# Patient Record
Sex: FEMALE | Race: WHITE | NOT HISPANIC OR LATINO | Employment: OTHER | URBAN - METROPOLITAN AREA
[De-identification: names, ages, dates, MRNs, and addresses within clinical notes are randomized per-mention and may not be internally consistent; named-entity substitution may affect disease eponyms.]

---

## 2017-01-03 DIAGNOSIS — C50.911 BREAST CARCINOMA, FEMALE, RIGHT: ICD-10-CM

## 2017-01-03 RX ORDER — ANASTROZOLE 1 MG/1
TABLET ORAL
Qty: 90 TABLET | Refills: 3 | Status: SHIPPED | OUTPATIENT
Start: 2017-01-03 | End: 2017-10-18 | Stop reason: SDUPTHER

## 2017-01-10 DIAGNOSIS — R06.09 DOE (DYSPNEA ON EXERTION): ICD-10-CM

## 2017-01-10 DIAGNOSIS — I10 ESSENTIAL HYPERTENSION: ICD-10-CM

## 2017-01-10 DIAGNOSIS — D33.2 BENIGN NEOPLASM OF BRAIN, UNSPECIFIED BRAIN REGION: ICD-10-CM

## 2017-01-10 DIAGNOSIS — I48.19 PERSISTENT ATRIAL FIBRILLATION: ICD-10-CM

## 2017-01-10 DIAGNOSIS — I38 VALVULAR REGURGITATION: ICD-10-CM

## 2017-01-10 DIAGNOSIS — C50.519 MALIGNANT NEOPLASM OF LOWER-OUTER QUADRANT OF FEMALE BREAST, UNSPECIFIED LATERALITY: ICD-10-CM

## 2017-01-11 ENCOUNTER — LAB VISIT (OUTPATIENT)
Dept: LAB | Facility: HOSPITAL | Age: 82
End: 2017-01-11
Attending: FAMILY MEDICINE
Payer: MEDICARE

## 2017-01-11 ENCOUNTER — ANTI-COAG VISIT (OUTPATIENT)
Dept: CARDIOLOGY | Facility: CLINIC | Age: 82
End: 2017-01-11

## 2017-01-11 DIAGNOSIS — I48.19 PERSISTENT ATRIAL FIBRILLATION: ICD-10-CM

## 2017-01-11 DIAGNOSIS — Z79.01 LONG TERM (CURRENT) USE OF ANTICOAGULANTS: ICD-10-CM

## 2017-01-11 DIAGNOSIS — I48.91 ATRIAL FIBRILLATION: ICD-10-CM

## 2017-01-11 LAB
INR PPP: 2.3
PROTHROMBIN TIME: 23.5 SEC

## 2017-01-11 PROCEDURE — 85610 PROTHROMBIN TIME: CPT

## 2017-01-11 PROCEDURE — 36415 COLL VENOUS BLD VENIPUNCTURE: CPT

## 2017-01-12 RX ORDER — ATENOLOL 25 MG/1
25 TABLET ORAL DAILY
Qty: 90 TABLET | Refills: 3 | Status: SHIPPED | OUTPATIENT
Start: 2017-01-12 | End: 2017-10-24 | Stop reason: SDUPTHER

## 2017-01-12 RX ORDER — LISINOPRIL 5 MG/1
TABLET ORAL
Qty: 90 TABLET | Refills: 3 | Status: SHIPPED | OUTPATIENT
Start: 2017-01-12 | End: 2017-10-18 | Stop reason: SDUPTHER

## 2017-01-12 RX ORDER — WARFARIN 2.5 MG/1
2.5 TABLET ORAL DAILY
Qty: 90 TABLET | Refills: 3 | Status: SHIPPED | OUTPATIENT
Start: 2017-01-12 | End: 2017-02-14

## 2017-01-18 ENCOUNTER — OFFICE VISIT (OUTPATIENT)
Dept: UROLOGY | Facility: CLINIC | Age: 82
End: 2017-01-18
Payer: MEDICARE

## 2017-01-18 VITALS
DIASTOLIC BLOOD PRESSURE: 75 MMHG | WEIGHT: 180.13 LBS | BODY MASS INDEX: 28.27 KG/M2 | SYSTOLIC BLOOD PRESSURE: 139 MMHG | HEIGHT: 67 IN | HEART RATE: 75 BPM

## 2017-01-18 DIAGNOSIS — R35.1 NOCTURIA: ICD-10-CM

## 2017-01-18 DIAGNOSIS — R35.0 INCREASED FREQUENCY OF URINATION: ICD-10-CM

## 2017-01-18 DIAGNOSIS — R39.15 URINARY URGENCY: Primary | ICD-10-CM

## 2017-01-18 PROCEDURE — 1157F ADVNC CARE PLAN IN RCRD: CPT | Mod: S$GLB,,, | Performed by: UROLOGY

## 2017-01-18 PROCEDURE — 87086 URINE CULTURE/COLONY COUNT: CPT

## 2017-01-18 PROCEDURE — 1126F AMNT PAIN NOTED NONE PRSNT: CPT | Mod: S$GLB,,, | Performed by: UROLOGY

## 2017-01-18 PROCEDURE — 1159F MED LIST DOCD IN RCRD: CPT | Mod: S$GLB,,, | Performed by: UROLOGY

## 2017-01-18 PROCEDURE — 99205 OFFICE O/P NEW HI 60 MIN: CPT | Mod: S$GLB,,, | Performed by: UROLOGY

## 2017-01-18 PROCEDURE — 1160F RVW MEDS BY RX/DR IN RCRD: CPT | Mod: S$GLB,,, | Performed by: UROLOGY

## 2017-01-18 PROCEDURE — 99999 PR PBB SHADOW E&M-EST. PATIENT-LVL III: CPT | Mod: PBBFAC,,, | Performed by: UROLOGY

## 2017-01-18 NOTE — MR AVS SNAPSHOT
[facility] - Urology 4th Floor  [address]  [place] [address]  Phone: [phone]                  Ngoc Castellanos   2017 10:20 AM   Office Visit    Description:  Female : 5/10/1925   Provider:  Ritu Zuniga MD   Department:  [facility] - Urology 4th Floor           Diagnoses this Visit        Comments    Urinary urgency    -  Primary     Nocturia         Increased frequency of urination                To Do List           Future Appointments        Provider Department Dept Phone    2017 11:20 AM Henry Maldonado MD [place] - Neurology [phone]    2017 8:00 AM APPOINTMENT LAB, [facility] [phone]    2017 2:40 PM Marcelino Stewart MD [place] - Cardiology [phone]      Goals (5 Years of Data)     None       These Medications        Disp Refills Start End    mirabegron (MYRBETRIQ) 25 mg Tb24 ER tablet 30 tablet 11 2017    Take 1 tablet (25 mg total) by mouth once daily. - Oral    Pharmacy: [facility] [address] [place] [address] [place] [address] [facility]  #: [phone]         [facility]     [facility] -  Assistance  Registered nurses in the [facility] provide clinical advisement, health education, appointment booking, and other advisory services.  Call for this free service at [phone].             Medications           Message regarding Medications     Verify the changes and/or additions to your medication regime listed below are the same as discussed with your clinician today.  If any of these changes or additions are incorrect, please notify your healthcare provider.        START taking these NEW medications        Refills    mirabegron (MYRBETRIQ) 25 mg Tb24 ER tablet 11    Sig: Take 1 tablet (25 mg total) by mouth once daily.    Class: Normal    Route: Oral           Verify that the below list of medications is an  "accurate representation of the medications you are currently taking.  If none reported, the list may be blank. If incorrect, please contact your healthcare provider. Carry this list with you in case of emergency.           Current Medications     anastrozole (ARIMIDEX) 1 mg Tab TAKE 1 TABLET ONE TIME DAILY    atenolol (TENORMIN) 25 MG tablet Take 1 tablet (25 mg total) by mouth once daily.    CALCIUM CARBONATE/VITAMIN D3 (CALCIUM 600 + D,3, ORAL) Take 1 tablet by mouth once daily.    lisinopril (PRINIVIL,ZESTRIL) 5 MG tablet Take 1 tablet (5 mg total)  by mouth once daily.    meclizine (ANTIVERT) 12.5 mg tablet Take 1 tablet (12.5 mg total) by mouth 3 (three) times daily as needed.    polyethylene glycol (GLYCOLAX) 17 gram/dose powder Take 17 g by mouth once daily.    VIT C/VIT E/LUTEIN/MIN/OMEGA-3 (OCUVITE ORAL) Take 1 tablet by mouth once daily.    warfarin (COUMADIN) 2.5 MG tablet Take 1 tablet (2.5 mg total) by mouth Daily. Mon, Wed, Thurs, Sat, Sun (AS DIRECTED BY COUMADIN CLINIC)    warfarin (COUMADIN) 5 MG tablet Take 1 tablet (5 mg total) by mouth Daily.    mirabegron (MYRBETRIQ) 25 mg Tb24 ER tablet Take 1 tablet (25 mg total) by mouth once daily.           Clinical Reference Information           Vital Signs - Last Recorded  Most recent update: 1/18/2017 10:24 AM by Richard White MA    BP Pulse Ht Wt BMI    139/75 (BP Location: Left arm, Patient Position: Sitting, BP Method: Automatic) 75 5' 6.5" (1.689 m) 81.7 kg (180 lb 1.9 oz) 28.64 kg/m2      Blood Pressure          Most Recent Value    BP  139/75      Allergies as of 1/18/2017     No Known Drug Allergies      Immunizations Administered on Date of Encounter - 1/18/2017     None      Orders Placed During Today's Visit      Normal Orders This Visit    Urine culture       "

## 2017-01-18 NOTE — LETTER
January 18, 2017      Jacobo Mcleod MD  [address] [facility] [address]           [facility] - Urology 4th Floor  [address]  [place] [address]  Phone: [phone]          Patient: Ngoc Castellanos   MR Number: [id number]   YOB: 1925   Date of Visit: 1/18/2017       Dear Dr. Jacobo Mcleod:    Thank you for referring Ngoc Castellanos to me for evaluation. Attached you will find relevant portions of my assessment and plan of care.    If you have questions, please do not hesitate to call me. I look forward to following Ngoc Castellanos along with you.    Sincerely,    Ritu Zuniga MD    Enclosure  CC:  No Recipients    If you would like to receive this communication electronically, please contact [email or web address] or [phone] to request more information on [employer] access.    For providers and/or their staff who would like to refer a patient to [facility], please contact us through our one-stop-shop provider referral line, [facility], at [phone].    If you feel you have received this communication in error or would no longer like to receive these types of communications, please e-mail [email or web address]

## 2017-01-18 NOTE — PROGRESS NOTES
CHIEF COMPLAINT:    Mrs. Castellanos is a 89 y.o. female presenting for a consultation at the request of Dr. Mcleod. Patient presents with urinary urgency, frequency and nocturia.    PRESENTING ILLNESS:    Ngoc Castellanos is a 89 y.o. female who states she has a history of urinary urgency, frequency and nocturia.  She denies incontinence on a regular basis.  She thinks she may have had urge incontinence 4-5 times in the past year.  She wears a panty liner just in case.  During the day, she urinates every 2-3 hours.  At night she has nocturia x 3.  She denies any gross hematuria nor does she have a history of recurrent UTI.  She is a very healthy 89 year old and is active in the assisted living home in which she lives.  She is on warfarin and has a history of afib and Mitral valve prolapse.  She also has a history of breast cancer, has been on anti estrogens.  She also has a history of meningioma and reports that she will be seeing Dr. Maldonado soon for a staggering gait, feeling a fogginess about her temples.      G0, hysterectomy for tumors of the uterus, not sexually active, has constipation, treats with Miralax.      REVIEW OF SYSTEMS:    Review of Systems   Constitutional: Negative.    HENT: Negative.    Eyes: Negative.    Respiratory: Negative.    Cardiovascular: Negative.    Gastrointestinal: Positive for constipation.   Genitourinary: Positive for frequency and urgency.        Nocturia   Musculoskeletal: Positive for joint pain. Negative for myalgias.   Skin: Negative.    Neurological: Positive for dizziness and focal weakness.   Endo/Heme/Allergies: Does not bruise/bleed easily.   Psychiatric/Behavioral: Negative.          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     left    Valvular regurgitation      moderate MR       Past Surgical History   Procedure Laterality Date    Brain surgery  2002     meningioma    Hysterectomy      Tonsillectomy      Hemorrhoid surgery      Appendectomy      Breast biopsy  2/2014     Right breast- [facility]    Breast surgery  03/17/14     right mastectomy    Breast cyst excision  1960     left breast - benign       Family History   Problem Relation Age of Onset    Breast cancer Neg Hx     Ovarian cancer Neg Hx      Social History    Marital status:      Social History Main Topics    Smoking status: Never Smoker    Smokeless tobacco: Never Used    Alcohol use No    Drug use: No    Sexual activity: Not on file       Allergies:  No known drug allergies    Medications:  Outpatient Encounter Prescriptions as of 1/18/2017   Medication Sig Dispense Refill    anastrozole (ARIMIDEX) 1 mg Tab TAKE 1 TABLET ONE TIME DAILY 90 tablet 3    atenolol (TENORMIN) 25 MG tablet Take 1 tablet (25 mg total) by mouth once daily. 90 tablet 3    CALCIUM CARBONATE/VITAMIN D3 (CALCIUM 600 + D,3, ORAL) Take 1 tablet by mouth once daily.      lisinopril (PRINIVIL,ZESTRIL) 5 MG tablet Take 1 tablet (5 mg total)  by mouth once daily. 90 tablet 3    meclizine (ANTIVERT) 12.5 mg tablet Take 1 tablet (12.5 mg total) by mouth 3 (three) times daily as needed. 60 tablet 2    polyethylene glycol (GLYCOLAX) 17 gram/dose powder Take 17 g by mouth once daily.      VIT C/VIT E/LUTEIN/MIN/OMEGA-3 (OCUVITE ORAL) Take 1 tablet by mouth once daily.      warfarin (COUMADIN) 2.5 MG tablet Take 1 tablet (2.5 mg total) by mouth Daily. Mon, Wed, Thurs, Sat, Sun (AS DIRECTED BY COUMADIN CLINIC) 90 tablet 3    warfarin (COUMADIN) 5 MG tablet Take 1 tablet (5 mg total) by mouth Daily. (Patient taking differently: Take 5 mg by mouth Daily. 5 mg tabs Tues, Fri.) 90 tablet 3    mirabegron (MYRBETRIQ) 25 mg Tb24 ER tablet Take 1 tablet (25 mg  total) by mouth once daily. 30 tablet 11     No facility-administered encounter medications on file as of 1/18/2017.          PHYSICAL EXAMINATION:    The patient generally appears in good health, is appropriately interactive, and is in no apparent distress.    Skin: No lesions.    Mental: Cooperative with normal affect.    Neuro: Grossly intact.    HEENT: Normal. No evidence of lymphadenopathy.    Chest: normal inspiratory effort.    Abdomen:  Soft, non-tender. No masses or organomegaly. Bladder is not palpable. No evidence of flank discomfort. No evidence of inguinal hernia.    Extremities: No clubbing, cyanosis, or edema    Normal external female genitalia  Grade II urogenital atrophy  Urethral meatus with a small urethral caruncle  Urethra and bladder are nontender to bimanual exam  Well supported anteriorly and posteriorly   Uterus and cervix are surgically absent  No adnexal masses  PVR by catheterization was 30 ml    LABS:    UA 1.005, pH 7, tr protein, tr blood otherwise, negative (voided specimen)    IMPRESSION:    Encounter Diagnoses   Name Primary?    Urinary urgency Yes    Nocturia     Increased frequency of urination        PLAN:    1.  Myrbetriq was prescribed.    2.  The catheterized specimen was sent for culture  3.  Follow up in 4-6 weeks.     Copy to: Dr. Mcleod

## 2017-01-19 LAB — BACTERIA UR CULT: NO GROWTH

## 2017-01-20 ENCOUNTER — OFFICE VISIT (OUTPATIENT)
Dept: NEUROLOGY | Facility: CLINIC | Age: 82
End: 2017-01-20
Payer: MEDICARE

## 2017-01-20 VITALS — HEIGHT: 67 IN | WEIGHT: 180.31 LBS | BODY MASS INDEX: 28.3 KG/M2

## 2017-01-20 DIAGNOSIS — G60.9 IDIOPATHIC PERIPHERAL NEUROPATHY: ICD-10-CM

## 2017-01-20 DIAGNOSIS — R26.9 GAIT DISORDER: ICD-10-CM

## 2017-01-20 DIAGNOSIS — R25.1 TREMOR: Primary | ICD-10-CM

## 2017-01-20 PROCEDURE — 1159F MED LIST DOCD IN RCRD: CPT | Mod: S$GLB,,, | Performed by: NEUROMUSCULOSKELETAL MEDICINE & OMM

## 2017-01-20 PROCEDURE — 1160F RVW MEDS BY RX/DR IN RCRD: CPT | Mod: S$GLB,,, | Performed by: NEUROMUSCULOSKELETAL MEDICINE & OMM

## 2017-01-20 PROCEDURE — 99499 UNLISTED E&M SERVICE: CPT | Mod: S$GLB,,, | Performed by: NEUROMUSCULOSKELETAL MEDICINE & OMM

## 2017-01-20 PROCEDURE — 1126F AMNT PAIN NOTED NONE PRSNT: CPT | Mod: S$GLB,,, | Performed by: NEUROMUSCULOSKELETAL MEDICINE & OMM

## 2017-01-20 PROCEDURE — 99999 PR PBB SHADOW E&M-EST. PATIENT-LVL III: CPT | Mod: PBBFAC,,, | Performed by: NEUROMUSCULOSKELETAL MEDICINE & OMM

## 2017-01-20 PROCEDURE — 99215 OFFICE O/P EST HI 40 MIN: CPT | Mod: S$GLB,,, | Performed by: NEUROMUSCULOSKELETAL MEDICINE & OMM

## 2017-01-20 PROCEDURE — 1157F ADVNC CARE PLAN IN RCRD: CPT | Mod: S$GLB,,, | Performed by: NEUROMUSCULOSKELETAL MEDICINE & OMM

## 2017-01-20 NOTE — PROGRESS NOTES
Ngoc E Pack  5/10/1925  Review of patient's allergies indicates:   Allergen Reactions    No known drug allergies      [unfilled]    Past Medical History   Diagnosis Date    *Atrial fibrillation     Atrial fibrillation     Breast cancer 2/2014     Right breast infiltrating ductal CA, ER/IN positive, Her2 equivocal    H/O total mastectomy of right breast 03/17/14    Hypertension     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     left    Valvular regurgitation      moderate MR     Social History     Social History    Marital status:      Spouse name: N/A    Number of children: N/A    Years of education: N/A     Occupational History    Not on file.     Social History Main Topics    Smoking status: Never Smoker    Smokeless tobacco: Never Used    Alcohol use No    Drug use: No    Sexual activity: Not on file     Other Topics Concern    Not on file     Social History Narrative     Family History   Problem Relation Age of Onset    Breast cancer Neg Hx     Ovarian cancer Neg Hx        Review of systems:  Constitutional-negative  Eyes-negative  ENT, mouth-negative  Cardiovascular-negative  Respiratory-negative  GI-negative  - negative  Musculoskeletal-negative  Skin-negative  Neurologic-negative  Psychiatric-negative  Endocrine-negative  Hematology/lymph nodes-negative  Allergies/immunology-negative  Ngoc E Pack  5/10/1925  Review of patient's allergies indicates:   Allergen Reactions    No known drug allergies      [unfilled]    Past Medical History   Diagnosis Date    *Atrial fibrillation     Atrial fibrillation     Breast cancer 2/2014     Right breast infiltrating ductal CA, ER/IN positive, Her2 equivocal    H/O total mastectomy of right breast 03/17/14    Hypertension     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     left    Valvular regurgitation      moderate MR     Social History     Social History    Marital status:      Spouse name: N/A    Number of  children: N/A    Years of education: N/A     Occupational History    Not on file.     Social History Main Topics    Smoking status: Never Smoker    Smokeless tobacco: Never Used    Alcohol use No    Drug use: No    Sexual activity: Not on file     Other Topics Concern    Not on file     Social History Narrative     Family History   Problem Relation Age of Onset    Breast cancer Neg Hx     Ovarian cancer Neg Hx        Review of systems:  Constitutional-negative  Eyes-negative  ENT, mouth-negative  Cardiovascular-negative  Respiratory-negative  GI-negative  - negative  Musculoskeletal-negative  Skin-negative  Neurologic-negative  Psychiatric-negative  Endocrine-negative  Hematology/lymph nodes-negative  Allergies/immunology-negative  Gen. Appearance: Well-developed with no obvious deformities  Carotid arteries symmetrical pulses  Peripheral vascular shows symmetrical pulses with no obvious edema or tenderness  Social History : Patient lives in a care home center.  Present history:   This is a 89-year-old white female who complains of foggy headedness and her thinking and off-balance.  She had some difficulty last week thinking straight.  She had seen me 2 years ago for right postural tremor.  She has a history of status post meningioma surgery.  According to her the tremor is very slightly progress.  Patient is hard of hearing which may contribute to some of her cognitive issues.  She has no specific complaints in terms of cognitive problems but just generally feels like her head feels foggy at times.  She remains quite sharp for 89 years old.  She also complains of balance problems and is noticed that she looks at the ground when she walks.    Previous note: 1-5-15:: Patient is doing well with resolution of previous parieto-occipital pains. She only very rarely has as these pains. Patient has been walking a lot Regularly,  Her tremor is only bothersome on occasion  Pevious note : 5-19-14 :She only  "occasionally has a minor left parietal spasm from her old surgical scar. Since her last visit she has had a right mastectomy for breast cancer. She denies any significant tremors although does complain of some difficulty writing. Overall she feels that she is doing quite well.       Patient presents for followup and test results. Her MRI of the brain is stable. EEG shows no evidence of seizure activity although she does have some mild changes consistent with her previous craniotomy. Her biggest concern is persistent handwriting problem since her brain surgery. She is aware that there is not much we can do to change that and she is not interested in medication for tremors at the present time.She continues to get the brief neuralgic type pains in the back of the head lasting less than a minute which she has accepted are related to scar tissue from her brain surgery.      Neurological Exam:   Mental status-alert and oriented to person, place, and time; attention span and concentration is good. Fund of knowledge-patient is aware of current events and able to give detailed history of the current problem.recent and remote memory seems intact.  Patient can subtract serial sevens and spell "world" backwards.  Language function is normal with no evidence of aphasia     Cranial nerves:Visual acuity to hand chart -normal; visual fields to confrontation normal;pupils were equal and reactive to light ; funduscopic examination was normal with sharp disc margins. external ocular movements were full with no nystagmus. Facial sensation to pinprick and corneal reflexes intact; Facial muscles were symmetrical. Hearing is unimpaired symmetrical finger rub; Tongue and palate movements were normal with swallowing unimpaired. Shoulder shrug was intact with good strength Speech was normal   Motor examination: Upper and Lower extremities - Normal and symmetrical;muscle tone was normal ; right-handed   Sensory examination:Upper -Normal; " lower extremities - Pinprick and soft touch Decreased 40% at the toes. Vibration sense -Absent at the toes   Deep tendon reflexes -Absent. Both plantar responses were flexor   Cerebellar examination upper: Normal finger to nose and rapid alternating movements   Gait: Steady with no ataxia; heel and toe walk normal   Romberg test: Positive Tandem gait: Normal   Involuntary movements: Mild right postural tremor   Cervical examination: Full range of motion with no pain Cervical tenderness:negative   Lumbar examination: Low back tenderness-negative Sciatic notch tenderness-negative Straight leg raising test-negative   Impression:Left parietal Neuralgia pain probably related to scar tissue postoperatively;History of meningioma status post craniotomy;Postural tremor on the right; history of peripheral neuropathy   Recommendations/plan: Followup 1 year                   Impression: Mild postural tremor; balance problems secondary to neuropathy of aging    Recommendations/Plan : Long discussion in reference to aging process of neuropathy and importance of watching her feet since she has no proprioception.  We discussed her going up and down the stairs and suggested at this point I would not do that.  She will continue to walk for exercise.  No further workup; follow-up 1 year

## 2017-02-01 ENCOUNTER — ANTI-COAG VISIT (OUTPATIENT)
Dept: CARDIOLOGY | Facility: CLINIC | Age: 82
End: 2017-02-01

## 2017-02-01 ENCOUNTER — LAB VISIT (OUTPATIENT)
Dept: LAB | Facility: HOSPITAL | Age: 82
End: 2017-02-01
Attending: INTERNAL MEDICINE
Payer: MEDICARE

## 2017-02-01 DIAGNOSIS — I10 ESSENTIAL HYPERTENSION: ICD-10-CM

## 2017-02-01 DIAGNOSIS — Z79.01 LONG TERM (CURRENT) USE OF ANTICOAGULANTS: ICD-10-CM

## 2017-02-01 DIAGNOSIS — I48.19 PERSISTENT ATRIAL FIBRILLATION: ICD-10-CM

## 2017-02-01 LAB
INR PPP: 3.1
PROTHROMBIN TIME: 31.4 SEC

## 2017-02-01 PROCEDURE — 85610 PROTHROMBIN TIME: CPT

## 2017-02-01 PROCEDURE — 36415 COLL VENOUS BLD VENIPUNCTURE: CPT

## 2017-02-14 ENCOUNTER — LAB VISIT (OUTPATIENT)
Dept: LAB | Facility: HOSPITAL | Age: 82
End: 2017-02-14
Attending: INTERNAL MEDICINE
Payer: MEDICARE

## 2017-02-14 ENCOUNTER — OFFICE VISIT (OUTPATIENT)
Dept: CARDIOLOGY | Facility: CLINIC | Age: 82
End: 2017-02-14
Payer: MEDICARE

## 2017-02-14 ENCOUNTER — ANTI-COAG VISIT (OUTPATIENT)
Dept: CARDIOLOGY | Facility: CLINIC | Age: 82
End: 2017-02-14

## 2017-02-14 VITALS
WEIGHT: 180 LBS | HEART RATE: 72 BPM | BODY MASS INDEX: 28.93 KG/M2 | DIASTOLIC BLOOD PRESSURE: 74 MMHG | SYSTOLIC BLOOD PRESSURE: 120 MMHG | HEIGHT: 66 IN

## 2017-02-14 DIAGNOSIS — R51.9 GENERALIZED HEADACHES: ICD-10-CM

## 2017-02-14 DIAGNOSIS — I48.19 PERSISTENT ATRIAL FIBRILLATION: ICD-10-CM

## 2017-02-14 DIAGNOSIS — Z79.01 LONG TERM (CURRENT) USE OF ANTICOAGULANTS: ICD-10-CM

## 2017-02-14 DIAGNOSIS — G60.9 IDIOPATHIC PERIPHERAL NEUROPATHY: ICD-10-CM

## 2017-02-14 DIAGNOSIS — I38 VALVULAR REGURGITATION: ICD-10-CM

## 2017-02-14 DIAGNOSIS — N18.30 CKD (CHRONIC KIDNEY DISEASE) STAGE 3, GFR 30-59 ML/MIN: ICD-10-CM

## 2017-02-14 DIAGNOSIS — I10 ESSENTIAL HYPERTENSION: ICD-10-CM

## 2017-02-14 DIAGNOSIS — R42 VERTIGO: ICD-10-CM

## 2017-02-14 DIAGNOSIS — I48.91 ATRIAL FIBRILLATION, UNSPECIFIED TYPE: Primary | ICD-10-CM

## 2017-02-14 DIAGNOSIS — R26.9 GAIT DISORDER: ICD-10-CM

## 2017-02-14 LAB
INR PPP: 2.4
PROTHROMBIN TIME: 24.4 SEC

## 2017-02-14 PROCEDURE — 99213 OFFICE O/P EST LOW 20 MIN: CPT | Mod: S$GLB,,, | Performed by: INTERNAL MEDICINE

## 2017-02-14 PROCEDURE — 1126F AMNT PAIN NOTED NONE PRSNT: CPT | Mod: S$GLB,,, | Performed by: INTERNAL MEDICINE

## 2017-02-14 PROCEDURE — 1157F ADVNC CARE PLAN IN RCRD: CPT | Mod: S$GLB,,, | Performed by: INTERNAL MEDICINE

## 2017-02-14 PROCEDURE — 36415 COLL VENOUS BLD VENIPUNCTURE: CPT

## 2017-02-14 PROCEDURE — 1160F RVW MEDS BY RX/DR IN RCRD: CPT | Mod: S$GLB,,, | Performed by: INTERNAL MEDICINE

## 2017-02-14 PROCEDURE — 99499 UNLISTED E&M SERVICE: CPT | Mod: S$GLB,,, | Performed by: INTERNAL MEDICINE

## 2017-02-14 PROCEDURE — 99999 PR PBB SHADOW E&M-EST. PATIENT-LVL III: CPT | Mod: PBBFAC,,, | Performed by: INTERNAL MEDICINE

## 2017-02-14 PROCEDURE — 85610 PROTHROMBIN TIME: CPT

## 2017-02-14 PROCEDURE — 1159F MED LIST DOCD IN RCRD: CPT | Mod: S$GLB,,, | Performed by: INTERNAL MEDICINE

## 2017-02-14 NOTE — PROGRESS NOTES
Subjective:    Patient ID:  Ngoc Castellanos is a 89 y.o. female who presents for follow-up of Atrial Fibrillation      HPI  89 y/o female former pt of Dr. Ford. She has a hx of persistent AFib on chronic anticoagulation with coumadin, HTN, mild MR, CKD 3 who presents for f/u. She was on Digoxin that was stopped and atenolol started. She has been tolerating her medical regimen well and has no new complaints. She denies CP, orthopnea, PND, syncope, palps. She does have intermittent OLIVIA after climbing 3 flights of stairs which is unchanged. She is compliant with her meds. She is inquiring about stopping coumadin and starting a NOAC. She is very active.     Review of Systems   Constitution: Negative for weakness and malaise/fatigue.   HENT: Negative for congestion and headaches.    Eyes: Negative for blurred vision.   Cardiovascular: Positive for dyspnea on exertion. Negative for chest pain, claudication, cyanosis, irregular heartbeat, leg swelling, near-syncope, orthopnea, palpitations, paroxysmal nocturnal dyspnea and syncope.   Respiratory: Negative for shortness of breath.    Endocrine: Negative for polyuria.   Hematologic/Lymphatic: Negative for bleeding problem.   Skin: Negative for itching and rash.   Musculoskeletal: Negative for joint swelling, muscle cramps and muscle weakness.   Gastrointestinal: Negative for abdominal pain, hematemesis, hematochezia, melena, nausea and vomiting.   Genitourinary: Negative for dysuria and hematuria.   Neurological: Negative for dizziness, focal weakness, light-headedness and loss of balance.   Psychiatric/Behavioral: Negative for depression. The patient is not nervous/anxious.         Objective:    Physical Exam   Constitutional: She is oriented to person, place, and time. She appears well-developed and well-nourished.   HENT:   Head: Normocephalic and atraumatic.   Neck: Neck supple. No JVD present.   Cardiovascular: Normal rate.  An irregularly irregular rhythm present.    Murmur heard.   Systolic murmur is present with a grade of 2/6   Pulses:       Carotid pulses are 2+ on the right side, and 2+ on the left side.       Radial pulses are 2+ on the right side, and 2+ on the left side.        Femoral pulses are 2+ on the right side, and 2+ on the left side.       Dorsalis pedis pulses are 2+ on the right side, and 2+ on the left side.        Posterior tibial pulses are 2+ on the right side, and 2+ on the left side.   Pulmonary/Chest: Effort normal and breath sounds normal.   Abdominal: Soft. Bowel sounds are normal.   Musculoskeletal: She exhibits no edema.   Neurological: She is alert and oriented to person, place, and time.   Skin: Skin is warm and dry.   Psychiatric: She has a normal mood and affect. Her behavior is normal. Thought content normal.         Assessment:       1. Atrial fibrillation, unspecified type    2. Generalized headaches    3. Idiopathic peripheral neuropathy    4. Essential hypertension    5. Persistent atrial fibrillation    6. CKD (chronic kidney disease) stage 3, GFR 30-59 ml/min    7. Gait disorder    8. Long term (current) use of anticoagulants    9. Valvular regurgitation    10. Vertigo      89 y/o female with hx and presentation as above. Doing well from a cardiac perspective and has no active cardiac complaints. We discussed at length anticoagulation in the setting of Afib and elevated CHADSVasc score and risks/benefits of starting Eliquis. Pt opted for trying Eliquis and will initiate once INR is < 2. Will check INR on Friday.        Plan:       -D/C coumadin  -Check INR on Friday and if <2, start Eliquis 5 mg BiD  -f/u in 6 months or PRN

## 2017-02-17 ENCOUNTER — LAB VISIT (OUTPATIENT)
Dept: LAB | Facility: HOSPITAL | Age: 82
End: 2017-02-17
Attending: INTERNAL MEDICINE
Payer: MEDICARE

## 2017-02-17 ENCOUNTER — ANTI-COAG VISIT (OUTPATIENT)
Dept: CARDIOLOGY | Facility: CLINIC | Age: 82
End: 2017-02-17

## 2017-02-17 DIAGNOSIS — Z79.01 LONG TERM (CURRENT) USE OF ANTICOAGULANTS: ICD-10-CM

## 2017-02-17 DIAGNOSIS — I48.91 ATRIAL FIBRILLATION: ICD-10-CM

## 2017-02-17 DIAGNOSIS — I48.19 PERSISTENT ATRIAL FIBRILLATION: ICD-10-CM

## 2017-02-17 LAB
INR PPP: 1.8
PROTHROMBIN TIME: 18.7 SEC

## 2017-02-17 PROCEDURE — 85610 PROTHROMBIN TIME: CPT

## 2017-02-17 PROCEDURE — 36415 COLL VENOUS BLD VENIPUNCTURE: CPT

## 2017-02-24 ENCOUNTER — OFFICE VISIT (OUTPATIENT)
Dept: UROLOGY | Facility: CLINIC | Age: 82
End: 2017-02-24
Payer: MEDICARE

## 2017-02-24 VITALS
SYSTOLIC BLOOD PRESSURE: 130 MMHG | BODY MASS INDEX: 28.48 KG/M2 | HEIGHT: 67 IN | DIASTOLIC BLOOD PRESSURE: 72 MMHG | WEIGHT: 181.44 LBS | HEART RATE: 62 BPM

## 2017-02-24 DIAGNOSIS — Z87.898 HISTORY OF URINARY URGENCY: Primary | ICD-10-CM

## 2017-02-24 PROCEDURE — 1159F MED LIST DOCD IN RCRD: CPT | Mod: S$GLB,,, | Performed by: UROLOGY

## 2017-02-24 PROCEDURE — 1126F AMNT PAIN NOTED NONE PRSNT: CPT | Mod: S$GLB,,, | Performed by: UROLOGY

## 2017-02-24 PROCEDURE — 1160F RVW MEDS BY RX/DR IN RCRD: CPT | Mod: S$GLB,,, | Performed by: UROLOGY

## 2017-02-24 PROCEDURE — 99999 PR PBB SHADOW E&M-EST. PATIENT-LVL III: CPT | Mod: PBBFAC,,, | Performed by: UROLOGY

## 2017-02-24 PROCEDURE — 1157F ADVNC CARE PLAN IN RCRD: CPT | Mod: S$GLB,,, | Performed by: UROLOGY

## 2017-02-24 PROCEDURE — 99212 OFFICE O/P EST SF 10 MIN: CPT | Mod: S$GLB,,, | Performed by: UROLOGY

## 2017-02-24 NOTE — PROGRESS NOTES
CHIEF COMPLAINT:    Mrs. Castellanos is a 89 y.o. female presenting for a follow up on urinary urgency.      PRESENTING ILLNESS:    Ngoc Castellanos is a 89 y.o. female who returns for follow up.  She states that she read the Bladder Matters book and changed her behavior.  She feels much better and though she got the Mirabegron Rx, she has not taken it and no longer has urgency.  She curtailed her coffee and tea intake.  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    HENT: Negative.    Eyes: Negative.    Respiratory: Negative.    Cardiovascular: Negative.    Gastrointestinal: Negative.    Genitourinary: Negative for urgency.   Musculoskeletal: Negative.    Skin: Negative.    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   Endo/Heme/Allergies: Bruises/bleeds easily (on Eliquis).   Psychiatric/Behavioral: Negative.      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    left    Valvular regurgitation     moderate MR       Past Surgical History:   Procedure Laterality Date    APPENDECTOMY      BRAIN SURGERY  2002    meningioma    BREAST BIOPSY  2/2014    Right breast- [facility]    BREAST CYST EXCISION  1960    left breast - benign    BREAST SURGERY  03/17/14    right mastectomy    HEMORRHOID SURGERY      HYSTERECTOMY      TONSILLECTOMY         Family History negative   Problem Relation Age of Onset     Social History    Marital status:      Social History Main Topics    Smoking status: Never Smoker    Smokeless tobacco: Never  Used    Alcohol use No    Drug use: No    Sexual activity: Not on file       Allergies:  No known drug allergies    Medications:  Outpatient Encounter Prescriptions as of 2/24/2017   Medication Sig Dispense Refill    anastrozole (ARIMIDEX) 1 mg Tab TAKE 1 TABLET ONE TIME DAILY 90 tablet 3    apixaban 5 mg Tab Take 1 tablet (5 mg total) by mouth 2 (two) times daily. 60 tablet 0    atenolol (TENORMIN) 25 MG tablet Take 1 tablet (25 mg total) by mouth once daily. 90 tablet 3    CALCIUM CARBONATE/VITAMIN D3 (CALCIUM 600 + D,3, ORAL) Take 1 tablet by mouth once daily.      lisinopril (PRINIVIL,ZESTRIL) 5 MG tablet Take 1 tablet (5 mg total)  by mouth once daily. 90 tablet 3    meclizine (ANTIVERT) 12.5 mg tablet Take 1 tablet (12.5 mg total) by mouth 3 (three) times daily as needed. 60 tablet 2    polyethylene glycol (GLYCOLAX) 17 gram/dose powder Take 17 g by mouth once daily.      VIT C/VIT E/LUTEIN/MIN/OMEGA-3 (OCUVITE ORAL) Take 1 tablet by mouth once daily.      mirabegron (MYRBETRIQ) 25 mg Tb24 ER tablet Take 1 tablet (25 mg total) by mouth once daily. 30 tablet 11     No facility-administered encounter medications on file as of 2/24/2017.          PHYSICAL EXAMINATION:    The patient generally appears in good health, is appropriately interactive, and is in no apparent distress.    Skin: No lesions.    Mental: Cooperative with normal affect.    Neuro: Grossly intact.    HEENT: Normal. No evidence of lymphadenopathy.    Chest: normal inspiratory effort.    Abdomen: Soft, non-tender. No masses or organomegaly. Bladder is not palpable. No evidence of flank discomfort. No evidence of inguinal hernia.    Extremities: No clubbing, cyanosis, or edema    IMPRESSION:    History of urgency    PLAN:    1.  Discussed that she has managed the urgency symptoms in the best way possible with behavioral therapy.  So pleased that it has resolved.   2.  Follow up as needed.

## 2017-03-02 ENCOUNTER — TELEPHONE (OUTPATIENT)
Dept: CARDIOLOGY | Facility: CLINIC | Age: 82
End: 2017-03-02

## 2017-03-02 NOTE — TELEPHONE ENCOUNTER
----- Message from Whit Brady PharmD sent at 3/2/2017  1:59 PM CST -----  Contact: [phone]  Sorry, but I can't refill this.  We discharged her last month because she was supposed to be switching to Eliquis.  If she isn't taking the Eliquis and wants to stick with Warfarin, please have Dr. Stewart re-enroll her with us.    Thanks!  Whit    ----- Message -----     From: Dontae Macedo LPN     Sent: 3/2/2017   8:29 AM       To: [facility] Coumad Provider        ----- Message -----     From: Marcelino Stewart MD     Sent: 3/2/2017   8:20 AM       To: [facility] Staff     Please rake care of this   Thanks  MARTHA    ----- Message -----     From: Eliza Holt MA     Sent: 3/1/2017  10:06 AM       To: Marcelino Stewart MD        ----- Message -----     From: Lizzy Lantigua     Sent: 3/1/2017   9:23 AM       To: [facility] Staff    Pt requesting refill for warfarin (COUMADIN) tablet 5 mg/ 90 day supply Pt requesting generic form of medication/    [facility] [phone]

## 2017-03-02 NOTE — TELEPHONE ENCOUNTER
Called patient to confirm which anticoagulant she was taking.  Patient stated she is taking apixaban and requested a refill for this medication on yesterday.  Would like a 90 day supply sent to [facility] pharmacy.  Informed prescription to be submitted.  Voiced understanding.

## 2017-03-23 ENCOUNTER — OFFICE VISIT (OUTPATIENT)
Dept: FAMILY MEDICINE | Facility: CLINIC | Age: 82
End: 2017-03-23
Payer: MEDICARE

## 2017-03-23 VITALS
BODY MASS INDEX: 28.68 KG/M2 | SYSTOLIC BLOOD PRESSURE: 126 MMHG | HEIGHT: 67 IN | WEIGHT: 182.75 LBS | HEART RATE: 85 BPM | OXYGEN SATURATION: 96 % | DIASTOLIC BLOOD PRESSURE: 74 MMHG

## 2017-03-23 DIAGNOSIS — E16.2 HYPOGLYCEMIA: ICD-10-CM

## 2017-03-23 DIAGNOSIS — N18.30 CKD (CHRONIC KIDNEY DISEASE) STAGE 3, GFR 30-59 ML/MIN: ICD-10-CM

## 2017-03-23 DIAGNOSIS — I10 ESSENTIAL HYPERTENSION: Primary | ICD-10-CM

## 2017-03-23 PROCEDURE — 1157F ADVNC CARE PLAN IN RCRD: CPT | Mod: S$GLB,,, | Performed by: FAMILY MEDICINE

## 2017-03-23 PROCEDURE — 99499 UNLISTED E&M SERVICE: CPT | Mod: S$GLB,,, | Performed by: FAMILY MEDICINE

## 2017-03-23 PROCEDURE — 99214 OFFICE O/P EST MOD 30 MIN: CPT | Mod: S$GLB,,, | Performed by: FAMILY MEDICINE

## 2017-03-23 PROCEDURE — 99999 PR PBB SHADOW E&M-EST. PATIENT-LVL III: CPT | Mod: PBBFAC,,, | Performed by: FAMILY MEDICINE

## 2017-03-23 PROCEDURE — 1159F MED LIST DOCD IN RCRD: CPT | Mod: S$GLB,,, | Performed by: FAMILY MEDICINE

## 2017-03-23 PROCEDURE — 1160F RVW MEDS BY RX/DR IN RCRD: CPT | Mod: S$GLB,,, | Performed by: FAMILY MEDICINE

## 2017-03-23 PROCEDURE — 1126F AMNT PAIN NOTED NONE PRSNT: CPT | Mod: S$GLB,,, | Performed by: FAMILY MEDICINE

## 2017-03-23 NOTE — PATIENT INSTRUCTIONS
Chronic Kidney Disease (CKD)    The role of the kidneys is to remove waste products and extra water from the blood.  When the kidneys do not work as they should, waste products begin to build up in the blood. This is called chronic kidney disease (CKD). CKD means that you have kidney damage or a decrease in kidney function lasting at least 3 months. CKD allows extra water, waste, and toxins to build up in the body. This can eventually become life-threatening. You might need dialysis or a kidney transplant to stay alive. This most severe form is called end stage renal disease.  Diabetes is the leading causes of chronic renal failure. Other causes include high blood pressure, hardening of the arteries (atherosclerosis), lupus, inflammation of the blood vessels (vasculitis), and past viral or bacterial infections. Certain over-the-counter pain medicines can cause renal failure when taken often over a long period of time. These include aspirin, ibuprofen, and related anti-inflammatory medicines called NSAIDs (nonsteroidal anti-inflammatory drugs).  Home care  The following guidelines will help you care for yourself at home:  · If you have diabetes, talk with your healthcare provider about keeping your blood sugar under control. Ask if you need to make and changes to your diet, lifestyle, or medicines.  · If you have high blood pressure:  ¨ Take prescribed medicine to lower your blood pressure to the recommended goal of less than 130/80.  ¨ Start a regular exercise program that you enjoy. Check with your healthcare provider to be sure your planned exercise program is right for you.  ¨ Eat less salt (sodium). Your healthcare provider can tell you how much salt per day is safe for you.  · If you are overweight, talk with your healthcare provider about a weight loss plan.  · If you smoke, you must quit. Smoking makes kidney disease worse. Talk with your healthcare provider about ways to help you quit.  For more  information, visit the following links:  ¨ www.smokefree.gov/sites/default/files/pdf/clearing-the-air-accessible.pdf  ¨ www.smokefree.gov  ¨ www.cancer.org/healthy/stayawayfromtobacco/guidetoquittingsmoking/  · Most people with CKD need to follow a special diet.  Be sure you understand yours. In general, you will need to limit protein, salt, potassium, and phosphorus. You also need to limit how much fluid you drink.   · CKD is a risk factor for heart disease. Talk with your healthcare provider about any other risk factors you might have and what you can do to lessen them.  · Talk with your healthcare provider about any medicines you are taking to find out if they need to be reduced or stopped.  · Don't use the following over-the-counter medicines, or consult your healthcare provider before using:  ¨ Aspirin and NSAIDs such as ibuprofen or naproxen. Using acetaminophen for fever or pain is OK.  ¨ Laxatives and antacids containing magnesium or aluminum  ¨ Fleet or phospho soda enemas containing phosphorus  ¨ Certain stomach acid-blocking medicine such as cimetidine or ranitidine   ¨ Decongestants containing pseudoephedrine   ¨ Herbal supplements  Follow-up care  Follow up with your healthcare provider, or as advised. Contact one of the following for more information:  · American Association of Kidney Patients 088-815-9751 www.aakp.org  · National Kidney Foundation 535-366-7595 www.kidney.org  · American Kidney Fund 561-729-5783 www.kidneyfund.org  · National Kidney Disease Education Program 866-4KIDNEY www.nkdep.nih.gov  If an X-ray, ECG (cardiogram), or other diagnostic test was taken, you will be told of any new findings that may affect your care.  Call 911  Call 911 if you have any of the following:  · Severe weakness, dizziness, fainting, drowsiness, or confusion  · Chest pain or shortness of breath  · Heart beating fast, slow, or irregularly  When to seek medical advice  Call your healthcare provider right away  if any of these occur:  · Nausea or vomiting  · Fever of 100.4°F (38°C) or higher, or as directed by your healthcare provider  · Unexpected weight gain or swelling in the legs, ankles, or around the eyes  · Decrease or absent urine output  Date Last Reviewed: 9/1/2016  © 8575-9888 [employer]. [address] [place] [address], [place] [address]. All rights reserved. This information is not intended as a substitute for professional medical care. Always follow your healthcare professional's instructions.        Eating Heart-Healthy Foods  Eating has a big impact on your heart health. In fact, eating healthier can improve several of your heart risks at once. For instance, it helps you manage weight, cholesterol, and blood pressure. Here are ideas to help you make heart-healthy changes without giving up all the foods and flavors you love.  Getting started  · Talk with your health care provider about eating plans, such as the DASH or Mediterranean diet. You may also be referred to a dietitian.  · Change a few things at a time. Give yourself time to get used to a few eating changes before adding more.  · Work to create a tasty, healthy eating plan that you can stick to for the rest of your life.    Goals for healthy eating  Below are some tips to improve your eating habits:  · Limit saturated fats and trans fats. Saturated fats raise your levels of cholesterol, so keep these fats to a minimum. They are found in foods such as fatty meats, whole milk, cheese, and palm and coconut oils. Avoid trans fats because they lower good cholesterol as well as raise bad cholesterol. Trans fats are most often found in processed foods.  · Reduce sodium (salt) intake. Eating too much salt may increase your blood pressure. Limit your sodium intake to 2,300 milligrams (mg) per day, or less if your health care provider recommends it. Dining out less often and eating fewer processed foods are two great ways to decrease the amount of salt  you consume.  · Managing calories. A calorie is a unit of energy. Your body burns calories for fuel, but if you eat more calories than your body burns, the extras are stored as fat. Your health care provider can help you create a diet plan to manage your calories. This will likely include eating healthier foods as well as exercising regularly. To help you track your progress, keep a diary to record what you eat and how often you exercise.  Choose the right foods  Aim to make these foods staples of your diet. If you have diabetes, you may have different recommendations than what is listed here:  · Fruits and vegetable provide plenty of nutrients without a lot of calories. At meals, fill half your plate with these foods. Split the other half of your plate between whole grains and lean protein.  · Whole grains are high in fiber and rich in vitamins and nutrients. Good choices include whole-wheat bread, pasta, and brown rice.  · Lean proteins give you nutrition with less fat. Good choices include fish, skinless chicken, and beans.  · Low-fat or nonfat dairy provides nutrients without a lot of fat. Try low-fat or nonfat milk, cheese, or yogurt.  · Healthy fats can be good for you in small amounts. These are unsaturated fats, such as olive oil, nuts, and fish. Try to have at least 2 servings per week of fatty fish such as salmon, sardines, mackerel, rainbow trout, and albacore tuna. These contain omega-3 fatty acids, which are good for your heart. Flaxseed is another source of a heart-healthy fat.  More on heart healthy eating    Read food labels  Healthy eating starts at the grocery store. Be sure to pay attention to food labels on packaged foods. Look for products that are high in fiber and protein, and low in saturated fat, cholesterol, and sodium. Avoid products that contain trans fat. And pay close attention to serving size. For instance, if you plan to eat two servings, double all the numbers on the label.  Prepare  food right  A key part of healthy cooking is cutting down on added fat and salt. Look on the internet for lower-fat, lower-sodium recipes. Also, try these tips:  · Remove fat from meat and skin from poultry before cooking.  · Skim fat from the surface of soups and sauces.  · Broil, boil, bake, steam, grill, and microwave food without added fats.  · Choose ingredients that spice up your food without adding calories, fat, or sodium. Try these items: horseradish, hot sauce, lemon, mustard, nonfat salad dressings, and vinegar. For salt-free herbs and spices, try basil, cilantro, cinnamon, pepper, and rosemary.  Date Last Reviewed: 6/25/2015  © 7230-9516 [employer]. [address] [place] [address], [place] [address]. All rights reserved. This information is not intended as a substitute for professional medical care. Always follow your healthcare professional's instructions.

## 2017-03-23 NOTE — MR AVS SNAPSHOT
[facility]   [place]  [place] [address]  Phone: [phone]  Fax: [phone]                  Ngoc Castellanos   3/23/2017 3:00 PM   Office Visit    Description:  Female : 5/10/1925   Provider:  Jacobo Mcleod MD   Department:  [facility]           Reason for Visit     Follow-up     Hypertension           Diagnoses this Visit        Comments    Essential hypertension    -  Primary     Hypoglycemia         CKD (chronic kidney disease) stage 3, GFR 30-59 ml/min                To Do List           Future Appointments        Provider Department Dept Phone    2017 10:00 AM [facility] MAMMO5 DX [facility] [phone]    2017 11:00 AM [facility] [phone]    2017 8:00 AM APPOINTMENT LAB, SENAIT SARGENT [facility] [phone]    2017 11:00 AM Noy Mo MD [facility] - Dermatology [phone]    2017 10:30 AM MD [facility] - Hematology Oncology [phone]      Goals (5 Years of Data)     None      Follow-Up and Disposition     Return in about 4 months (around 2017), or if symptoms worsen or fail to improve.      [facility]     [facility] -  Assistance  Registered nurses in the [facility] provide clinical advisement, health education, appointment booking, and other advisory services.  Call for this free service at [phone].             Medications           Message regarding Medications     Verify the changes and/or additions to your medication regime listed below are the same as discussed with your clinician today.  If any of these changes or additions are incorrect, please notify your healthcare provider.             Verify that the below list of medications is an accurate representation of the medications you are currently taking.  If none reported, the list may be blank. If incorrect,  "please contact your healthcare provider. Carry this list with you in case of emergency.           Current Medications     anastrozole (ARIMIDEX) 1 mg Tab TAKE 1 TABLET ONE TIME DAILY    apixaban 5 mg Tab Take 1 tablet (5 mg total) by mouth 2 (two) times daily.    atenolol (TENORMIN) 25 MG tablet Take 1 tablet (25 mg total) by mouth once daily.    CALCIUM CARBONATE/VITAMIN D3 (CALCIUM 600 + D,3, ORAL) Take 1 tablet by mouth once daily.    lisinopril (PRINIVIL,ZESTRIL) 5 MG tablet Take 1 tablet (5 mg total)  by mouth once daily.    meclizine (ANTIVERT) 12.5 mg tablet Take 1 tablet (12.5 mg total) by mouth 3 (three) times daily as needed.    mirabegron (MYRBETRIQ) 25 mg Tb24 ER tablet Take 1 tablet (25 mg total) by mouth once daily.    polyethylene glycol (GLYCOLAX) 17 gram/dose powder Take 17 g by mouth once daily.    VIT C/VIT E/LUTEIN/MIN/OMEGA-3 (OCUVITE ORAL) Take 1 tablet by mouth once daily.           Clinical Reference Information           Your Vitals Were     BP Pulse Height Weight SpO2 BMI    126/74 (BP Location: Left arm, Patient Position: Sitting, BP Method: Manual) 85 5' 6.5" (1.689 m) 82.9 kg (182 lb 12.2 oz) 96% 29.06 kg/m2      Blood Pressure          Most Recent Value    BP  126/74      Allergies as of 3/23/2017     No Known Drug Allergies      Immunizations Administered on Date of Encounter - 3/23/2017     None      Instructions      Chronic Kidney Disease (CKD)    The role of the kidneys is to remove waste products and extra water from the blood.  When the kidneys do not work as they should, waste products begin to build up in the blood. This is called chronic kidney disease (CKD). CKD means that you have kidney damage or a decrease in kidney function lasting at least 3 months. CKD allows extra water, waste, and toxins to build up in the body. This can eventually become life-threatening. You might need dialysis or a kidney transplant to stay alive. This most severe form is called end stage renal " disease.  Diabetes is the leading causes of chronic renal failure. Other causes include high blood pressure, hardening of the arteries (atherosclerosis), lupus, inflammation of the blood vessels (vasculitis), and past viral or bacterial infections. Certain over-the-counter pain medicines can cause renal failure when taken often over a long period of time. These include aspirin, ibuprofen, and related anti-inflammatory medicines called NSAIDs (nonsteroidal anti-inflammatory drugs).  Home care  The following guidelines will help you care for yourself at home:  · If you have diabetes, talk with your healthcare provider about keeping your blood sugar under control. Ask if you need to make and changes to your diet, lifestyle, or medicines.  · If you have high blood pressure:  ¨ Take prescribed medicine to lower your blood pressure to the recommended goal of less than 130/80.  ¨ Start a regular exercise program that you enjoy. Check with your healthcare provider to be sure your planned exercise program is right for you.  ¨ Eat less salt (sodium). Your healthcare provider can tell you how much salt per day is safe for you.  · If you are overweight, talk with your healthcare provider about a weight loss plan.  · If you smoke, you must quit. Smoking makes kidney disease worse. Talk with your healthcare provider about ways to help you quit.  For more information, visit the following links:  ¨ www.smokefree.gov/sites/default/files/pdf/clearing-the-air-accessible.pdf  ¨ www.smokefree.gov  ¨ www.cancer.org/healthy/stayawayfromtobacco/guidetoquittingsmoking/  · Most people with CKD need to follow a special diet.  Be sure you understand yours. In general, you will need to limit protein, salt, potassium, and phosphorus. You also need to limit how much fluid you drink.   · CKD is a risk factor for heart disease. Talk with your healthcare provider about any other risk factors you might have and what you can do to lessen  them.  · Talk with your healthcare provider about any medicines you are taking to find out if they need to be reduced or stopped.  · Don't use the following over-the-counter medicines, or consult your healthcare provider before using:  ¨ Aspirin and NSAIDs such as ibuprofen or naproxen. Using acetaminophen for fever or pain is OK.  ¨ Laxatives and antacids containing magnesium or aluminum  ¨ Fleet or phospho soda enemas containing phosphorus  ¨ Certain stomach acid-blocking medicine such as cimetidine or ranitidine   ¨ Decongestants containing pseudoephedrine   ¨ Herbal supplements  Follow-up care  Follow up with your healthcare provider, or as advised. Contact one of the following for more information:  · American Association of Kidney Patients 546-119-7609 www.aakp.org  · National Kidney Foundation 411-649-4207 www.kidney.org  · American Kidney Fund 123-617-3490 www.kidneyfund.org  · National Kidney Disease Education Program 866-4KIDNEY www.nkdep.nih.gov  If an X-ray, ECG (cardiogram), or other diagnostic test was taken, you will be told of any new findings that may affect your care.  Call 911  Call 911 if you have any of the following:  · Severe weakness, dizziness, fainting, drowsiness, or confusion  · Chest pain or shortness of breath  · Heart beating fast, slow, or irregularly  When to seek medical advice  Call your healthcare provider right away if any of these occur:  · Nausea or vomiting  · Fever of 100.4°F (38°C) or higher, or as directed by your healthcare provider  · Unexpected weight gain or swelling in the legs, ankles, or around the eyes  · Decrease or absent urine output  Date Last Reviewed: 9/1/2016  © 2532-1117 [employer]. [address] [place] [address], [place] [address]. All rights reserved. This information is not intended as a substitute for professional medical care. Always follow your healthcare professional's instructions.        Eating Heart-Healthy Foods  Eating has a big impact on  your heart health. In fact, eating healthier can improve several of your heart risks at once. For instance, it helps you manage weight, cholesterol, and blood pressure. Here are ideas to help you make heart-healthy changes without giving up all the foods and flavors you love.  Getting started  · Talk with your health care provider about eating plans, such as the DASH or Mediterranean diet. You may also be referred to a dietitian.  · Change a few things at a time. Give yourself time to get used to a few eating changes before adding more.  · Work to create a tasty, healthy eating plan that you can stick to for the rest of your life.    Goals for healthy eating  Below are some tips to improve your eating habits:  · Limit saturated fats and trans fats. Saturated fats raise your levels of cholesterol, so keep these fats to a minimum. They are found in foods such as fatty meats, whole milk, cheese, and palm and coconut oils. Avoid trans fats because they lower good cholesterol as well as raise bad cholesterol. Trans fats are most often found in processed foods.  · Reduce sodium (salt) intake. Eating too much salt may increase your blood pressure. Limit your sodium intake to 2,300 milligrams (mg) per day, or less if your health care provider recommends it. Dining out less often and eating fewer processed foods are two great ways to decrease the amount of salt you consume.  · Managing calories. A calorie is a unit of energy. Your body burns calories for fuel, but if you eat more calories than your body burns, the extras are stored as fat. Your health care provider can help you create a diet plan to manage your calories. This will likely include eating healthier foods as well as exercising regularly. To help you track your progress, keep a diary to record what you eat and how often you exercise.  Choose the right foods  Aim to make these foods staples of your diet. If you have diabetes, you may have different recommendations  than what is listed here:  · Fruits and vegetable provide plenty of nutrients without a lot of calories. At meals, fill half your plate with these foods. Split the other half of your plate between whole grains and lean protein.  · Whole grains are high in fiber and rich in vitamins and nutrients. Good choices include whole-wheat bread, pasta, and brown rice.  · Lean proteins give you nutrition with less fat. Good choices include fish, skinless chicken, and beans.  · Low-fat or nonfat dairy provides nutrients without a lot of fat. Try low-fat or nonfat milk, cheese, or yogurt.  · Healthy fats can be good for you in small amounts. These are unsaturated fats, such as olive oil, nuts, and fish. Try to have at least 2 servings per week of fatty fish such as salmon, sardines, mackerel, rainbow trout, and albacore tuna. These contain omega-3 fatty acids, which are good for your heart. Flaxseed is another source of a heart-healthy fat.  More on heart healthy eating    Read food labels  Healthy eating starts at the grocery store. Be sure to pay attention to food labels on packaged foods. Look for products that are high in fiber and protein, and low in saturated fat, cholesterol, and sodium. Avoid products that contain trans fat. And pay close attention to serving size. For instance, if you plan to eat two servings, double all the numbers on the label.  Prepare food right  A key part of healthy cooking is cutting down on added fat and salt. Look on the internet for lower-fat, lower-sodium recipes. Also, try these tips:  · Remove fat from meat and skin from poultry before cooking.  · Skim fat from the surface of soups and sauces.  · Broil, boil, bake, steam, grill, and microwave food without added fats.  · Choose ingredients that spice up your food without adding calories, fat, or sodium. Try these items: horseradish, hot sauce, lemon, mustard, nonfat salad dressings, and vinegar. For salt-free herbs and spices, try basil,  kimberlyro, cinnamon, pepper, and rosemary.  Date Last Reviewed: 6/25/2015  © 9127-1750 [employer]. [address] [place] [address], [place] [address]. All rights reserved. This information is not intended as a substitute for professional medical care. Always follow your healthcare professional's instructions.             Language Assistance Services     ATTENTION: Language assistance services are available, free of charge. Please call [phone].      ATENCIÓN: Si habla zuly, tiene a rivers disposición servicios gratuitos de asistencia lingüística. Llame al [phone].     CHÚ Ý: N?u b?n nói Ti?ng Vi?t, có các d?ch v? h? tr? ngôn ng? mi?n phí dành cho b?n. G?i s? [phone].         [facility] complies with applicable Federal civil rights laws and does not discriminate on the basis of race, color, national origin, age, disability, or sex.

## 2017-03-23 NOTE — PROGRESS NOTES
Subjective:       Patient ID: Ngoc Castellanos is a 89 y.o. female.    Chief Complaint: Follow-up and Hypertension    HPI Comments: 89 years old female who came to the clinic for blood pressure check.  Blood pressure today is stable.  No chest pain palpitations orthopnea or PND.  Patient with decreased kidney function but stable in comparison with previous reports.  Patient reports episodic hypoglycemia using the glucose supplements as needed with significant improvement.    Hypertension   Pertinent negatives include no chest pain or palpitations.     Review of Systems   Constitutional: Negative.    HENT: Negative.    Eyes: Negative.    Respiratory: Negative.    Cardiovascular: Negative.  Negative for chest pain, palpitations and leg swelling.   Gastrointestinal: Negative.    Endocrine: Negative for cold intolerance, heat intolerance, polydipsia, polyphagia and polyuria.   Genitourinary: Negative.    Musculoskeletal: Negative.    Skin: Negative.    Neurological: Negative.    Psychiatric/Behavioral: Negative.        Objective:      Physical Exam   Constitutional: She is oriented to person, place, and time. She appears well-developed and well-nourished. No distress.   HENT:   Head: Normocephalic and atraumatic.   Right Ear: External ear normal.   Left Ear: External ear normal.   Nose: Nose normal.   Mouth/Throat: Oropharynx is clear and moist. No oropharyngeal exudate.   Eyes: Conjunctivae and EOM are normal. Pupils are equal, round, and reactive to light. Right eye exhibits no discharge. Left eye exhibits no discharge. No scleral icterus.   Neck: Normal range of motion. Neck supple. No JVD present. No tracheal deviation present. No thyromegaly present.   Cardiovascular: Normal rate, regular rhythm, normal heart sounds and intact distal pulses.  Exam reveals no gallop and no friction rub.    No murmur heard.  Pulmonary/Chest: Effort normal and breath sounds normal. No stridor. No respiratory distress. She has no  wheezes. She has no rales. She exhibits no tenderness.   Abdominal: Soft. Bowel sounds are normal. She exhibits no distension and no mass. There is no tenderness. There is no rebound and no guarding.   Musculoskeletal: Normal range of motion. She exhibits no edema or tenderness.   Lymphadenopathy:     She has no cervical adenopathy.   Neurological: She is alert and oriented to person, place, and time. She has normal reflexes. No cranial nerve deficit. She exhibits normal muscle tone. Coordination and gait abnormal.   Skin: Skin is warm and dry. No rash noted. She is not diaphoretic. No erythema. No pallor.   Psychiatric: She has a normal mood and affect. Her behavior is normal. Judgment and thought content normal.       Assessment:       1. Essential hypertension    2. Hypoglycemia    3. CKD (chronic kidney disease) stage 3, GFR 30-59 ml/min        Plan:         Ngoc was seen today for follow-up and hypertension.    Diagnoses and all orders for this visit:    Essential hypertension    Hypoglycemia    CKD (chronic kidney disease) stage 3, GFR 30-59 ml/min    Continue monitoring blood pressure at home, low sodium diet.  Patient was advised to eat a small fruit Between the meals.

## 2017-04-17 ENCOUNTER — HOSPITAL ENCOUNTER (OUTPATIENT)
Dept: RADIOLOGY | Facility: HOSPITAL | Age: 82
Discharge: HOME | End: 2017-04-17
Attending: INTERNAL MEDICINE
Payer: MEDICARE

## 2017-04-17 ENCOUNTER — OFFICE VISIT (OUTPATIENT)
Dept: SURGERY | Facility: CLINIC | Age: 82
End: 2017-04-17
Payer: MEDICARE

## 2017-04-17 VITALS
TEMPERATURE: 97 F | WEIGHT: 180 LBS | SYSTOLIC BLOOD PRESSURE: 110 MMHG | DIASTOLIC BLOOD PRESSURE: 65 MMHG | BODY MASS INDEX: 28.25 KG/M2 | HEIGHT: 67 IN | HEART RATE: 73 BPM

## 2017-04-17 DIAGNOSIS — C50.511 MALIGNANT NEOPLASM OF LOWER-OUTER QUADRANT OF RIGHT FEMALE BREAST: ICD-10-CM

## 2017-04-17 DIAGNOSIS — Z90.11 S/P MASTECTOMY, RIGHT: ICD-10-CM

## 2017-04-17 DIAGNOSIS — Z85.3 PERSONAL HISTORY OF BREAST CANCER: Primary | ICD-10-CM

## 2017-04-17 DIAGNOSIS — B37.89 CANDIDIASIS OF BREAST: ICD-10-CM

## 2017-04-17 PROCEDURE — 99213 OFFICE O/P EST LOW 20 MIN: CPT | Mod: S$GLB,,, | Performed by: NURSE PRACTITIONER

## 2017-04-17 PROCEDURE — 77065 DX MAMMO INCL CAD UNI: CPT | Mod: 26,LT,, | Performed by: RADIOLOGY

## 2017-04-17 PROCEDURE — 1159F MED LIST DOCD IN RCRD: CPT | Mod: S$GLB,,, | Performed by: NURSE PRACTITIONER

## 2017-04-17 PROCEDURE — 77061 BREAST TOMOSYNTHESIS UNI: CPT | Mod: 26,LT,, | Performed by: RADIOLOGY

## 2017-04-17 PROCEDURE — 99999 PR PBB SHADOW E&M-EST. PATIENT-LVL III: CPT | Mod: PBBFAC,,, | Performed by: NURSE PRACTITIONER

## 2017-04-17 PROCEDURE — 1126F AMNT PAIN NOTED NONE PRSNT: CPT | Mod: S$GLB,,, | Performed by: NURSE PRACTITIONER

## 2017-04-17 PROCEDURE — 1157F ADVNC CARE PLAN IN RCRD: CPT | Mod: S$GLB,,, | Performed by: NURSE PRACTITIONER

## 2017-04-17 PROCEDURE — 1160F RVW MEDS BY RX/DR IN RCRD: CPT | Mod: S$GLB,,, | Performed by: NURSE PRACTITIONER

## 2017-04-17 RX ORDER — NYSTATIN 100000 U/G
CREAM TOPICAL 2 TIMES DAILY
Qty: 30 G | Refills: 3 | Status: SHIPPED | OUTPATIENT
Start: 2017-04-17 | End: 2018-05-01 | Stop reason: SDUPTHER

## 2017-04-17 NOTE — PROGRESS NOTES
Subjective:      Patient ID: Ngoc Castellanos is a 89 y.o. female.    Chief Complaint: Breast Cancer Screening (CBE/Hx of Breast Cancer)      HPI: (PF, EPF - 1-3) (Detailed, Comp, - 4) new patient presents to me for breast cancer surveillance, previously followed by Dr Limon and CONNER WILSON. 89 yo female who remains very active and independent. 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 Adjuvant endocrine therapy initiated 5/2014 (arimidex), followed by Dr Valles     Review of Systems   Constitutional: Negative for appetite change and fatigue.   Respiratory: Negative for cough and shortness of breath.    Cardiovascular: Negative for chest pain.   Musculoskeletal: Negative for back pain.     Objective:   Physical Exam   Pulmonary/Chest: She exhibits no mass, no tenderness, no laceration, no edema, no swelling and no retraction. Left breast exhibits no inverted nipple, no mass, no nipple discharge and no tenderness.   S/p right mastectomy without reconstruction, no mass or skin changes anterior chest wall, no upper extremity lymphedema. Breathing non-labored. There is a slight red rash medial inframmary fold left breast consistent with candidiasis    Lymphadenopathy:     She has no cervical adenopathy.     She has no axillary adenopathy.        Right: No supraclavicular adenopathy present.        Left: No supraclavicular adenopathy present.     Assessment:       1. Personal history of breast cancer        Plan:       Clinically [facility]  Prescription give for mastectomy bras  Prescription given for nystatin cream apply BID for a week as needed for  rash under left breast  Return in one year CBE only, can re-evaluate need for mammogram at that time depending on health status   To also f/u with Dr Valles, remains on endocrine therapy  Call for any interval palpable breast mass, pain, nipple discharge, skin changes or other breast related concerns

## 2017-04-19 ENCOUNTER — LAB VISIT (OUTPATIENT)
Dept: LAB | Facility: HOSPITAL | Age: 82
End: 2017-04-19
Attending: INTERNAL MEDICINE
Payer: MEDICARE

## 2017-04-19 DIAGNOSIS — Z79.811 AROMATASE INHIBITOR USE: ICD-10-CM

## 2017-04-19 DIAGNOSIS — C50.911 BREAST CANCER, RIGHT BREAST: ICD-10-CM

## 2017-04-19 LAB
ALBUMIN SERPL BCP-MCNC: 3.8 G/DL
ALP SERPL-CCNC: 73 U/L
ALT SERPL W/O P-5'-P-CCNC: 10 U/L
ANION GAP SERPL CALC-SCNC: 8 MMOL/L
AST SERPL-CCNC: 18 U/L
BASOPHILS # BLD AUTO: 0.05 K/UL
BASOPHILS NFR BLD: 0.7 %
BILIRUB SERPL-MCNC: 1.1 MG/DL
BUN SERPL-MCNC: 17 MG/DL
CALCIUM SERPL-MCNC: 9.6 MG/DL
CHLORIDE SERPL-SCNC: 104 MMOL/L
CO2 SERPL-SCNC: 26 MMOL/L
CREAT SERPL-MCNC: 1.1 MG/DL
DIFFERENTIAL METHOD: ABNORMAL
EOSINOPHIL # BLD AUTO: 0.3 K/UL
EOSINOPHIL NFR BLD: 4.3 %
ERYTHROCYTE [DISTWIDTH] IN BLOOD BY AUTOMATED COUNT: 13.4 %
EST. GFR  (AFRICAN AMERICAN): 51 ML/MIN/1.73 M^2
EST. GFR  (NON AFRICAN AMERICAN): 44 ML/MIN/1.73 M^2
GLUCOSE SERPL-MCNC: 109 MG/DL
HCT VFR BLD AUTO: 40.3 %
HGB BLD-MCNC: 13.9 G/DL
LYMPHOCYTES # BLD AUTO: 1 K/UL
LYMPHOCYTES NFR BLD: 13.9 %
MCH RBC QN AUTO: 29.4 PG
MCHC RBC AUTO-ENTMCNC: 34.5 %
MCV RBC AUTO: 85 FL
MONOCYTES # BLD AUTO: 0.4 K/UL
MONOCYTES NFR BLD: 5.4 %
NEUTROPHILS # BLD AUTO: 5.3 K/UL
NEUTROPHILS NFR BLD: 75.6 %
PLATELET # BLD AUTO: 186 K/UL
PMV BLD AUTO: 9.9 FL
POTASSIUM SERPL-SCNC: 4.9 MMOL/L
PROT SERPL-MCNC: 7.2 G/DL
RBC # BLD AUTO: 4.73 M/UL
SODIUM SERPL-SCNC: 138 MMOL/L
WBC # BLD AUTO: 6.99 K/UL

## 2017-04-19 PROCEDURE — 36415 COLL VENOUS BLD VENIPUNCTURE: CPT

## 2017-04-19 PROCEDURE — 80053 COMPREHEN METABOLIC PANEL: CPT

## 2017-04-19 PROCEDURE — 85025 COMPLETE CBC W/AUTO DIFF WBC: CPT

## 2017-04-21 ENCOUNTER — OFFICE VISIT (OUTPATIENT)
Dept: DERMATOLOGY | Facility: CLINIC | Age: 82
End: 2017-04-21
Payer: MEDICARE

## 2017-04-21 DIAGNOSIS — T14.8XXA ABRASION: ICD-10-CM

## 2017-04-21 DIAGNOSIS — D18.00 ANGIOMA: Primary | ICD-10-CM

## 2017-04-21 PROCEDURE — 1159F MED LIST DOCD IN RCRD: CPT | Mod: S$GLB,,, | Performed by: DERMATOLOGY

## 2017-04-21 PROCEDURE — 1160F RVW MEDS BY RX/DR IN RCRD: CPT | Mod: S$GLB,,, | Performed by: DERMATOLOGY

## 2017-04-21 PROCEDURE — 99202 OFFICE O/P NEW SF 15 MIN: CPT | Mod: S$GLB,,, | Performed by: DERMATOLOGY

## 2017-04-21 PROCEDURE — 1157F ADVNC CARE PLAN IN RCRD: CPT | Mod: S$GLB,,, | Performed by: DERMATOLOGY

## 2017-04-21 PROCEDURE — 99999 PR PBB SHADOW E&M-EST. PATIENT-LVL II: CPT | Mod: PBBFAC,,, | Performed by: DERMATOLOGY

## 2017-04-21 NOTE — PROGRESS NOTES
Subjective:       Patient ID:  Ngoc Castellanos is a 89 y.o. female who presents for   Chief Complaint   Patient presents with    Skin Check     face     HPI Comments: History of Present Illness: The patient presents with chief complaint of lesion.  Location: neck  Duration: years  Signs/Symptoms: irritated    Prior treatments: none        Review of Systems   Constitutional: Negative for fever.   Skin: Negative for itching and rash.   Hematologic/Lymphatic: Does not bruise/bleed easily.        Objective:    Physical Exam   Constitutional: She appears well-developed and well-nourished. No distress.   Neurological: She is alert and oriented to person, place, and time. She is not disoriented.   Psychiatric: She has a normal mood and affect.   Skin:   Areas Examined (abnormalities noted in diagram):   Head / Face Inspection Performed  Neck Inspection Performed  Chest / Axilla Inspection Performed  Back Inspection Performed  RUE Inspected  LUE Inspection Performed              Diagram Legend        Vascular papule c/w angioma      See annotation      Assessment / Plan:        Angioma  Shave removal procedure note:  Right post neck  Shave removal performed after verbal consent including risk of infection, scar, recurrence, need for additional treatment of site. Area prepped with alcohol, anesthetized 1% lidocaine with epinephrine. Lesional tissue shaved. Lesion defect size 6mm No complications. Dressing applied. Wound care explained. Not sent for path        Abrasion  Cleaned with alcohol, applied band aid

## 2017-04-21 NOTE — MR AVS SNAPSHOT
[place] - Dermatology   [facility]  [place] [address]  Phone: [phone]  Fax: [phone]                  Ngoc Castellanos   2017 11:00 AM   Office Visit    Description:  Female : 5/10/1925   Provider:  Noy Mo MD   Department:  [place] - Dermatology           Reason for Visit     Skin Check           Diagnoses this Visit        Comments    Angioma    -  Primary     Abrasion                To Do List           Future Appointments        Provider Department Dept Phone    2017 10:30 AM Syd Valles MD [place] - Hematology Oncology [phone]    2017 10:00 AM Jacobo Mcleod MD [facility] [phone]      Goals (5 Years of Data)     None      [facility]     [facility] Nurse Care Line -  Assistance  Unless otherwise directed by your provider, please contact [facility], our nurse care line that is available for  assistance.     Registered nurses in the [facility] Center provide: appointment scheduling, clinical advisement, health education, and other advisory services.  Call: [phone] (toll free)               Medications           Message regarding Medications     Verify the changes and/or additions to your medication regime listed below are the same as discussed with your clinician today.  If any of these changes or additions are incorrect, please notify your healthcare provider.             Verify that the below list of medications is an accurate representation of the medications you are currently taking.  If none reported, the list may be blank. If incorrect, please contact your healthcare provider. Carry this list with you in case of emergency.           Current Medications     anastrozole (ARIMIDEX) 1 mg Tab TAKE 1 TABLET ONE TIME DAILY    apixaban 5 mg Tab Take 1 tablet (5 mg total) by mouth 2 (two) times daily.    atenolol (TENORMIN) 25 MG tablet Take 1 tablet (25 mg total) by mouth once daily.     CALCIUM CARBONATE/VITAMIN D3 (CALCIUM 600 + D,3, ORAL) Take 1 tablet by mouth once daily.    lisinopril (PRINIVIL,ZESTRIL) 5 MG tablet Take 1 tablet (5 mg total)  by mouth once daily.    meclizine (ANTIVERT) 12.5 mg tablet Take 1 tablet (12.5 mg total) by mouth 3 (three) times daily as needed.    nystatin (MYCOSTATIN) cream Apply topically 2 (two) times daily.    polyethylene glycol (GLYCOLAX) 17 gram/dose powder Take 17 g by mouth once daily.    VIT C/VIT E/LUTEIN/MIN/OMEGA-3 (OCUVITE ORAL) Take 1 tablet by mouth once daily.           Clinical Reference Information           Allergies as of 4/21/2017     No Known Drug Allergies      Immunizations Administered on Date of Encounter - 4/21/2017     None      Language Assistance Services     ATTENTION: Language assistance services are available, free of charge. Please call [phone].      ATENCIÓN: Si mariana caruso, tiene a rivers disposición servicios gratuitos de asistencia lingüística. Llame al [phone].     [facility] Ý: N?u b?n nói Ti?ng Vi?t, có các d?ch v? h? tr? ngôn ng? mi?n phí hongh cho b?n. G?i s? [phone].         [facility] complies with applicable Federal civil rights laws and does not discriminate on the basis of race, color, national origin, age, disability, or sex.

## 2017-04-21 NOTE — LETTER
April 21, 2017      Jacobo Mcleod MD  [address] [facility] [address]           [place] - Dermatology  [address] [facility] [address]  Phone: [phone]  Fax: [phone]          Patient: Ngoc Castellanos   MR Number: [id number]   YOB: 1925   Date of Visit: 4/21/2017       Dear Dr. Jacobo Mcleod:    Thank you for referring Ngoc Castellanos to me for evaluation. Attached you will find relevant portions of my assessment and plan of care.    If you have questions, please do not hesitate to call me. I look forward to following Ngoc Castellanos along with you.    Sincerely,    Noy Mo MD    Enclosure  CC:  No Recipients    If you would like to receive this communication electronically, please contact [email or web address] or [phone] to request more information on [facility] access.    For providers and/or their staff who would like to refer a patient to [facility], please contact us through our one-stop-shop provider referral line, [facility], at [phone].    If you feel you have received this communication in error or would no longer like to receive these types of communications, please e-mail [email or web address]

## 2017-04-24 ENCOUNTER — OFFICE VISIT (OUTPATIENT)
Dept: HEMATOLOGY/ONCOLOGY | Facility: CLINIC | Age: 82
End: 2017-04-24
Payer: MEDICARE

## 2017-04-24 VITALS
SYSTOLIC BLOOD PRESSURE: 120 MMHG | WEIGHT: 181 LBS | HEIGHT: 67 IN | HEART RATE: 71 BPM | DIASTOLIC BLOOD PRESSURE: 76 MMHG | TEMPERATURE: 98 F | BODY MASS INDEX: 28.41 KG/M2 | OXYGEN SATURATION: 95 %

## 2017-04-24 DIAGNOSIS — C50.511 MALIGNANT NEOPLASM OF LOWER-OUTER QUADRANT OF RIGHT FEMALE BREAST: Primary | ICD-10-CM

## 2017-04-24 DIAGNOSIS — Z79.01 LONG TERM (CURRENT) USE OF ANTICOAGULANTS: ICD-10-CM

## 2017-04-24 DIAGNOSIS — Z79.811 AROMATASE INHIBITOR USE: ICD-10-CM

## 2017-04-24 PROCEDURE — 99213 OFFICE O/P EST LOW 20 MIN: CPT | Mod: S$GLB,,, | Performed by: INTERNAL MEDICINE

## 2017-04-24 PROCEDURE — 99499 UNLISTED E&M SERVICE: CPT | Mod: S$GLB,,, | Performed by: INTERNAL MEDICINE

## 2017-04-24 PROCEDURE — 1157F ADVNC CARE PLAN IN RCRD: CPT | Mod: S$GLB,,, | Performed by: INTERNAL MEDICINE

## 2017-04-24 PROCEDURE — 1126F AMNT PAIN NOTED NONE PRSNT: CPT | Mod: S$GLB,,, | Performed by: INTERNAL MEDICINE

## 2017-04-24 PROCEDURE — 1159F MED LIST DOCD IN RCRD: CPT | Mod: S$GLB,,, | Performed by: INTERNAL MEDICINE

## 2017-04-24 PROCEDURE — 99999 PR PBB SHADOW E&M-EST. PATIENT-LVL III: CPT | Mod: PBBFAC,,, | Performed by: INTERNAL MEDICINE

## 2017-04-24 PROCEDURE — 1160F RVW MEDS BY RX/DR IN RCRD: CPT | Mod: S$GLB,,, | Performed by: INTERNAL MEDICINE

## 2017-04-24 NOTE — PROGRESS NOTES
Subjective:       Patient ID: Ngoc Castellanos is a 89 y.o. female.    Chief Complaint: Breast Cancer (right; lab, mammogram results)    HPI She is here for followup.  She was diagnosed with carcinoma of the right breast, underwent right modified radical mastectomy on 3/26/14.  She has a 1.7 centimeter invasive mammary carcinoma with lobular features.  6 lymph nodes were removed and they were negative. Two sentinel lymph nodes and four additional right axillary lymph nodes were negative. Her tumor was ER/CO strongly positive and positive by FISH for HER-2. She was not given adjuvant Herceptin. She started Arimidex in May 2014.    She has history of atrial fibrillation and has been on Coumadin for several years, doing well without bleeding issues. Sees .    DEXA scan (9/14/15) showed Osteopenia of both femoral necks with normal bone mineral density of the lumbar spine.     Received the first dose of prolia on 8/5/14. Not interested in further prolia injections or alternative bisphosphonate Rx.    She is here for follow-up today.   Feels well.    Review of Systems   Respiratory: Negative for cough and shortness of breath.    Cardiovascular: Negative for chest pain, palpitations and leg swelling.   Hematological: Negative for adenopathy. Does not bruise/bleed easily.         Objective:      Physical Exam   Constitutional: She is oriented to person, place, and time. She appears well-developed. No distress.   HENT:   Head: Normocephalic and atraumatic.   Mouth/Throat: No oropharyngeal exudate.   Eyes: No scleral icterus.   Neck: Normal range of motion. Neck supple.   Cardiovascular: Normal rate.  Exam reveals no gallop.    Irregular.   Pulmonary/Chest: Effort normal and breath sounds normal. No stridor. She has no wheezes. She has no rales.       She is status post right mastectomy with a well-healed incision. Wound has healed well. There are no masses in the left breast.        Abdominal: Soft. She exhibits no  distension and no mass. There is no tenderness.   Lymphadenopathy:     She has no cervical adenopathy.   Neurological: She is alert and oriented to person, place, and time. No cranial nerve deficit.       Assessment:       1. Malignant neoplasm of lower-outer quadrant of right female breast    2. Long term (current) use of anticoagulants    3. Aromatase inhibitor use        Plan:   She has T1cN0 Stage 1a ER/NM positive and Her-2 positive invasive mammary carcinoma of the right breast.   Adjuvant Arimidex was started in May 2014.  She is tolerating it well.  Continue Arimidex.    Recent mammogram in April 2017 unremarkable.    Last DEXA scan was in Sept 2015.   Despite being 89 yrs old, she is still rather healthy, drives around and is tolerating anticoagulation well.  Continue calcium/vitamin D.     Return to clinic in 6 months.  Plan repeat mammogram in April 2018 - if she continues to remain active and healthy.

## 2017-04-24 NOTE — MR AVS SNAPSHOT
[place] - Hematology Oncology  [address] [place] [address] Ave  [place] [address]  Phone: [phone]                  Ngoc Castellanos   2017 10:30 AM   Office Visit    Description:  Female : 5/10/1925   Provider:  Syd Valles MD   Department:  [place] [facility] Oncology           Reason for Visit     Breast Cancer           Diagnoses this Visit        Comments    Malignant neoplasm of lower-outer quadrant of right female breast    -  Primary     Long term (current) use of anticoagulants         Aromatase inhibitor use                To Do List           Future Appointments        Provider Department Dept Phone    2017 10:00 AM Jacobo Mcleod MD [facility] [phone]      Goals (5 Years of Data)     None      [facility] On [facility]     [facility] On Call Nurse Care Line -  Assistance  Unless otherwise directed by your provider, please contact [facility], our nurse care line that is available for  assistance.     Registered nurses in the [facility] On Call Center provide: appointment scheduling, clinical advisement, health education, and other advisory services.  Call: [phone] (toll free)               Medications           Message regarding Medications     Verify the changes and/or additions to your medication regime listed below are the same as discussed with your clinician today.  If any of these changes or additions are incorrect, please notify your healthcare provider.             Verify that the below list of medications is an accurate representation of the medications you are currently taking.  If none reported, the list may be blank. If incorrect, please contact your healthcare provider. Carry this list with you in case of emergency.           Current Medications     anastrozole (ARIMIDEX) 1 mg Tab TAKE 1 TABLET ONE TIME DAILY    apixaban 5 mg Tab Take 1 tablet (5 mg total) by mouth 2 (two) times daily.    atenolol (TENORMIN) 25 MG tablet Take 1 tablet (25 mg  "total) by mouth once daily.    CALCIUM CARBONATE/VITAMIN D3 (CALCIUM 600 + D,3, ORAL) Take 1 tablet by mouth once daily.    lisinopril (PRINIVIL,ZESTRIL) 5 MG tablet Take 1 tablet (5 mg total)  by mouth once daily.    meclizine (ANTIVERT) 12.5 mg tablet Take 1 tablet (12.5 mg total) by mouth 3 (three) times daily as needed.    nystatin (MYCOSTATIN) cream Apply topically 2 (two) times daily.    polyethylene glycol (GLYCOLAX) 17 gram/dose powder Take 17 g by mouth once daily.    VIT C/VIT E/LUTEIN/MIN/OMEGA-3 (OCUVITE ORAL) Take 1 tablet by mouth once daily.           Clinical Reference Information           Your Vitals Were     BP Pulse Temp Height Weight SpO2    120/76 71 97.6 °F (36.4 °C) (Oral) 5' 6.5" (1.689 m) 82.1 kg (181 lb) 95%    BMI                28.78 kg/m2          Blood Pressure          Most Recent Value    BP  120/76      Allergies as of 4/24/2017     No Known Drug Allergies      Immunizations Administered on Date of Encounter - 4/24/2017     None      Language Assistance Services     ATTENTION: Language assistance services are available, free of charge. Please call [phone].      ATENCIÓN: Si habla zuly, tiene a rivers disposición servicios gratuitos de asistencia lingüística. Llame al [phone].     [facility] Ý: N?u b?n nói Ti?ng Vi?t, có các d?ch v? h? tr? ngôn ng? mi?n phí dành cho b?n. G?i s? [phone].         [facility] complies with applicable Federal civil rights laws and does not discriminate on the basis of race, color, national origin, age, disability, or sex.        "

## 2017-05-09 ENCOUNTER — OFFICE VISIT (OUTPATIENT)
Dept: INTERNAL MEDICINE | Facility: CLINIC | Age: 82
End: 2017-05-09
Payer: MEDICARE

## 2017-05-09 VITALS
BODY MASS INDEX: 29.02 KG/M2 | DIASTOLIC BLOOD PRESSURE: 72 MMHG | HEIGHT: 66 IN | OXYGEN SATURATION: 99 % | SYSTOLIC BLOOD PRESSURE: 100 MMHG | WEIGHT: 180.56 LBS | TEMPERATURE: 98 F | HEART RATE: 79 BPM

## 2017-05-09 DIAGNOSIS — J06.9 UPPER RESPIRATORY TRACT INFECTION, UNSPECIFIED TYPE: Primary | ICD-10-CM

## 2017-05-09 PROCEDURE — 1157F ADVNC CARE PLAN IN RCRD: CPT | Mod: S$GLB,,, | Performed by: INTERNAL MEDICINE

## 2017-05-09 PROCEDURE — 99213 OFFICE O/P EST LOW 20 MIN: CPT | Mod: S$GLB,,, | Performed by: INTERNAL MEDICINE

## 2017-05-09 PROCEDURE — 1160F RVW MEDS BY RX/DR IN RCRD: CPT | Mod: S$GLB,,, | Performed by: INTERNAL MEDICINE

## 2017-05-09 PROCEDURE — 99999 PR PBB SHADOW E&M-EST. PATIENT-LVL III: CPT | Mod: PBBFAC,,, | Performed by: INTERNAL MEDICINE

## 2017-05-09 PROCEDURE — 1159F MED LIST DOCD IN RCRD: CPT | Mod: S$GLB,,, | Performed by: INTERNAL MEDICINE

## 2017-05-09 PROCEDURE — 1126F AMNT PAIN NOTED NONE PRSNT: CPT | Mod: S$GLB,,, | Performed by: INTERNAL MEDICINE

## 2017-05-09 RX ORDER — HYDROCODONE BITARTRATE AND HOMATROPINE METHYLBROMIDE ORAL SOLUTION 5; 1.5 MG/5ML; MG/5ML
5 LIQUID ORAL EVERY 4 HOURS PRN
Qty: 175 ML | Refills: 0 | Status: SHIPPED | OUTPATIENT
Start: 2017-05-09 | End: 2017-05-19

## 2017-05-09 RX ORDER — METHYLPREDNISOLONE 4 MG/1
TABLET ORAL
Qty: 1 PACKAGE | Refills: 0 | Status: SHIPPED | OUTPATIENT
Start: 2017-05-09 | End: 2017-07-24

## 2017-05-09 NOTE — MR AVS SNAPSHOT
[facility]   [place]  [place] [address]  Phone: [phone]  Fax: [phone]                  Ngoc Castellanos   2017 5:40 PM   Office Visit    Description:  Female : 5/10/1925   Provider:  Binu Wong MD   Department:  [facility] Internal Medicine           Reason for Visit     Sinusitis           Diagnoses this Visit        Comments    Upper respiratory tract infection, unspecified type    -  Primary            To Do List           Future Appointments        Provider Department Dept Phone    2017 5:40 PM Binu Wong MD [facility] [phone]    2017 10:00 AM Jacobo Mcleod MD [facility] [phone]      Goals (5 Years of Data)     None      Follow-Up and Disposition     Return if symptoms worsen or fail to improve.       These Medications        Disp Refills Start End    hydrocodone-homatropine 5-1.5 mg/5 ml (HYCODAN) 5-1.5 mg/5 mL Syrp 175 mL 0 2017    Take 5 mLs by mouth every 4 (four) hours as needed (cough). - Oral    Pharmacy: [email or web address] [address] [place] [address] [place] [address] AT [facility] Ph #: [phone]       methylPREDNISolone (MEDROL DOSEPACK) 4 mg tablet 1 Package 0 2017     use as directed    Pharmacy: [facility] [address] [place] [address] [place], [place] [address] AT [facility] Ph #: [phone]         [facility] On Call     [facility] On [facility] Nurse Care Line -  Assistance  Unless otherwise directed by your provider, please contact [facility], our nurse care line that is available for  assistance.     Registered nurses in the [facility] On Call Center provide: appointment scheduling, clinical advisement, health education, and other advisory services.  Call: [phone] (toll free)               Medications           Message regarding Medications     Verify the changes and/or additions to your medication regime  listed below are the same as discussed with your clinician today.  If any of these changes or additions are incorrect, please notify your healthcare provider.        START taking these NEW medications        Refills    hydrocodone-homatropine 5-1.5 mg/5 ml (HYCODAN) 5-1.5 mg/5 mL Syrp 0    Sig: Take 5 mLs by mouth every 4 (four) hours as needed (cough).    Class: Normal    Route: Oral    methylPREDNISolone (MEDROL DOSEPACK) 4 mg tablet 0    Sig: use as directed    Class: Normal           Verify that the below list of medications is an accurate representation of the medications you are currently taking.  If none reported, the list may be blank. If incorrect, please contact your healthcare provider. Carry this list with you in case of emergency.           Current Medications     anastrozole (ARIMIDEX) 1 mg Tab TAKE 1 TABLET ONE TIME DAILY    apixaban 5 mg Tab Take 1 tablet (5 mg total) by mouth 2 (two) times daily.    atenolol (TENORMIN) 25 MG tablet Take 1 tablet (25 mg total) by mouth once daily.    CALCIUM CARBONATE/VITAMIN D3 (CALCIUM 600 + D,3, ORAL) Take 1 tablet by mouth once daily.    lisinopril (PRINIVIL,ZESTRIL) 5 MG tablet Take 1 tablet (5 mg total)  by mouth once daily.    meclizine (ANTIVERT) 12.5 mg tablet Take 1 tablet (12.5 mg total) by mouth 3 (three) times daily as needed.    nystatin (MYCOSTATIN) cream Apply topically 2 (two) times daily.    polyethylene glycol (GLYCOLAX) 17 gram/dose powder Take 17 g by mouth once daily.    VIT C/VIT E/LUTEIN/MIN/OMEGA-3 (OCUVITE ORAL) Take 1 tablet by mouth once daily.    hydrocodone-homatropine 5-1.5 mg/5 ml (HYCODAN) 5-1.5 mg/5 mL Syrp Take 5 mLs by mouth every 4 (four) hours as needed (cough).    methylPREDNISolone (MEDROL DOSEPACK) 4 mg tablet use as directed           Clinical Reference Information           Your Vitals Were     BP Pulse Temp Height Weight SpO2    100/72 (BP Location: Left arm, Patient Position: Sitting, BP Method: Manual) 79 97.9 °F (36.6  "°C) (Oral) 5' 6" (1.676 m) 81.9 kg (180 lb 8.9 oz) 99%    BMI                29.14 kg/m2          Blood Pressure          Most Recent Value    BP  100/72      Allergies as of 5/9/2017     No Known Drug Allergies      Immunizations Administered on Date of Encounter - 5/9/2017     None      Language Assistance Services     ATTENTION: Language assistance services are available, free of charge. Please call [phone].      ATENCIÓN: Si habla español, tiene a rivers disposición servicios gratuitos de asistencia lingüística. Llame al [phone].     [facility] Ý: N?u b?n nói Ti?ng Vi?t, có các d?ch v? h? tr? ngôn ng? mi?n phí dành cho b?n. G?i s? [phone].         [facility] complies with applicable Federal civil rights laws and does not discriminate on the basis of race, color, national origin, age, disability, or sex.        "

## 2017-05-09 NOTE — PROGRESS NOTES
Subjective:       Patient ID: Ngoc Castellanos is a 89 y.o. female.    Chief Complaint: Sinusitis (x 1 week)    HPI  Pt with 10 days of coryza, nasal congestion, min prod cough w/o F/C, SOB.  No resp to OTC meds.  Review of Systems    Objective:      Physical Exam   Constitutional: She appears well-developed. No distress.   HENT:   Head: Normocephalic.   Mouth/Throat: Oropharynx is clear and moist.   Eyes: EOM are normal.   Neck: Normal range of motion. No tracheal deviation present.   Cardiovascular: Normal rate, regular rhythm and intact distal pulses.    Pulmonary/Chest: Effort normal and breath sounds normal. No respiratory distress.   Abdominal: She exhibits no distension.   Musculoskeletal: Normal range of motion. She exhibits no edema.   Neurological: She is alert. No cranial nerve deficit. She exhibits normal muscle tone. Coordination normal.   Skin: Skin is warm and dry. No rash noted. She is not diaphoretic. No erythema.   Psychiatric: She has a normal mood and affect. Her behavior is normal.   Vitals reviewed.      Assessment:       1. Upper respiratory tract infection, unspecified type        Plan:       Nogc was seen today for sinusitis.    Diagnoses and all orders for this visit:    Upper respiratory tract infection, unspecified type  -     hydrocodone-homatropine 5-1.5 mg/5 ml (HYCODAN) 5-1.5 mg/5 mL Syrp; Take 5 mLs by mouth every 4 (four) hours as needed (cough).  -     methylPREDNISolone (MEDROL DOSEPACK) 4 mg tablet; use as directed      Return if symptoms worsen or fail to improve.

## 2017-05-22 DIAGNOSIS — Z85.3 PERSONAL HISTORY OF BREAST CANCER: Primary | ICD-10-CM

## 2017-05-22 DIAGNOSIS — Z90.11 S/P RIGHT MASTECTOMY: ICD-10-CM

## 2017-07-24 ENCOUNTER — LAB VISIT (OUTPATIENT)
Dept: LAB | Facility: HOSPITAL | Age: 82
End: 2017-07-24
Attending: FAMILY MEDICINE
Payer: MEDICARE

## 2017-07-24 ENCOUNTER — OFFICE VISIT (OUTPATIENT)
Dept: FAMILY MEDICINE | Facility: CLINIC | Age: 82
End: 2017-07-24
Payer: MEDICARE

## 2017-07-24 VITALS
BODY MASS INDEX: 29.41 KG/M2 | HEIGHT: 66 IN | WEIGHT: 183 LBS | SYSTOLIC BLOOD PRESSURE: 110 MMHG | HEART RATE: 80 BPM | DIASTOLIC BLOOD PRESSURE: 68 MMHG

## 2017-07-24 DIAGNOSIS — I48.20 CHRONIC ATRIAL FIBRILLATION: ICD-10-CM

## 2017-07-24 DIAGNOSIS — N18.30 CKD (CHRONIC KIDNEY DISEASE) STAGE 3, GFR 30-59 ML/MIN: ICD-10-CM

## 2017-07-24 DIAGNOSIS — I10 ESSENTIAL HYPERTENSION: ICD-10-CM

## 2017-07-24 DIAGNOSIS — M85.80 OSTEOPENIA, UNSPECIFIED LOCATION: ICD-10-CM

## 2017-07-24 DIAGNOSIS — Z78.0 ASYMPTOMATIC MENOPAUSE: ICD-10-CM

## 2017-07-24 DIAGNOSIS — I10 ESSENTIAL HYPERTENSION: Primary | ICD-10-CM

## 2017-07-24 LAB
ALBUMIN SERPL BCP-MCNC: 3.6 G/DL
ALP SERPL-CCNC: 79 U/L
ALT SERPL W/O P-5'-P-CCNC: 12 U/L
ANION GAP SERPL CALC-SCNC: 6 MMOL/L
AST SERPL-CCNC: 20 U/L
BASOPHILS # BLD AUTO: 0.03 K/UL
BASOPHILS NFR BLD: 0.4 %
BILIRUB SERPL-MCNC: 0.7 MG/DL
BUN SERPL-MCNC: 15 MG/DL
CALCIUM SERPL-MCNC: 9.6 MG/DL
CHLORIDE SERPL-SCNC: 105 MMOL/L
CO2 SERPL-SCNC: 28 MMOL/L
CREAT SERPL-MCNC: 1.1 MG/DL
DIFFERENTIAL METHOD: ABNORMAL
EOSINOPHIL # BLD AUTO: 0.2 K/UL
EOSINOPHIL NFR BLD: 2.1 %
ERYTHROCYTE [DISTWIDTH] IN BLOOD BY AUTOMATED COUNT: 14.1 %
EST. GFR  (AFRICAN AMERICAN): 50.4 ML/MIN/1.73 M^2
EST. GFR  (NON AFRICAN AMERICAN): 43.7 ML/MIN/1.73 M^2
GLUCOSE SERPL-MCNC: 87 MG/DL
HCT VFR BLD AUTO: 41.2 %
HGB BLD-MCNC: 13.4 G/DL
LYMPHOCYTES # BLD AUTO: 1.2 K/UL
LYMPHOCYTES NFR BLD: 14.3 %
MCH RBC QN AUTO: 28.4 PG
MCHC RBC AUTO-ENTMCNC: 32.5 G/DL
MCV RBC AUTO: 87 FL
MONOCYTES # BLD AUTO: 0.5 K/UL
MONOCYTES NFR BLD: 6.4 %
NEUTROPHILS # BLD AUTO: 6.3 K/UL
NEUTROPHILS NFR BLD: 76.4 %
PLATELET # BLD AUTO: 186 K/UL
PMV BLD AUTO: 11.1 FL
POTASSIUM SERPL-SCNC: 4.7 MMOL/L
PROT SERPL-MCNC: 7 G/DL
RBC # BLD AUTO: 4.72 M/UL
SODIUM SERPL-SCNC: 139 MMOL/L
WBC # BLD AUTO: 8.25 K/UL

## 2017-07-24 PROCEDURE — 1159F MED LIST DOCD IN RCRD: CPT | Mod: S$GLB,,, | Performed by: FAMILY MEDICINE

## 2017-07-24 PROCEDURE — 99999 PR PBB SHADOW E&M-EST. PATIENT-LVL III: CPT | Mod: PBBFAC,,, | Performed by: FAMILY MEDICINE

## 2017-07-24 PROCEDURE — 1157F ADVNC CARE PLAN IN RCRD: CPT | Mod: S$GLB,,, | Performed by: FAMILY MEDICINE

## 2017-07-24 PROCEDURE — 85025 COMPLETE CBC W/AUTO DIFF WBC: CPT

## 2017-07-24 PROCEDURE — 80053 COMPREHEN METABOLIC PANEL: CPT

## 2017-07-24 PROCEDURE — 99214 OFFICE O/P EST MOD 30 MIN: CPT | Mod: S$GLB,,, | Performed by: FAMILY MEDICINE

## 2017-07-24 PROCEDURE — 1126F AMNT PAIN NOTED NONE PRSNT: CPT | Mod: S$GLB,,, | Performed by: FAMILY MEDICINE

## 2017-07-24 PROCEDURE — 36415 COLL VENOUS BLD VENIPUNCTURE: CPT | Mod: PO

## 2017-07-24 NOTE — MEDICAL/APP STUDENT
Subjective:       Patient ID: Ngoc Castellanos is a 89 y.o. female.     Chief Complaint: Follow-up and Hypertension     HPI Comments: 89 years old female is here today for blood pressure check. Blood pressuretoday  is stable.  No chest pain palpitations orthopnea or PND.  Patient with decreased kidney function but stable in comparison with previous reports.    Patient wishes to schedule repeat DEXA scan (Last DXA bone density spine and hip in 2/11/2015; left femoral neck T score of -1.1; right femoral neck T score of -2.2). Patient is on endocrine therapy (Arimidex; started in May 2014) s/p right modified radical mastectomy for carcinoma of the right breast.    Hypertension   Pertinent negatives include no chest pain or palpitations.      Review of Systems   Constitutional: Negative.    HENT: Negative.    Eyes: Negative.    Respiratory: Negative.    Cardiovascular: Negative.  Negative for chest pain, palpitations and leg swelling.   Gastrointestinal: Negative.    Endocrine: Negative for cold intolerance, heat intolerance, polydipsia, polyphagia and polyuria.   Genitourinary: Negative.    Musculoskeletal: Negative.    Skin: Negative.    Neurological: Negative.    Psychiatric/Behavioral: Negative.        Objective:      Physical Exam   Constitutional: She is oriented to person, place, and time. She appears well-developed and well-nourished. No distress.   HENT:   Head: Normocephalic and atraumatic.   Right Ear: External ear normal.   Left Ear: External ear normal.   Nose: Nose normal.   Mouth/Throat: Oropharynx is clear and moist. No oropharyngeal exudate.   Eyes: Conjunctivae and EOM are normal. Pupils are equal, round, and reactive to light. Right eye exhibits no discharge. Left eye exhibits no discharge. No scleral icterus.   Neck: Normal range of motion. Neck supple. No JVD present. No tracheal deviation present. No thyromegaly present.   Cardiovascular: Normal rate, regular rhythm, normal heart sounds and intact  distal pulses.  Exam reveals no gallop and no friction rub.    No murmur heard.  Pulmonary/Chest: Effort normal and breath sounds normal. No stridor. No respiratory distress. She has no wheezes. She has no rales. She exhibits no tenderness.   Abdominal: Soft. Bowel sounds are normal. She exhibits no distension and no mass. There is no tenderness. There is no rebound and no guarding.   Musculoskeletal: Normal range of motion. She exhibits no edema or tenderness.   Lymphadenopathy:     She has no cervical adenopathy.   Neurological: She is alert and oriented to person, place, and time. She has normal reflexes. No cranial nerve deficit. She exhibits normal muscle tone.   Skin: Skin is warm and dry. No rash noted. She is not diaphoretic. No erythema. No pallor.   Psychiatric: She has a normal mood and affect. Her behavior is normal. Judgment and thought content normal.       Assessment:       1. Essential hypertension  2. Chronic atrial fibrillation  3. CKD (Chronic Kidney disease) stage 3  4. Osteopenia     Plan:       Ngoc was seen today for follow-up.    Diagnoses and all orders for this visit:    Essential hypertension  -     Comprehensive metabolic panel; Future  -     CBC auto differential; Future    Chronic atrial fibrillation  -     Comprehensive metabolic panel; Future  -     CBC auto differential; Future    CKD (chronic kidney disease) stage 3, GFR 30-59 ml/min  -     Comprehensive metabolic panel; Future    Asymptomatic menopause  -     DXA Bone Density Spine And Hip; Future    Osteopenia, unspecified location  -     DXA Bone Density Spine And Hip; Future

## 2017-07-24 NOTE — PROGRESS NOTES
Subjective:       Patient ID: Ngoc Castellanos is a 89 y.o. female.    Chief Complaint: Follow-up    89 years old female came to the clinic for blood pressure check.  No chest pain palpitations orthopnea or PND.  Patient with atrial fibrillation currently stable .  Patient with decreased kidney function but stable in comparison with previous reports.  Patient is due for her bone density.      Review of Systems   Cardiovascular: Negative for chest pain, palpitations and leg swelling.       Objective:      Physical Exam   Constitutional: She is oriented to person, place, and time. She appears well-developed and well-nourished. No distress.   HENT:   Head: Normocephalic and atraumatic.   Right Ear: External ear normal.   Left Ear: External ear normal.   Nose: Nose normal.   Mouth/Throat: Oropharynx is clear and moist. No oropharyngeal exudate.   Eyes: Conjunctivae and EOM are normal. Pupils are equal, round, and reactive to light. Right eye exhibits no discharge. Left eye exhibits no discharge. No scleral icterus.   Neck: Normal range of motion. Neck supple. No JVD present. No tracheal deviation present. No thyromegaly present.   Cardiovascular: Normal rate, regular rhythm, normal heart sounds and intact distal pulses.  Exam reveals no gallop and no friction rub.    No murmur heard.  Pulmonary/Chest: Effort normal and breath sounds normal. No stridor. No respiratory distress. She has no wheezes. She has no rales. She exhibits no tenderness.   Abdominal: Soft. Bowel sounds are normal. She exhibits no distension and no mass. There is no tenderness. There is no rebound and no guarding.   Musculoskeletal: Normal range of motion. She exhibits no edema or tenderness.   Lymphadenopathy:     She has no cervical adenopathy.   Neurological: She is alert and oriented to person, place, and time. She has normal reflexes. No cranial nerve deficit. She exhibits normal muscle tone. Coordination normal.   Skin: Skin is warm and dry. No  rash noted. She is not diaphoretic. No erythema. No pallor.   Psychiatric: She has a normal mood and affect. Her behavior is normal. Judgment and thought content normal.       Assessment:       1. Essential hypertension    2. Chronic atrial fibrillation    3. CKD (chronic kidney disease) stage 3, GFR 30-59 ml/min    4. Asymptomatic menopause    5. Osteopenia, unspecified location        Plan:         Ngoc was seen today for follow-up.    Diagnoses and all orders for this visit:    Essential hypertension  -     Comprehensive metabolic panel; Future  -     CBC auto differential; Future    Chronic atrial fibrillation  -     Comprehensive metabolic panel; Future  -     CBC auto differential; Future    CKD (chronic kidney disease) stage 3, GFR 30-59 ml/min  -     Comprehensive metabolic panel; Future    Asymptomatic menopause  -     DXA Bone Density Spine And Hip; Future    Osteopenia, unspecified location  -     DXA Bone Density Spine And Hip; Future    Continue monitoring blood pressure at home, low sodium diet.  Continue with calcium and vitamin D twice a day.  Continue with current regimen.

## 2017-09-05 ENCOUNTER — OFFICE VISIT (OUTPATIENT)
Dept: URGENT CARE | Facility: CLINIC | Age: 82
End: 2017-09-05
Payer: MEDICARE

## 2017-09-05 VITALS
HEART RATE: 76 BPM | DIASTOLIC BLOOD PRESSURE: 78 MMHG | SYSTOLIC BLOOD PRESSURE: 132 MMHG | OXYGEN SATURATION: 98 % | RESPIRATION RATE: 18 BRPM | HEIGHT: 66 IN | TEMPERATURE: 97 F | BODY MASS INDEX: 28.93 KG/M2 | WEIGHT: 180 LBS

## 2017-09-05 DIAGNOSIS — M79.672 PAIN OF LEFT HEEL: Primary | ICD-10-CM

## 2017-09-05 DIAGNOSIS — M79.662 PAIN IN LEFT LOWER LEG: ICD-10-CM

## 2017-09-05 DIAGNOSIS — Z78.0 ASYMPTOMATIC MENOPAUSE: ICD-10-CM

## 2017-09-05 DIAGNOSIS — M85.80 OSTEOPENIA, UNSPECIFIED LOCATION: ICD-10-CM

## 2017-09-05 DIAGNOSIS — M77.52 RETROCALCANEAL BURSITIS (BACK OF HEEL), LEFT: ICD-10-CM

## 2017-09-05 PROCEDURE — 3008F BODY MASS INDEX DOCD: CPT | Mod: S$GLB,,, | Performed by: PHYSICIAN ASSISTANT

## 2017-09-05 PROCEDURE — 1157F ADVNC CARE PLAN IN RCRD: CPT | Mod: S$GLB,,, | Performed by: PHYSICIAN ASSISTANT

## 2017-09-05 PROCEDURE — 99203 OFFICE O/P NEW LOW 30 MIN: CPT | Mod: S$GLB,,, | Performed by: PHYSICIAN ASSISTANT

## 2017-09-05 PROCEDURE — 1159F MED LIST DOCD IN RCRD: CPT | Mod: S$GLB,,, | Performed by: PHYSICIAN ASSISTANT

## 2017-09-05 NOTE — PROGRESS NOTES
"Subjective:       Patient ID: Ngoc Castellanos is a 89 y.o. female.    Vitals:  height is 5' 6" (1.676 m) and weight is 81.6 kg (180 lb). Her temperature is 97.4 °F (36.3 °C). Her blood pressure is 132/78 and her pulse is 76. Her respiration is 18 and oxygen saturation is 98%.     Chief Complaint: Ankle Pain    89 y/o wf presents with left heel pain and foot swelling x 3 days. She does not recall any trauma and states she woke up with the pain.       Ankle Pain    The incident occurred 2 days ago. The incident occurred at home. The pain is present in the left ankle and left foot. The quality of the pain is described as aching. The pain is at a severity of 5/10. The pain is mild. Associated symptoms include an inability to bear weight. Pertinent negatives include no numbness. She reports no foreign bodies present. The symptoms are aggravated by movement.     Review of Systems   Constitution: Negative for chills and fever.   HENT: Negative for hearing loss, nosebleeds and sore throat.    Eyes: Negative for blurred vision and redness.   Cardiovascular: Negative for chest pain and syncope.   Respiratory: Negative for cough and shortness of breath.    Hematologic/Lymphatic: Negative for bleeding problem.   Skin: Negative for color change and rash.   Musculoskeletal: Positive for joint pain and joint swelling (left foot). Negative for back pain and neck pain.   Gastrointestinal: Negative for abdominal pain, diarrhea, nausea and vomiting.   Neurological: Negative for headaches, numbness and paresthesias.   Psychiatric/Behavioral: The patient is not nervous/anxious.        Objective:      Physical Exam   Constitutional: Vital signs are normal. She appears well-developed and well-nourished. She is active. No distress.   HENT:   Head: Normocephalic and atraumatic.   Right Ear: Hearing and external ear normal.   Left Ear: Hearing and external ear normal.   Nose: Nose normal. No nasal deformity. No epistaxis.   Mouth/Throat: " Oropharynx is clear and moist and mucous membranes are normal.   Eyes: Conjunctivae and lids are normal. No scleral icterus.   Neck: Trachea normal and normal range of motion.   Cardiovascular: Intact distal pulses and normal pulses.    Pulmonary/Chest: Effort normal. No stridor. No respiratory distress.   Musculoskeletal:        Left lower leg: She exhibits tenderness (calf), swelling and edema.        Left foot: There is tenderness (heel) and swelling. There is normal range of motion and normal capillary refill.   Neurological: She is alert. She has normal strength. She is not disoriented. No sensory deficit. GCS eye subscore is 4. GCS verbal subscore is 5. GCS motor subscore is 6.   Skin: Skin is warm, dry and intact. Capillary refill takes less than 2 seconds. She is not diaphoretic.   Psychiatric: She has a normal mood and affect. Her speech is normal and behavior is normal. She is attentive.   Nursing note and vitals reviewed.        X-ray Foot Complete Left    Result Date: 9/5/2017  Foot complete 3 view left Clinical history: Pain in left foot Comparison: None Findings: 3 views. There is osteopenia.  Degenerative changes are noted of the foot.  No acute displaced fracture or dislocation of the foot.  There is edema about the dorsal aspect.  No convincing underlying osseous erosive or destructive process.  There is irregularity of the proximal phalange of the fifth toe, findings suggest sequela of previous injury or insult although correlation with any point tenderness is recommended.    1.  Diffuse edema about the dorsal aspect of the foot and anterior ankle, correlation recommended, this could reflect cellulitis although this is not in the region of patient's reported pain. 2.  Irregularity of the proximal phalange of the fifth toe, suggesting remote injury or insult, correlation with point tenderness however is recommended. Electronically signed by: CRISTO ISABEL MD Date:     09/05/17 Time:    15:57    Assessment:       1. Pain of left heel    2. Retrocalcaneal bursitis (back of heel), left    3. Pain in left lower leg        Plan:         Pain of left heel  -     X-Ray Foot Complete Left    Retrocalcaneal bursitis (back of heel), left    Pain in left lower leg  -     Vascular Lab ()  Venous Leg Left; Future; Expected date: 09/06/2017    R/O DVT. Pt on eloquis x 3 months.     Ice and elevate often. No NSAIDS due to eloquis use. OTC Tylenol as needed for pain.   Follow up with you primary care provider 1-2 weeks if no improvement or sooner if gets worse.

## 2017-09-05 NOTE — LETTER
[facility] - [address]  [place] [address]  Phone: [phone]  Fax: [phone]    Pt Name: Ngoc Castellanos  Injury Date:    Employee ID:  Date of First Treatment: 09/07/2017   Company: [id number][retired            Appointment Time: 02:10 PM Arrived:  2:25 PM CDT   Physician: Henry Garcia PA-C            Office Treatment: Ngoc was seen today for ankle pain.    Diagnoses and all orders for this visit:    Pain of left heel  -     X-Ray Foot Complete Left    Retrocalcaneal bursitis (back of heel), left    Pain in left lower leg  -     Vascular Lab () US Venous Leg Left; Future  -     Vascular Lab () US Venous Leg Left    Asymptomatic menopause  -     DXA Bone Density Spine And Hip    Osteopenia, unspecified location  -     DXA Bone Density Spine And Hip                    Return Appointment: Visit date not found

## 2017-09-05 NOTE — PATIENT INSTRUCTIONS
Understanding Retrocalcaneal Bursitis    Retrocalcaneal bursitis is a condition that causes heel pain. This pain spreads from the bursa located between the Achilles tendon and the heel bone. This bursa normally provides a cushion as you walk.  A bursa is a fluid-filled sac. Your body has many of them. They are found in areas where rubbing may occur, such as between tendons and bones. The fluid inside them helps ease friction during movement. If they become injured or irritated, you may feel pain.     How to say it  ret-elsi-annabelle-ESTUARDO-tiffanie [id number]-SI-[id number]   What causes retrocalcaneal bursitis?  This type of bursitis is mainly caused by using the ankle a lot, such as running uphill. A sudden increase in running or similar activities can also lead to it. Athletes who wear tight-fitting shoes while practicing or exercising are more prone to the condition. So too are those who dont stretch or warm up properly before such activities. Some cases may result from an infection or a disease such as arthritis.  Symptoms of retrocalcaneal bursitis  Severe pain and swelling in the heel are typical symptoms. You may also notice tenderness when the heel is touched. Tight-fitting shoes may become hard to wear. You may hear a crackling sound when you flex your foot.  Treatment for retrocalcaneal bursitis  The aim of treatment is to ease symptoms so that the bursa has time to heal. Treatment may include:  · Rest. You may need to alter or limit activities that cause heel pain. These include high-impact activities like running.  · Prescription or over-the-counter medicines. These help reduce pain and swelling.  · Cold packs or heat packs. Thesemay ease pain.  · Shoe inserts or padding. Devices such as heel cups or pads for the back of your heel can ease discomfort when moving.  · Footwear. You should avoid wearing tight-fitting shoes or those that rub the back of your heel. Shoes with an open-back heel, such as clogs, may  help.  · Stretching exercises. Gentle stretching movements can restore flexibility in your ankle and foot. They can also help with pain.  · Steroid injection. A needle is used to inject a pain reliever and steroid into the affected bursa. This shot can relieve pain and reduce swelling for several weeks.  · Surgery. This treatment is rarely needed unless other options dont work.  Complications of retrocalcaneal bursitis  · Limited range of motion in the affected foot and ankle  · Infected bursa     When to call your healthcare provider  Call your healthcare provider right away if you have any of these:  · Fever of 100.4°F (38°C) or higher, or as directed  · Pain that gets worse  · Symptoms that dont get better, or get worse  · New symptoms    Date Last Reviewed: 3/10/2016  © 7544-6154 [employer]. [address] [place] [address], [place] [address]. All rights reserved. This information is not intended as a substitute for professional medical care. Always follow your healthcare professional's instructions.

## 2017-09-11 ENCOUNTER — TELEPHONE (OUTPATIENT)
Dept: URGENT CARE | Facility: CLINIC | Age: 82
End: 2017-09-11

## 2017-09-11 ENCOUNTER — HOSPITAL ENCOUNTER (OUTPATIENT)
Dept: VASCULAR SURGERY | Facility: CLINIC | Age: 82
Discharge: HOME | End: 2017-09-11
Attending: PHYSICIAN ASSISTANT
Payer: MEDICARE

## 2017-09-11 DIAGNOSIS — M79.662 PAIN IN LEFT LOWER LEG: ICD-10-CM

## 2017-09-11 PROCEDURE — 93971 EXTREMITY STUDY: CPT | Mod: S$GLB,,, | Performed by: SURGERY

## 2017-09-11 NOTE — TELEPHONE ENCOUNTER
----- Message from Zakia Lanier MD sent at 9/11/2017 11:27 AM CDT -----  Please call the patient regarding her ultrasound of the leg which was negative for blood clot.  No further testing needed.      No answer and was not able to leave a message for patient to return call. Will call back later.

## 2017-09-15 ENCOUNTER — TELEPHONE (OUTPATIENT)
Dept: URGENT CARE | Facility: CLINIC | Age: 82
End: 2017-09-15

## 2017-09-25 ENCOUNTER — HOSPITAL ENCOUNTER (OUTPATIENT)
Dept: RADIOLOGY | Facility: HOSPITAL | Age: 82
Discharge: HOME | End: 2017-09-25
Attending: FAMILY MEDICINE
Payer: MEDICARE

## 2017-09-25 PROCEDURE — 77080 DXA BONE DENSITY AXIAL: CPT | Mod: 26,,, | Performed by: INTERNAL MEDICINE

## 2017-09-25 PROCEDURE — 77080 DXA BONE DENSITY AXIAL: CPT | Mod: TC

## 2017-10-18 DIAGNOSIS — I48.19 PERSISTENT ATRIAL FIBRILLATION: ICD-10-CM

## 2017-10-18 DIAGNOSIS — I38 VALVULAR REGURGITATION: ICD-10-CM

## 2017-10-18 DIAGNOSIS — R06.09 DOE (DYSPNEA ON EXERTION): ICD-10-CM

## 2017-10-18 DIAGNOSIS — I10 ESSENTIAL HYPERTENSION: ICD-10-CM

## 2017-10-18 DIAGNOSIS — C50.911 BREAST CARCINOMA, FEMALE, RIGHT: ICD-10-CM

## 2017-10-18 DIAGNOSIS — D33.2 BENIGN NEOPLASM OF BRAIN, UNSPECIFIED BRAIN REGION: ICD-10-CM

## 2017-10-19 RX ORDER — LISINOPRIL 5 MG/1
TABLET ORAL
Qty: 90 TABLET | Refills: 3 | Status: SHIPPED | OUTPATIENT
Start: 2017-10-19 | End: 2017-10-24 | Stop reason: SDUPTHER

## 2017-10-22 RX ORDER — ANASTROZOLE 1 MG/1
TABLET ORAL
Qty: 90 TABLET | Refills: 3 | Status: SHIPPED | OUTPATIENT
Start: 2017-10-22 | End: 2018-11-01 | Stop reason: SDUPTHER

## 2017-10-24 DIAGNOSIS — D33.2 BENIGN NEOPLASM OF BRAIN, UNSPECIFIED BRAIN REGION: ICD-10-CM

## 2017-10-24 DIAGNOSIS — R06.09 DOE (DYSPNEA ON EXERTION): ICD-10-CM

## 2017-10-24 DIAGNOSIS — I48.19 PERSISTENT ATRIAL FIBRILLATION: ICD-10-CM

## 2017-10-24 DIAGNOSIS — I10 ESSENTIAL HYPERTENSION: ICD-10-CM

## 2017-10-24 DIAGNOSIS — I38 VALVULAR REGURGITATION: ICD-10-CM

## 2017-10-26 DIAGNOSIS — I38 VALVULAR REGURGITATION: ICD-10-CM

## 2017-10-26 DIAGNOSIS — I10 ESSENTIAL HYPERTENSION: ICD-10-CM

## 2017-10-26 DIAGNOSIS — R06.09 DOE (DYSPNEA ON EXERTION): ICD-10-CM

## 2017-10-26 DIAGNOSIS — I48.19 PERSISTENT ATRIAL FIBRILLATION: ICD-10-CM

## 2017-10-26 DIAGNOSIS — D33.2 BENIGN NEOPLASM OF BRAIN, UNSPECIFIED BRAIN REGION: ICD-10-CM

## 2017-10-26 RX ORDER — LISINOPRIL 5 MG/1
TABLET ORAL
Qty: 90 TABLET | Refills: 3 | Status: SHIPPED | OUTPATIENT
Start: 2017-10-26 | End: 2017-11-03 | Stop reason: SDUPTHER

## 2017-10-26 RX ORDER — ATENOLOL 25 MG/1
25 TABLET ORAL DAILY
Qty: 90 TABLET | Refills: 3 | Status: SHIPPED | OUTPATIENT
Start: 2017-10-26 | End: 2017-10-30

## 2017-10-27 ENCOUNTER — LAB VISIT (OUTPATIENT)
Dept: LAB | Facility: HOSPITAL | Age: 82
End: 2017-10-27
Attending: INTERNAL MEDICINE
Payer: MEDICARE

## 2017-10-27 DIAGNOSIS — C50.911 BREAST CANCER, RIGHT BREAST: ICD-10-CM

## 2017-10-27 DIAGNOSIS — Z79.811 AROMATASE INHIBITOR USE: ICD-10-CM

## 2017-10-27 LAB
ALBUMIN SERPL BCP-MCNC: 3.8 G/DL
ALP SERPL-CCNC: 90 U/L
ALT SERPL W/O P-5'-P-CCNC: 12 U/L
ANION GAP SERPL CALC-SCNC: 5 MMOL/L
AST SERPL-CCNC: 21 U/L
BASOPHILS # BLD AUTO: 0.04 K/UL
BASOPHILS NFR BLD: 0.6 %
BILIRUB SERPL-MCNC: 1 MG/DL
BUN SERPL-MCNC: 15 MG/DL
CALCIUM SERPL-MCNC: 9.7 MG/DL
CHLORIDE SERPL-SCNC: 106 MMOL/L
CO2 SERPL-SCNC: 30 MMOL/L
CREAT SERPL-MCNC: 1 MG/DL
DIFFERENTIAL METHOD: ABNORMAL
EOSINOPHIL # BLD AUTO: 0.2 K/UL
EOSINOPHIL NFR BLD: 3.3 %
ERYTHROCYTE [DISTWIDTH] IN BLOOD BY AUTOMATED COUNT: 13.9 %
EST. GFR  (AFRICAN AMERICAN): 57 ML/MIN/1.73 M^2
EST. GFR  (NON AFRICAN AMERICAN): 49 ML/MIN/1.73 M^2
GLUCOSE SERPL-MCNC: 112 MG/DL
HCT VFR BLD AUTO: 42.2 %
HGB BLD-MCNC: 13.9 G/DL
LYMPHOCYTES # BLD AUTO: 1 K/UL
LYMPHOCYTES NFR BLD: 13.4 %
MCH RBC QN AUTO: 28.3 PG
MCHC RBC AUTO-ENTMCNC: 32.9 G/DL
MCV RBC AUTO: 86 FL
MONOCYTES # BLD AUTO: 0.4 K/UL
MONOCYTES NFR BLD: 5.6 %
NEUTROPHILS # BLD AUTO: 5.5 K/UL
NEUTROPHILS NFR BLD: 76.8 %
PLATELET # BLD AUTO: 178 K/UL
PMV BLD AUTO: 10.1 FL
POTASSIUM SERPL-SCNC: 4.5 MMOL/L
PROT SERPL-MCNC: 7.4 G/DL
RBC # BLD AUTO: 4.91 M/UL
SODIUM SERPL-SCNC: 141 MMOL/L
WBC # BLD AUTO: 7.17 K/UL

## 2017-10-27 PROCEDURE — 36415 COLL VENOUS BLD VENIPUNCTURE: CPT

## 2017-10-27 PROCEDURE — 80053 COMPREHEN METABOLIC PANEL: CPT

## 2017-10-27 PROCEDURE — 85025 COMPLETE CBC W/AUTO DIFF WBC: CPT

## 2017-10-27 RX ORDER — ATENOLOL 25 MG/1
25 TABLET ORAL DAILY
Qty: 90 TABLET | Refills: 3 | Status: SHIPPED | OUTPATIENT
Start: 2017-10-27 | End: 2017-10-30 | Stop reason: SDUPTHER

## 2017-10-30 ENCOUNTER — OFFICE VISIT (OUTPATIENT)
Dept: CARDIOLOGY | Facility: CLINIC | Age: 82
End: 2017-10-30
Payer: MEDICARE

## 2017-10-30 VITALS
BODY MASS INDEX: 29.73 KG/M2 | SYSTOLIC BLOOD PRESSURE: 141 MMHG | HEIGHT: 66 IN | OXYGEN SATURATION: 97 % | HEART RATE: 85 BPM | WEIGHT: 185 LBS | DIASTOLIC BLOOD PRESSURE: 79 MMHG

## 2017-10-30 DIAGNOSIS — R06.09 DOE (DYSPNEA ON EXERTION): ICD-10-CM

## 2017-10-30 DIAGNOSIS — Z79.01 CHRONIC ANTICOAGULATION: ICD-10-CM

## 2017-10-30 DIAGNOSIS — I10 ESSENTIAL HYPERTENSION: ICD-10-CM

## 2017-10-30 DIAGNOSIS — I38 VALVULAR REGURGITATION: ICD-10-CM

## 2017-10-30 DIAGNOSIS — I48.19 PERSISTENT ATRIAL FIBRILLATION: Primary | ICD-10-CM

## 2017-10-30 PROCEDURE — 99214 OFFICE O/P EST MOD 30 MIN: CPT | Mod: S$GLB,,, | Performed by: INTERNAL MEDICINE

## 2017-10-30 PROCEDURE — 99499 UNLISTED E&M SERVICE: CPT | Mod: S$GLB,,, | Performed by: INTERNAL MEDICINE

## 2017-10-30 PROCEDURE — 99999 PR PBB SHADOW E&M-EST. PATIENT-LVL III: CPT | Mod: PBBFAC,,, | Performed by: INTERNAL MEDICINE

## 2017-10-30 RX ORDER — METOPROLOL TARTRATE AND HYDROCHLOROTHIAZIDE 50; 25 MG/1; MG/1
1 TABLET ORAL DAILY
Qty: 30 TABLET | Refills: 11 | Status: SHIPPED | OUTPATIENT
Start: 2017-10-30 | End: 2017-10-30 | Stop reason: SDUPTHER

## 2017-10-30 RX ORDER — METOPROLOL TARTRATE AND HYDROCHLOROTHIAZIDE 50; 25 MG/1; MG/1
1 TABLET ORAL DAILY
Qty: 90 TABLET | Refills: 3 | Status: SHIPPED | OUTPATIENT
Start: 2017-10-30 | End: 2017-11-03 | Stop reason: SDUPTHER

## 2017-10-30 NOTE — PROGRESS NOTES
Subjective:    Patient ID:  Ngoc Castellanos is a 89 y.o. female who presents for evaluation of Medication Refill and Shortness of Breath      HPI  89 y/o female former pt of Dr. Ford. She has a hx of persistent AFib on chronic anticoagulation with Eliquis, HTN, mild MR, CKD 3 who presents for f/u. She was on Digoxin that was stopped and atenolol started. She has been tolerating her medical regimen well and has no new complaints. She denies CP, orthopnea, PND, syncope, palps. She has been having worsening, intermittent OLIVIA and bilateral LE edema. She uses compression stockings and follows a low salt diet. She is compliant with her meds. She remains very active.      Review of Systems   Constitution: Negative for weakness and malaise/fatigue.   HENT: Negative for congestion.    Eyes: Negative for blurred vision.   Cardiovascular: Positive for dyspnea on exertion and leg swelling. Negative for chest pain, claudication, cyanosis, irregular heartbeat, near-syncope, orthopnea, palpitations, paroxysmal nocturnal dyspnea and syncope.   Respiratory: Negative for shortness of breath.    Endocrine: Negative for polyuria.   Hematologic/Lymphatic: Negative for bleeding problem.   Skin: Negative for itching and rash.   Musculoskeletal: Negative for joint swelling, muscle cramps and muscle weakness.   Gastrointestinal: Negative for abdominal pain, hematemesis, hematochezia, melena, nausea and vomiting.   Genitourinary: Negative for dysuria and hematuria.   Neurological: Negative for dizziness, focal weakness, headaches, light-headedness and loss of balance.   Psychiatric/Behavioral: Negative for depression. The patient is not nervous/anxious.         Objective:    Physical Exam   Constitutional: She is oriented to person, place, and time. She appears well-developed and well-nourished.   HENT:   Head: Normocephalic and atraumatic.   Neck: Neck supple. No JVD present.   Cardiovascular: Normal rate.  An irregularly irregular rhythm  present.   Murmur heard.   Systolic murmur is present with a grade of 2/6   Pulses:       Carotid pulses are 2+ on the right side, and 2+ on the left side.       Radial pulses are 2+ on the right side, and 2+ on the left side.        Femoral pulses are 2+ on the right side, and 2+ on the left side.       Dorsalis pedis pulses are 2+ on the right side, and 2+ on the left side.        Posterior tibial pulses are 2+ on the right side, and 2+ on the left side.   Pulmonary/Chest: Effort normal and breath sounds normal.   Abdominal: Soft. Bowel sounds are normal.   Musculoskeletal: She exhibits no edema.   Neurological: She is alert and oriented to person, place, and time.   Skin: Skin is warm and dry.   Psychiatric: She has a normal mood and affect. Her behavior is normal. Thought content normal.         Assessment:       1. Persistent atrial fibrillation    2. Essential hypertension    3. Valvular regurgitation    4. Chronic anticoagulation      89 y/o female with hx and presentation as above. Will schedule for 2DE. Insurance will not allow for atenolol so will switch to metoprolol. Will start HCTZ for LE edema and possible diastolic dysfunction causing her SOB.        Plan:       -Start metoprolol-HCTZ 50/25 mg  -2DE with CFD  -F/u in 3 months

## 2017-10-31 ENCOUNTER — OFFICE VISIT (OUTPATIENT)
Dept: HEMATOLOGY/ONCOLOGY | Facility: CLINIC | Age: 82
End: 2017-10-31
Payer: MEDICARE

## 2017-10-31 VITALS
HEART RATE: 77 BPM | DIASTOLIC BLOOD PRESSURE: 67 MMHG | SYSTOLIC BLOOD PRESSURE: 130 MMHG | BODY MASS INDEX: 29.07 KG/M2 | TEMPERATURE: 98 F | WEIGHT: 185.19 LBS | HEIGHT: 67 IN

## 2017-10-31 DIAGNOSIS — M85.80 OSTEOPENIA, UNSPECIFIED LOCATION: ICD-10-CM

## 2017-10-31 DIAGNOSIS — I48.19 PERSISTENT ATRIAL FIBRILLATION: ICD-10-CM

## 2017-10-31 DIAGNOSIS — M94.9 DISORDER OF CARTILAGE: ICD-10-CM

## 2017-10-31 DIAGNOSIS — Z79.01 CHRONIC ANTICOAGULATION: ICD-10-CM

## 2017-10-31 DIAGNOSIS — C50.511 MALIGNANT NEOPLASM OF LOWER-OUTER QUADRANT OF RIGHT BREAST OF FEMALE, ESTROGEN RECEPTOR POSITIVE: ICD-10-CM

## 2017-10-31 DIAGNOSIS — Z79.01 LONG TERM CURRENT USE OF ANTICOAGULANT THERAPY: Primary | ICD-10-CM

## 2017-10-31 DIAGNOSIS — Z17.0 MALIGNANT NEOPLASM OF LOWER-OUTER QUADRANT OF RIGHT BREAST OF FEMALE, ESTROGEN RECEPTOR POSITIVE: ICD-10-CM

## 2017-10-31 PROCEDURE — 99999 PR PBB SHADOW E&M-EST. PATIENT-LVL III: CPT | Mod: PBBFAC,,, | Performed by: INTERNAL MEDICINE

## 2017-10-31 PROCEDURE — 99213 OFFICE O/P EST LOW 20 MIN: CPT | Mod: S$GLB,,, | Performed by: INTERNAL MEDICINE

## 2017-10-31 PROCEDURE — 99499 UNLISTED E&M SERVICE: CPT | Mod: S$GLB,,, | Performed by: INTERNAL MEDICINE

## 2017-10-31 RX ORDER — ACETAMINOPHEN 500 MG
1 TABLET ORAL ONCE
COMMUNITY
Start: 2017-10-31 | End: 2017-10-31

## 2017-10-31 NOTE — PROGRESS NOTES
Subjective:       Patient ID: Ngoc Castellanos is a 89 y.o. female.    Chief Complaint: No chief complaint on file.    HPI She is here for followup.  She was diagnosed with carcinoma of the right breast, underwent right modified radical mastectomy on 3/26/14.  She has a 1.7 centimeter invasive mammary carcinoma with lobular features.  6 lymph nodes were removed and they were negative. Two sentinel lymph nodes and four additional right axillary lymph nodes were negative. Her tumor was ER/W[place] strongly positive and positive by FISH for HER-2. She was not given adjuvant Herceptin. She started Arimidex in May 2014.    She has history of atrial fibrillation and has been on Coumadin for several years, doing well without bleeding issues. Sees .    DEXA scan (9/14/15) showed Osteopenia of both femoral necks with normal bone mineral density of the lumbar spine.     Received the first dose of prolia on 8/5/14. Not interested in further prolia injections or alternative bisphosphonate Rx.    She is here for follow-up today.     Feels well.    Review of Systems   Respiratory: Negative for cough and shortness of breath.    Cardiovascular: Negative for chest pain, palpitations and leg swelling.   Hematological: Negative for adenopathy. Does not bruise/bleed easily.         Objective:      Physical Exam   Constitutional: She is oriented to person, place, and time. She appears well-developed. No distress.   HENT:   Head: Normocephalic and atraumatic.   Mouth/Throat: No oropharyngeal exudate.   Eyes: No scleral icterus.   Neck: Normal range of motion. Neck supple.   Cardiovascular: Normal rate.  Exam reveals no gallop.    Irregular.   Pulmonary/Chest: Effort normal and breath sounds normal. No stridor. She has no wheezes. She has no rales.       She is status post right mastectomy with a well-healed incision. Wound has healed well. There are no masses in the left breast.        Abdominal: Soft. She exhibits no distension and no  mass. There is no tenderness.   Lymphadenopathy:     She has no cervical adenopathy.   Neurological: She is alert and oriented to person, place, and time. No cranial nerve deficit.       Assessment:       1. Long term current use of anticoagulant therapy    2. Chronic anticoagulation    3. Persistent atrial fibrillation    4. Osteopenia, unspecified location        Plan:   She has T1cN0 Stage 1a ER/TX positive and Her-2 positive invasive mammary carcinoma of the right breast.     Adjuvant Arimidex was started in May 2014.  She is tolerating it well.  Continue Arimidex.    Recent mammogram in April 2017 unremarkable.    Last DEXA scan was in Sept 2017 - shows osteopenia.  Despite being 89 yrs old, she is still rather healthy, drives around and is tolerating anticoagulation well.  Continue calcium/vitamin D.   She's not interested in further bisphosphonate therapy or Prolia injections.    Return to clinic in 6 months.  Plan repeat mammogram in April 2018 - if she continues to remain active and healthy.

## 2017-11-03 DIAGNOSIS — I10 ESSENTIAL HYPERTENSION: ICD-10-CM

## 2017-11-03 DIAGNOSIS — D33.2 BENIGN NEOPLASM OF BRAIN, UNSPECIFIED BRAIN REGION: ICD-10-CM

## 2017-11-03 DIAGNOSIS — I48.19 PERSISTENT ATRIAL FIBRILLATION: ICD-10-CM

## 2017-11-03 DIAGNOSIS — I38 VALVULAR REGURGITATION: ICD-10-CM

## 2017-11-03 DIAGNOSIS — R06.09 DOE (DYSPNEA ON EXERTION): ICD-10-CM

## 2017-11-03 RX ORDER — METOPROLOL TARTRATE AND HYDROCHLOROTHIAZIDE 50; 25 MG/1; MG/1
1 TABLET ORAL DAILY
Qty: 90 TABLET | Refills: 3 | Status: SHIPPED | OUTPATIENT
Start: 2017-11-03 | End: 2018-06-12

## 2017-11-03 RX ORDER — LISINOPRIL 5 MG/1
TABLET ORAL
Qty: 90 TABLET | Refills: 3 | Status: SHIPPED | OUTPATIENT
Start: 2017-11-03 | End: 2018-01-08 | Stop reason: SDUPTHER

## 2018-01-08 DIAGNOSIS — I38 VALVULAR REGURGITATION: ICD-10-CM

## 2018-01-08 DIAGNOSIS — D33.2 BENIGN NEOPLASM OF BRAIN, UNSPECIFIED BRAIN REGION: ICD-10-CM

## 2018-01-08 DIAGNOSIS — R06.09 DOE (DYSPNEA ON EXERTION): ICD-10-CM

## 2018-01-08 DIAGNOSIS — I10 ESSENTIAL HYPERTENSION: ICD-10-CM

## 2018-01-08 DIAGNOSIS — I48.19 PERSISTENT ATRIAL FIBRILLATION: ICD-10-CM

## 2018-01-08 RX ORDER — LISINOPRIL 5 MG/1
TABLET ORAL
Qty: 90 TABLET | Refills: 3 | Status: SHIPPED | OUTPATIENT
Start: 2018-01-08 | End: 2018-01-19 | Stop reason: SDUPTHER

## 2018-01-09 ENCOUNTER — LAB VISIT (OUTPATIENT)
Dept: LAB | Facility: HOSPITAL | Age: 83
End: 2018-01-09
Attending: FAMILY MEDICINE
Payer: MEDICARE

## 2018-01-09 ENCOUNTER — TELEPHONE (OUTPATIENT)
Dept: FAMILY MEDICINE | Facility: CLINIC | Age: 83
End: 2018-01-09

## 2018-01-09 ENCOUNTER — OFFICE VISIT (OUTPATIENT)
Dept: FAMILY MEDICINE | Facility: CLINIC | Age: 83
End: 2018-01-09
Payer: MEDICARE

## 2018-01-09 VITALS
HEIGHT: 66 IN | OXYGEN SATURATION: 97 % | WEIGHT: 180 LBS | DIASTOLIC BLOOD PRESSURE: 72 MMHG | HEART RATE: 61 BPM | BODY MASS INDEX: 28.93 KG/M2 | SYSTOLIC BLOOD PRESSURE: 130 MMHG

## 2018-01-09 DIAGNOSIS — M54.50 ACUTE BILATERAL LOW BACK PAIN WITHOUT SCIATICA: ICD-10-CM

## 2018-01-09 DIAGNOSIS — I10 ESSENTIAL HYPERTENSION: ICD-10-CM

## 2018-01-09 DIAGNOSIS — I48.20 CHRONIC ATRIAL FIBRILLATION: Primary | ICD-10-CM

## 2018-01-09 DIAGNOSIS — Z23 NEED FOR VACCINATION AGAINST STREPTOCOCCUS PNEUMONIAE: ICD-10-CM

## 2018-01-09 DIAGNOSIS — N18.30 CKD (CHRONIC KIDNEY DISEASE) STAGE 3, GFR 30-59 ML/MIN: ICD-10-CM

## 2018-01-09 LAB
ANION GAP SERPL CALC-SCNC: 7 MMOL/L
BUN SERPL-MCNC: 22 MG/DL
CALCIUM SERPL-MCNC: 9.8 MG/DL
CHLORIDE SERPL-SCNC: 101 MMOL/L
CO2 SERPL-SCNC: 26 MMOL/L
CREAT SERPL-MCNC: 1.2 MG/DL
EST. GFR  (AFRICAN AMERICAN): 45.3 ML/MIN/1.73 M^2
EST. GFR  (NON AFRICAN AMERICAN): 39.3 ML/MIN/1.73 M^2
GLUCOSE SERPL-MCNC: 108 MG/DL
POTASSIUM SERPL-SCNC: 4.6 MMOL/L
SODIUM SERPL-SCNC: 134 MMOL/L

## 2018-01-09 PROCEDURE — 99499 UNLISTED E&M SERVICE: CPT | Mod: S$GLB,,, | Performed by: FAMILY MEDICINE

## 2018-01-09 PROCEDURE — 90732 PPSV23 VACC 2 YRS+ SUBQ/IM: CPT | Mod: S$GLB,,, | Performed by: FAMILY MEDICINE

## 2018-01-09 PROCEDURE — 99999 PR PBB SHADOW E&M-EST. PATIENT-LVL III: CPT | Mod: PBBFAC,,, | Performed by: FAMILY MEDICINE

## 2018-01-09 PROCEDURE — 99214 OFFICE O/P EST MOD 30 MIN: CPT | Mod: S$GLB,,, | Performed by: FAMILY MEDICINE

## 2018-01-09 PROCEDURE — 36415 COLL VENOUS BLD VENIPUNCTURE: CPT | Mod: PO

## 2018-01-09 PROCEDURE — 80048 BASIC METABOLIC PNL TOTAL CA: CPT

## 2018-01-09 PROCEDURE — G0009 ADMIN PNEUMOCOCCAL VACCINE: HCPCS | Mod: S$GLB,,, | Performed by: FAMILY MEDICINE

## 2018-01-09 RX ORDER — TIZANIDINE 2 MG/1
4 TABLET ORAL EVERY 8 HOURS PRN
Qty: 40 TABLET | Refills: 0 | Status: SHIPPED | OUTPATIENT
Start: 2018-01-09 | End: 2018-01-19

## 2018-01-09 NOTE — TELEPHONE ENCOUNTER
Pt had fall in bathroom down stairs, pt was evaluated, bp taken 120/70, open wound to hand, hand was cleaned, antibiotic ointment gauze and wrapped with coban, pt verbalized she was ok, pt was escorted downstairs in wheelchair, had labs done and left clinic

## 2018-01-09 NOTE — PROGRESS NOTES
Subjective:       Patient ID: Ngoc Castellanos is a 89 y.o. female.    Chief Complaint: No chief complaint on file.    89 years old female came to the clinic for blood pressure check.  Blood pressure today stable.  No chest pain palpitations orthopnea or PND.  Atrial fibrillation is stable.  Patient with lower back pain for the last 4 weeks.  The pain is 8 out of 10 of intensity on and off aggravated with activity and better with rest.  Patient with decreased kidney function but stable in comparison with previous reports.      Review of Systems   Constitutional: Negative.    HENT: Negative.    Eyes: Negative.    Respiratory: Negative.    Cardiovascular: Negative.  Negative for chest pain, palpitations and leg swelling.   Gastrointestinal: Negative.    Genitourinary: Negative.    Musculoskeletal: Positive for back pain.   Skin: Negative.    Neurological: Negative.    Psychiatric/Behavioral: Negative.        Objective:      Physical Exam   Constitutional: She is oriented to person, place, and time. She appears well-developed and well-nourished. No distress.   HENT:   Head: Normocephalic and atraumatic.   Right Ear: External ear normal.   Left Ear: External ear normal.   Nose: Nose normal.   Mouth/Throat: Oropharynx is clear and moist. No oropharyngeal exudate.   Eyes: Conjunctivae and EOM are normal. Pupils are equal, round, and reactive to light. Right eye exhibits no discharge. Left eye exhibits no discharge. No scleral icterus.   Neck: Normal range of motion. Neck supple. No JVD present. No tracheal deviation present. No thyromegaly present.   Cardiovascular: Normal rate, normal heart sounds and intact distal pulses.  An irregularly irregular rhythm present. Exam reveals no gallop and no friction rub.    No murmur heard.  Pulmonary/Chest: Effort normal and breath sounds normal. No stridor. No respiratory distress. She has no wheezes. She has no rales. She exhibits no tenderness.   Abdominal: Soft. Bowel sounds are  normal. She exhibits no distension and no mass. There is no tenderness. There is no rebound and no guarding.   Musculoskeletal: She exhibits no edema.        Lumbar back: She exhibits decreased range of motion, tenderness and pain.   Lymphadenopathy:     She has no cervical adenopathy.   Neurological: She is alert and oriented to person, place, and time. She has normal reflexes. No cranial nerve deficit. She exhibits normal muscle tone. Coordination normal.   Skin: Skin is warm and dry. No rash noted. She is not diaphoretic. No erythema. No pallor.   Psychiatric: She has a normal mood and affect. Her behavior is normal. Judgment and thought content normal.       Assessment:       1. Chronic atrial fibrillation    2. Essential hypertension    3. Acute bilateral low back pain without sciatica    4. CKD (chronic kidney disease) stage 3, GFR 30-59 ml/min    5. Need for vaccination against Streptococcus pneumoniae        Plan:         Diagnoses and all orders for this visit:    Chronic atrial fibrillation    Essential hypertension  -     Basic metabolic panel; Future    Acute bilateral low back pain without sciatica  -     X-Ray Lumbar Spine Ap And Lateral; Future  -     Basic metabolic panel; Future  -     Urinalysis; Future  -     tiZANidine (ZANAFLEX) 2 MG tablet; Take 2 tablets (4 mg total) by mouth every 8 (eight) hours as needed (spasm).    CKD (chronic kidney disease) stage 3, GFR 30-59 ml/min  -     Basic metabolic panel; Future  -     Urinalysis; Future    Need for vaccination against Streptococcus pneumoniae  -     Pneumococcal Polysaccharide Vaccine (23 Valent) (SQ/IM)     patient with a fall after the clinical appointment.  Patient only with small abrassion over the right hand no apparent injury.  Patient denies passing out or significant pain in other joints.

## 2018-01-09 NOTE — PATIENT INSTRUCTIONS
Exercises to Strengthen Your Lower Back  Strong lower back and abdominal muscles work together to support your spine. The exercises below will help strengthen the lower back. It is important that you begin exercising slowly and increase levels gradually.  Always begin any exercise program with stretching. If you feel pain while doing any of these exercises, stop and talk to your doctor about a more specific exercise program that better suits your condition.   Low back stretch  The point of stretching is to make you more flexible and increase your range of motion. Stretch only as much as you are able. Stretch slowly. Do not push your stretch to the limit. If at any point you feel pain while stretching, this is your (temporary) limit.  · Lie on your back with your knees bent and both feet on the ground.  · Slowly raise your left knee to your chest as you flatten your lower back against the floor. Hold for 5 seconds.  · Relax and repeat the exercise with your right knee.  · Do 10 of these exercises for each leg.  · Repeat hugging both knees to your chest at the same time.  Building lower back strength  Start your exercise routine with 10 to 30 minutes a day, 1 to 3 times a day.  Initial exercises  Lying on your back:  1. Ankle pumps: Move your foot up and down, towards your head, and then away. Repeat 10 times with each foot.  2. Heel slides: Slowly bend your knee, drawing the heel of your foot towards you. Then slide your heel/foot from you, straightening your knee. Do not lift your foot off the floor (this is not a leg lift).  3. Abdominal contraction: Bend your knees and put your hands on your stomach. Tighten your stomach muscles. Hold for 5 seconds, then relax. Repeat 10 times.  4. Straight leg raise: Bend one leg at the knee and keep the other leg straight. Tighten your stomach muscles. Slowly lift your straight leg 6 to 12 inches off the floor and hold for up to 5 seconds. Repeat 10 times on each  side.  Standin. Wall squats: Stand with your back against the wall. Move your feet about 12 inches away from the wall. Tighten your stomach muscles, and slowly bend your knees until they are at about a 45 degree angle. Do not go down too far. Hold about 5 seconds. Then slowly return to your starting position. Repeat 10 times.  2. Heel raises: Stand facing the wall. Slowly raise the heels of your feet up and down, while keeping your toes on the floor. If you have trouble balancing, you can touch the wall with your hands. Repeat 10 times.  More advanced exercises  When you feel comfortable enough, try these exercises.  1. Kneeling lumbar extension: Begin on your hands and knees. At the same time, raise and straighten your right arm and left leg until they are parallel to the ground. Hold for 2 seconds and come back slowly to a starting position. Repeat with left arm and right leg, alternating 10 times.  2. Prone lumbar extension: Lie face down, arms extended overhead, palms on the floor. At the same time, raise your right arm and left leg as high as comfortably possible. Hold for 10 seconds and slowly return to start. Repeat with left arm and right leg, alternating 10 times. Gradually build up to 20 times. (Advanced: Repeat this exercise raising both arms and both legs a few inches off the floor at the same time. Hold for 5 seconds and release.)  3. Pelvic tilt: Lie on the floor on your back with your knees bent at 90 degrees. Your feet should be flat on the floor. Inhale, exhale, then slowly contract your abdominal muscles bringing your navel toward your spine. Let your pelvis rock back until your lower back is flat on the floor. Hold for 10 seconds while breathing smoothly.  4. Abdominal crunch: Perform a pelvic tilt (above) flattening your lower back against the floor. Holding the tension in your abdominal muscles, take another breath and raise your shoulder blades off the ground (this is not a full sit-up).  Keep your head in line with your body (dont bend your neck forward). Hold for 2 seconds, then slowly lower.  Date Last Reviewed: 6/1/2016  © 0861-2865 [employer]. [address] [place] [address], [place] [address]. All rights reserved. This information is not intended as a substitute for professional medical care. Always follow your healthcare professional's instructions.

## 2018-01-12 ENCOUNTER — TELEPHONE (OUTPATIENT)
Dept: FAMILY MEDICINE | Facility: CLINIC | Age: 83
End: 2018-01-12

## 2018-01-12 ENCOUNTER — HOSPITAL ENCOUNTER (OUTPATIENT)
Dept: RADIOLOGY | Facility: HOSPITAL | Age: 83
Discharge: HOME | End: 2018-01-12
Attending: FAMILY MEDICINE
Payer: MEDICARE

## 2018-01-12 DIAGNOSIS — M48.56XA COMPRESSION FRACTURE OF LUMBAR SPINE, NON-TRAUMATIC, INITIAL ENCOUNTER: Primary | ICD-10-CM

## 2018-01-12 DIAGNOSIS — M54.50 ACUTE BILATERAL LOW BACK PAIN WITHOUT SCIATICA: ICD-10-CM

## 2018-01-12 PROCEDURE — 72100 X-RAY EXAM L-S SPINE 2/3 VWS: CPT | Mod: TC,FY

## 2018-01-12 PROCEDURE — 72100 X-RAY EXAM L-S SPINE 2/3 VWS: CPT | Mod: 26,,, | Performed by: RADIOLOGY

## 2018-01-13 NOTE — TELEPHONE ENCOUNTER
X-ray with mild compression fracture probably old.  Follow-up with Dr. Hernandez for possible interventional treatment.    Please contact the patient with the appointment.

## 2018-01-15 NOTE — TELEPHONE ENCOUNTER
Called to give pt result. Phone rang/ no answer no voicemail. Confirm with Dr. Hansen and we will stop by pt room at nursing home on our next visit to ebony ba[facility]uelos

## 2018-01-19 DIAGNOSIS — D33.2 BENIGN NEOPLASM OF BRAIN, UNSPECIFIED BRAIN REGION: ICD-10-CM

## 2018-01-19 DIAGNOSIS — R06.09 DOE (DYSPNEA ON EXERTION): ICD-10-CM

## 2018-01-19 DIAGNOSIS — I10 ESSENTIAL HYPERTENSION: ICD-10-CM

## 2018-01-19 DIAGNOSIS — I38 VALVULAR REGURGITATION: ICD-10-CM

## 2018-01-19 DIAGNOSIS — I48.19 PERSISTENT ATRIAL FIBRILLATION: ICD-10-CM

## 2018-01-19 RX ORDER — LISINOPRIL 5 MG/1
TABLET ORAL
Qty: 90 TABLET | Refills: 3 | Status: SHIPPED | OUTPATIENT
Start: 2018-01-19 | End: 2018-11-16 | Stop reason: SDUPTHER

## 2018-01-22 ENCOUNTER — TELEPHONE (OUTPATIENT)
Dept: FAMILY MEDICINE | Facility: CLINIC | Age: 83
End: 2018-01-22

## 2018-01-22 NOTE — TELEPHONE ENCOUNTER
----- Message from Mckenzie Dowling sent at 1/22/2018  9:51 AM CST -----  Contact: [phone]/ self   Pt its requesting xray test results done 01/12/18  . Please advise

## 2018-01-22 NOTE — TELEPHONE ENCOUNTER
Patient was previously referred to .  Patient with possible compression fracture of the lumbar level.  I wasn't able to contact her by phone.

## 2018-01-29 ENCOUNTER — OFFICE VISIT (OUTPATIENT)
Dept: PAIN MEDICINE | Facility: CLINIC | Age: 83
End: 2018-01-29
Payer: MEDICARE

## 2018-01-29 VITALS
WEIGHT: 179.88 LBS | HEART RATE: 69 BPM | SYSTOLIC BLOOD PRESSURE: 116 MMHG | HEIGHT: 66 IN | DIASTOLIC BLOOD PRESSURE: 62 MMHG | BODY MASS INDEX: 28.91 KG/M2

## 2018-01-29 DIAGNOSIS — M47.816 LUMBAR SPONDYLOSIS: Primary | ICD-10-CM

## 2018-01-29 DIAGNOSIS — S32.010A CLOSED COMPRESSION FRACTURE OF FIRST LUMBAR VERTEBRA, INITIAL ENCOUNTER: ICD-10-CM

## 2018-01-29 PROCEDURE — 99499 UNLISTED E&M SERVICE: CPT | Mod: S$GLB,,, | Performed by: ANESTHESIOLOGY

## 2018-01-29 PROCEDURE — 99204 OFFICE O/P NEW MOD 45 MIN: CPT | Mod: S$GLB,,, | Performed by: ANESTHESIOLOGY

## 2018-01-29 PROCEDURE — 99999 PR PBB SHADOW E&M-EST. PATIENT-LVL III: CPT | Mod: PBBFAC,,, | Performed by: ANESTHESIOLOGY

## 2018-01-29 RX ORDER — CALCIUM CARBONATE 600 MG
600 TABLET ORAL 2 TIMES DAILY WITH MEALS
COMMUNITY

## 2018-01-29 RX ORDER — ACETAMINOPHEN 500 MG
1000 TABLET ORAL EVERY 6 HOURS PRN
Status: ON HOLD | COMMUNITY
End: 2023-11-21 | Stop reason: HOSPADM

## 2018-01-29 NOTE — LETTER
January 29, 2018      Jacobo Mcleod MD  [address] [place] [address]           [facility]  [address]  [facility] [address]  Phone: [phone]          Patient: Ngoc Castellanos   MR Number: [id number]   YOB: 1925   Date of Visit: 1/29/2018       Dear Dr. Jacobo Mcleod:    Thank you for referring Ngoc Castellanos to me for evaluation. Attached you will find relevant portions of my assessment and plan of care.    If you have questions, please do not hesitate to call me. I look forward to following Ngoc Castellanos along with you.    Sincerely,    Elton Hernandez MD    Enclosure  CC:  No Recipients    If you would like to receive this communication electronically, please contact [email or web address] or [phone] to request more information on [employer] Link access.    For providers and/or their staff who would like to refer a patient to [facility], please contact us through our one-stop-shop provider referral line, [facility], at [phone].    If you feel you have received this communication in error or would no longer like to receive these types of communications, please e-mail [email or web address]

## 2018-01-29 NOTE — PROGRESS NOTES
Chronic Pain - New Consult    Referring Physician: Jacobo Mcleod*    Chief Complaint:   Chief Complaint   Patient presents with    Low-back Pain    Hip Pain     left         SUBJECTIVE:    Ngoc Castellanos presents to the clinic for the evaluation of lower back and left hip  pain. The pain started 1 month  ago following fall and symptoms have been worsening.The pain is located in the lower back area and radiates to the left hip.  The pain is described as dull and is rated as 2/10. The pain is rated with a score of  2/10 on the BEST day and a score of 10/10 on the WORST day.  Symptoms interfere with daily activity and sleeping. The pain is exacerbated by Getting out of bed/chair.  The pain is mitigated by medications. She reports spending 1 hours per day reclining. The patient reports 6 hours of uninterrupted sleep per night.    Xray of the L spine showed  Mild wedge compression fracture deformity of L1 and L2 of unknown chronicity., and arthritic changes   She was in a lot of pain about 1 month ago, however her pain subsided now, very mild pain and soreness   Patient denies night fever/night sweats, urinary incontinence, bowel incontinence, significant weight loss, significant motor weakness and loss of sensations.    Physical Therapy/Home Exercise: no      Pain Disability Index Review:  Last 3 PDI Scores 1/29/2018   Pain Disability Index (PDI) 12       Pain Medications:    - Opioids: None  - Adjuvant Medications:Tylenol  - Anti-Coagulants: Eliquis   - Others: See Medication List      report:  Reviewed and consistent with medication use as prescribed.    Pain Procedures: None    Imaging: X-Ray Lumbar Spine Ap And Lateral  Lumbar spine radiographs,    Results: AP, lateral and spot view.  There is a mild levoscoliosis.  Grade 1 anterior listhesis of L4 relative to L5.  Mild wedge compression fracture deformity of L1 and L2 of unknown chronicity.  The remainder of the vertebrae maintain normal height.   There is mild disc space narrowing at L2-L3 and L5-S1.  Mild facet joint osseous hypertrophy at L4-L5 and L5-S1.  There is significant atherosclerotic changes of the aorta.   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     Social History Main Topics    Smoking status: Never Smoker    Smokeless tobacco: Never Used    Alcohol use No    Drug use: No    Sexual activity: Not on file     Other Topics Concern    Not on file     Social History Narrative    No narrative on file     Family History   Problem Relation Age of Onset    Breast cancer Neg Hx     Ovarian cancer Neg Hx     Melanoma Neg Hx        Review of patient's allergies indicates:   Allergen Reactions    Adhesive tape-silicones Rash       Current Outpatient Prescriptions   Medication Sig    acetaminophen (TYLENOL) 500 MG tablet Take 1,000 mg by mouth every 6 (six) hours as needed for Pain.    anastrozole (ARIMIDEX) 1 mg Tab TAKE 1 TABLET EVERY DAY    apixaban 5 mg Tab Take 1 tablet (5 mg total) by mouth 2 (two) times daily.  "   calcium carbonate (OS-CELIA) 600 mg calcium (1,500 mg) Tab Take 600 mg by mouth once daily.    lisinopril (PRINIVIL,ZESTRIL) 5 MG tablet Take 1 tablet (5 mg total)  by mouth once daily.    meclizine (ANTIVERT) 12.5 mg tablet Take 1 tablet (12.5 mg total) by mouth 3 (three) times daily as needed.    metoprolol ta-hydrochlorothiaz (LOPRESSOR HCT) 50-25 mg per tablet Take 1 tablet by mouth once daily.    nystatin (MYCOSTATIN) cream Apply topically 2 (two) times daily. (Patient taking differently: Apply topically 2 (two) times daily. USES AS NEEDED)    polyethylene glycol (GLYCOLAX) 17 gram/dose powder Take 17 g by mouth once daily.    VIT C/VIT E/LUTEIN/MIN/OMEGA-3 (OCUVITE ORAL) Take 1 tablet by mouth once daily.     No current facility-administered medications for this visit.        REVIEW OF SYSTEMS:    GENERAL:  No weight loss, malaise or fevers.  HEENT:  Negative for frequent or significant headaches.  NECK:  Negative for lumps, goiter, pain and significant neck swelling.  RESPIRATORY:  Negative for cough, wheezing or shortness of breath.  CARDIOVASCULAR: + AFib on chronic anticoagulation with Eliquis Negative for chest pain, leg swelling or palpitations.  GI:  Negative for abdominal discomfort, blood in stools or black stools or change in bowel habits.  MUSCULOSKELETAL:  See HPI.  ONC: hx of breast cancer sp mastectomy and  hormonal treatment   SKIN:  Negative for lesions, rash, and itching.  PSYCH:  Negative for sleep disturbance, mood disorder and recent psychosocial stressors.  HEMATOLOGY/LYMPHOLOGY: On Eliquis,  Negative for prolonged bleeding, bruising easily or swollen nodes.  NEURO:   No history of headaches, syncope, paralysis, seizures or tremors.  All other reviewed and negative other than HPI.    OBJECTIVE:    /62 (BP Location: Left arm, Patient Position: Sitting, BP Method: Large (Automatic))   Pulse 69   Ht 5' 6" (1.676 m)   Wt 81.6 kg (179 lb 14.3 oz)   BMI 29.04 kg/m²     PHYSICAL " EXAMINATION:    General appearance: Well appearing, in no acute distress, alert and oriented x3.  Psych:  Mood and affect appropriate.  Skin: Skin color, texture, turgor normal, no rashes or lesions, in both upper and lower body.  Head/face:  Normocephalic, atraumatic. No palpable lymph nodes.  Neck: No pain to palpation over the cervical paraspinous muscles. Spurling Negative. No pain with neck flexion, extension, or lateral flexion.   Cor: irregular   Pulm: CTA  Back: Straight leg raising in the sitting and supine positions is negative to radicular pain. No pain to palpation over the spine or costovertebral angles. Normal range of motion without pain reproduction.  Extremities: Peripheral joint ROM is full and pain free without obvious instability or laxity in all four extremities. +LE edema   Musculoskeletal: Shoulder, hip, sacroiliac and knee provocative maneuvers are negative. Bilateral upper and lower extremity strength is normal and symmetric.  No atrophy or tone abnormalities are noted.  Neuro: Bilateral upper and lower extremity coordination and muscle stretch reflexes are physiologic and symmetric.  Plantar response are downgoing. No loss of sensation is noted.  Gait: normal.    ASSESSMENT: 89 y.o. year old female with low back pain, consistent with lumbar spondylosis and mild L1,L2 compressio fdeformity. Her pain has subsided and she denies any pain over the last few days. Xray of the L spine showed  Mild wedge compression fracture deformity of L1 and L2 of unknown chronicity., and arthritic changes   She was in a lot of pain about 1 month ago, however her pain subsided now, very mild pain and soreness     1. Lumbar spondylosis    2. Closed compression fracture of first lumbar vertebra, initial encounter          PLAN:     -Exam in WNL, she denies pain , so no intervention needed at this time. Should the pain return,, she is instructed to call me for an appointment  - RTC as needed  - Counseled patient  regarding the importance of constant sleeping habits.    The above plan and management options were discussed at length with patient. Patient is in agreement with the above and verbalized understanding. It will be communicated with the referring physician via electronic record, fax, or mail.    Elton Hernandez  01/29/2018

## 2018-02-08 ENCOUNTER — TELEPHONE (OUTPATIENT)
Dept: CARDIOLOGY | Facility: CLINIC | Age: 83
End: 2018-02-08

## 2018-02-08 NOTE — TELEPHONE ENCOUNTER
----- Message from Crystal Vincent sent at 2/8/2018  9:03 AM CST -----  Contact: Self [phone]  Patient is requesting to talk to nurse has personal questions. Please advice

## 2018-02-15 ENCOUNTER — TELEPHONE (OUTPATIENT)
Dept: NEUROLOGY | Facility: CLINIC | Age: 83
End: 2018-02-15

## 2018-02-15 NOTE — TELEPHONE ENCOUNTER
Called patient about 5 times today just to inform her that her appointment tomorrow has to be canceled and rescheduled due to Dr. Maldonado still being out on leave with his injury. Wasn't able to leave a voice mail because there was none set up.

## 2018-02-20 ENCOUNTER — OFFICE VISIT (OUTPATIENT)
Dept: CARDIOLOGY | Facility: CLINIC | Age: 83
End: 2018-02-20
Payer: MEDICARE

## 2018-02-20 VITALS
BODY MASS INDEX: 28.61 KG/M2 | OXYGEN SATURATION: 99 % | WEIGHT: 178 LBS | HEIGHT: 66 IN | DIASTOLIC BLOOD PRESSURE: 72 MMHG | SYSTOLIC BLOOD PRESSURE: 132 MMHG | HEART RATE: 70 BPM

## 2018-02-20 DIAGNOSIS — C50.511 MALIGNANT NEOPLASM OF LOWER-OUTER QUADRANT OF RIGHT BREAST OF FEMALE, ESTROGEN RECEPTOR POSITIVE: ICD-10-CM

## 2018-02-20 DIAGNOSIS — I38 VALVULAR REGURGITATION: ICD-10-CM

## 2018-02-20 DIAGNOSIS — Z17.0 MALIGNANT NEOPLASM OF LOWER-OUTER QUADRANT OF RIGHT BREAST OF FEMALE, ESTROGEN RECEPTOR POSITIVE: ICD-10-CM

## 2018-02-20 DIAGNOSIS — R51.9 GENERALIZED HEADACHES: ICD-10-CM

## 2018-02-20 DIAGNOSIS — I48.19 PERSISTENT ATRIAL FIBRILLATION: Primary | ICD-10-CM

## 2018-02-20 DIAGNOSIS — G60.9 IDIOPATHIC PERIPHERAL NEUROPATHY: ICD-10-CM

## 2018-02-20 DIAGNOSIS — M47.816 LUMBAR SPONDYLOSIS: ICD-10-CM

## 2018-02-20 DIAGNOSIS — I10 ESSENTIAL HYPERTENSION: ICD-10-CM

## 2018-02-20 DIAGNOSIS — Z79.01 CHRONIC ANTICOAGULATION: ICD-10-CM

## 2018-02-20 PROCEDURE — 1126F AMNT PAIN NOTED NONE PRSNT: CPT | Mod: S$GLB,,, | Performed by: INTERNAL MEDICINE

## 2018-02-20 PROCEDURE — 1159F MED LIST DOCD IN RCRD: CPT | Mod: S$GLB,,, | Performed by: INTERNAL MEDICINE

## 2018-02-20 PROCEDURE — 99214 OFFICE O/P EST MOD 30 MIN: CPT | Mod: S$GLB,,, | Performed by: INTERNAL MEDICINE

## 2018-02-20 PROCEDURE — 99499 UNLISTED E&M SERVICE: CPT | Mod: S$GLB,,, | Performed by: INTERNAL MEDICINE

## 2018-02-20 PROCEDURE — 3008F BODY MASS INDEX DOCD: CPT | Mod: S$GLB,,, | Performed by: INTERNAL MEDICINE

## 2018-02-20 PROCEDURE — 99999 PR PBB SHADOW E&M-EST. PATIENT-LVL III: CPT | Mod: PBBFAC,,, | Performed by: INTERNAL MEDICINE

## 2018-02-20 NOTE — PROGRESS NOTES
Subjective:    Patient ID:  Ngoc Castellanos is a 89 y.o. female who presents for follow-up of Atrial Fibrillation      HPI    89 y/o female former pt of Dr. Ford. She has a hx of persistent AFib on chronic anticoagulation with Eliquis, HTN, mild MR, CKD 3 who presents for f/u. She was on Digoxin that was stopped and atenolol started. Atenolol stopped due to insurance reasons and metoprolol started with HCTZ due to LE edema. Has been using compression stockings also and LE well controlled. She has been tolerating her medical regimen well and has no new complaints. She denies CP, orthopnea, PND, syncope, palps. She has chronic, intermittent OLIVIA. Follows a low salt diet. She is compliant with her meds. She remains very active. Recent mechanical fall with injury to chest, now resolved, no fx.     Review of Systems   Constitution: Negative for weakness and malaise/fatigue.   HENT: Negative for congestion.    Eyes: Negative for blurred vision.   Cardiovascular: Positive for dyspnea on exertion. Negative for chest pain, claudication, cyanosis, irregular heartbeat, leg swelling, near-syncope, orthopnea, palpitations, paroxysmal nocturnal dyspnea and syncope.   Respiratory: Negative for shortness of breath.    Endocrine: Negative for polyuria.   Hematologic/Lymphatic: Negative for bleeding problem.   Skin: Negative for itching and rash.   Musculoskeletal: Negative for joint swelling, muscle cramps and muscle weakness.   Gastrointestinal: Negative for abdominal pain, hematemesis, hematochezia, melena, nausea and vomiting.   Genitourinary: Negative for dysuria and hematuria.   Neurological: Negative for dizziness, focal weakness, headaches, light-headedness and loss of balance.   Psychiatric/Behavioral: Negative for depression. The patient is not nervous/anxious.         Objective:    Physical Exam   Constitutional: She is oriented to person, place, and time. She appears well-developed and well-nourished.   HENT:   Head:  Normocephalic and atraumatic.   Neck: Neck supple. No JVD present.   Cardiovascular: Normal rate and regular rhythm.    Murmur heard.   Systolic murmur is present with a grade of 2/6   Pulses:       Carotid pulses are 2+ on the right side, and 2+ on the left side.       Radial pulses are 2+ on the right side, and 2+ on the left side.        Femoral pulses are 2+ on the right side, and 2+ on the left side.       Dorsalis pedis pulses are 2+ on the left side.        Posterior tibial pulses are 1+ on the right side, and 1+ on the left side.   Pulmonary/Chest: Effort normal and breath sounds normal.   Abdominal: Soft. Bowel sounds are normal.   Musculoskeletal: She exhibits no edema.   Neurological: She is alert and oriented to person, place, and time.   Skin: Skin is warm and dry.   Psychiatric: She has a normal mood and affect. Her behavior is normal. Thought content normal.         Assessment:       1. Persistent atrial fibrillation    2. Essential hypertension    3. Valvular regurgitation    4. Chronic anticoagulation    5. Lumbar spondylosis    6. Idiopathic peripheral neuropathy    7. Generalized headaches    8. Malignant neoplasm of lower-outer quadrant of right breast of female, estrogen receptor positive      89 y/o female with hx and presentation as above. Doing well from a cardiac perspective. No changes to meds.        Plan:       -Continue current medical management  -f/u in 6 months

## 2018-02-27 ENCOUNTER — TELEPHONE (OUTPATIENT)
Dept: HEMATOLOGY/ONCOLOGY | Facility: CLINIC | Age: 83
End: 2018-02-27

## 2018-03-01 ENCOUNTER — PES CALL (OUTPATIENT)
Dept: ADMINISTRATIVE | Facility: CLINIC | Age: 83
End: 2018-03-01

## 2018-03-09 ENCOUNTER — OFFICE VISIT (OUTPATIENT)
Dept: NEUROLOGY | Facility: CLINIC | Age: 83
End: 2018-03-09
Payer: MEDICARE

## 2018-03-09 VITALS
SYSTOLIC BLOOD PRESSURE: 122 MMHG | WEIGHT: 178 LBS | HEART RATE: 74 BPM | HEIGHT: 66 IN | DIASTOLIC BLOOD PRESSURE: 67 MMHG | BODY MASS INDEX: 28.61 KG/M2

## 2018-03-09 DIAGNOSIS — R25.1 TREMOR: Primary | ICD-10-CM

## 2018-03-09 DIAGNOSIS — G60.3 IDIOPATHIC PROGRESSIVE NEUROPATHY: ICD-10-CM

## 2018-03-09 DIAGNOSIS — R26.9 GAIT DISORDER: ICD-10-CM

## 2018-03-09 PROCEDURE — 99214 OFFICE O/P EST MOD 30 MIN: CPT | Mod: S$GLB,,, | Performed by: NEUROMUSCULOSKELETAL MEDICINE & OMM

## 2018-03-09 PROCEDURE — 99999 PR PBB SHADOW E&M-EST. PATIENT-LVL III: CPT | Mod: PBBFAC,,, | Performed by: NEUROMUSCULOSKELETAL MEDICINE & OMM

## 2018-03-12 NOTE — PROGRESS NOTES
Social History : Patient lives in a alf center.  Present history: Patient presents for follow-up for her balance and tremors.  She says she has no tremor today.  Her balance is definitely related to aging.  She had a compression fracture of the spine a couple months ago in the lumbar area and this is resolving.  She notes that with sudden turns she loses her balance.  I have just as her for not using a cane which she says she has at home.  She will start using the cane.  Previous note: 1-20-17: This is a 89-year-old white female who complains of foggy headedness and her thinking and off-balance.  She had some difficulty last week thinking straight.  She had seen me 2 years ago for right postural tremor.  She has a history of status post meningioma surgery.  According to her the tremor is very slightly progress.  Patient is hard of hearing which may contribute to some of her cognitive issues.  She has no specific complaints in terms of cognitive problems but just generally feels like her head feels foggy at times.  She remains quite sharp for 89 years old.  She also complains of balance problems and is noticed that she looks at the ground when she walks.          Patient presents for followup and test results. Her MRI of the brain is stable. EEG shows no evidence of seizure activity although she does have some mild changes consistent with her previous craniotomy. Her biggest concern is persistent handwriting problem since her brain surgery. She is aware that there is not much we can do to change that and she is not interested in medication for tremors at the present time.She continues to get the brief neuralgic type pains in the back of the head lasting less than a minute which she has accepted are related to scar tissue from her brain surgery.      Neurological Exam:   Mental status-alert and oriented to person, place, and time; attention span and concentration is good. Fund of knowledge-patient is aware of  "current events and able to give detailed history of the current problem.recent and remote memory seems intact.  Patient can subtract serial sevens and spell "world" backwards.  Language function is normal with no evidence of aphasia      Cranial nerves:Visual acuity to hand chart -normal; visual fields to confrontation normal;pupils were equal and reactive to light ; funduscopic examination was normal with sharp disc margins. external ocular movements were full with no nystagmus. Facial sensation to pinprick and corneal reflexes intact; Facial muscles were symmetrical. Hearing is unimpaired symmetrical finger rub; Tongue and palate movements were normal with swallowing unimpaired. Shoulder shrug was intact with good strength Speech was normal   Motor examination: Upper and Lower extremities - Normal and symmetrical;muscle tone was normal ; right-handed   Sensory examination:Upper -Normal; lower extremities - Pinprick and soft touch Decreased 40% at the toes. Vibration sense -Absent at the toes   Deep tendon reflexes -Absent. Both plantar responses were flexor   Cerebellar examination upper: Normal finger to nose and rapid alternating movements   Gait: Steady with no ataxia; heel and toe walk normal   Romberg test: Positive Tandem gait: Normal   Involuntary movements: Mild right postural tremor   Cervical examination: Full range of motion with no pain Cervical tenderness:negative   Lumbar examination: Low back tenderness-negative Sciatic notch tenderness-negative Straight leg raising test-negative   Impression:Left parietal Neuralgia pain probably related to scar tissue postoperatively;History of meningioma status post craniotomy;Postural tremor on the right; history of peripheral neuropathy   Recommendations/plan: Emphasized the need for her to use a cane with walking particularly to stabilize with turning.  Followup 1 year                 "

## 2018-04-09 ENCOUNTER — TELEPHONE (OUTPATIENT)
Dept: AUDIOLOGY | Facility: CLINIC | Age: 83
End: 2018-04-09

## 2018-04-13 ENCOUNTER — TELEPHONE (OUTPATIENT)
Dept: OTOLARYNGOLOGY | Facility: CLINIC | Age: 83
End: 2018-04-13

## 2018-04-13 NOTE — TELEPHONE ENCOUNTER
Called patient back regarding her hearing aids. I informed her that we do not have an account with [facility] here at [place]. Referred her to [facility].

## 2018-04-18 ENCOUNTER — OFFICE VISIT (OUTPATIENT)
Dept: SURGERY | Facility: CLINIC | Age: 83
End: 2018-04-18
Payer: MEDICARE

## 2018-04-18 VITALS
WEIGHT: 182.44 LBS | HEART RATE: 69 BPM | DIASTOLIC BLOOD PRESSURE: 72 MMHG | TEMPERATURE: 98 F | HEIGHT: 67 IN | BODY MASS INDEX: 28.63 KG/M2 | SYSTOLIC BLOOD PRESSURE: 169 MMHG

## 2018-04-18 DIAGNOSIS — Z85.3 PERSONAL HISTORY OF BREAST CANCER: Primary | ICD-10-CM

## 2018-04-18 DIAGNOSIS — Z90.11 H/O RIGHT MASTECTOMY: ICD-10-CM

## 2018-04-18 PROCEDURE — 99213 OFFICE O/P EST LOW 20 MIN: CPT | Mod: S$GLB,,, | Performed by: NURSE PRACTITIONER

## 2018-04-18 PROCEDURE — 99999 PR PBB SHADOW E&M-EST. PATIENT-LVL IV: CPT | Mod: PBBFAC,,, | Performed by: NURSE PRACTITIONER

## 2018-04-18 NOTE — MEDICAL/APP STUDENT
Patient ID: Ngoc Castellanos is a 89 y.o. female.    Chief Complaint: Breast Cancer Screening (CBE/Hx of Breast Cancer)      HPI: Established patient presents today for breast cancer surveillance.    Per chart review:  - 2/18/2014 right core biopsy with IDC, grade 2, ER+/VT+, HER-2 +2, FISH+  - 3/26/2014 right mastectomy with 1.7cm invasive mammary carcinoma with lobular features, 0/2 negative SN with 0/4 additional negative nodes  - Adjuvant endocrine therapy initiated 5/2014 with Arimidex, followed by Dr. Valles, next appointment 5/1/2018    Last left mmg 4/17/2017 with report reading scattered fibroglandular densities and no mammographic evidence of malignancy.    Scheduled for left mmg 4/30/2018.    Today, patient denies palpable breast masses, breast pain, changes to skin of the breast, or nipple discharge or retraction. Denies right chest wall masses, skin changes, or pain. Denies upper extremity lymphedema.    Reports right axillary stiffness with overhead activity. Has never tried PT, states she is not interested.    Denies changes to family history regarding cancer since last visit.    Review of Systems   Denies unexplained weight loss, dyspnea or hemoptysis, new-onset bone pain, or new/changed headaches.  Objective:   Physical Exam   Pulmonary/Chest: She exhibits no mass, no tenderness, no laceration, no edema and no retraction. Left breast exhibits no inverted nipple, no mass, no nipple discharge, no skin change and no tenderness.   Lymphadenopathy:     She has no cervical adenopathy.     She has no axillary adenopathy.        Right: No supraclavicular adenopathy present.        Left: No supraclavicular adenopathy present.   S/p right mastectomy without reconstruction, no mass or skin changes appreciated to anterior chest wall, no lymphedema appreciated.     Assessment:       1. Personal history of breast cancer    2. H/O right mastectomy        Plan:       Patient evaluation discussed and CBE performed with  Nishi Tadeo, EULOGIOP. See today's clinic note per FNP. Clinically GUIDO today. Mmg scheduled for 4/30/2018 per patient, and she desired to keep this as scheduled. FNP gave prescription today for post-mastectomy bras. If mmg is normal, patient to RTC in 1 year with CBE and left mmg. FNP discussed with patient that surveillance without mmg can also be considered at next year's annual visit given patient's age and current health status. Patient to call FNP with any interval breast-related changes or concerns.

## 2018-04-18 NOTE — PROGRESS NOTES
Subjective:      Patient ID: Ngoc Castellanos is a 89 y.o. female.    Chief Complaint: Breast Cancer Screening (CBE/Hx of Breast Cancer)      HPI: (PF, EPF - 1-3) (Detailed, Comp, - 4) returning patient presents for cancer surveillance. She remains very active and independent. Patient denies palpable breast mass, pain, nipple discharge, redness, increased warmth, unexplained weight loss, new onset bone pain, cough/SOB     mmg 4- with no abnormality  , next mmg scheduled 4- 2- right core biopsy with IDC, grade 2, ER/AZ +, HER2 +2, FISH +  3- right mastectomy with 1.7cm invasive mammary carcinoma with lobular features, 0/2 negative SN with 0/4 additional negative nodes. Adjuvant endocrine therapy initiated 5/2014 (arimidex), followed by Dr Valles       Review of Systems   Constitutional: Negative for appetite change and fatigue.   Respiratory: Negative for cough and shortness of breath.    Cardiovascular: Negative for chest pain.   Musculoskeletal: Negative for back pain.     Objective:   Physical Exam   Pulmonary/Chest: She exhibits no mass, no tenderness, no laceration, no edema, no swelling and no retraction. Left breast exhibits no inverted nipple, no mass, no nipple discharge, no skin change and no tenderness.   S/p right mastectomy without reconstruction, no mass or skin changes appreciated anterior chest wall, no lymphedema appreciated    Lymphadenopathy:     She has no cervical adenopathy.     She has no axillary adenopathy.        Right: No supraclavicular adenopathy present.        Left: No supraclavicular adenopathy present.     Assessment:       1. Personal history of breast cancer        Plan:       Clinically GUIDO  mmg scheduled 4-, patient wanted to keep this as scheduled  Prescription given for post mastectomy bras  If mmg is normal, return in one year CBE and left mmg, can also consider surveillance without mmg based on her age and current health status at that  time  Call for any interval palpable breast mass, pain, nipple discharge, skin changes or other breast related concerns

## 2018-04-25 ENCOUNTER — CLINICAL SUPPORT (OUTPATIENT)
Dept: AUDIOLOGY | Facility: CLINIC | Age: 83
End: 2018-04-25
Payer: MEDICARE

## 2018-04-25 ENCOUNTER — CLINICAL SUPPORT (OUTPATIENT)
Dept: AUDIOLOGY | Facility: CLINIC | Age: 83
End: 2018-04-25

## 2018-04-25 DIAGNOSIS — H90.3 SENSORINEURAL HEARING LOSS, BILATERAL: Primary | ICD-10-CM

## 2018-04-25 PROCEDURE — 92557 COMPREHENSIVE HEARING TEST: CPT | Mod: S$GLB,,, | Performed by: AUDIOLOGIST-HEARING AID FITTER

## 2018-04-25 PROCEDURE — 99499 UNLISTED E&M SERVICE: CPT | Mod: S$GLB,,, | Performed by: OTOLARYNGOLOGY

## 2018-04-25 NOTE — PROGRESS NOTES
Ngoc Castellanos was seen in the clinic today for a hearing evaluation. Ms. Castellanos currently wears bilateral [employer] 330 fusion hearing aids. She reported a decrease in hearing since her last audiogram in 2015.       Audiological testing revealed a mild to severe sensorineural hearing loss in both ears. A speech reception threshold was obtained at 45 dBHL, bilaterally. Speech discrimination was 64% in the left ear and 56% in the right ear. There was a decrease noted in Ms. Castellanos's hearing since her last evaluation.    Recommendations:  1. Otologic evaluation  2. Annual hearing evaluation  3. Continued use of hearing aids

## 2018-04-25 NOTE — PROGRESS NOTES
Ngoc Castellanos was seen in the clinic today for a hearing aid follow-up. Ms. Castellanos was a patient of Peyton Lantigua. She currently wears bilateral [employer] 330 fusion hearing aids with #2 M receivers and medium tulip domes.    The hearing aids were reprogrammed based on the results of today's hearing test. Acclimatization was set to 2. Ms. Castellanos was pleased with how the hearing aids sounded.     She will call the clinic for a hearing aid follow-up as needed. A one year reminder was put in to the Epic system.

## 2018-04-30 ENCOUNTER — HOSPITAL ENCOUNTER (OUTPATIENT)
Dept: RADIOLOGY | Facility: HOSPITAL | Age: 83
Discharge: HOME | End: 2018-04-30
Attending: INTERNAL MEDICINE
Payer: MEDICARE

## 2018-04-30 VITALS — BODY MASS INDEX: 29.25 KG/M2 | WEIGHT: 182 LBS | HEIGHT: 66 IN

## 2018-04-30 DIAGNOSIS — C50.511 MALIGNANT NEOPLASM OF LOWER-OUTER QUADRANT OF RIGHT BREAST OF FEMALE, ESTROGEN RECEPTOR POSITIVE: ICD-10-CM

## 2018-04-30 DIAGNOSIS — Z17.0 MALIGNANT NEOPLASM OF LOWER-OUTER QUADRANT OF RIGHT BREAST OF FEMALE, ESTROGEN RECEPTOR POSITIVE: ICD-10-CM

## 2018-04-30 PROCEDURE — 77061 BREAST TOMOSYNTHESIS UNI: CPT | Mod: TC,LT

## 2018-04-30 PROCEDURE — 77065 DX MAMMO INCL CAD UNI: CPT | Mod: 26,LT,, | Performed by: RADIOLOGY

## 2018-04-30 PROCEDURE — 77061 BREAST TOMOSYNTHESIS UNI: CPT | Mod: 26,LT,, | Performed by: RADIOLOGY

## 2018-04-30 PROCEDURE — 77065 DX MAMMO INCL CAD UNI: CPT | Mod: TC,LT

## 2018-05-01 ENCOUNTER — OFFICE VISIT (OUTPATIENT)
Dept: HEMATOLOGY/ONCOLOGY | Facility: CLINIC | Age: 83
End: 2018-05-01
Payer: MEDICARE

## 2018-05-01 VITALS
SYSTOLIC BLOOD PRESSURE: 106 MMHG | DIASTOLIC BLOOD PRESSURE: 66 MMHG | HEIGHT: 67 IN | WEIGHT: 183.63 LBS | BODY MASS INDEX: 28.82 KG/M2 | OXYGEN SATURATION: 96 % | HEART RATE: 72 BPM

## 2018-05-01 DIAGNOSIS — Z79.01 CHRONIC ANTICOAGULATION: ICD-10-CM

## 2018-05-01 DIAGNOSIS — I48.19 PERSISTENT ATRIAL FIBRILLATION: ICD-10-CM

## 2018-05-01 DIAGNOSIS — M94.9 DISORDER OF CARTILAGE: ICD-10-CM

## 2018-05-01 DIAGNOSIS — C50.911 BREAST CANCER, STAGE 1, RIGHT: ICD-10-CM

## 2018-05-01 DIAGNOSIS — B37.89 CANDIDIASIS OF BREAST: ICD-10-CM

## 2018-05-01 DIAGNOSIS — Z79.811 AROMATASE INHIBITOR USE: Primary | ICD-10-CM

## 2018-05-01 PROCEDURE — 99213 OFFICE O/P EST LOW 20 MIN: CPT | Mod: S$GLB,,, | Performed by: INTERNAL MEDICINE

## 2018-05-01 PROCEDURE — 99999 PR PBB SHADOW E&M-EST. PATIENT-LVL III: CPT | Mod: PBBFAC,,, | Performed by: INTERNAL MEDICINE

## 2018-05-01 PROCEDURE — 99499 UNLISTED E&M SERVICE: CPT | Mod: S$PBB,,, | Performed by: INTERNAL MEDICINE

## 2018-05-01 RX ORDER — NYSTATIN 100000 U/G
CREAM TOPICAL 2 TIMES DAILY
Qty: 30 G | Refills: 3 | Status: SHIPPED | OUTPATIENT
Start: 2018-05-01 | End: 2022-06-14

## 2018-05-01 NOTE — PROGRESS NOTES
Subjective:       Patient ID: Ngoc Castellanos is a 89 y.o. female.    Chief Complaint: No chief complaint on file.    HPI She is here for followup of right breast cancer.      She was diagnosed with carcinoma of the right breast, underwent right modified radical mastectomy in march 2014. She had a 1.7 cm invasive mammary carcinoma with lobular features.  6 lymph nodes were removed and they were negative.     Her tumor was ER/DE strongly positive and positive by FISH for HER-2. She was not given adjuvant Herceptin. She started Arimidex in May 2014.    She has history of atrial fibrillation and has been on Coumadin for several years, doing well without bleeding issues, now on Eliquis. Sees .    DEXA scan (9/14/15) showed Osteopenia of both femoral necks with normal bone mineral density of the lumbar spine.     Received the first dose of prolia on 8/5/14. Not interested in further prolia injections or alternative bisphosphonate Rx.    She is here for follow-up today.     Feels well.    Review of Systems   Respiratory: Negative for cough and shortness of breath.    Cardiovascular: Negative for chest pain, palpitations and leg swelling.   Hematological: Negative for adenopathy. Does not bruise/bleed easily.         Objective:      Physical Exam   Constitutional: She is oriented to person, place, and time. She appears well-developed. No distress.   HENT:   Head: Normocephalic and atraumatic.   Mouth/Throat: No oropharyngeal exudate.   Eyes: No scleral icterus.   Neck: Normal range of motion. Neck supple.   Cardiovascular: Normal rate.  Exam reveals no gallop.    Irregular.   Pulmonary/Chest: Effort normal and breath sounds normal. No stridor. She has no wheezes. She has no rales.       She is status post right mastectomy with a well-healed incision. Wound has healed well. There are no masses in the left breast.        Abdominal: Soft. She exhibits no distension and no mass. There is no tenderness.    Lymphadenopathy:     She has no cervical adenopathy.   Neurological: She is alert and oriented to person, place, and time. No cranial nerve deficit.       Assessment:       1. Aromatase inhibitor use    2. Persistent atrial fibrillation    3. Chronic anticoagulation        Plan:   She has T1cN0 Stage 1a ER/IN positive and Her-2 positive invasive mammary carcinoma of the right breast.     Adjuvant Arimidex was started in May 2014.  She is tolerating it well.  Continue Arimidex.    Recent mammogram in April 2018 unremarkable.    Last DEXA scan was in Sept 2017 - shows osteopenia.  Despite being 89 yrs old, she is still rather healthy, drives around and is tolerating anticoagulation well.  Continue calcium/vitamin D.   She's not interested in further bisphosphonate therapy or Prolia injections.    Return to clinic in 6 months.  Plan repeat left breast mammogram in April 2019 - if she continues to remain active and healthy.

## 2018-06-12 ENCOUNTER — OFFICE VISIT (OUTPATIENT)
Dept: CARDIOLOGY | Facility: CLINIC | Age: 83
End: 2018-06-12
Payer: MEDICARE

## 2018-06-12 VITALS
HEART RATE: 66 BPM | DIASTOLIC BLOOD PRESSURE: 76 MMHG | BODY MASS INDEX: 29.03 KG/M2 | OXYGEN SATURATION: 96 % | WEIGHT: 185 LBS | SYSTOLIC BLOOD PRESSURE: 130 MMHG | HEIGHT: 67 IN

## 2018-06-12 DIAGNOSIS — I38 VALVULAR REGURGITATION: ICD-10-CM

## 2018-06-12 DIAGNOSIS — G60.3 IDIOPATHIC PROGRESSIVE NEUROPATHY: ICD-10-CM

## 2018-06-12 DIAGNOSIS — R42 VERTIGO: ICD-10-CM

## 2018-06-12 DIAGNOSIS — I51.89 DIASTOLIC DYSFUNCTION: Primary | ICD-10-CM

## 2018-06-12 DIAGNOSIS — M47.816 LUMBAR SPONDYLOSIS: ICD-10-CM

## 2018-06-12 DIAGNOSIS — I48.21 PERMANENT ATRIAL FIBRILLATION: ICD-10-CM

## 2018-06-12 DIAGNOSIS — Z79.01 CHRONIC ANTICOAGULATION: ICD-10-CM

## 2018-06-12 DIAGNOSIS — I48.19 PERSISTENT ATRIAL FIBRILLATION: ICD-10-CM

## 2018-06-12 DIAGNOSIS — R26.9 GAIT DISORDER: ICD-10-CM

## 2018-06-12 DIAGNOSIS — D33.2 BENIGN NEOPLASM OF BRAIN, UNSPECIFIED BRAIN REGION: ICD-10-CM

## 2018-06-12 DIAGNOSIS — I10 ESSENTIAL HYPERTENSION: ICD-10-CM

## 2018-06-12 PROCEDURE — 99499 UNLISTED E&M SERVICE: CPT | Mod: S$PBB,,, | Performed by: INTERNAL MEDICINE

## 2018-06-12 PROCEDURE — 99999 PR PBB SHADOW E&M-EST. PATIENT-LVL III: CPT | Mod: PBBFAC,,, | Performed by: INTERNAL MEDICINE

## 2018-06-12 PROCEDURE — 93000 ELECTROCARDIOGRAM COMPLETE: CPT | Mod: S$GLB,,, | Performed by: INTERNAL MEDICINE

## 2018-06-12 PROCEDURE — 99214 OFFICE O/P EST MOD 30 MIN: CPT | Mod: S$GLB,,, | Performed by: INTERNAL MEDICINE

## 2018-06-12 RX ORDER — ATENOLOL 25 MG/1
25 TABLET ORAL DAILY
Qty: 90 TABLET | Refills: 3 | Status: SHIPPED | OUTPATIENT
Start: 2018-06-12 | End: 2019-03-19 | Stop reason: SDUPTHER

## 2018-06-12 RX ORDER — ATENOLOL 25 MG/1
25 TABLET ORAL DAILY
Qty: 30 TABLET | Refills: 0 | Status: SHIPPED | OUTPATIENT
Start: 2018-06-12 | End: 2018-06-12 | Stop reason: SDUPTHER

## 2018-06-12 RX ORDER — FUROSEMIDE 20 MG/1
20 TABLET ORAL DAILY
Qty: 90 TABLET | Refills: 3 | Status: SHIPPED | OUTPATIENT
Start: 2018-06-12 | End: 2019-03-19 | Stop reason: SDUPTHER

## 2018-06-12 RX ORDER — FUROSEMIDE 20 MG/1
20 TABLET ORAL DAILY
Qty: 30 TABLET | Refills: 0 | Status: SHIPPED | OUTPATIENT
Start: 2018-06-12 | End: 2018-06-12 | Stop reason: SDUPTHER

## 2018-06-12 NOTE — PROGRESS NOTES
"Subjective:    Patient ID:  Ngoc Castellanos is a 89 y.o. female who presents for follow-up of Shortness of Breath      HPI  89 y/o female former pt of Dr. Ford. She has a hx of persistent AFib on chronic anticoagulation with Eliquis, diastolic dysfunction, HTN, mild MR, CKD 3 who presents for f/u. She was on Digoxin that was stopped and atenolol started. Atenolol stopped due to insurance reasons and metoprolol started with HCTZ due to LE edema. Has been using compression stockings also and edema improved, but still present. She has been tolerating her medical regimen well.  Since last clinic visit has developed worsening OLIVIA and feels "congested." She denies CP, orthopnea, PND, syncope, palps. She has stopped eating chips, however, unknown amount of sodium in food she is served. She is compliant with her meds. She remains very active.      Review of Systems   Constitution: Negative for weakness and malaise/fatigue.   HENT: Negative for congestion.    Eyes: Negative for blurred vision.   Cardiovascular: Positive for dyspnea on exertion and leg swelling. Negative for chest pain, claudication, cyanosis, irregular heartbeat, near-syncope, orthopnea, palpitations, paroxysmal nocturnal dyspnea and syncope.   Respiratory: Positive for shortness of breath.    Endocrine: Negative for polyuria.   Hematologic/Lymphatic: Negative for bleeding problem.   Skin: Negative for itching and rash.   Musculoskeletal: Negative for joint swelling, muscle cramps and muscle weakness.   Gastrointestinal: Negative for abdominal pain, hematemesis, hematochezia, melena, nausea and vomiting.   Genitourinary: Negative for dysuria and hematuria.   Neurological: Negative for dizziness, focal weakness, headaches, light-headedness and loss of balance.   Psychiatric/Behavioral: Negative for depression. The patient is not nervous/anxious.         Objective:    Physical Exam   Constitutional: She is oriented to person, place, and time. She appears " well-developed and well-nourished.   HENT:   Head: Normocephalic and atraumatic.   Neck: Neck supple. No JVD present.   Cardiovascular: Normal rate.  An irregularly irregular rhythm present.   Murmur heard.   Systolic murmur is present with a grade of 2/6   Pulses:       Carotid pulses are 2+ on the right side, and 2+ on the left side.       Radial pulses are 2+ on the right side, and 2+ on the left side.        Femoral pulses are 2+ on the right side, and 2+ on the left side.       Posterior tibial pulses are 1+ on the right side, and 1+ on the left side.   Pulmonary/Chest: Effort normal and breath sounds normal.   Abdominal: Soft. Bowel sounds are normal.   Musculoskeletal: She exhibits edema.   Neurological: She is alert and oriented to person, place, and time.   Skin: Skin is warm and dry.   Psychiatric: She has a normal mood and affect. Her behavior is normal. Thought content normal.         Assessment:       1. Diastolic dysfunction    2. Permanent atrial fibrillation    3. Persistent atrial fibrillation    4. Essential hypertension    5. Valvular regurgitation    6. Chronic anticoagulation    7. Vertigo    8. Idiopathic progressive neuropathy    9. Lumbar spondylosis    10. Benign neoplasm of brain, unspecified brain region    11. Gait disorder      89 y/o female with hx and presentation as above. Will evaluate symptoms (likely HFpEF) with 2DE. Stop Metoprolol/HCTZ. Start Atenolol 25 mg once daily (now available) and lasix daily.       Plan:       -D/C metoprolol/HCTZ  -Start Atenolol 25 mg once daily  -Start lasix 20 mg daily - pt to call clinic in 2 weeks to assess response  -2DE with CFD  -f/u in 1 month

## 2018-06-15 ENCOUNTER — HOSPITAL ENCOUNTER (OUTPATIENT)
Dept: CARDIOLOGY | Facility: HOSPITAL | Age: 83
Discharge: HOME | End: 2018-06-15
Attending: INTERNAL MEDICINE
Payer: MEDICARE

## 2018-06-15 DIAGNOSIS — I51.89 DIASTOLIC DYSFUNCTION: ICD-10-CM

## 2018-06-15 LAB
ESTIMATED PA SYSTOLIC PRESSURE: 47.94
MITRAL VALVE MOBILITY: NORMAL
MITRAL VALVE REGURGITATION: ABNORMAL
RETIRED EF AND QEF - SEE NOTES: 65 (ref 55–65)
TRICUSPID VALVE REGURGITATION: ABNORMAL

## 2018-06-15 PROCEDURE — 93306 TTE W/DOPPLER COMPLETE: CPT | Mod: 26,,, | Performed by: INTERNAL MEDICINE

## 2018-06-15 PROCEDURE — 93306 TTE W/DOPPLER COMPLETE: CPT

## 2018-06-19 ENCOUNTER — TELEPHONE (OUTPATIENT)
Dept: CARDIOLOGY | Facility: CLINIC | Age: 83
End: 2018-06-19

## 2018-06-19 NOTE — TELEPHONE ENCOUNTER
"Spoke to pt and went over echocardiogram results. Pt was advised: "Your echocardiogram shows normal heart function and no significant change from prior". Pt stated she understood and would call to f/u about new rx in a couple of weeks.  "

## 2018-07-02 ENCOUNTER — TELEPHONE (OUTPATIENT)
Dept: CARDIOLOGY | Facility: CLINIC | Age: 83
End: 2018-07-02

## 2018-07-02 NOTE — TELEPHONE ENCOUNTER
----- Message from Araceli Lugo sent at 7/2/2018 10:05 AM CDT -----  Contact: [phone]/self  Patient called in requesting to speak with you. Patient prefers to speak with a nurse. Please advise.

## 2018-07-02 NOTE — TELEPHONE ENCOUNTER
Pt called to f/u on meds. Pt stated she's doing well on both the Atenolol and Lasix, but has broken out in a small rash on her left side and thinks it may be due to the Lasix. Pt stated she took it in the past and had a similar effect, but otherwise doing well. Pt plans on f/u w/ her dermatologist on July 9. Pt asking if she can take benadryl to help with the rash. Please advise. Thanks.

## 2018-07-17 ENCOUNTER — OFFICE VISIT (OUTPATIENT)
Dept: CARDIOLOGY | Facility: CLINIC | Age: 83
End: 2018-07-17
Payer: MEDICARE

## 2018-07-17 VITALS
SYSTOLIC BLOOD PRESSURE: 130 MMHG | OXYGEN SATURATION: 95 % | DIASTOLIC BLOOD PRESSURE: 77 MMHG | HEIGHT: 67 IN | BODY MASS INDEX: 29.03 KG/M2 | HEART RATE: 80 BPM | WEIGHT: 185 LBS

## 2018-07-17 DIAGNOSIS — Z79.01 CHRONIC ANTICOAGULATION: ICD-10-CM

## 2018-07-17 DIAGNOSIS — R25.1 TREMOR: ICD-10-CM

## 2018-07-17 DIAGNOSIS — C50.911 BREAST CANCER, STAGE 1, RIGHT: ICD-10-CM

## 2018-07-17 DIAGNOSIS — I10 ESSENTIAL HYPERTENSION: ICD-10-CM

## 2018-07-17 DIAGNOSIS — I48.19 PERSISTENT ATRIAL FIBRILLATION: Primary | ICD-10-CM

## 2018-07-17 DIAGNOSIS — M85.80 OSTEOPENIA, UNSPECIFIED LOCATION: ICD-10-CM

## 2018-07-17 DIAGNOSIS — D33.2 BENIGN NEOPLASM OF BRAIN, UNSPECIFIED BRAIN REGION: ICD-10-CM

## 2018-07-17 DIAGNOSIS — I38 VALVULAR REGURGITATION: ICD-10-CM

## 2018-07-17 DIAGNOSIS — G60.3 IDIOPATHIC PROGRESSIVE NEUROPATHY: ICD-10-CM

## 2018-07-17 DIAGNOSIS — R42 VERTIGO: ICD-10-CM

## 2018-07-17 DIAGNOSIS — M47.816 LUMBAR SPONDYLOSIS: ICD-10-CM

## 2018-07-17 PROCEDURE — 99499 UNLISTED E&M SERVICE: CPT | Mod: S$GLB,,, | Performed by: INTERNAL MEDICINE

## 2018-07-17 PROCEDURE — 99214 OFFICE O/P EST MOD 30 MIN: CPT | Mod: S$GLB,,, | Performed by: INTERNAL MEDICINE

## 2018-07-17 PROCEDURE — 99999 PR PBB SHADOW E&M-EST. PATIENT-LVL III: CPT | Mod: PBBFAC,,, | Performed by: INTERNAL MEDICINE

## 2018-08-28 ENCOUNTER — OFFICE VISIT (OUTPATIENT)
Dept: URGENT CARE | Facility: CLINIC | Age: 83
End: 2018-08-28
Payer: MEDICARE

## 2018-08-28 VITALS
DIASTOLIC BLOOD PRESSURE: 67 MMHG | OXYGEN SATURATION: 98 % | HEIGHT: 66 IN | SYSTOLIC BLOOD PRESSURE: 139 MMHG | BODY MASS INDEX: 28.93 KG/M2 | TEMPERATURE: 98 F | WEIGHT: 180 LBS | RESPIRATION RATE: 16 BRPM | HEART RATE: 80 BPM

## 2018-08-28 DIAGNOSIS — M25.561 PAIN AND SWELLING OF RIGHT KNEE: Primary | ICD-10-CM

## 2018-08-28 DIAGNOSIS — M25.461 PAIN AND SWELLING OF RIGHT KNEE: Primary | ICD-10-CM

## 2018-08-28 DIAGNOSIS — S83.401A SPRAIN OF COLLATERAL LIGAMENT OF RIGHT KNEE, INITIAL ENCOUNTER: ICD-10-CM

## 2018-08-28 PROCEDURE — 99213 OFFICE O/P EST LOW 20 MIN: CPT | Mod: S$GLB,,, | Performed by: FAMILY MEDICINE

## 2018-08-28 RX ORDER — KETOCONAZOLE 20 MG/G
CREAM TOPICAL DAILY
COMMUNITY
End: 2018-10-23

## 2018-08-28 NOTE — PROGRESS NOTES
"Subjective:       Patient ID: Ngoc Castellanos is a 89 y.o. female.    Vitals:  height is 5' 6" (1.676 m) and weight is 81.6 kg (180 lb). Her oral temperature is 97.5 °F (36.4 °C). Her blood pressure is 139/67 and her pulse is 80. Her respiration is 16 and oxygen saturation is 98%.     Chief Complaint: Knee Injury    Patient states that she was walking and turned and felt pain in her right knee. 3 weeks ago, had swelling initially, resolved, but still fells stiffness and pain        Knee Injury   This is a new problem. Episode onset: 3 weeks. The problem occurs intermittently. The problem has been gradually improving. Associated symptoms include joint swelling. Pertinent negatives include no abdominal pain, chest pain, neck pain, numbness or weakness. The symptoms are aggravated by standing, walking and twisting. She has tried acetaminophen and ice (Aspercream) for the symptoms. The treatment provided significant relief.     Review of Systems   Constitution: Negative for weakness and malaise/fatigue.   HENT: Negative for nosebleeds.    Cardiovascular: Negative for chest pain and syncope.   Respiratory: Negative for shortness of breath.    Musculoskeletal: Positive for joint pain, joint swelling and stiffness. Negative for back pain and neck pain.   Gastrointestinal: Negative for abdominal pain.   Genitourinary: Negative for hematuria.   Neurological: Negative for dizziness and numbness.       Objective:      Physical Exam   Constitutional: She is oriented to person, place, and time. She appears well-developed and well-nourished. She is cooperative.  Non-toxic appearance. She does not appear ill. No distress.   HENT:   Head: Normocephalic and atraumatic.   Right Ear: Hearing, tympanic membrane, external ear and ear canal normal.   Left Ear: Hearing, tympanic membrane, external ear and ear canal normal.   Nose: Nose normal. No mucosal edema, rhinorrhea or nasal deformity. No epistaxis. Right sinus exhibits no maxillary " sinus tenderness and no frontal sinus tenderness. Left sinus exhibits no maxillary sinus tenderness and no frontal sinus tenderness.   Mouth/Throat: Uvula is midline, oropharynx is clear and moist and mucous membranes are normal. No trismus in the jaw. Normal dentition. No uvula swelling. No posterior oropharyngeal erythema.   Eyes: Conjunctivae and lids are normal. Right eye exhibits no discharge. Left eye exhibits no discharge. No scleral icterus.   Sclera clear bilat   Neck: Trachea normal, normal range of motion, full passive range of motion without pain and phonation normal. Neck supple.   Cardiovascular: Normal rate, regular rhythm, normal heart sounds, intact distal pulses and normal pulses. Exam reveals no friction rub.   No murmur heard.  Pulmonary/Chest: Effort normal and breath sounds normal. No stridor.   Abdominal: Soft. Normal appearance and bowel sounds are normal. She exhibits no distension, no pulsatile midline mass and no mass. There is no tenderness.   Musculoskeletal: Normal range of motion. She exhibits no edema or deformity.   Right knee no swelling or warmth, slight tenderness on inversion of right knee  Flexion/extension normal     Lymphadenopathy:     She has no cervical adenopathy.   Neurological: She is alert and oriented to person, place, and time. She exhibits normal muscle tone. Coordination normal.   Skin: Skin is warm, dry and intact. She is not diaphoretic. No pallor.   Psychiatric: She has a normal mood and affect. Her speech is normal and behavior is normal. Judgment and thought content normal. Cognition and memory are normal.   Nursing note and vitals reviewed.      Assessment:       No diagnosis found.    Plan:         There are no diagnoses linked to this encounter.        Pt stasfied with tylenol and or aleve prn, does not want any thing stronger for pain  FU with ORTHO if pain persists

## 2018-08-28 NOTE — PATIENT INSTRUCTIONS
Knee Sprain    A sprain is an injury to the ligaments or capsule that holds a joint together. There are no broken bones. Most sprains take 3 to 6 weeks to heal. If it a severe sprain where the ligament is completely torn, it can take months to recover.  Most knee sprains are treated with a splint, knee immobilizer brace, or elastic wrap for support. Severe sprains may require surgery.  Home care  · Stay off the injured leg as much as possible until you can walk on it without pain. If you have a lot of pain with walking, crutches or a walker may be prescribed. (These can be rented or purchased at many pharmacies and surgical or orthopedic supply stores). Follow your healthcare provider's advice about when to begin putting weight on that leg.  · Keep your leg elevated to reduce pain and swelling. When sleeping, place a pillow under the injured leg. When sitting, support the injured leg so it is level with your waist. This is very important during the first 48 hours.  · Apply an ice pack over the injured area for 15 to 20 minutes every 3 to 6 hours. You should do this for the first 24 to 48 hours. You can make an ice pack by filling a plastic bag that seals at the top with ice cubes and then wrapping it with a thin towel. Continue to use ice packs for relief of pain and swelling as needed. As the ice melts, be careful to avoid getting your wrap, splint, or cast wet. After 48 hours, apply heat (warm shower or warm bath) for 15 to 20 minutes several times a day, or alternate ice and heat. You can place the ice pack directly over the splint. If you have to wear a hook-and-loop knee brace, you can open it to apply the ice pack, or heat, directly to the knee. Never put ice directly on the skin. Always wrap the ice in a towel or other type of cloth.  · You may use over-the-counter pain medicine to control pain, unless another pain medicine was prescribed.If you have chronic liver or kidney disease or ever had a stomach  ulcer or GI bleeding, talk with your healthcare provider before using these medicines.  · If you were given a splint, keep it completely dry at all times. Bathe with your splint out of the water, protected with 2 large plastic bags, rubber-banded at the top end. If a fiberglass splint gets wet, you can dry it with a hair dryer. If you have a hook-and-loop knee brace, you can remove this to bathe, unless told otherwise.  Follow-up care  Follow up with your doctor as advised. Any X-rays you had today dont show any broken bones, breaks, or fractures. Sometimes fractures dont show up on the first X-ray. Bruises and sprains can sometimes hurt as much as a fracture. These injuries can take time to heal completely. If your symptoms dont improve or they get worse, talk with your doctor. You may need a repeat X-ray. If X-rays were taken, you will be told of any new findings that may affect your care.  Call 911  Call 911 if you have:  ·  Shortness of breath  ·  Chest pain  When to seek medical advice  Call your healthcare provider right away if any of these occur:  · The splint or knee immobilizer brace becomes wet or soft  · The fiberglass cast or splint remains wet for more than 24 hours  · Pain or swelling increases  · The injured leg or toes become cold, blue, numb, or tingly  Date Last Reviewed: 11/20/2015  © 0977-9621 [employer]. [address] [place] [address], [place] [address]. All rights reserved. This information is not intended as a substitute for professional medical care. Always follow your healthcare professional's instructions.

## 2018-09-13 ENCOUNTER — LAB VISIT (OUTPATIENT)
Dept: LAB | Facility: HOSPITAL | Age: 83
End: 2018-09-13
Attending: INTERNAL MEDICINE
Payer: MEDICARE

## 2018-09-13 ENCOUNTER — OFFICE VISIT (OUTPATIENT)
Dept: CARDIOLOGY | Facility: CLINIC | Age: 83
End: 2018-09-13
Payer: MEDICARE

## 2018-09-13 VITALS
HEIGHT: 66 IN | DIASTOLIC BLOOD PRESSURE: 70 MMHG | BODY MASS INDEX: 29.89 KG/M2 | HEART RATE: 79 BPM | WEIGHT: 186 LBS | SYSTOLIC BLOOD PRESSURE: 131 MMHG

## 2018-09-13 DIAGNOSIS — G60.3 IDIOPATHIC PROGRESSIVE NEUROPATHY: ICD-10-CM

## 2018-09-13 DIAGNOSIS — M47.816 LUMBAR SPONDYLOSIS: ICD-10-CM

## 2018-09-13 DIAGNOSIS — I10 HYPERTENSION, UNSPECIFIED TYPE: ICD-10-CM

## 2018-09-13 DIAGNOSIS — Z79.01 CHRONIC ANTICOAGULATION: ICD-10-CM

## 2018-09-13 DIAGNOSIS — R60.0 LEG EDEMA: Primary | ICD-10-CM

## 2018-09-13 DIAGNOSIS — M85.80 OSTEOPENIA, UNSPECIFIED LOCATION: ICD-10-CM

## 2018-09-13 DIAGNOSIS — I48.19 PERSISTENT ATRIAL FIBRILLATION: ICD-10-CM

## 2018-09-13 DIAGNOSIS — I38 VALVULAR REGURGITATION: ICD-10-CM

## 2018-09-13 LAB
ANION GAP SERPL CALC-SCNC: 8 MMOL/L
BUN SERPL-MCNC: 16 MG/DL
CALCIUM SERPL-MCNC: 9.9 MG/DL
CHLORIDE SERPL-SCNC: 102 MMOL/L
CO2 SERPL-SCNC: 28 MMOL/L
CREAT SERPL-MCNC: 1.1 MG/DL
EST. GFR  (AFRICAN AMERICAN): 50 ML/MIN/1.73 M^2
EST. GFR  (NON AFRICAN AMERICAN): 43 ML/MIN/1.73 M^2
GLUCOSE SERPL-MCNC: 142 MG/DL
POTASSIUM SERPL-SCNC: 4.4 MMOL/L
SODIUM SERPL-SCNC: 138 MMOL/L

## 2018-09-13 PROCEDURE — 99214 OFFICE O/P EST MOD 30 MIN: CPT | Mod: S$PBB,,, | Performed by: INTERNAL MEDICINE

## 2018-09-13 PROCEDURE — 36415 COLL VENOUS BLD VENIPUNCTURE: CPT

## 2018-09-13 PROCEDURE — 99213 OFFICE O/P EST LOW 20 MIN: CPT | Mod: PBBFAC,PO | Performed by: INTERNAL MEDICINE

## 2018-09-13 PROCEDURE — 99999 PR PBB SHADOW E&M-EST. PATIENT-LVL III: CPT | Mod: PBBFAC,,, | Performed by: INTERNAL MEDICINE

## 2018-09-13 PROCEDURE — 1101F PT FALLS ASSESS-DOCD LE1/YR: CPT | Mod: CPTII,,, | Performed by: INTERNAL MEDICINE

## 2018-09-13 PROCEDURE — 80048 BASIC METABOLIC PNL TOTAL CA: CPT

## 2018-09-13 NOTE — PROGRESS NOTES
"Subjective:    Patient ID:  Ngoc Castellanos is a 89 y.o. female who presents for follow-up of Leg Swelling      HPI  89 y/o female former pt of Dr. Ford. She has a hx of persistent AFib on chronic anticoagulation with Eliquis, diastolic dysfunction, HTN, mild MR, CKD 3 who presents for f/u. She was on Digoxin that was stopped and atenolol started. Atenolol stopped due to insurance reasons and metoprolol started with HCTZ due to LE edema. Atenolol restarted. Developed "congestion" and worsening LE edema. HCTZ stopped and lasix started last clinic visit. Had significant improvement with resolution of "congestion" and improvement in LE edema.   Last clinic visit had improved, but has developed worsening bilateral LE edema. Also has had some episodes of SOB. Continues to feel fatigued. Able to accomplish ADL's. She denies CP, orthopnea, PND, syncope, palps. She has stopped eating chips, however, unknown amount of sodium in food she is served. She is compliant with her meds. She remains very active.     Review of Systems   Constitution: Positive for malaise/fatigue. Negative for weakness.   HENT: Negative for congestion.    Eyes: Negative for blurred vision.   Cardiovascular: Positive for dyspnea on exertion and leg swelling. Negative for chest pain, claudication, cyanosis, irregular heartbeat, near-syncope, orthopnea, palpitations, paroxysmal nocturnal dyspnea and syncope.   Respiratory: Positive for shortness of breath.    Endocrine: Negative for polyuria.   Hematologic/Lymphatic: Negative for bleeding problem.   Skin: Negative for itching and rash.   Musculoskeletal: Positive for back pain, joint pain and joint swelling. Negative for muscle cramps and muscle weakness.   Gastrointestinal: Negative for abdominal pain, hematemesis, hematochezia, melena, nausea and vomiting.   Genitourinary: Negative for dysuria and hematuria.   Neurological: Negative for dizziness, focal weakness, headaches, light-headedness and loss of " balance.   Psychiatric/Behavioral: Negative for depression. The patient is not nervous/anxious.         Objective:    Physical Exam   Constitutional: She is oriented to person, place, and time. She appears well-developed and well-nourished.   HENT:   Head: Normocephalic and atraumatic.   Neck: Neck supple. No JVD present.   Cardiovascular: Normal rate. An irregularly irregular rhythm present.   Murmur heard.   Systolic murmur is present with a grade of 2/6.  Pulses:       Carotid pulses are 2+ on the right side, and 2+ on the left side.       Radial pulses are 2+ on the right side, and 2+ on the left side.        Femoral pulses are 2+ on the right side, and 2+ on the left side.       Posterior tibial pulses are 1+ on the right side, and 1+ on the left side.   Pulmonary/Chest: Effort normal and breath sounds normal.   Abdominal: Soft. Bowel sounds are normal.   Musculoskeletal: She exhibits edema.   Neurological: She is alert and oriented to person, place, and time.   Skin: Skin is warm and dry.   Psychiatric: She has a normal mood and affect. Her behavior is normal. Thought content normal.         Assessment:       1. Leg edema    2. Hypertension, unspecified type    3. Persistent atrial fibrillation    4. Valvular regurgitation    5. Idiopathic progressive neuropathy    6. Lumbar spondylosis    7. Chronic anticoagulation    8. Osteopenia, unspecified location      89 y/o pt with hx and presentation as above. Doing well from a cardiac perspective. Will increase lasix to BID for one week and pt counseled to continue compression stockings. Information given for different types of compression stockings. BMP.         Plan:       -BMP  -Increase lasix to BID  -Compression stockings  -f/u in 1 month

## 2018-09-17 ENCOUNTER — TELEPHONE (OUTPATIENT)
Dept: CARDIOLOGY | Facility: CLINIC | Age: 83
End: 2018-09-17

## 2018-09-17 NOTE — TELEPHONE ENCOUNTER
----- Message from Marcelino Stewart MD sent at 9/17/2018 11:27 AM CDT -----  Labs look good including kidney function and potassium levels. Sugar was a little high, otherwise no other significant lab abnormality

## 2018-10-10 DIAGNOSIS — C50.911 BREAST CARCINOMA, FEMALE, RIGHT: ICD-10-CM

## 2018-10-10 RX ORDER — ANASTROZOLE 1 MG/1
TABLET ORAL
Qty: 90 TABLET | Refills: 3 | OUTPATIENT
Start: 2018-10-10

## 2018-10-23 ENCOUNTER — OFFICE VISIT (OUTPATIENT)
Dept: CARDIOLOGY | Facility: CLINIC | Age: 83
End: 2018-10-23
Payer: MEDICARE

## 2018-10-23 VITALS
WEIGHT: 183 LBS | DIASTOLIC BLOOD PRESSURE: 83 MMHG | OXYGEN SATURATION: 96 % | SYSTOLIC BLOOD PRESSURE: 142 MMHG | HEART RATE: 78 BPM | HEIGHT: 66 IN | BODY MASS INDEX: 29.41 KG/M2

## 2018-10-23 DIAGNOSIS — Z79.01 CHRONIC ANTICOAGULATION: ICD-10-CM

## 2018-10-23 DIAGNOSIS — I48.19 PERSISTENT ATRIAL FIBRILLATION: Primary | ICD-10-CM

## 2018-10-23 DIAGNOSIS — I38 VALVULAR REGURGITATION: ICD-10-CM

## 2018-10-23 DIAGNOSIS — C50.911 BREAST CANCER, STAGE 1, RIGHT: ICD-10-CM

## 2018-10-23 DIAGNOSIS — I10 ESSENTIAL HYPERTENSION: ICD-10-CM

## 2018-10-23 DIAGNOSIS — R60.0 LEG EDEMA: ICD-10-CM

## 2018-10-23 DIAGNOSIS — D33.2 BENIGN NEOPLASM OF BRAIN, UNSPECIFIED BRAIN REGION: ICD-10-CM

## 2018-10-23 DIAGNOSIS — M47.816 LUMBAR SPONDYLOSIS: ICD-10-CM

## 2018-10-23 PROCEDURE — 99213 OFFICE O/P EST LOW 20 MIN: CPT | Mod: PBBFAC,PO | Performed by: INTERNAL MEDICINE

## 2018-10-23 PROCEDURE — 1101F PT FALLS ASSESS-DOCD LE1/YR: CPT | Mod: CPTII,,, | Performed by: INTERNAL MEDICINE

## 2018-10-23 PROCEDURE — 99214 OFFICE O/P EST MOD 30 MIN: CPT | Mod: S$PBB,,, | Performed by: INTERNAL MEDICINE

## 2018-10-23 PROCEDURE — 99999 PR PBB SHADOW E&M-EST. PATIENT-LVL III: CPT | Mod: PBBFAC,,, | Performed by: INTERNAL MEDICINE

## 2018-10-23 NOTE — PROGRESS NOTES
"Subjective:    Patient ID:  Ngoc Castellanos is a 89 y.o. female who presents for follow-up of Leg Swelling and Atrial Fibrillation      HPI     89 y/o female former pt of Dr. Ford. She has a hx of persistent AFib on chronic anticoagulation with Eliquis, diastolic dysfunction, HTN, mild MR, CKD 3 who presents for f/u. She was on Digoxin that was stopped and atenolol started. Atenolol stopped due to insurance reasons and metoprolol started with HCTZ due to LE edema. Atenolol restarted. Developed "congestion" and worsening LE edema. HCTZ stopped and lasix started last clinic visit. Had significant improvement with resolution of "congestion" and improvement in LE edema.   Last clinic visit was complaining of worsening SOB and leg edema, lasix increased, with significant improvement in symptoms. Continues to feel fatigued. Able to accomplish ADL's. She denies CP, orthopnea, PND, syncope, palps. She has stopped eating chips, however, unknown amount of sodium in food she is served. She is compliant with her meds. She remains very active.     Review of Systems   Constitution: Negative for weakness and malaise/fatigue.   HENT: Negative for congestion.    Eyes: Negative for blurred vision.   Cardiovascular: Positive for dyspnea on exertion and leg swelling. Negative for chest pain, claudication, cyanosis, irregular heartbeat, near-syncope, orthopnea, palpitations, paroxysmal nocturnal dyspnea and syncope.   Respiratory: Negative for shortness of breath.    Endocrine: Negative for polyuria.   Hematologic/Lymphatic: Negative for bleeding problem.   Skin: Negative for itching and rash.   Musculoskeletal: Negative for joint swelling, muscle cramps and muscle weakness.   Gastrointestinal: Negative for abdominal pain, hematemesis, hematochezia, melena, nausea and vomiting.   Genitourinary: Negative for dysuria and hematuria.   Neurological: Negative for dizziness, focal weakness, headaches, light-headedness and loss of balance. "   Psychiatric/Behavioral: Negative for depression. The patient is not nervous/anxious.         Objective:    Physical Exam   Constitutional: She is oriented to person, place, and time. She appears well-developed and well-nourished.   HENT:   Head: Normocephalic and atraumatic.   Neck: Neck supple. No JVD present.   Cardiovascular: Normal rate, regular rhythm and normal heart sounds.   Pulses:       Carotid pulses are 2+ on the right side, and 2+ on the left side.       Radial pulses are 2+ on the right side, and 2+ on the left side.        Femoral pulses are 2+ on the right side, and 2+ on the left side.       Posterior tibial pulses are 1+ on the right side, and 1+ on the left side.   Pulmonary/Chest: Effort normal and breath sounds normal.   Abdominal: Soft. Bowel sounds are normal.   Musculoskeletal: She exhibits no edema.   Neurological: She is alert and oriented to person, place, and time.   Skin: Skin is warm and dry.   Psychiatric: She has a normal mood and affect. Her behavior is normal. Thought content normal.         Assessment:       1. Persistent atrial fibrillation    2. Leg edema    3. Valvular regurgitation    4. Essential hypertension    5. Chronic anticoagulation    6. Benign neoplasm of brain, unspecified brain region    7. Breast cancer, stage 1, right    8. Lumbar spondylosis      89 y/o pt with hx and presentation as above. Doing well from a cardiac perspective and compensated from a HF perspective. Increase lasix BID PRN.          Plan:       -Continue current medical management  -Increase lasix to BID when necessary  -f/u in 6 months

## 2018-10-29 ENCOUNTER — LAB VISIT (OUTPATIENT)
Dept: LAB | Facility: HOSPITAL | Age: 83
End: 2018-10-29
Attending: INTERNAL MEDICINE
Payer: MEDICARE

## 2018-10-29 DIAGNOSIS — Z79.811 AROMATASE INHIBITOR USE: ICD-10-CM

## 2018-10-29 DIAGNOSIS — M94.9 DISORDER OF CARTILAGE: ICD-10-CM

## 2018-10-29 LAB
25(OH)D3+25(OH)D2 SERPL-MCNC: 46 NG/ML
ALBUMIN SERPL BCP-MCNC: 3.9 G/DL
ALP SERPL-CCNC: 75 U/L
ALT SERPL W/O P-5'-P-CCNC: 10 U/L
ANION GAP SERPL CALC-SCNC: 10 MMOL/L
AST SERPL-CCNC: 19 U/L
BASOPHILS # BLD AUTO: 0.02 K/UL
BASOPHILS NFR BLD: 0.2 %
BILIRUB SERPL-MCNC: 0.7 MG/DL
BUN SERPL-MCNC: 16 MG/DL
CALCIUM SERPL-MCNC: 10 MG/DL
CHLORIDE SERPL-SCNC: 101 MMOL/L
CO2 SERPL-SCNC: 27 MMOL/L
CREAT SERPL-MCNC: 1.1 MG/DL
DIFFERENTIAL METHOD: ABNORMAL
EOSINOPHIL # BLD AUTO: 0.3 K/UL
EOSINOPHIL NFR BLD: 3.7 %
ERYTHROCYTE [DISTWIDTH] IN BLOOD BY AUTOMATED COUNT: 13.8 %
EST. GFR  (AFRICAN AMERICAN): 50 ML/MIN/1.73 M^2
EST. GFR  (NON AFRICAN AMERICAN): 43 ML/MIN/1.73 M^2
GLUCOSE SERPL-MCNC: 82 MG/DL
HCT VFR BLD AUTO: 40.8 %
HGB BLD-MCNC: 13.4 G/DL
LYMPHOCYTES # BLD AUTO: 1.3 K/UL
LYMPHOCYTES NFR BLD: 15.6 %
MCH RBC QN AUTO: 28.6 PG
MCHC RBC AUTO-ENTMCNC: 32.8 G/DL
MCV RBC AUTO: 87 FL
MONOCYTES # BLD AUTO: 0.7 K/UL
MONOCYTES NFR BLD: 8.2 %
NEUTROPHILS # BLD AUTO: 6 K/UL
NEUTROPHILS NFR BLD: 72.2 %
PLATELET # BLD AUTO: 206 K/UL
PMV BLD AUTO: 9.7 FL
POTASSIUM SERPL-SCNC: 4.5 MMOL/L
PROT SERPL-MCNC: 7.4 G/DL
RBC # BLD AUTO: 4.69 M/UL
SODIUM SERPL-SCNC: 138 MMOL/L
WBC # BLD AUTO: 8.29 K/UL

## 2018-10-29 PROCEDURE — 85025 COMPLETE CBC W/AUTO DIFF WBC: CPT

## 2018-10-29 PROCEDURE — 80053 COMPREHEN METABOLIC PANEL: CPT

## 2018-10-29 PROCEDURE — 36415 COLL VENOUS BLD VENIPUNCTURE: CPT

## 2018-10-29 PROCEDURE — 82306 VITAMIN D 25 HYDROXY: CPT

## 2018-11-01 ENCOUNTER — OFFICE VISIT (OUTPATIENT)
Dept: HEMATOLOGY/ONCOLOGY | Facility: CLINIC | Age: 83
End: 2018-11-01
Payer: MEDICARE

## 2018-11-01 VITALS
RESPIRATION RATE: 16 BRPM | TEMPERATURE: 98 F | BODY MASS INDEX: 29.5 KG/M2 | HEART RATE: 77 BPM | DIASTOLIC BLOOD PRESSURE: 70 MMHG | WEIGHT: 182.75 LBS | OXYGEN SATURATION: 97 % | SYSTOLIC BLOOD PRESSURE: 114 MMHG

## 2018-11-01 DIAGNOSIS — Z12.31 ENCOUNTER FOR SCREENING MAMMOGRAM FOR MALIGNANT NEOPLASM OF BREAST: ICD-10-CM

## 2018-11-01 DIAGNOSIS — Z79.01 LONG TERM CURRENT USE OF ANTICOAGULANT THERAPY: ICD-10-CM

## 2018-11-01 DIAGNOSIS — Z79.01 CHRONIC ANTICOAGULATION: ICD-10-CM

## 2018-11-01 DIAGNOSIS — C50.511 MALIGNANT NEOPLASM OF LOWER-OUTER QUADRANT OF RIGHT BREAST OF FEMALE, ESTROGEN RECEPTOR POSITIVE: ICD-10-CM

## 2018-11-01 DIAGNOSIS — M94.9 DISORDER OF CARTILAGE: ICD-10-CM

## 2018-11-01 DIAGNOSIS — Z79.811 AROMATASE INHIBITOR USE: ICD-10-CM

## 2018-11-01 DIAGNOSIS — C50.911 BREAST CANCER, STAGE 1, RIGHT: Primary | ICD-10-CM

## 2018-11-01 DIAGNOSIS — Z17.0 MALIGNANT NEOPLASM OF LOWER-OUTER QUADRANT OF RIGHT BREAST OF FEMALE, ESTROGEN RECEPTOR POSITIVE: ICD-10-CM

## 2018-11-01 DIAGNOSIS — C50.911 BREAST CARCINOMA, FEMALE, RIGHT: ICD-10-CM

## 2018-11-01 PROCEDURE — 99999 PR PBB SHADOW E&M-EST. PATIENT-LVL III: CPT | Mod: PBBFAC,,, | Performed by: INTERNAL MEDICINE

## 2018-11-01 PROCEDURE — 99213 OFFICE O/P EST LOW 20 MIN: CPT | Mod: PBBFAC,PO | Performed by: INTERNAL MEDICINE

## 2018-11-01 PROCEDURE — 99213 OFFICE O/P EST LOW 20 MIN: CPT | Mod: S$GLB,,, | Performed by: INTERNAL MEDICINE

## 2018-11-01 PROCEDURE — 1101F PT FALLS ASSESS-DOCD LE1/YR: CPT | Mod: CPTII,S$GLB,, | Performed by: INTERNAL MEDICINE

## 2018-11-01 RX ORDER — ANASTROZOLE 1 MG/1
1 TABLET ORAL DAILY
Qty: 90 TABLET | Refills: 3 | Status: SHIPPED | OUTPATIENT
Start: 2018-11-01 | End: 2019-08-19 | Stop reason: SDUPTHER

## 2018-11-01 NOTE — PROGRESS NOTES
Subjective:       Patient ID: Ngoc Castellanos is a 89 y.o. female.    Chief Complaint: Breast Cancer    HPI She is here for followup of right breast cancer.      She was diagnosed with carcinoma of the right breast, underwent right modified radical mastectomy in march 2014. She had a 1.7 cm invasive mammary carcinoma with lobular features.  6 lymph nodes were removed and they were negative.     Her tumor was ER/MD strongly positive and positive by FISH for HER-2. She was not given adjuvant Herceptin. She started Arimidex in May 2014.    She has history of atrial fibrillation and has been on Coumadin for several years, doing well without bleeding issues, now on Eliquis. Sees .    DEXA scan (9/14/15) showed Osteopenia of both femoral necks with normal bone mineral density of the lumbar spine.     Received the first dose of prolia on 8/5/14. Not interested in further prolia injections or alternative bisphosphonate Rx.    She is here for follow-up today.     Feels well.    Review of Systems   Respiratory: Negative for cough and shortness of breath.    Cardiovascular: Negative for chest pain, palpitations and leg swelling.   Hematological: Negative for adenopathy. Does not bruise/bleed easily.         Objective:      Physical Exam   Constitutional: She is oriented to person, place, and time. She appears well-developed. No distress.   HENT:   Head: Normocephalic and atraumatic.   Mouth/Throat: No oropharyngeal exudate.   Eyes: No scleral icterus.   Neck: Normal range of motion. Neck supple.   Cardiovascular: Normal rate. Exam reveals no gallop.   Irregular.   Pulmonary/Chest: Effort normal and breath sounds normal. No stridor. She has no wheezes. She has no rales.       She is status post right mastectomy with a well-healed incision. Wound has healed well. There are no masses in the left breast.        Abdominal: Soft. She exhibits no distension and no mass. There is no tenderness.   Lymphadenopathy:     She has  no cervical adenopathy.   Neurological: She is alert and oriented to person, place, and time. No cranial nerve deficit.       Assessment:       1. Breast cancer, stage 1, right    2. Aromatase inhibitor use    3. Disorder of cartilage     4. Long term current use of anticoagulant therapy    5. Chronic anticoagulation    6. Malignant neoplasm of lower-outer quadrant of right breast of female, estrogen receptor positive        Plan:   She has T1cN0 Stage 1a ER/IA positive and Her-2 positive invasive mammary carcinoma of the right breast.     Adjuvant Arimidex was started in May 2014.  She is tolerating it well.  Continue Arimidex.    Last DEXA scan was in Sept 2017 - shows osteopenia.    Despite being 89 yrs old, she is still rather healthy, drives around and is tolerating anticoagulation well.    Continue calcium/vitamin D.  She's not interested in further bisphosphonate therapy or Prolia injections.    Return to clinic in 6 months.    Plan repeat left breast mammogram in April 2019 - she continues to be healthy, active and still drives around

## 2018-11-16 DIAGNOSIS — I48.19 PERSISTENT ATRIAL FIBRILLATION: ICD-10-CM

## 2018-11-16 DIAGNOSIS — I38 VALVULAR REGURGITATION: ICD-10-CM

## 2018-11-16 DIAGNOSIS — D33.2 BENIGN NEOPLASM OF BRAIN, UNSPECIFIED BRAIN REGION: ICD-10-CM

## 2018-11-16 DIAGNOSIS — I10 ESSENTIAL HYPERTENSION: ICD-10-CM

## 2018-11-16 DIAGNOSIS — R06.09 DOE (DYSPNEA ON EXERTION): ICD-10-CM

## 2018-11-16 RX ORDER — LISINOPRIL 5 MG/1
TABLET ORAL
Qty: 90 TABLET | Refills: 3 | Status: SHIPPED | OUTPATIENT
Start: 2018-11-16 | End: 2019-04-01

## 2019-02-11 RX ORDER — APIXABAN 5 MG/1
TABLET, FILM COATED ORAL
Qty: 180 TABLET | Refills: 3 | Status: SHIPPED | OUTPATIENT
Start: 2019-02-11 | End: 2020-01-27

## 2019-03-19 RX ORDER — FUROSEMIDE 20 MG/1
TABLET ORAL
Qty: 90 TABLET | Refills: 3 | Status: SHIPPED | OUTPATIENT
Start: 2019-03-19 | End: 2020-01-14

## 2019-03-19 RX ORDER — ATENOLOL 25 MG/1
TABLET ORAL
Qty: 90 TABLET | Refills: 3 | Status: SHIPPED | OUTPATIENT
Start: 2019-03-19 | End: 2020-01-14

## 2019-04-01 ENCOUNTER — OFFICE VISIT (OUTPATIENT)
Dept: FAMILY MEDICINE | Facility: CLINIC | Age: 84
End: 2019-04-01
Payer: MEDICARE

## 2019-04-01 VITALS
HEART RATE: 77 BPM | SYSTOLIC BLOOD PRESSURE: 106 MMHG | BODY MASS INDEX: 29.83 KG/M2 | OXYGEN SATURATION: 96 % | WEIGHT: 185.63 LBS | DIASTOLIC BLOOD PRESSURE: 68 MMHG | HEIGHT: 66 IN

## 2019-04-01 DIAGNOSIS — H81.10 BENIGN PAROXYSMAL POSITIONAL VERTIGO, UNSPECIFIED LATERALITY: ICD-10-CM

## 2019-04-01 DIAGNOSIS — I10 ESSENTIAL HYPERTENSION: Primary | ICD-10-CM

## 2019-04-01 DIAGNOSIS — R42 VERTIGO: ICD-10-CM

## 2019-04-01 DIAGNOSIS — J32.9 OTHER SINUSITIS, UNSPECIFIED CHRONICITY: ICD-10-CM

## 2019-04-01 DIAGNOSIS — I48.19 PERSISTENT ATRIAL FIBRILLATION: ICD-10-CM

## 2019-04-01 PROCEDURE — 99214 OFFICE O/P EST MOD 30 MIN: CPT | Mod: S$GLB,,, | Performed by: FAMILY MEDICINE

## 2019-04-01 PROCEDURE — 99999 PR PBB SHADOW E&M-EST. PATIENT-LVL III: ICD-10-PCS | Mod: PBBFAC,,, | Performed by: FAMILY MEDICINE

## 2019-04-01 PROCEDURE — 1101F PR PT FALLS ASSESS DOC 0-1 FALLS W/OUT INJ PAST YR: ICD-10-PCS | Mod: CPTII,S$GLB,, | Performed by: FAMILY MEDICINE

## 2019-04-01 PROCEDURE — 99214 PR OFFICE/OUTPT VISIT, EST, LEVL IV, 30-39 MIN: ICD-10-PCS | Mod: S$GLB,,, | Performed by: FAMILY MEDICINE

## 2019-04-01 PROCEDURE — 1101F PT FALLS ASSESS-DOCD LE1/YR: CPT | Mod: CPTII,S$GLB,, | Performed by: FAMILY MEDICINE

## 2019-04-01 PROCEDURE — 99999 PR PBB SHADOW E&M-EST. PATIENT-LVL III: CPT | Mod: PBBFAC,,, | Performed by: FAMILY MEDICINE

## 2019-04-01 RX ORDER — LOSARTAN POTASSIUM 25 MG/1
25 TABLET ORAL DAILY
Qty: 90 TABLET | Refills: 3 | Status: SHIPPED | OUTPATIENT
Start: 2019-04-01 | End: 2020-01-13

## 2019-04-01 RX ORDER — DESLORATADINE 5 MG/1
5 TABLET ORAL DAILY
Qty: 30 TABLET | Refills: 0 | Status: SHIPPED | OUTPATIENT
Start: 2019-04-01 | End: 2021-12-13

## 2019-04-01 RX ORDER — MECLIZINE HCL 12.5 MG 12.5 MG/1
12.5 TABLET ORAL 3 TIMES DAILY PRN
Qty: 90 TABLET | Refills: 3 | Status: SHIPPED | OUTPATIENT
Start: 2019-04-01 | End: 2022-12-14 | Stop reason: SDUPTHER

## 2019-04-01 NOTE — PROGRESS NOTES
Subjective:       Patient ID: Ngoc Castellanos is a 89 y.o. female.    Chief Complaint: Annual Exam    89 years old female came to the clinic for blood pressure check.  Blood pressure today stable .  No chest pain, palpitation orthopnea or PND.  Patient request meclizine for episodic vertigo.  Patient with sinus postnasal drip for the last couple of weeks.  No fever or chills.    Review of Systems   Constitutional: Negative.  Negative for fever.   HENT: Positive for postnasal drip.    Eyes: Negative.    Respiratory: Negative.    Cardiovascular: Negative.  Negative for chest pain, palpitations and leg swelling.   Gastrointestinal: Negative.    Genitourinary: Negative.    Musculoskeletal: Negative.    Skin: Negative.    Neurological: Negative.    Psychiatric/Behavioral: Negative.        Objective:      Physical Exam   Constitutional: She is oriented to person, place, and time. She appears well-developed and well-nourished. No distress.   HENT:   Head: Normocephalic and atraumatic.   Right Ear: External ear normal.   Left Ear: External ear normal.   Nose: Nose normal.   Mouth/Throat: Oropharynx is clear and moist. No oropharyngeal exudate.   Eyes: Pupils are equal, round, and reactive to light. Conjunctivae and EOM are normal. Right eye exhibits no discharge. Left eye exhibits no discharge. No scleral icterus.   Neck: Normal range of motion. Neck supple. No JVD present. No tracheal deviation present. No thyromegaly present.   Cardiovascular: Normal rate, normal heart sounds and intact distal pulses. An irregularly irregular rhythm present. Exam reveals no gallop and no friction rub.   No murmur heard.  Pulmonary/Chest: Effort normal and breath sounds normal. No stridor. No respiratory distress. She has no wheezes. She has no rales. She exhibits no tenderness.   Abdominal: Soft. Bowel sounds are normal. She exhibits no distension and no mass. There is no tenderness. There is no rebound and no guarding.   Musculoskeletal:  Normal range of motion. She exhibits no edema or tenderness.   Lymphadenopathy:     She has no cervical adenopathy.   Neurological: She is alert and oriented to person, place, and time. She has normal reflexes. No cranial nerve deficit. She exhibits normal muscle tone. Coordination and gait abnormal.   Skin: Skin is warm and dry. No rash noted. She is not diaphoretic. No erythema. No pallor.   Psychiatric: She has a normal mood and affect. Her behavior is normal. Judgment and thought content normal.       Assessment:       1. Essential hypertension    2. Benign paroxysmal positional vertigo, unspecified laterality    3. Persistent atrial fibrillation    4. Vertigo    5. Other sinusitis, unspecified chronicity        Plan:         Ngoc was seen today for annual exam.    Diagnoses and all orders for this visit:    Essential hypertension  -     losartan (COZAAR) 25 MG tablet; Take 1 tablet (25 mg total) by mouth once daily.  -     TSH; Future  -     Lipid panel; Future  -     Urinalysis; Future    Benign paroxysmal positional vertigo, unspecified laterality    Persistent atrial fibrillation  -     losartan (COZAAR) 25 MG tablet; Take 1 tablet (25 mg total) by mouth once daily.  -     TSH; Future    Vertigo  -     meclizine (ANTIVERT) 12.5 mg tablet; Take 1 tablet (12.5 mg total) by mouth 3 (three) times daily as needed.    Other sinusitis, unspecified chronicity  -     desloratadine (CLARINEX) 5 mg tablet; Take 1 tablet (5 mg total) by mouth once daily.    Continue monitoring blood pressure at home, low sodium diet.  Discontinue lisinopril.

## 2019-04-01 NOTE — PATIENT INSTRUCTIONS
Managing Dizziness (Vertigo) with Medicines    Although medicines can't cure your problem, they can help control symptoms. Your doctor may prescribe medicines for a few weeks and then taper them off. Always take your medicine as prescribed. Never share your medicine with others.  Contact your healthcare provider right away if you have side effects from your medicines.   How medicines can help  · Treat infection or inflammation. If you have an infection caused by bacteria, your doctor can prescribe antibiotics.  · Limit conflicting balance signals. These medicines are often in pill form.  · Ease nausea. Suppositories, pills, or shots can reduce vomiting.  · Reduce pressure in the canals. Diuretics can be used to treat Meniere's disease. These medicines help your body get rid of extra fluid.  · Ease other symptoms. Other medicines can help ease depression and anxiety caused by living with dizziness or fainting.  Date Last Reviewed: 11/1/2016  © 5383-3021 [employer]. [address] [place] [address], [place] [address]. All rights reserved. This information is not intended as a substitute for professional medical care. Always follow your healthcare professional's instructions.

## 2019-04-23 ENCOUNTER — OFFICE VISIT (OUTPATIENT)
Dept: CARDIOLOGY | Facility: CLINIC | Age: 84
End: 2019-04-23
Payer: MEDICARE

## 2019-04-23 VITALS
SYSTOLIC BLOOD PRESSURE: 134 MMHG | BODY MASS INDEX: 30 KG/M2 | HEART RATE: 82 BPM | DIASTOLIC BLOOD PRESSURE: 70 MMHG | OXYGEN SATURATION: 96 % | WEIGHT: 185.88 LBS

## 2019-04-23 DIAGNOSIS — R60.0 LEG EDEMA: ICD-10-CM

## 2019-04-23 DIAGNOSIS — I38 VALVULAR REGURGITATION: ICD-10-CM

## 2019-04-23 DIAGNOSIS — Z79.01 CHRONIC ANTICOAGULATION: ICD-10-CM

## 2019-04-23 DIAGNOSIS — R26.9 GAIT DISORDER: ICD-10-CM

## 2019-04-23 DIAGNOSIS — I10 ESSENTIAL HYPERTENSION: ICD-10-CM

## 2019-04-23 DIAGNOSIS — C50.911 BREAST CANCER, STAGE 1, RIGHT: ICD-10-CM

## 2019-04-23 DIAGNOSIS — G60.3 IDIOPATHIC PROGRESSIVE NEUROPATHY: ICD-10-CM

## 2019-04-23 DIAGNOSIS — I48.19 PERSISTENT ATRIAL FIBRILLATION: Primary | ICD-10-CM

## 2019-04-23 PROCEDURE — 99999 PR PBB SHADOW E&M-EST. PATIENT-LVL III: ICD-10-PCS | Mod: PBBFAC,HCNC,, | Performed by: INTERNAL MEDICINE

## 2019-04-23 PROCEDURE — 99999 PR PBB SHADOW E&M-EST. PATIENT-LVL III: CPT | Mod: PBBFAC,HCNC,, | Performed by: INTERNAL MEDICINE

## 2019-04-23 PROCEDURE — 1101F PR PT FALLS ASSESS DOC 0-1 FALLS W/OUT INJ PAST YR: ICD-10-PCS | Mod: HCNC,CPTII,S$GLB, | Performed by: INTERNAL MEDICINE

## 2019-04-23 PROCEDURE — 99214 PR OFFICE/OUTPT VISIT, EST, LEVL IV, 30-39 MIN: ICD-10-PCS | Mod: HCNC,S$GLB,, | Performed by: INTERNAL MEDICINE

## 2019-04-23 PROCEDURE — 1101F PT FALLS ASSESS-DOCD LE1/YR: CPT | Mod: HCNC,CPTII,S$GLB, | Performed by: INTERNAL MEDICINE

## 2019-04-23 PROCEDURE — 99214 OFFICE O/P EST MOD 30 MIN: CPT | Mod: HCNC,S$GLB,, | Performed by: INTERNAL MEDICINE

## 2019-04-23 NOTE — PROGRESS NOTES
"Subjective:    Patient ID:  Ngoc Castellanos is a 89 y.o. female who presents for follow-up of No chief complaint on file.      HPI     89 y/o female former pt of Dr. Ford. She has a hx of persistent AFib on chronic anticoagulation with Eliquis, diastolic dysfunction, HTN, mild MR, CKD 3 who presents for f/u. She was on Digoxin that was stopped and atenolol started. Atenolol stopped due to insurance reasons and metoprolol started with HCTZ due to LE edema. Atenolol restarted. Developed "congestion" and worsening LE edema. HCTZ stopped and lasix started last clinic visit. Had significant improvement with resolution of "congestion" and improvement in LE edema.   Continues to feel fatigued. Able to accomplish ADL's. She denies CP, orthopnea, PND, syncope, palps. Unknown amount of sodium in food she is served. She is compliant with her meds. She remains very active. Questions about losartan.    Review of Systems   Constitution: Positive for malaise/fatigue.   HENT: Negative for congestion.    Eyes: Negative for blurred vision.   Cardiovascular: Negative for chest pain, claudication, cyanosis, dyspnea on exertion, irregular heartbeat, leg swelling, near-syncope, orthopnea, palpitations, paroxysmal nocturnal dyspnea and syncope.   Respiratory: Negative for shortness of breath.    Endocrine: Negative for polyuria.   Hematologic/Lymphatic: Negative for bleeding problem.   Skin: Negative for itching and rash.   Musculoskeletal: Negative for joint swelling, muscle cramps and muscle weakness.   Gastrointestinal: Negative for abdominal pain, hematemesis, hematochezia, melena, nausea and vomiting.   Genitourinary: Negative for dysuria and hematuria.   Neurological: Negative for dizziness, focal weakness, headaches, light-headedness, loss of balance and weakness.   Psychiatric/Behavioral: Negative for depression. The patient is not nervous/anxious.         Objective:    Physical Exam   Constitutional: She is oriented to person, " place, and time. She appears well-developed and well-nourished.   HENT:   Head: Normocephalic and atraumatic.   Neck: Neck supple. No JVD present.   Cardiovascular: Normal rate, regular rhythm and normal heart sounds.   Pulses:       Carotid pulses are 2+ on the right side, and 2+ on the left side.       Radial pulses are 2+ on the right side, and 2+ on the left side.        Femoral pulses are 2+ on the right side, and 2+ on the left side.       Dorsalis pedis pulses are 2+ on the right side, and 2+ on the left side.        Posterior tibial pulses are 2+ on the right side, and 2+ on the left side.   Pulmonary/Chest: Effort normal and breath sounds normal.   Abdominal: Soft. Bowel sounds are normal.   Musculoskeletal: She exhibits edema.   Neurological: She is alert and oriented to person, place, and time.   Skin: Skin is warm.   Psychiatric: She has a normal mood and affect. Her behavior is normal. Thought content normal.         Assessment:       1. Persistent atrial fibrillation    2. Essential hypertension    3. Valvular regurgitation    4. Chronic anticoagulation    5. Idiopathic progressive neuropathy    6. Breast cancer, stage 1, right    7. Leg edema    8. Gait disorder      89 y/o pt with hx and presentation as above. Doing well from a cardiac perspective and compensated from a HF perspective. Discussed the etiology, evaluation, and management of HTN. Discussed the importance of med compliance, heart healthy diet, and regular exercise.       Plan:       -Continue current medical management  -cont with losartan  -f/u in 6 months

## 2019-05-01 ENCOUNTER — HOSPITAL ENCOUNTER (OUTPATIENT)
Dept: RADIOLOGY | Facility: HOSPITAL | Age: 84
Discharge: HOME | End: 2019-05-01
Attending: INTERNAL MEDICINE
Payer: MEDICARE

## 2019-05-01 DIAGNOSIS — Z12.31 ENCOUNTER FOR SCREENING MAMMOGRAM FOR MALIGNANT NEOPLASM OF BREAST: ICD-10-CM

## 2019-05-01 DIAGNOSIS — C50.911 BREAST CANCER, STAGE 1, RIGHT: ICD-10-CM

## 2019-05-01 PROCEDURE — 77065 MAMMO DIGITAL DIAGNOSTIC LEFT WITH TOMOSYNTHESIS_CAD: ICD-10-PCS | Mod: 26,HCNC,LT, | Performed by: RADIOLOGY

## 2019-05-01 PROCEDURE — 77065 DX MAMMO INCL CAD UNI: CPT | Mod: TC,HCNC,LT

## 2019-05-01 PROCEDURE — 77061 BREAST TOMOSYNTHESIS UNI: CPT | Mod: 26,HCNC,LT, | Performed by: RADIOLOGY

## 2019-05-01 PROCEDURE — 77061 BREAST TOMOSYNTHESIS UNI: CPT | Mod: TC,HCNC,LT

## 2019-05-01 PROCEDURE — 77061 MAMMO DIGITAL DIAGNOSTIC LEFT WITH TOMOSYNTHESIS_CAD: ICD-10-PCS | Mod: 26,HCNC,LT, | Performed by: RADIOLOGY

## 2019-05-01 PROCEDURE — 77065 DX MAMMO INCL CAD UNI: CPT | Mod: 26,HCNC,LT, | Performed by: RADIOLOGY

## 2019-05-02 ENCOUNTER — OFFICE VISIT (OUTPATIENT)
Dept: HEMATOLOGY/ONCOLOGY | Facility: CLINIC | Age: 84
End: 2019-05-02
Payer: MEDICARE

## 2019-05-02 VITALS
OXYGEN SATURATION: 95 % | SYSTOLIC BLOOD PRESSURE: 158 MMHG | DIASTOLIC BLOOD PRESSURE: 88 MMHG | HEART RATE: 70 BPM | WEIGHT: 184.5 LBS | HEIGHT: 67 IN | RESPIRATION RATE: 18 BRPM | BODY MASS INDEX: 28.96 KG/M2 | TEMPERATURE: 98 F

## 2019-05-02 DIAGNOSIS — M94.9 DISORDER OF CARTILAGE: ICD-10-CM

## 2019-05-02 DIAGNOSIS — C50.911 BREAST CANCER, STAGE 1, RIGHT: Primary | ICD-10-CM

## 2019-05-02 PROCEDURE — 1101F PT FALLS ASSESS-DOCD LE1/YR: CPT | Mod: HCNC,CPTII,S$GLB, | Performed by: INTERNAL MEDICINE

## 2019-05-02 PROCEDURE — 99213 OFFICE O/P EST LOW 20 MIN: CPT | Mod: HCNC,S$GLB,, | Performed by: INTERNAL MEDICINE

## 2019-05-02 PROCEDURE — 99999 PR PBB SHADOW E&M-EST. PATIENT-LVL III: ICD-10-PCS | Mod: PBBFAC,HCNC,, | Performed by: INTERNAL MEDICINE

## 2019-05-02 PROCEDURE — 1101F PR PT FALLS ASSESS DOC 0-1 FALLS W/OUT INJ PAST YR: ICD-10-PCS | Mod: HCNC,CPTII,S$GLB, | Performed by: INTERNAL MEDICINE

## 2019-05-02 PROCEDURE — 99213 PR OFFICE/OUTPT VISIT, EST, LEVL III, 20-29 MIN: ICD-10-PCS | Mod: HCNC,S$GLB,, | Performed by: INTERNAL MEDICINE

## 2019-05-02 PROCEDURE — 99999 PR PBB SHADOW E&M-EST. PATIENT-LVL III: CPT | Mod: PBBFAC,HCNC,, | Performed by: INTERNAL MEDICINE

## 2019-05-02 NOTE — PROGRESS NOTES
Subjective:       Patient ID: Ngoc Castellanos is a 89 y.o. female.    Chief Complaint: Breast cancer, stage 1, right    HPI She is here for followup of right breast cancer.      She was diagnosed with carcinoma of the right breast, underwent right modified radical mastectomy in march 2014. She had a 1.7 cm invasive mammary carcinoma with lobular features.  6 lymph nodes were removed and they were negative.     Her tumor was ER/AZ strongly positive and positive by FISH for HER-2. She was not given adjuvant Herceptin. She started Arimidex in May 2014.    She has history of atrial fibrillation and has been on Coumadin for several years, doing well without bleeding issues, now on Eliquis. Sees .    DEXA scan (9/14/15) showed Osteopenia of both femoral necks with normal bone mineral density of the lumbar spine.     Received the first dose of prolia on 8/5/14. Not interested in further prolia injections or alternative bisphosphonate Rx.    She is here for follow-up today.     Feels well.    Review of Systems   Respiratory: Negative for cough and shortness of breath.    Cardiovascular: Negative for chest pain, palpitations and leg swelling.   Hematological: Negative for adenopathy. Does not bruise/bleed easily.         Objective:      Physical Exam   Constitutional: She is oriented to person, place, and time. She appears well-developed. No distress.   HENT:   Head: Normocephalic and atraumatic.   Mouth/Throat: No oropharyngeal exudate.   Eyes: No scleral icterus.   Neck: Normal range of motion. Neck supple.   Cardiovascular: Normal rate. Exam reveals no gallop.   Irregular.   Pulmonary/Chest: Effort normal and breath sounds normal. No stridor. She has no wheezes. She has no rales.       She is status post right mastectomy with a well-healed incision. Wound has healed well. There are no masses in the left breast.        Abdominal: Soft. She exhibits no distension and no mass. There is no tenderness.    Lymphadenopathy:     She has no cervical adenopathy.   Neurological: She is alert and oriented to person, place, and time. No cranial nerve deficit.       Assessment:       1. Breast cancer, stage 1, right    2. Disorder of cartilage         Plan:   She has T1cN0 Stage 1a ER/ME positive and Her-2 positive invasive mammary carcinoma of the right breast.     Adjuvant Arimidex was started in May 2014.  She is tolerating it well.  Continue Arimidex.    Last DEXA scan was in Sept 2017 - shows osteopenia.    Despite being 89 yrs old, she is still rather healthy, drives around and is tolerating anticoagulation well.    Continue calcium/vitamin D.  She's not interested in further bisphosphonate therapy or Prolia injections.    Return to clinic in 6 months.    Plan repeat left breast mammogram in May 2020 - she continues to be healthy, active and still drives around

## 2019-05-27 ENCOUNTER — OFFICE VISIT (OUTPATIENT)
Dept: URGENT CARE | Facility: CLINIC | Age: 84
End: 2019-05-27
Payer: MEDICARE

## 2019-05-27 ENCOUNTER — TELEPHONE (OUTPATIENT)
Dept: FAMILY MEDICINE | Facility: CLINIC | Age: 84
End: 2019-05-27

## 2019-05-27 VITALS
SYSTOLIC BLOOD PRESSURE: 137 MMHG | TEMPERATURE: 99 F | WEIGHT: 184 LBS | HEIGHT: 67 IN | DIASTOLIC BLOOD PRESSURE: 62 MMHG | OXYGEN SATURATION: 96 % | RESPIRATION RATE: 18 BRPM | BODY MASS INDEX: 28.88 KG/M2 | HEART RATE: 78 BPM

## 2019-05-27 DIAGNOSIS — R05.9 COUGH: Primary | ICD-10-CM

## 2019-05-27 DIAGNOSIS — R91.1 PULMONARY NODULE: Primary | ICD-10-CM

## 2019-05-27 DIAGNOSIS — R93.89 ABNORMAL CHEST X-RAY: ICD-10-CM

## 2019-05-27 DIAGNOSIS — R09.81 NASAL CONGESTION: ICD-10-CM

## 2019-05-27 DIAGNOSIS — R91.1 NODULE OF UPPER LOBE OF LEFT LUNG: ICD-10-CM

## 2019-05-27 PROCEDURE — 1101F PT FALLS ASSESS-DOCD LE1/YR: CPT | Mod: CPTII,S$GLB,, | Performed by: PHYSICIAN ASSISTANT

## 2019-05-27 PROCEDURE — 99214 OFFICE O/P EST MOD 30 MIN: CPT | Mod: S$GLB,,, | Performed by: PHYSICIAN ASSISTANT

## 2019-05-27 PROCEDURE — 71046 XR CHEST PA AND LATERAL: ICD-10-PCS | Mod: FY,S$GLB,, | Performed by: RADIOLOGY

## 2019-05-27 PROCEDURE — 99214 PR OFFICE/OUTPT VISIT, EST, LEVL IV, 30-39 MIN: ICD-10-PCS | Mod: S$GLB,,, | Performed by: PHYSICIAN ASSISTANT

## 2019-05-27 PROCEDURE — 1101F PR PT FALLS ASSESS DOC 0-1 FALLS W/OUT INJ PAST YR: ICD-10-PCS | Mod: CPTII,S$GLB,, | Performed by: PHYSICIAN ASSISTANT

## 2019-05-27 PROCEDURE — 71046 X-RAY EXAM CHEST 2 VIEWS: CPT | Mod: FY,S$GLB,, | Performed by: RADIOLOGY

## 2019-05-27 RX ORDER — PROMETHAZINE HYDROCHLORIDE AND CODEINE PHOSPHATE 6.25; 1 MG/5ML; MG/5ML
5 SOLUTION ORAL EVERY 4 HOURS PRN
Qty: 118 ML | Refills: 0 | Status: SHIPPED | OUTPATIENT
Start: 2019-05-27 | End: 2019-07-18

## 2019-05-27 RX ORDER — DOXYCYCLINE HYCLATE 100 MG
100 TABLET ORAL 2 TIMES DAILY
Qty: 20 TABLET | Refills: 0 | Status: SHIPPED | OUTPATIENT
Start: 2019-05-27 | End: 2019-05-28

## 2019-05-27 NOTE — TELEPHONE ENCOUNTER
Patient with pulmonary nodule in the x-ray.    Radiology recommends chest CT scan for better assessment.    Please notify the patient with appointment.

## 2019-05-27 NOTE — PATIENT INSTRUCTIONS
Follow-up with your primary care provider due to abnormal chest x-ray.    Return here go to the ER for worsening condition such as increased shortness of breath, temperature above 100.4° or cough that does not resolve in the next week.

## 2019-05-27 NOTE — PROGRESS NOTES
"Subjective:       Patient ID: Ngoc Castellanos is a 89 y.o. female.    Vitals:  height is 5' 7" (1.702 m) and weight is 83.5 kg (184 lb). Her oral temperature is 98.6 °F (37 °C). Her blood pressure is 137/62 and her pulse is 78. Her respiration is 18 and oxygen saturation is 96%.     Chief Complaint: Cough    Pt has had a productive cough for the last week, congestion, hoarse voice and weakness, no fever or sinus pressure, coughing is aggravated by lying down specifically at night.  Patient has been taking desloratadine with no improvement.  She denies fevers or chills.  She denies recent travel or known sick contacts.  Patient lives in an assisted living facility.    Cough   This is a new problem. The current episode started in the past 7 days. The problem has been gradually improving. The problem occurs every few hours (mostly at night). The cough is productive of purulent sputum and productive of blood-tinged sputum (cloudy yellow sputum ). Associated symptoms include nasal congestion, rhinorrhea and a sore throat. Pertinent negatives include no chest pain, ear pain, headaches, rash or shortness of breath. The symptoms are aggravated by lying down. She has tried body position changes, OTC cough suppressant and prescription cough suppressant for the symptoms. The treatment provided moderate relief.       HENT: Positive for congestion, sore throat and voice change. Negative for ear pain and sinus pain.    Neck: Negative for neck pain.   Cardiovascular: Negative for chest pain.   Eyes: Negative for eye itching.   Respiratory: Positive for cough and sputum production. Negative for shortness of breath and asthma.    Gastrointestinal: Negative for abdominal pain and vomiting.   Musculoskeletal: Negative for trauma and back pain.   Skin: Negative for rash.   Allergic/Immunologic: Negative for seasonal allergies and asthma.   Neurological: Negative for dizziness and headaches.       Objective:      Physical Exam "   Constitutional: She is oriented to person, place, and time. Vital signs are normal. She appears well-developed and well-nourished. She is active and cooperative.  Non-toxic appearance. She does not have a sickly appearance. She does not appear ill. No distress.   HENT:   Head: Normocephalic and atraumatic.   Right Ear: Hearing, tympanic membrane, external ear and ear canal normal.   Left Ear: Hearing, tympanic membrane, external ear and ear canal normal.   Nose: Mucosal edema and rhinorrhea present. No nasal deformity. No epistaxis. Right sinus exhibits no maxillary sinus tenderness and no frontal sinus tenderness. Left sinus exhibits no maxillary sinus tenderness and no frontal sinus tenderness.   Mouth/Throat: Uvula is midline and mucous membranes are normal. No trismus in the jaw. Normal dentition. No uvula swelling. No posterior oropharyngeal edema or posterior oropharyngeal erythema (mild). No tonsillar exudate.   Eyes: Pupils are equal, round, and reactive to light. Conjunctivae, EOM and lids are normal. No scleral icterus.   Sclera clear bilat   Neck: Trachea normal, full passive range of motion without pain and phonation normal. Neck supple.   Cardiovascular: Normal heart sounds and intact distal pulses. An irregularly irregular rhythm present.   Pulses:       Radial pulses are 1+ on the right side, and 1+ on the left side.   Pulmonary/Chest: Effort normal and breath sounds normal. No respiratory distress.   Musculoskeletal: Normal range of motion. She exhibits no edema or deformity.   Neurological: She is alert and oriented to person, place, and time. She has normal strength. Gait normal.   Skin: Skin is warm, dry and intact. Capillary refill takes less than 2 seconds. No rash noted. She is not diaphoretic. No pallor.   Psychiatric: She has a normal mood and affect. Her speech is normal and behavior is normal. Judgment and thought content normal. Cognition and memory are normal.   Nursing note and vitals  reviewed.        X-ray Chest Pa And Lateral    Result Date: 5/27/2019  EXAMINATION: XR CHEST PA AND LATERAL CLINICAL HISTORY: Cough TECHNIQUE: PA and lateral views of the chest were performed. COMPARISON: 08/22/2014 FINDINGS: The cardiac silhouette is enlarged.  The pulmonary vascularity is normal.  There is a nodular opacity left upper lobe measuring 1.5 cm, new from the prior study concerning for pulmonary nodule.  Chest CT advised.  Abnormal opacity left lung base noted posteriorly could represent subsegmental atelectasis or airspace disease.  No pleural effusion.  No pneumothorax.  The osseous structures appear within normal limits.     Abnormal nodular opacity left upper lobe, new, further evaluation with chest CT advised to rule out a pulmonary nodule. Left lung base pleural base opacity posteriorly concerning for subsegmental atelectasis or airspace disease.  This could also be evaluated with chest CT. This report was flagged in Epic as abnormal. Electronically signed by: Anay Shay MD Date:    05/27/2019 Time:    11:46        Assessment:       1. Cough    2. Nodule of upper lobe of left lung    3. Abnormal chest x-ray    4. Nasal congestion        Plan:         CURB-65 = 1 (age).        Cough  -     X-Ray Chest PA And Lateral; Future; Expected date: 05/27/2019  -     promethazine-codeine 6.25-10 mg/5 ml (PHENERGAN WITH CODEINE) 6.25-10 mg/5 mL syrup; Take 5 mLs by mouth every 4 (four) hours as needed for Cough.  Dispense: 118 mL; Refill: 0    Nodule of upper lobe of left lung    Abnormal chest x-ray  -     doxycycline (VIBRA-TABS) 100 MG tablet; Take 1 tablet (100 mg total) by mouth 2 (two) times daily. for 10 days  Dispense: 20 tablet; Refill: 0    Nasal congestion      Patient Instructions   Follow-up with your primary care provider due to abnormal chest x-ray.    Return here go to the ER for worsening condition such as increased shortness of breath, temperature above 100.4° or cough that does  not resolve in the next week.

## 2019-05-28 ENCOUNTER — TELEPHONE (OUTPATIENT)
Dept: FAMILY MEDICINE | Facility: CLINIC | Age: 84
End: 2019-05-28

## 2019-05-28 ENCOUNTER — HOSPITAL ENCOUNTER (OUTPATIENT)
Dept: RADIOLOGY | Facility: HOSPITAL | Age: 84
Discharge: HOME | End: 2019-05-28
Attending: FAMILY MEDICINE
Payer: MEDICARE

## 2019-05-28 DIAGNOSIS — Z85.3 HISTORY OF BREAST CANCER: ICD-10-CM

## 2019-05-28 DIAGNOSIS — R91.1 PULMONARY NODULE: ICD-10-CM

## 2019-05-28 DIAGNOSIS — J18.0 BRONCHOPNEUMONIA: Primary | ICD-10-CM

## 2019-05-28 DIAGNOSIS — R93.89 ABNORMAL CT OF THE CHEST: Primary | ICD-10-CM

## 2019-05-28 PROCEDURE — 71260 CT CHEST WITH CONTRAST: ICD-10-PCS | Mod: 26,HCNC,, | Performed by: RADIOLOGY

## 2019-05-28 PROCEDURE — 71260 CT THORAX DX C+: CPT | Mod: TC,HCNC

## 2019-05-28 PROCEDURE — 25500020 PHARM REV CODE 255: Mod: HCNC | Performed by: FAMILY MEDICINE

## 2019-05-28 PROCEDURE — 71260 CT THORAX DX C+: CPT | Mod: 26,HCNC,, | Performed by: RADIOLOGY

## 2019-05-28 RX ORDER — LEVOFLOXACIN 500 MG/1
500 TABLET, FILM COATED ORAL DAILY
Qty: 5 TABLET | Refills: 0 | Status: SHIPPED | OUTPATIENT
Start: 2019-05-28 | End: 2019-06-02

## 2019-05-28 RX ADMIN — IOHEXOL 75 ML: 350 INJECTION, SOLUTION INTRAVENOUS at 09:05

## 2019-05-28 NOTE — TELEPHONE ENCOUNTER
Patient with abnormal CT scan.  Findings were discussed with the patient.    Lung specialist referral was done.  Please contact the patient with appointment .    Levaquin was ordered to cover for possible pneumonia.

## 2019-06-26 DIAGNOSIS — J90 PLEURAL EFFUSION ON LEFT: ICD-10-CM

## 2019-06-26 DIAGNOSIS — C50.911 BREAST CANCER, STAGE 1, RIGHT: Primary | ICD-10-CM

## 2019-06-28 ENCOUNTER — HOSPITAL ENCOUNTER (OUTPATIENT)
Dept: RADIOLOGY | Facility: HOSPITAL | Age: 84
Discharge: HOME | End: 2019-06-28
Attending: STUDENT IN AN ORGANIZED HEALTH CARE EDUCATION/TRAINING PROGRAM
Payer: MEDICARE

## 2019-06-28 ENCOUNTER — OFFICE VISIT (OUTPATIENT)
Dept: PULMONOLOGY | Facility: CLINIC | Age: 84
End: 2019-06-28
Payer: MEDICARE

## 2019-06-28 VITALS
BODY MASS INDEX: 28.82 KG/M2 | OXYGEN SATURATION: 95 % | HEART RATE: 73 BPM | WEIGHT: 183.63 LBS | DIASTOLIC BLOOD PRESSURE: 78 MMHG | SYSTOLIC BLOOD PRESSURE: 128 MMHG | HEIGHT: 67 IN

## 2019-06-28 DIAGNOSIS — R09.82 POST-NASAL DRIP: ICD-10-CM

## 2019-06-28 DIAGNOSIS — R05.9 COUGH: ICD-10-CM

## 2019-06-28 DIAGNOSIS — C50.911 BREAST CANCER, STAGE 1, RIGHT: ICD-10-CM

## 2019-06-28 DIAGNOSIS — J90 PLEURAL EFFUSION ON LEFT: ICD-10-CM

## 2019-06-28 DIAGNOSIS — R06.01 ORTHOPNEA: ICD-10-CM

## 2019-06-28 DIAGNOSIS — M54.9 BACK PAIN, UNSPECIFIED BACK LOCATION, UNSPECIFIED BACK PAIN LATERALITY, UNSPECIFIED CHRONICITY: ICD-10-CM

## 2019-06-28 DIAGNOSIS — R91.1 NODULE OF UPPER LOBE OF LEFT LUNG: Primary | ICD-10-CM

## 2019-06-28 DIAGNOSIS — I51.89 DIASTOLIC DYSFUNCTION: ICD-10-CM

## 2019-06-28 PROCEDURE — 99205 PR OFFICE/OUTPT VISIT, NEW, LEVL V, 60-74 MIN: ICD-10-PCS | Mod: HCNC,GC,S$GLB, | Performed by: STUDENT IN AN ORGANIZED HEALTH CARE EDUCATION/TRAINING PROGRAM

## 2019-06-28 PROCEDURE — 71045 X-RAY EXAM CHEST 1 VIEW: CPT | Mod: TC,HCNC,FY,LT

## 2019-06-28 PROCEDURE — 99999 PR PBB SHADOW E&M-EST. PATIENT-LVL IV: ICD-10-PCS | Mod: PBBFAC,HCNC,GC, | Performed by: STUDENT IN AN ORGANIZED HEALTH CARE EDUCATION/TRAINING PROGRAM

## 2019-06-28 PROCEDURE — 99205 OFFICE O/P NEW HI 60 MIN: CPT | Mod: HCNC,GC,S$GLB, | Performed by: STUDENT IN AN ORGANIZED HEALTH CARE EDUCATION/TRAINING PROGRAM

## 2019-06-28 PROCEDURE — 99999 PR PBB SHADOW E&M-EST. PATIENT-LVL IV: CPT | Mod: PBBFAC,HCNC,GC, | Performed by: STUDENT IN AN ORGANIZED HEALTH CARE EDUCATION/TRAINING PROGRAM

## 2019-06-28 PROCEDURE — 71045 X-RAY EXAM CHEST 1 VIEW: CPT | Mod: 26,HCNC,LT, | Performed by: RADIOLOGY

## 2019-06-28 PROCEDURE — 1101F PR PT FALLS ASSESS DOC 0-1 FALLS W/OUT INJ PAST YR: ICD-10-PCS | Mod: HCNC,CPTII,GC,S$GLB | Performed by: STUDENT IN AN ORGANIZED HEALTH CARE EDUCATION/TRAINING PROGRAM

## 2019-06-28 PROCEDURE — 72100 XR LUMBAR SPINE 2 OR 3 VIEWS: ICD-10-PCS | Mod: 26,HCNC,, | Performed by: RADIOLOGY

## 2019-06-28 PROCEDURE — 72100 X-RAY EXAM L-S SPINE 2/3 VWS: CPT | Mod: TC,HCNC

## 2019-06-28 PROCEDURE — 71045 XR CHEST LATERAL DECUBITUS LEFT: ICD-10-PCS | Mod: 26,HCNC,LT, | Performed by: RADIOLOGY

## 2019-06-28 PROCEDURE — 1101F PT FALLS ASSESS-DOCD LE1/YR: CPT | Mod: HCNC,CPTII,GC,S$GLB | Performed by: STUDENT IN AN ORGANIZED HEALTH CARE EDUCATION/TRAINING PROGRAM

## 2019-06-28 PROCEDURE — 72100 X-RAY EXAM L-S SPINE 2/3 VWS: CPT | Mod: 26,HCNC,, | Performed by: RADIOLOGY

## 2019-06-28 RX ORDER — FLUTICASONE PROPIONATE 50 MCG
1 SPRAY, SUSPENSION (ML) NASAL DAILY
Qty: 1 BOTTLE | Refills: 0 | Status: SHIPPED | OUTPATIENT
Start: 2019-06-28 | End: 2022-06-14

## 2019-06-28 NOTE — PROGRESS NOTES
"Subjective:       Patient ID: Ngoc Castellanos is a 89 y.o. female.    Chief Complaint: Pulmonary Nodules    Ms. Castellanos is a 89 yo F with a PMH of stage I right-sided invasive mammary carcinoma (ER/NC/HER2 +) s/p radical mastectomy in 03/2014 on anastrozole, HTN, atrial fibrillation on eliquis, HFpEF on Lasix who presents to pulmonary clinic to establish care.    One month ago, patient went to urgent care due to malaise/fatigue/weakness. CXR showed L pleural effusion and possible infiltrate - antibiotics were prescribed at that time. Patient states that she still has a dry cough and feels that it is related to post-nasal drip.    Xrays and CT chest shows SUNDAY 1.6 cm nodule. Patient denies fevers, chills, weight loss, appetite change, SOB, sputum production, or hemoptysis. Patient also complains of back pain that started 2-3 months ago.    Lastly, patient states that she has severe SOB when lying down. Her SOB improves significantly when sitting up. She is on Lasix 20 mg daily and has tried to increase her dose a few times due to bloating/feeling like she has too much fluid on her.     Review of Systems   Constitutional: Negative for fever, chills, weight loss, appetite change, fatigue and night sweats.   HENT: Positive for postnasal drip. Negative for rhinorrhea, sinus pressure, sore throat and congestion.    Respiratory: Positive for cough and orthopnea. Negative for snoring, hemoptysis, sputum production, shortness of breath and wheezing.    Cardiovascular: Positive for leg swelling. Negative for chest pain.   Genitourinary: Negative for difficulty urinating.   Musculoskeletal: Negative for arthralgias and myalgias.   Skin: Negative for rash.   Gastrointestinal: Negative for nausea, vomiting, abdominal pain and abdominal distention.   Neurological: Negative for dizziness and headaches.     Objective:   Pulse 73   Ht 5' 7" (1.702 m)   Wt 83.3 kg (183 lb 10.3 oz)   SpO2 95%   BMI 28.76 kg/m²     Physical Exam "   Constitutional: She is oriented to person, place, and time. She appears well-developed and well-nourished. No distress.   HENT:   Head: Normocephalic.   Nose: Nose normal.   Mouth/Throat: Oropharynx is clear and moist. No oropharyngeal exudate.   Neck: Normal range of motion. Neck supple.   Cardiovascular: Regular rhythm and normal heart sounds.   Pulmonary/Chest: Normal expansion, symmetric chest wall expansion and effort normal. She has no wheezes. She has no rhonchi.   Decreased breath sounds at the L base.   Abdominal: Soft. Bowel sounds are normal. She exhibits no distension. There is no tenderness.   Musculoskeletal: Normal range of motion. She exhibits edema.   Lymphadenopathy: No supraclavicular adenopathy is present.     She has no cervical adenopathy.   Neurological: She is alert and oriented to person, place, and time.   Skin: Skin is warm and dry. She is not diaphoretic.   Psychiatric: She has a normal mood and affect. Her behavior is normal.   Nursing note and vitals reviewed.    Personal Diagnostic Review  CT chest 5/28/19: SUNDAY nodule in the anterior segment measuring 1.6 cm. RUL posterior segment micro nodule cluster noted.    Assessment:       1. Nodule of upper lobe of left lung    2. Cough    3. Post-nasal drip    4. Orthopnea    5. Pleural effusion on left    6. Diastolic dysfunction    7. Back pain, unspecified back location, unspecified back pain laterality, unspecified chronicity        Outpatient Encounter Medications as of 6/28/2019   Medication Sig Dispense Refill    acetaminophen (TYLENOL) 500 MG tablet Take 1,000 mg by mouth every 6 (six) hours as needed for Pain.      anastrozole (ARIMIDEX) 1 mg Tab Take 1 tablet (1 mg total) by mouth once daily. 90 tablet 3    atenolol (TENORMIN) 25 MG tablet TAKE 1 TABLET   DAILY. 90 tablet 3    calcium carbonate (OS-CELIA) 600 mg calcium (1,500 mg) Tab Take 600 mg by mouth once daily.      ELIQUIS 5 mg Tab TAKE 1 TABLET TWICE DAILY 180 tablet 3     furosemide (LASIX) 20 MG tablet TAKE 1 TABLET  DAILY. 90 tablet 3    losartan (COZAAR) 25 MG tablet Take 1 tablet (25 mg total) by mouth once daily. 90 tablet 3    meclizine (ANTIVERT) 12.5 mg tablet Take 1 tablet (12.5 mg total) by mouth 3 (three) times daily as needed. 90 tablet 3    nystatin (MYCOSTATIN) cream Apply topically 2 (two) times daily. 30 g 3    polyethylene glycol (GLYCOLAX) 17 gram/dose powder Take 17 g by mouth once daily.      VIT C/VIT E/LUTEIN/MIN/OMEGA-3 (OCUVITE ORAL) Take 1 tablet by mouth once daily.      desloratadine (CLARINEX) 5 mg tablet Take 1 tablet (5 mg total) by mouth once daily. 30 tablet 0    FLUAD [id number], 65 YR UP,,PF, 45 mcg (15 mcg x 3)/0.5 mL Syrg       fluticasone propionate (FLONASE) 50 mcg/actuation nasal spray 1 spray (50 mcg total) by Each Nare route once daily. 1 Bottle 0    promethazine-codeine 6.25-10 mg/5 ml (PHENERGAN WITH CODEINE) 6.25-10 mg/5 mL syrup Take 5 mLs by mouth every 4 (four) hours as needed for Cough. 118 mL 0     No facility-administered encounter medications on file as of 6/28/2019.      Orders Placed This Encounter   Procedures    IR Biopsy Lung     Standing Status:   Future     Standing Expiration Date:   12/28/2019     Order Specific Question:   Has the patient had a prior contrast or iodine reaction?     Answer:   No     Order Specific Question:   Is the patient currently on any blood thinners like Aspirin, Coumadin, or Plavix?     Answer:   Yes     Comments:   Eliquis     Order Specific Question:   Is the patient on any Metformin drug, such as Glucophage or Glucovance?     Answer:   No     Order Specific Question:   May the Radiologist modify the order per protocol to meet the clinical needs of the patient?     Answer:   Yes    Amb Consult to [facility] Interventional RAD     Referral Priority:   Routine     Referral Type:   Consultation     Number of Visits Requested:   1     Plan:     1. SUNDAY 1.6 cm nodule  -Spoke to patient in detail  about her options. We have decided to proceed with IR biopsy for diagnosis. Orders have been placed.  -Nodule is smooth and round in appearance with some peripheral calcification. Explained that this could be new malignancy, metastasis from her breast CA, an infectious process, or atelectasis.    2. Cough  -Likely related to post-nasal drip.  -Saline spray and Fluticasone prescribed.    3. Back pain  -Concern for lytic lesions as back pain is new over the past few months.  -Xray of lumbar spine ordered.    4. Orthopnea  -Patient has significant SOB when lying down.  -Asked her to increase her Lasix dosing to 40 mg daily for the next 3 days.  -Asked her to get in touch with her cardiologist's office regarding increasing the dose if her orthopnea persists.    Martha Copeland MD  [employer] PCCM Fellow

## 2019-07-15 ENCOUNTER — TELEPHONE (OUTPATIENT)
Dept: PULMONOLOGY | Facility: CLINIC | Age: 84
End: 2019-07-15

## 2019-07-15 NOTE — TELEPHONE ENCOUNTER
----- Message from Edwige Guevara MA sent at 7/15/2019  9:09 AM CDT -----  Contact: Self/[phone]  Pt states she had a test done and the PT states she was suppose to speak with Corina in regards to her results and its been 2 weeks and she would like to know the results.

## 2019-07-15 NOTE — TELEPHONE ENCOUNTER
Spoke to patient informed her that I have received her message and will forward it to Dr. Copeland to review/advise. Patient verbalizes that she understands.

## 2019-07-15 NOTE — PROGRESS NOTES
I have reviewed the notes, assessments, and/or procedures performed by Dr. Copeland, I concur with her/his documentation of Ngoc Torres

## 2019-07-15 NOTE — PROGRESS NOTES
Spoke with patient regarding her lumbar xray - no lesions were noted. Patient has an appointment with IR on 7/18 for lung biopsy.    Martha Copeland MD  [employer] PCCM Fellow

## 2019-07-18 ENCOUNTER — LAB VISIT (OUTPATIENT)
Dept: LAB | Facility: HOSPITAL | Age: 84
End: 2019-07-18
Payer: MEDICARE

## 2019-07-18 ENCOUNTER — OFFICE VISIT (OUTPATIENT)
Dept: INTERVENTIONAL RADIOLOGY/VASCULAR | Facility: CLINIC | Age: 84
End: 2019-07-18
Payer: MEDICARE

## 2019-07-18 VITALS
DIASTOLIC BLOOD PRESSURE: 83 MMHG | BODY MASS INDEX: 28.96 KG/M2 | SYSTOLIC BLOOD PRESSURE: 166 MMHG | HEART RATE: 69 BPM | WEIGHT: 184.5 LBS | HEIGHT: 67 IN

## 2019-07-18 DIAGNOSIS — R91.1 LUNG NODULE: Primary | ICD-10-CM

## 2019-07-18 DIAGNOSIS — R91.1 LUNG NODULE: ICD-10-CM

## 2019-07-18 DIAGNOSIS — D49.9 NEOPLASM: ICD-10-CM

## 2019-07-18 LAB
BASOPHILS # BLD AUTO: 0.03 K/UL (ref 0–0.2)
BASOPHILS NFR BLD: 0.4 % (ref 0–1.9)
DIFFERENTIAL METHOD: ABNORMAL
EOSINOPHIL # BLD AUTO: 0.2 K/UL (ref 0–0.5)
EOSINOPHIL NFR BLD: 2.9 % (ref 0–8)
ERYTHROCYTE [DISTWIDTH] IN BLOOD BY AUTOMATED COUNT: 14 % (ref 11.5–14.5)
HCT VFR BLD AUTO: 40 % (ref 37–48.5)
HGB BLD-MCNC: 13 G/DL (ref 12–16)
IMM GRANULOCYTES # BLD AUTO: 0.02 K/UL (ref 0–0.04)
IMM GRANULOCYTES NFR BLD AUTO: 0.3 % (ref 0–0.5)
INR PPP: 1.1 (ref 0.8–1.2)
LYMPHOCYTES # BLD AUTO: 1 K/UL (ref 1–4.8)
LYMPHOCYTES NFR BLD: 13 % (ref 18–48)
MCH RBC QN AUTO: 28.4 PG (ref 27–31)
MCHC RBC AUTO-ENTMCNC: 32.5 G/DL (ref 32–36)
MCV RBC AUTO: 88 FL (ref 82–98)
MONOCYTES # BLD AUTO: 0.6 K/UL (ref 0.3–1)
MONOCYTES NFR BLD: 8.8 % (ref 4–15)
NEUTROPHILS # BLD AUTO: 5.5 K/UL (ref 1.8–7.7)
NEUTROPHILS NFR BLD: 74.6 % (ref 38–73)
NRBC BLD-RTO: 0 /100 WBC
PLATELET # BLD AUTO: 189 K/UL (ref 150–350)
PMV BLD AUTO: 10.5 FL (ref 9.2–12.9)
PROTHROMBIN TIME: 11.4 SEC (ref 9–12.5)
RBC # BLD AUTO: 4.57 M/UL (ref 4–5.4)
WBC # BLD AUTO: 7.3 K/UL (ref 3.9–12.7)

## 2019-07-18 PROCEDURE — 1101F PR PT FALLS ASSESS DOC 0-1 FALLS W/OUT INJ PAST YR: ICD-10-PCS | Mod: HCNC,CPTII,S$GLB, | Performed by: FAMILY MEDICINE

## 2019-07-18 PROCEDURE — 85610 PROTHROMBIN TIME: CPT | Mod: HCNC

## 2019-07-18 PROCEDURE — 99999 PR PBB SHADOW E&M-EST. PATIENT-LVL III: ICD-10-PCS | Mod: PBBFAC,HCNC,, | Performed by: FAMILY MEDICINE

## 2019-07-18 PROCEDURE — 36415 COLL VENOUS BLD VENIPUNCTURE: CPT | Mod: HCNC

## 2019-07-18 PROCEDURE — 99204 PR OFFICE/OUTPT VISIT, NEW, LEVL IV, 45-59 MIN: ICD-10-PCS | Mod: HCNC,S$GLB,, | Performed by: FAMILY MEDICINE

## 2019-07-18 PROCEDURE — 85025 COMPLETE CBC W/AUTO DIFF WBC: CPT | Mod: HCNC

## 2019-07-18 PROCEDURE — 99204 OFFICE O/P NEW MOD 45 MIN: CPT | Mod: HCNC,S$GLB,, | Performed by: FAMILY MEDICINE

## 2019-07-18 PROCEDURE — 99999 PR PBB SHADOW E&M-EST. PATIENT-LVL III: CPT | Mod: PBBFAC,HCNC,, | Performed by: FAMILY MEDICINE

## 2019-07-18 PROCEDURE — 1101F PT FALLS ASSESS-DOCD LE1/YR: CPT | Mod: HCNC,CPTII,S$GLB, | Performed by: FAMILY MEDICINE

## 2019-07-18 NOTE — LETTER
July 18, 2019    Ngoc SHAH Pack  [address]  [address]  [place] [address]     [facility]  [address]  [place] [address]  Phone: [phone] PRE-PROCEDURE INSTRUCTIONS    Your procedure with Interventional Radiology is scheduled for August 1, 2019. Please arrive by 8:00am.    You must check-in and receive a wristband before going to your procedure. [facility].    DO NOT take eliquis for 2 days before your procedure.    **Do not eat or drink anything between midnight and the time of your procedure. This includes gum, mints, and candy lemon drops.    **Do not smoke or drink alcoholic beverages 24 hours prior to your procedure.    **If you wear contact lenses, dentures, hearing aids, or glasses, bring a container to put them in during the procedure and give them to a family member for safekeeping.    **If you have been diagnosed with sleep apnea please bring your CPAP machine.    **If your doctor has scheduled you for an overnight stay, bring a small overnight bag with any personal items that you may need.    **Make arrangements in advance for transportation home by a responsible adult. It is not safe to drive a vehicle during the 24 hours following the procedure.    **All [facility] facilities and properties are tobacco free. Smoking is NOT allowed.    PLEASE NOTE: The procedure schedule has many variables which affect the time of your procedure. Family members should be available if your surgery time changes.    If you have any questions about these instructions call Interventional Radiology at [phone] Monday - Friday between 8:00am and 4:00pm or [phone] for after hours.

## 2019-07-18 NOTE — PROGRESS NOTES
Subjective:       Patient ID: Ngoc Castellanos is a 89 y.o. female.    Chief Complaint: Lesion    Patient here with complaints of a SUNDAY nodule. Her history includes stage I right-sided invasive mammary carcinoma (ER/AK/HER2 +) s/p radical mastectomy in 03/2014 on anastrozole, HTN, atrial fibrillation on eliquis, HFpEF on Lasix. A few months ago, patient went to urgent care due to malaise/fatigue/weakness. CXR showed L pleural effusion and possible infiltrate - antibiotics were prescribed at that time. Xrays and CT chest shows SUNDAY 1.6 cm nodule. Patient denies fevers, chills, weight loss, appetite change, SOB, sputum production, or hemoptysis. She is accompanied by her son.     Review of Systems   Constitutional: Positive for activity change (decreased due to SOB) and fatigue. Negative for appetite change, chills and fever.   HENT: Negative for congestion, drooling, ear discharge, postnasal drip, sneezing and trouble swallowing.    Eyes: Negative for pain, discharge, redness and itching.   Respiratory: Positive for cough (improved with nasal drops and flonase) and shortness of breath (with exertion). Negative for wheezing and stridor.    Cardiovascular: Positive for leg swelling (improved with elevation). Negative for chest pain and palpitations.   Gastrointestinal: Positive for abdominal distention (comes and goes). Negative for abdominal pain, constipation, diarrhea, nausea and vomiting.   Genitourinary: Negative for difficulty urinating, dysuria, frequency and urgency.   Musculoskeletal: Positive for back pain. Negative for arthralgias (lower back aches), gait problem, joint swelling, myalgias and neck pain.   Skin: Negative for color change, pallor and rash.   Neurological: Positive for weakness (general). Negative for dizziness and headaches.       Objective:      Physical Exam   Constitutional: She is oriented to person, place, and time. She appears well-developed and well-nourished. No distress.   HENT:   Head:  Normocephalic and atraumatic.   Right Ear: External ear normal.   Left Ear: External ear normal.   Nose: Nose normal.   Mouth/Throat: Oropharynx is clear and moist. No oropharyngeal exudate.   Eyes: Pupils are equal, round, and reactive to light. Conjunctivae are normal. Right eye exhibits no discharge. Left eye exhibits no discharge. No scleral icterus.   Neck: Neck supple. No JVD present. No tracheal deviation present. No thyromegaly present.   Cardiovascular: Normal rate, normal heart sounds and intact distal pulses. A regularly irregular rhythm present. Exam reveals no gallop and no friction rub.   No murmur heard.  Pulmonary/Chest: Effort normal and breath sounds normal. No stridor. No respiratory distress. She has no wheezes. She has no rales.   Abdominal: Soft. Bowel sounds are normal. She exhibits no distension and no mass. There is no hepatosplenomegaly. There is no tenderness. There is no rebound and no guarding.   Musculoskeletal: She exhibits no edema.   Lymphadenopathy:     She has no cervical adenopathy.   Neurological: She is alert and oriented to person, place, and time. Gait normal.   Skin: Skin is warm and dry. She is not diaphoretic. No cyanosis. Nails show no clubbing.   Psychiatric: She has a normal mood and affect.   Vitals reviewed.      Imaging:      Assessment:       1. Lung nodule    2. Neoplasm        Plan:         Discussed case with Dr. Lees. Reviewed images with patient and son. Pointed out target lesion. Explained recommendation is to obtain a biopsy. Discussed how the procedure will be performed, risks (including, but not limited to, pain, bleeding, infection, damage to nearby structures, and the need for additional procedures), benefits, possible complications, pre-post procedure expectations, and alternatives. Patient tells me that she is not interested in treatment with chemotherapy nor radiation. She asks what her prognosis would be without treatment. Explained difficult to  know. Explained if this is metastatic breast cancer prognosis would be different that if this is primary lung cancer. The patient voices understanding and all questions have been answered.  The patient agrees to proceed as planned. Dr. Lees in room. Written informed consent obtained. Patient scheduled for biopsy on 8/1/2019. Pre-procedure handout with clinic phone number provided. Patient will have pre-procedure labs drawn today.

## 2019-07-29 ENCOUNTER — PES CALL (OUTPATIENT)
Dept: ADMINISTRATIVE | Facility: CLINIC | Age: 84
End: 2019-07-29

## 2019-07-29 DIAGNOSIS — R91.1 NODULE OF UPPER LOBE OF LEFT LUNG: Primary | ICD-10-CM

## 2019-07-31 ENCOUNTER — TELEPHONE (OUTPATIENT)
Dept: INTERVENTIONAL RADIOLOGY/VASCULAR | Facility: HOSPITAL | Age: 84
End: 2019-07-31

## 2019-07-31 RX ORDER — FENTANYL CITRATE 50 UG/ML
50 INJECTION, SOLUTION INTRAMUSCULAR; INTRAVENOUS
Status: CANCELLED | OUTPATIENT
Start: 2019-07-31

## 2019-07-31 RX ORDER — MIDAZOLAM HYDROCHLORIDE 1 MG/ML
1 INJECTION INTRAMUSCULAR; INTRAVENOUS
Status: CANCELLED | OUTPATIENT
Start: 2019-07-31

## 2019-08-01 ENCOUNTER — HOSPITAL ENCOUNTER (OUTPATIENT)
Facility: HOSPITAL | Age: 84
Discharge: HOME | End: 2019-08-09
Attending: RADIOLOGY | Admitting: RADIOLOGY
Payer: MEDICARE

## 2019-08-01 VITALS
WEIGHT: 183 LBS | RESPIRATION RATE: 18 BRPM | DIASTOLIC BLOOD PRESSURE: 67 MMHG | SYSTOLIC BLOOD PRESSURE: 134 MMHG | BODY MASS INDEX: 28.72 KG/M2 | TEMPERATURE: 98 F | HEART RATE: 69 BPM | HEIGHT: 67 IN | OXYGEN SATURATION: 98 %

## 2019-08-01 DIAGNOSIS — R91.8 LUNG MASS: ICD-10-CM

## 2019-08-01 DIAGNOSIS — R91.1 NODULE OF UPPER LOBE OF LEFT LUNG: ICD-10-CM

## 2019-08-01 PROCEDURE — 88305 TISSUE EXAM BY PATHOLOGIST: CPT | Mod: 26,HCNC,, | Performed by: PATHOLOGY

## 2019-08-01 PROCEDURE — 88341 IMHCHEM/IMCYTCHM EA ADD ANTB: CPT | Mod: 26,HCNC,, | Performed by: PATHOLOGY

## 2019-08-01 PROCEDURE — 88312 SPECIAL STAINS GROUP 1: CPT | Mod: HCNC,59 | Performed by: PATHOLOGY

## 2019-08-01 PROCEDURE — 88333 PATH CONSLTJ SURG CYTO XM 1: CPT | Mod: 26,HCNC,, | Performed by: PATHOLOGY

## 2019-08-01 PROCEDURE — 63600175 PHARM REV CODE 636 W HCPCS: Mod: HCNC | Performed by: RADIOLOGY

## 2019-08-01 PROCEDURE — 88305 CYTOLOGY SPECIMEN-FNA-RADIOLOGY ASSISTED WITH PATH ADEQUACY-CORE BX: ICD-10-PCS | Mod: 26,HCNC,, | Performed by: PATHOLOGY

## 2019-08-01 PROCEDURE — 88342 CYTOLOGY SPECIMEN-FNA-RADIOLOGY ASSISTED WITH PATH ADEQUACY-CORE BX: ICD-10-PCS | Mod: 26,HCNC,, | Performed by: PATHOLOGY

## 2019-08-01 PROCEDURE — 88341 CYTOLOGY SPECIMEN-FNA-RADIOLOGY ASSISTED WITH PATH ADEQUACY-CORE BX: ICD-10-PCS | Mod: 26,HCNC,, | Performed by: PATHOLOGY

## 2019-08-01 PROCEDURE — 88333 CYTOLOGY SPECIMEN-FNA-RADIOLOGY ASSISTED WITH PATH ADEQUACY-CORE BX: ICD-10-PCS | Mod: 26,HCNC,, | Performed by: PATHOLOGY

## 2019-08-01 PROCEDURE — 88312 CYTOLOGY SPECIMEN-FNA-RADIOLOGY ASSISTED WITH PATH ADEQUACY-CORE BX: ICD-10-PCS | Mod: 26,HCNC,, | Performed by: PATHOLOGY

## 2019-08-01 PROCEDURE — 88312 SPECIAL STAINS GROUP 1: CPT | Mod: 26,HCNC,, | Performed by: PATHOLOGY

## 2019-08-01 PROCEDURE — 88342 IMHCHEM/IMCYTCHM 1ST ANTB: CPT | Mod: 26,HCNC,, | Performed by: PATHOLOGY

## 2019-08-01 PROCEDURE — 63600175 PHARM REV CODE 636 W HCPCS: Mod: HCNC | Performed by: NURSE PRACTITIONER

## 2019-08-01 PROCEDURE — 88341 IMHCHEM/IMCYTCHM EA ADD ANTB: CPT | Mod: HCNC | Performed by: PATHOLOGY

## 2019-08-01 PROCEDURE — 88305 TISSUE EXAM BY PATHOLOGIST: CPT | Mod: HCNC | Performed by: PATHOLOGY

## 2019-08-01 RX ORDER — MIDAZOLAM HYDROCHLORIDE 1 MG/ML
INJECTION INTRAMUSCULAR; INTRAVENOUS CODE/TRAUMA/SEDATION MEDICATION
Status: COMPLETED | OUTPATIENT
Start: 2019-08-01 | End: 2019-08-01

## 2019-08-01 RX ORDER — SODIUM CHLORIDE 9 MG/ML
500 INJECTION, SOLUTION INTRAVENOUS ONCE
Status: COMPLETED | OUTPATIENT
Start: 2019-08-01 | End: 2019-08-01

## 2019-08-01 RX ORDER — FENTANYL CITRATE 50 UG/ML
INJECTION, SOLUTION INTRAMUSCULAR; INTRAVENOUS CODE/TRAUMA/SEDATION MEDICATION
Status: COMPLETED | OUTPATIENT
Start: 2019-08-01 | End: 2019-08-01

## 2019-08-01 RX ADMIN — MIDAZOLAM HYDROCHLORIDE 1 MG: 1 INJECTION, SOLUTION INTRAMUSCULAR; INTRAVENOUS at 10:08

## 2019-08-01 RX ADMIN — SODIUM CHLORIDE 500 ML: 0.9 INJECTION, SOLUTION INTRAVENOUS at 08:08

## 2019-08-01 RX ADMIN — FENTANYL CITRATE 50 MCG: 50 INJECTION, SOLUTION INTRAMUSCULAR; INTRAVENOUS at 10:08

## 2019-08-01 NOTE — H&P
Radiology History & Physical      SUBJECTIVE:     Chief Complaint: lung bx    History of Present Illness:  Ngoc Castellanos is a 89 y.o. female who presents for lung bx.    Pt with h/o breast CA s/p radical mastectomy.     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    left    Valvular regurgitation     moderate MR     Past Surgical History:   Procedure Laterality Date    APPENDECTOMY      BIOPSY, LYMPH NODE, SENTINEL Right 3/26/2014    Performed by [facility]bie [facility]on [facility] at [facility] OR [address] [place] [address]    BRAIN SURGERY  2002    meningioma    BREAST BIOPSY  2/2014    Right breast- [facility]    BREAST CYST EXCISION  1960    left breast - benign    BREAST SURGERY  03/17/14    right mastectomy    HEMORRHOID SURGERY      HYSTERECTOMY      INJECTION, FOR SENTINEL NODE IDENTIFICATION Right 3/26/2014    Performed by [facility]bie [facility]on MD at [facility] OR [address] [place] [address]    LYMPHADENECTOMY, AXILLARY Right 3/26/2014    Performed by Robbie Limon MD at [facility] OR [address] [place] [address]    MASTECTOMY Right     MASTECTOMY, WITH SENTINEL NODE BIOPSY AND AXILLARY LYMPHADENECTOMY Right 3/26/2014    Performed by [facility]bie Limon MD at [facility] OR [address] [place] [address]    TONSILLECTOMY         Home Meds:   Prior to Admission medications    Medication Sig Start Date End Date Taking? Authorizing Provider   acetaminophen (TYLENOL) 500 MG tablet Take 1,000 mg by mouth every 6 (six) hours as needed for Pain.   Yes Historical Provider, MD   anastrozole (ARIMIDEX) 1 mg Tab Take 1 tablet (1 mg total) by mouth once daily. 11/1/18  Yes Syd Valles MD   atenolol (TENORMIN) 25 MG tablet TAKE 1 TABLET   DAILY. 3/19/19  Yes Marcelino Stewart MD   calcium carbonate (OS-CELIA) 600 mg calcium (1,500 mg) Tab Take 600 mg by mouth once daily.    Yes Historical Provider, MD   fluticasone propionate (FLONASE) 50 mcg/actuation nasal spray 1 spray (50 mcg total) by Each Nare route once daily. 6/28/19  Yes Martha Copeland MD   furosemide (LASIX) 20 MG tablet TAKE 1 TABLET  DAILY. 3/19/19  Yes Marcelino Stewart MD   losartan (COZAAR) 25 MG tablet Take 1 tablet (25 mg total) by mouth once daily. 4/1/19 3/31/20 Yes Jacobo Mcleod MD   polyethylene glycol (GLYCOLAX) 17 gram/dose powder Take 17 g by mouth once daily.   Yes Historical Provider, MD   VIT C/VIT E/LUTEIN/MIN/OMEGA-3 (OCUVITE ORAL) Take 1 tablet by mouth once daily.   Yes Historical Provider, MD   desloratadine (CLARINEX) 5 mg tablet Take 1 tablet (5 mg total) by mouth once daily. 4/1/19 8/1/19  Jacobo Mcleod MD   ELIQUIS 5 mg Tab TAKE 1 TABLET TWICE DAILY 2/11/19   Marcelino Stewart MD   FLUAD [id number], 65 YR UP,,PF, 45 mcg (15 mcg x 3)/0.5 mL Syrg  10/30/18   Historical Provider, MD   meclizine (ANTIVERT) 12.5 mg tablet Take 1 tablet (12.5 mg total) by mouth 3 (three) times daily as needed. 4/1/19   Jacobo Mcleod MD   nystatin (MYCOSTATIN) cream Apply topically 2 (two) times daily. 5/1/18   Syd Valles MD     Anticoagulants/Antiplatelets: no anticoagulation    Allergies:   Review of patient's allergies indicates:   Allergen Reactions    Adhesive tape-silicones Rash     Sedation History:  no adverse reactions    Review of Systems:   Hematological: no known coagulopathies  Respiratory: no shortness of breath  Cardiovascular: no chest pain  Gastrointestinal: no abdominal pain  Genito-Urinary: no dysuria  Musculoskeletal: negative  Neurological: no TIA or stroke symptoms         OBJECTIVE:     Vital Signs (Most Recent)  Temp: 97.6 °F (36.4 °C) (08/01/19 0837)  Pulse: 72 (08/01/19 0837)  Resp: 16 (08/01/19 0837)  BP: (!) 168/69 (08/01/19 0837)  SpO2: 98 % (08/01/19 0837)    Physical Exam:  ASA: 2  Mallampati: 2    General: no acute distress  Mental Status: alert and  oriented to person, place and time  HEENT: normocephalic, atraumatic  Chest: unlabored breathing  Heart: regular heart rate  Abdomen: nondistended  Extremity: moves all extremities    Laboratory  Lab Results   Component Value Date    INR 1.1 07/18/2019       Lab Results   Component Value Date    WBC 7.30 07/18/2019    HGB 13.0 07/18/2019    HCT 40.0 07/18/2019    MCV 88 07/18/2019     07/18/2019      Lab Results   Component Value Date     05/01/2019     05/01/2019    K 4.5 05/01/2019     05/01/2019    CO2 29 05/01/2019    BUN 16 05/01/2019    CREATININE 1.2 05/01/2019    CALCIUM 10.1 05/01/2019    MG 1.8 08/10/2014    ALT 12 05/01/2019    AST 19 05/01/2019    ALBUMIN 3.7 05/01/2019    BILITOT 0.8 05/01/2019       ASSESSMENT/PLAN:     Sedation Plan: Up to moderate   Patient will undergo CT guided lung bx.    [facility]  Diagnostic and interventional radiology  [phone]

## 2019-08-01 NOTE — PROCEDURES
Radiology Post-Procedure Note    Pre Op Diagnosis: Left lung mass    Post Op Diagnosis: Left lung mass    Procedure: left lung biopsy    Procedure performed by: Sly Lees MD    Written Informed Consent Obtained: Yes    Specimen Removed: YES 4 x 18 gauge cores    Estimated Blood Loss: Minimal    Findings:   Using CT guidance a 17g sheath needle was placed into a Left lung mass. 18g monopty biopsy gun used to take 4 core biopsy samples. Specimen sent to pathology.     Additional specimen sent to lab: No    Patient tolerated procedure well.    Sly Lees M.D.  Diagnostic and Interventional Radiologist  Department of Radiology  Pager: [phone]

## 2019-08-01 NOTE — PROGRESS NOTES
Recovery complete. Pt tolerated well. Site clean, dry, intact, no bleeding, no hematoma. 2nd chest xray read by MASHA Braga MD. Per MASHA Braga, pt can discharge. Pt has no c/o pain. Pt and caregiver given discharge and site care instructions. Both verbalized understanding. Pt dressed and ambulated independently. Pt discharged home in wheelchair in care of caregiver.

## 2019-08-01 NOTE — PROGRESS NOTES
Procedure complete. Patient tolerated well.Dressing dry and intact to upper left chest area. Coughing up small amount of blood. Md aware. Chest xray ordered. Pt to recover in [facility] accompanied per Ir nursing staff. Report to be given at bedside.Vss.

## 2019-08-01 NOTE — PLAN OF CARE
Plan of care reviewed with patient, patient verbalizes understanding. Pt arrived to ct for lung biopsy. Pt oriented to unit and staff. Comfort measures utilized. Pt safely transferred from stretcher to procedural table. Fall risk reviewed with patient, fall risk interventions maintained. Safety strap applied, positioner pillows utilized to minimize pressure points. Blankets applied. Pt prepped and draped utilizing standard sterile technique. Patient placed on continuous monitoring, as required by sedation policy. Timeouts completed utilizing standard universal time-out, per department and facility policy. RN to remain at bedside, continuous monitoring maintained. Pt resting comfortably. Denies pain/discomfort. Will continue to monitor. See flow sheets for monitoring, medication administration, and updates.

## 2019-08-01 NOTE — PROGRESS NOTES
Pt arrived to [facility] 3 via stretcher on 2LNC, pt coughing up scant red blood, sats 96%, pt aao x's4, no distress noted, chest xray ordered now & @ 1245, report received from Sima BELTRAN

## 2019-08-01 NOTE — DISCHARGE SUMMARY
Radiology Discharge Summary      Hospital Course: No complications    Admit Date: 8/1/2019  Discharge Date: 08/01/2019     Instructions Given to Patient: Yes  Diet: Resume prior diet  Activity: activity as tolerated and no driving for today    Description of Condition on Discharge: Stable  Vital Signs (Most Recent): Temp: 97.6 °F (36.4 °C) (08/01/19 0837)  Pulse: 78 (08/01/19 1045)  Resp: 16 (08/01/19 1045)  BP: (!) 140/75 (08/01/19 1045)  SpO2: 96 % (08/01/19 1045)    Discharge Disposition: Home    Discharge Diagnosis: Lung nodule s/p left lung biopsy. Follow up as scheduled.    Sly Lees M.D.  Diagnostic and Interventional Radiologist  Department of Radiology  Pager: [phone]

## 2019-08-12 ENCOUNTER — TELEPHONE (OUTPATIENT)
Dept: PULMONOLOGY | Facility: CLINIC | Age: 84
End: 2019-08-12

## 2019-08-12 NOTE — TELEPHONE ENCOUNTER
----- Message from Edwige Guevara MA sent at 8/12/2019  9:13 AM CDT -----  Contact: self/[phone]  Pt states she is calling in regards to getting her labs results, Pt states the test was done on Aug 1st.

## 2019-08-19 DIAGNOSIS — C50.911 BREAST CARCINOMA, FEMALE, RIGHT: ICD-10-CM

## 2019-08-25 RX ORDER — ANASTROZOLE 1 MG/1
TABLET ORAL
Qty: 90 TABLET | Refills: 3 | Status: SHIPPED | OUTPATIENT
Start: 2019-08-25 | End: 2020-08-01

## 2019-10-28 ENCOUNTER — PATIENT OUTREACH (OUTPATIENT)
Dept: ADMINISTRATIVE | Facility: OTHER | Age: 84
End: 2019-10-28

## 2019-10-29 ENCOUNTER — OFFICE VISIT (OUTPATIENT)
Dept: CARDIOLOGY | Facility: CLINIC | Age: 84
End: 2019-10-29
Payer: MEDICARE

## 2019-10-29 VITALS
SYSTOLIC BLOOD PRESSURE: 144 MMHG | HEART RATE: 78 BPM | WEIGHT: 184.94 LBS | DIASTOLIC BLOOD PRESSURE: 74 MMHG | BODY MASS INDEX: 29.72 KG/M2 | HEIGHT: 66 IN | OXYGEN SATURATION: 95 %

## 2019-10-29 DIAGNOSIS — I48.19 PERSISTENT ATRIAL FIBRILLATION: Primary | ICD-10-CM

## 2019-10-29 DIAGNOSIS — I38 VALVULAR REGURGITATION: ICD-10-CM

## 2019-10-29 DIAGNOSIS — G60.3 IDIOPATHIC PROGRESSIVE NEUROPATHY: ICD-10-CM

## 2019-10-29 DIAGNOSIS — R60.0 LEG EDEMA: ICD-10-CM

## 2019-10-29 DIAGNOSIS — Z79.01 CHRONIC ANTICOAGULATION: ICD-10-CM

## 2019-10-29 DIAGNOSIS — M47.816 LUMBAR SPONDYLOSIS: ICD-10-CM

## 2019-10-29 DIAGNOSIS — R91.8 LUNG MASS: ICD-10-CM

## 2019-10-29 DIAGNOSIS — I10 ESSENTIAL HYPERTENSION: ICD-10-CM

## 2019-10-29 DIAGNOSIS — D33.2 BENIGN NEOPLASM OF BRAIN, UNSPECIFIED BRAIN REGION: ICD-10-CM

## 2019-10-29 DIAGNOSIS — C50.911 BREAST CANCER, STAGE 1, RIGHT: ICD-10-CM

## 2019-10-29 PROCEDURE — 99999 PR PBB SHADOW E&M-EST. PATIENT-LVL III: CPT | Mod: PBBFAC,HCNC,, | Performed by: INTERNAL MEDICINE

## 2019-10-29 PROCEDURE — 1101F PT FALLS ASSESS-DOCD LE1/YR: CPT | Mod: HCNC,CPTII,S$GLB, | Performed by: INTERNAL MEDICINE

## 2019-10-29 PROCEDURE — 1101F PR PT FALLS ASSESS DOC 0-1 FALLS W/OUT INJ PAST YR: ICD-10-PCS | Mod: HCNC,CPTII,S$GLB, | Performed by: INTERNAL MEDICINE

## 2019-10-29 PROCEDURE — 99499 RISK ADDL DX/OHS AUDIT: ICD-10-PCS | Mod: HCNC,S$GLB,, | Performed by: INTERNAL MEDICINE

## 2019-10-29 PROCEDURE — 99214 PR OFFICE/OUTPT VISIT, EST, LEVL IV, 30-39 MIN: ICD-10-PCS | Mod: HCNC,S$GLB,, | Performed by: INTERNAL MEDICINE

## 2019-10-29 PROCEDURE — 99499 UNLISTED E&M SERVICE: CPT | Mod: HCNC,S$GLB,, | Performed by: INTERNAL MEDICINE

## 2019-10-29 PROCEDURE — 99214 OFFICE O/P EST MOD 30 MIN: CPT | Mod: HCNC,S$GLB,, | Performed by: INTERNAL MEDICINE

## 2019-10-29 PROCEDURE — 99999 PR PBB SHADOW E&M-EST. PATIENT-LVL III: ICD-10-PCS | Mod: PBBFAC,HCNC,, | Performed by: INTERNAL MEDICINE

## 2019-10-29 NOTE — PROGRESS NOTES
"Subjective:    Patient ID:  Ngoc Castellanos is a 89 y.o. female who presents for follow-up of Atrial Fibrillation      HPI    89 y/o female former pt of Dr. Ford. She has a hx of persistent AFib on chronic anticoagulation with Eliquis, diastolic dysfunction, HTN, mild MR, CKD 3 who presents for f/u. She was on Digoxin that was stopped and atenolol started. Atenolol stopped due to insurance reasons and metoprolol started with HCTZ due to LE edema. Atenolol restarted. Developed "congestion" and worsening LE edema. HCTZ stopped and lasix started. Had significant improvement with resolution of "congestion" and improvement in LE edema.   Continues to feel fatigued. Able to accomplish ADL's. She denies CP, orthopnea, PND, syncope, palps. Unknown amount of sodium in food she is served. She is compliant with her meds. She remains very active.     Review of Systems   Constitution: Positive for malaise/fatigue.   HENT: Negative for congestion.    Eyes: Negative for blurred vision.   Cardiovascular: Positive for dyspnea on exertion and leg swelling. Negative for chest pain, claudication, cyanosis, irregular heartbeat, near-syncope, orthopnea, palpitations, paroxysmal nocturnal dyspnea and syncope.   Respiratory: Negative for shortness of breath.    Endocrine: Negative for polyuria.   Hematologic/Lymphatic: Negative for bleeding problem.   Skin: Negative for itching and rash.   Musculoskeletal: Negative for joint swelling, muscle cramps and muscle weakness.   Gastrointestinal: Negative for abdominal pain, hematemesis, hematochezia, melena, nausea and vomiting.   Genitourinary: Negative for dysuria and hematuria.   Neurological: Negative for dizziness, focal weakness, headaches, light-headedness, loss of balance and weakness.   Psychiatric/Behavioral: Negative for depression. The patient is not nervous/anxious.         Objective:    Physical Exam   Constitutional: She is oriented to person, place, and time. She appears " well-developed and well-nourished.   HENT:   Head: Normocephalic and atraumatic.   Neck: Neck supple. No JVD present.   Cardiovascular: Normal rate, regular rhythm and normal heart sounds.   Pulses:       Carotid pulses are 2+ on the right side, and 2+ on the left side.       Radial pulses are 2+ on the right side, and 2+ on the left side.        Femoral pulses are 2+ on the right side, and 2+ on the left side.       Dorsalis pedis pulses are 2+ on the right side, and 2+ on the left side.        Posterior tibial pulses are 2+ on the right side, and 2+ on the left side.   Pulmonary/Chest: Effort normal and breath sounds normal.   Abdominal: Soft. Bowel sounds are normal.   Musculoskeletal: She exhibits edema.   Neurological: She is alert and oriented to person, place, and time.   Skin: Skin is warm and dry.   Psychiatric: She has a normal mood and affect. Her behavior is normal. Thought content normal.         Assessment:       1. Persistent atrial fibrillation    2. Essential hypertension    3. Valvular regurgitation    4. Chronic anticoagulation    5. Lung mass    6. Lumbar spondylosis    7. Idiopathic progressive neuropathy    8. Benign neoplasm of brain, unspecified brain region    9. Breast cancer, stage 1, right    10. Leg edema      89 y/o pt with hx and presentation as above. Doing well from a cardiac perspective and compensated from a HF perspective. Needs to continue to stay active. Discussed the etiology, evaluation, and management of Afib, HTN, AC, leg edema. Discussed the importance of med compliance, heart healthy diet, and regular exercise.        Plan:       -Continue current medical management  -f/u in 6 months

## 2019-11-01 ENCOUNTER — LAB VISIT (OUTPATIENT)
Dept: LAB | Facility: HOSPITAL | Age: 84
End: 2019-11-01
Attending: INTERNAL MEDICINE
Payer: MEDICARE

## 2019-11-01 DIAGNOSIS — C50.911 BREAST CANCER, STAGE 1, RIGHT: ICD-10-CM

## 2019-11-01 DIAGNOSIS — M94.9 DISORDER OF CARTILAGE: ICD-10-CM

## 2019-11-01 LAB
25(OH)D3+25(OH)D2 SERPL-MCNC: 45 NG/ML (ref 30–96)
ALBUMIN SERPL BCP-MCNC: 4.1 G/DL (ref 3.5–5.2)
ALP SERPL-CCNC: 86 U/L (ref 55–135)
ALT SERPL W/O P-5'-P-CCNC: 8 U/L (ref 10–44)
ANION GAP SERPL CALC-SCNC: 9 MMOL/L (ref 8–16)
AST SERPL-CCNC: 19 U/L (ref 10–40)
BASOPHILS # BLD AUTO: 0.04 K/UL (ref 0–0.2)
BASOPHILS NFR BLD: 0.5 % (ref 0–1.9)
BILIRUB SERPL-MCNC: 0.9 MG/DL (ref 0.1–1)
BUN SERPL-MCNC: 18 MG/DL (ref 10–30)
CALCIUM SERPL-MCNC: 10.4 MG/DL (ref 8.7–10.5)
CHLORIDE SERPL-SCNC: 101 MMOL/L (ref 95–110)
CO2 SERPL-SCNC: 29 MMOL/L (ref 23–29)
CREAT SERPL-MCNC: 1.1 MG/DL (ref 0.5–1.4)
DIFFERENTIAL METHOD: ABNORMAL
EOSINOPHIL # BLD AUTO: 0.2 K/UL (ref 0–0.5)
EOSINOPHIL NFR BLD: 2.5 % (ref 0–8)
ERYTHROCYTE [DISTWIDTH] IN BLOOD BY AUTOMATED COUNT: 14.1 % (ref 11.5–14.5)
EST. GFR  (AFRICAN AMERICAN): 50 ML/MIN/1.73 M^2
EST. GFR  (NON AFRICAN AMERICAN): 43 ML/MIN/1.73 M^2
GLUCOSE SERPL-MCNC: 101 MG/DL (ref 70–110)
HCT VFR BLD AUTO: 41.9 % (ref 37–48.5)
HGB BLD-MCNC: 13.6 G/DL (ref 12–16)
LYMPHOCYTES # BLD AUTO: 0.9 K/UL (ref 1–4.8)
LYMPHOCYTES NFR BLD: 10.9 % (ref 18–48)
MCH RBC QN AUTO: 28.2 PG (ref 27–31)
MCHC RBC AUTO-ENTMCNC: 32.5 G/DL (ref 32–36)
MCV RBC AUTO: 87 FL (ref 82–98)
MONOCYTES # BLD AUTO: 0.6 K/UL (ref 0.3–1)
MONOCYTES NFR BLD: 6.8 % (ref 4–15)
NEUTROPHILS # BLD AUTO: 6.4 K/UL (ref 1.8–7.7)
NEUTROPHILS NFR BLD: 79.3 % (ref 38–73)
PLATELET # BLD AUTO: 228 K/UL (ref 150–350)
PMV BLD AUTO: 10 FL (ref 9.2–12.9)
POTASSIUM SERPL-SCNC: 4.5 MMOL/L (ref 3.5–5.1)
PROT SERPL-MCNC: 7.6 G/DL (ref 6–8.4)
RBC # BLD AUTO: 4.83 M/UL (ref 4–5.4)
SODIUM SERPL-SCNC: 139 MMOL/L (ref 136–145)
WBC # BLD AUTO: 8.07 K/UL (ref 3.9–12.7)

## 2019-11-01 PROCEDURE — 36415 COLL VENOUS BLD VENIPUNCTURE: CPT | Mod: HCNC

## 2019-11-01 PROCEDURE — 82306 VITAMIN D 25 HYDROXY: CPT | Mod: HCNC

## 2019-11-01 PROCEDURE — 80053 COMPREHEN METABOLIC PANEL: CPT | Mod: HCNC

## 2019-11-01 PROCEDURE — 85025 COMPLETE CBC W/AUTO DIFF WBC: CPT | Mod: HCNC

## 2019-11-04 ENCOUNTER — TELEPHONE (OUTPATIENT)
Dept: HEMATOLOGY/ONCOLOGY | Facility: CLINIC | Age: 84
End: 2019-11-04

## 2019-11-04 ENCOUNTER — OFFICE VISIT (OUTPATIENT)
Dept: HEMATOLOGY/ONCOLOGY | Facility: CLINIC | Age: 84
End: 2019-11-04
Payer: MEDICARE

## 2019-11-04 ENCOUNTER — HOSPITAL ENCOUNTER (OUTPATIENT)
Dept: RADIOLOGY | Facility: HOSPITAL | Age: 84
Discharge: HOME | End: 2019-11-04
Attending: INTERNAL MEDICINE
Payer: MEDICARE

## 2019-11-04 VITALS
BODY MASS INDEX: 29.31 KG/M2 | OXYGEN SATURATION: 97 % | WEIGHT: 184.31 LBS | DIASTOLIC BLOOD PRESSURE: 73 MMHG | HEART RATE: 73 BPM | RESPIRATION RATE: 18 BRPM | TEMPERATURE: 98 F | SYSTOLIC BLOOD PRESSURE: 140 MMHG

## 2019-11-04 DIAGNOSIS — M94.9 DISORDER OF CARTILAGE, UNSPECIFIED: ICD-10-CM

## 2019-11-04 DIAGNOSIS — C50.911 BREAST CANCER, STAGE 1, RIGHT: ICD-10-CM

## 2019-11-04 DIAGNOSIS — R91.1 NODULE OF UPPER LOBE OF LEFT LUNG: ICD-10-CM

## 2019-11-04 DIAGNOSIS — R91.1 NODULE OF UPPER LOBE OF LEFT LUNG: Primary | ICD-10-CM

## 2019-11-04 PROCEDURE — 99214 OFFICE O/P EST MOD 30 MIN: CPT | Mod: HCNC,S$GLB,, | Performed by: INTERNAL MEDICINE

## 2019-11-04 PROCEDURE — 71250 CT THORAX DX C-: CPT | Mod: 26,HCNC,, | Performed by: RADIOLOGY

## 2019-11-04 PROCEDURE — 99214 PR OFFICE/OUTPT VISIT, EST, LEVL IV, 30-39 MIN: ICD-10-PCS | Mod: HCNC,S$GLB,, | Performed by: INTERNAL MEDICINE

## 2019-11-04 PROCEDURE — 99499 RISK ADDL DX/OHS AUDIT: ICD-10-PCS | Mod: S$GLB,,, | Performed by: INTERNAL MEDICINE

## 2019-11-04 PROCEDURE — 1101F PR PT FALLS ASSESS DOC 0-1 FALLS W/OUT INJ PAST YR: ICD-10-PCS | Mod: HCNC,CPTII,S$GLB, | Performed by: INTERNAL MEDICINE

## 2019-11-04 PROCEDURE — 99999 PR PBB SHADOW E&M-EST. PATIENT-LVL III: CPT | Mod: PBBFAC,HCNC,, | Performed by: INTERNAL MEDICINE

## 2019-11-04 PROCEDURE — 99499 UNLISTED E&M SERVICE: CPT | Mod: S$GLB,,, | Performed by: INTERNAL MEDICINE

## 2019-11-04 PROCEDURE — 71250 CT CHEST WITHOUT CONTRAST: ICD-10-PCS | Mod: 26,HCNC,, | Performed by: RADIOLOGY

## 2019-11-04 PROCEDURE — 1101F PT FALLS ASSESS-DOCD LE1/YR: CPT | Mod: HCNC,CPTII,S$GLB, | Performed by: INTERNAL MEDICINE

## 2019-11-04 PROCEDURE — 99999 PR PBB SHADOW E&M-EST. PATIENT-LVL III: ICD-10-PCS | Mod: PBBFAC,HCNC,, | Performed by: INTERNAL MEDICINE

## 2019-11-04 PROCEDURE — 71250 CT THORAX DX C-: CPT | Mod: TC,HCNC

## 2019-11-04 NOTE — TELEPHONE ENCOUNTER
Pt made aware of MD findings. Pt verbalized understanding.     ----- Message from Syd Valles MD sent at 11/4/2019  4:54 PM CST -----  The left upper lobe nodule is stable at 1.6 cm.  It is not increasing in size.  That is good news.  Please inform patient.

## 2019-11-04 NOTE — PROGRESS NOTES
Subjective:       Patient ID: gNoc Castellanos is a 89 y.o. female.    Chief Complaint: Breast Cancer    HPI She is here for followup of right breast cancer.      She was diagnosed with carcinoma of the right breast, underwent right modified radical mastectomy in march 2014. She had a 1.7 cm invasive mammary carcinoma with lobular features.  6 lymph nodes were removed and they were negative.     Her tumor was ER/[place] strongly positive and positive by FISH for HER-2. She was not given adjuvant Herceptin. She started Arimidex in May 2014.    She has history of atrial fibrillation and has been on Coumadin for several years, doing well without bleeding issues, now on Eliquis. Sees .    DEXA scan (9/14/15) showed Osteopenia of both femoral necks with normal bone mineral density of the lumbar spine.     Received the first dose of prolia on 8/5/14. Not interested in further prolia injections or alternative bisphosphonate Rx.    She is here for follow-up today.     Feels well.    Review of Systems   Respiratory: Negative for cough and shortness of breath.    Cardiovascular: Negative for chest pain, palpitations and leg swelling.   Hematological: Negative for adenopathy. Does not bruise/bleed easily.         Objective:      Physical Exam   Constitutional: She is oriented to person, place, and time. She appears well-developed. No distress.   HENT:   Head: Normocephalic and atraumatic.   Mouth/Throat: No oropharyngeal exudate.   Eyes: No scleral icterus.   Neck: Normal range of motion. Neck supple.   Cardiovascular: Normal rate. Exam reveals no gallop.   Irregular.   Pulmonary/Chest: Effort normal and breath sounds normal. No stridor. She has no wheezes. She has no rales.       She is status post right mastectomy with a well-healed incision. Wound has healed well. There are no masses in the left breast.        Abdominal: Soft. She exhibits no distension and no mass. There is no tenderness.   Lymphadenopathy:     She has  no cervical adenopathy.   Neurological: She is alert and oriented to person, place, and time. No cranial nerve deficit.       Assessment:       1. Nodule of upper lobe of left lung    2. Breast cancer, stage 1, right    3. Disorder of cartilage, unspecified         Plan:   She has T1cN0 Stage 1a ER/[place] positive and Her-2 positive invasive mammary carcinoma of the right breast.     Adjuvant Arimidex was started in May 2014.  She is tolerating it well.  Continue Arimidex.    Last DEXA scan was in Sept 2017 - shows osteopenia.    She will continue calcium with vitamin-D, she is not interested in bisphosphonate therapy or Prolia injections    She is also not interested in further mammograms so will discontinue those    She has a left upper lobe pulmonary nodule that was biopsied as there was concern for bronchogenic carcinoma, will schedule a CT scan of the chest for follow-up of this and I will call her with the test results

## 2020-01-13 DIAGNOSIS — I48.19 PERSISTENT ATRIAL FIBRILLATION: ICD-10-CM

## 2020-01-13 DIAGNOSIS — I10 ESSENTIAL HYPERTENSION: ICD-10-CM

## 2020-01-13 RX ORDER — LOSARTAN POTASSIUM 25 MG/1
TABLET ORAL
Qty: 90 TABLET | Refills: 3 | Status: SHIPPED | OUTPATIENT
Start: 2020-01-13 | End: 2020-10-22

## 2020-01-14 RX ORDER — FUROSEMIDE 20 MG/1
TABLET ORAL
Qty: 90 TABLET | Refills: 3 | Status: SHIPPED | OUTPATIENT
Start: 2020-01-14 | End: 2020-09-21 | Stop reason: SDUPTHER

## 2020-01-14 RX ORDER — ATENOLOL 25 MG/1
TABLET ORAL
Qty: 90 TABLET | Refills: 3 | Status: SHIPPED | OUTPATIENT
Start: 2020-01-14 | End: 2020-10-22

## 2020-01-27 RX ORDER — APIXABAN 5 MG/1
TABLET, FILM COATED ORAL
Qty: 180 TABLET | Refills: 3 | Status: SHIPPED | OUTPATIENT
Start: 2020-01-27 | End: 2020-11-09

## 2020-01-30 ENCOUNTER — TELEPHONE (OUTPATIENT)
Dept: FAMILY MEDICINE | Facility: CLINIC | Age: 85
End: 2020-01-30

## 2020-01-30 NOTE — TELEPHONE ENCOUNTER
Called and spoke w/ patient. Dr. Hansen will see her next Wednesday at [facility].      ----- Message from Felicita Martinez sent at 1/30/2020  2:31 PM CST -----  Contact: [phone] /self  Patient is living at [facility] and would like to be seen when he comes to the facility. Does he still come there? She is suffering with vertigo. Please call her to discuss being seen. Thanks

## 2020-02-05 ENCOUNTER — EXTERNAL VISIT (OUTPATIENT)
Dept: FAMILY MEDICINE | Facility: CLINIC | Age: 85
End: 2020-02-05
Payer: MEDICARE

## 2020-02-05 ENCOUNTER — TELEPHONE (OUTPATIENT)
Dept: FAMILY MEDICINE | Facility: CLINIC | Age: 85
End: 2020-02-05

## 2020-02-05 VITALS — SYSTOLIC BLOOD PRESSURE: 120 MMHG | OXYGEN SATURATION: 95 % | HEART RATE: 75 BPM | DIASTOLIC BLOOD PRESSURE: 70 MMHG

## 2020-02-05 DIAGNOSIS — R26.89 BALANCE PROBLEM: ICD-10-CM

## 2020-02-05 DIAGNOSIS — I10 ESSENTIAL HYPERTENSION: ICD-10-CM

## 2020-02-05 DIAGNOSIS — H81.10 BENIGN PAROXYSMAL POSITIONAL VERTIGO, UNSPECIFIED LATERALITY: Primary | ICD-10-CM

## 2020-02-05 DIAGNOSIS — I48.19 PERSISTENT ATRIAL FIBRILLATION: ICD-10-CM

## 2020-02-05 DIAGNOSIS — R42 DIZZINESS: ICD-10-CM

## 2020-02-05 DIAGNOSIS — R26.9 ABNORMALITY OF GAIT: ICD-10-CM

## 2020-02-05 PROCEDURE — 99499 UNLISTED E&M SERVICE: CPT | Mod: HCNC,S$GLB,, | Performed by: FAMILY MEDICINE

## 2020-02-05 PROCEDURE — 99350 PR HOME VISIT,ESTAB PATIENT,LEVEL IV: ICD-10-PCS | Mod: HCNC,,, | Performed by: FAMILY MEDICINE

## 2020-02-05 PROCEDURE — 99350 HOME/RES VST EST HIGH MDM 60: CPT | Mod: HCNC,,, | Performed by: FAMILY MEDICINE

## 2020-02-05 PROCEDURE — 99499 RISK ADDL DX/OHS AUDIT: ICD-10-PCS | Mod: HCNC,S$GLB,, | Performed by: FAMILY MEDICINE

## 2020-02-05 NOTE — PROGRESS NOTES
Subjective:       Patient ID: Ngoc Castellanos is a 89 y.o. female.    Chief Complaint: No chief complaint on file.    89 years old female who was evaluated at her home with episodic dizziness.  Patient was previously diagnosed with benign positional vertigo.  Last blood work was normal.  Blood pressure was stable.  No chest pain, palpitation, orthopnea or PND.  Patient is using Antivert only as needed.  Patient also with significant problems with walking with a cane.  She is requesting a better walker to help her with the mobility.    Review of Systems   Constitutional: Negative.    HENT: Negative.    Eyes: Negative.    Respiratory: Negative.    Cardiovascular: Negative.  Negative for chest pain, palpitations and leg swelling.   Gastrointestinal: Negative.    Genitourinary: Negative.    Musculoskeletal: Positive for gait problem.   Skin: Negative.    Neurological: Positive for dizziness.   Psychiatric/Behavioral: Negative.        Objective:      Physical Exam   Constitutional: She is oriented to person, place, and time. She appears well-developed and well-nourished. No distress.   HENT:   Head: Normocephalic and atraumatic.   Right Ear: External ear normal.   Left Ear: External ear normal.   Nose: Nose normal.   Mouth/Throat: Oropharynx is clear and moist. No oropharyngeal exudate.   Eyes: Pupils are equal, round, and reactive to light. Conjunctivae and EOM are normal. Right eye exhibits no discharge. Left eye exhibits no discharge. No scleral icterus.   Neck: Normal range of motion. Neck supple. No JVD present. No tracheal deviation present. No thyromegaly present.   Cardiovascular: Normal rate, normal heart sounds and intact distal pulses. An irregularly irregular rhythm present. Exam reveals no gallop and no friction rub.   No murmur heard.  Pulmonary/Chest: Effort normal and breath sounds normal. No stridor. No respiratory distress. She has no wheezes. She has no rales. She exhibits no tenderness.   Abdominal:  Soft. Bowel sounds are normal. She exhibits no distension and no mass. There is no tenderness. There is no rebound and no guarding.   Musculoskeletal: Normal range of motion. She exhibits no edema or tenderness.   Lymphadenopathy:     She has no cervical adenopathy.   Neurological: She is alert and oriented to person, place, and time. She has normal reflexes. No cranial nerve deficit. She exhibits normal muscle tone. Coordination and gait abnormal.   Skin: Skin is warm and dry. No rash noted. She is not diaphoretic. No erythema. No pallor.   Psychiatric: She has a normal mood and affect. Her behavior is normal. Judgment and thought content normal.       Assessment:       1. Benign paroxysmal positional vertigo, unspecified laterality    2. Essential hypertension    3. Dizziness    4. Persistent atrial fibrillation        Plan:         Diagnoses and all orders for this visit:    Benign paroxysmal positional vertigo, unspecified laterality    Essential hypertension    Dizziness    Persistent atrial fibrillation    Epley and Freddie-Jayson  exercises at home.  Continue monitoring blood pressure at home, low sodium diet.  Walker was ordered patient with problems with walking and balance .

## 2020-07-17 ENCOUNTER — TELEPHONE (OUTPATIENT)
Dept: CARDIOLOGY | Facility: CLINIC | Age: 85
End: 2020-07-17

## 2020-07-17 NOTE — TELEPHONE ENCOUNTER
Reached out to pt in regards to message     Pt was due for a f/u back in April, but had to cancel due to COVID concerns     Pt was reaching out to reschedule clinical andreas with Dr. Stewart to be evaluated in clinic for recent symptoms of SOB, no chest tightness or chest pain    Pt states increasing her lasix to BID and that has helped with her symptoms     Pt scheduled to come in next Tuesday for clinical f/u

## 2020-07-17 NOTE — TELEPHONE ENCOUNTER
----- Message from Shana Urbina sent at 7/17/2020 10:16 AM CDT -----  Regarding: Appointment Request  Contact: Self/ [phone]  Patient requesting to speak with you regarding getting an appointment scheduled. She says she has heaviness around her chest and labored breathing. Please call.

## 2020-07-21 ENCOUNTER — OFFICE VISIT (OUTPATIENT)
Dept: CARDIOLOGY | Facility: CLINIC | Age: 85
End: 2020-07-21
Payer: MEDICARE

## 2020-07-21 ENCOUNTER — LAB VISIT (OUTPATIENT)
Dept: LAB | Facility: HOSPITAL | Age: 85
End: 2020-07-21
Attending: INTERNAL MEDICINE
Payer: MEDICARE

## 2020-07-21 VITALS
HEART RATE: 92 BPM | WEIGHT: 184.19 LBS | BODY MASS INDEX: 29.6 KG/M2 | SYSTOLIC BLOOD PRESSURE: 132 MMHG | DIASTOLIC BLOOD PRESSURE: 80 MMHG | HEIGHT: 66 IN | OXYGEN SATURATION: 95 %

## 2020-07-21 DIAGNOSIS — R06.02 SOB (SHORTNESS OF BREATH): ICD-10-CM

## 2020-07-21 DIAGNOSIS — R91.8 LUNG MASS: ICD-10-CM

## 2020-07-21 DIAGNOSIS — G60.3 IDIOPATHIC PROGRESSIVE NEUROPATHY: ICD-10-CM

## 2020-07-21 DIAGNOSIS — I10 ESSENTIAL HYPERTENSION: ICD-10-CM

## 2020-07-21 DIAGNOSIS — R06.02 SOB (SHORTNESS OF BREATH): Primary | ICD-10-CM

## 2020-07-21 DIAGNOSIS — D33.2 BENIGN NEOPLASM OF BRAIN, UNSPECIFIED BRAIN REGION: ICD-10-CM

## 2020-07-21 DIAGNOSIS — R60.0 LEG EDEMA: ICD-10-CM

## 2020-07-21 DIAGNOSIS — Z79.01 CHRONIC ANTICOAGULATION: ICD-10-CM

## 2020-07-21 DIAGNOSIS — I38 VALVULAR REGURGITATION: ICD-10-CM

## 2020-07-21 DIAGNOSIS — C50.911 BREAST CANCER, STAGE 1, RIGHT: ICD-10-CM

## 2020-07-21 DIAGNOSIS — R42 VERTIGO: ICD-10-CM

## 2020-07-21 DIAGNOSIS — I48.19 PERSISTENT ATRIAL FIBRILLATION: ICD-10-CM

## 2020-07-21 DIAGNOSIS — R91.1 NODULE OF UPPER LOBE OF LEFT LUNG: ICD-10-CM

## 2020-07-21 DIAGNOSIS — M47.816 LUMBAR SPONDYLOSIS: ICD-10-CM

## 2020-07-21 LAB
ANION GAP SERPL CALC-SCNC: 8 MMOL/L (ref 8–16)
BASOPHILS # BLD AUTO: 0.02 K/UL (ref 0–0.2)
BASOPHILS NFR BLD: 0.3 % (ref 0–1.9)
BUN SERPL-MCNC: 16 MG/DL (ref 10–30)
CALCIUM SERPL-MCNC: 9.7 MG/DL (ref 8.7–10.5)
CHLORIDE SERPL-SCNC: 104 MMOL/L (ref 95–110)
CO2 SERPL-SCNC: 27 MMOL/L (ref 23–29)
CREAT SERPL-MCNC: 1.2 MG/DL (ref 0.5–1.4)
DIFFERENTIAL METHOD: ABNORMAL
EOSINOPHIL # BLD AUTO: 0.2 K/UL (ref 0–0.5)
EOSINOPHIL NFR BLD: 2.6 % (ref 0–8)
ERYTHROCYTE [DISTWIDTH] IN BLOOD BY AUTOMATED COUNT: 14.1 % (ref 11.5–14.5)
EST. GFR  (AFRICAN AMERICAN): 44 ML/MIN/1.73 M^2
EST. GFR  (NON AFRICAN AMERICAN): 39 ML/MIN/1.73 M^2
GLUCOSE SERPL-MCNC: 114 MG/DL (ref 70–110)
HCT VFR BLD AUTO: 40.3 % (ref 37–48.5)
HGB BLD-MCNC: 13.1 G/DL (ref 12–16)
IMM GRANULOCYTES # BLD AUTO: 0.03 K/UL (ref 0–0.04)
IMM GRANULOCYTES NFR BLD AUTO: 0.4 % (ref 0–0.5)
LYMPHOCYTES # BLD AUTO: 0.9 K/UL (ref 1–4.8)
LYMPHOCYTES NFR BLD: 12 % (ref 18–48)
MCH RBC QN AUTO: 28.7 PG (ref 27–31)
MCHC RBC AUTO-ENTMCNC: 32.5 G/DL (ref 32–36)
MCV RBC AUTO: 88 FL (ref 82–98)
MONOCYTES # BLD AUTO: 0.6 K/UL (ref 0.3–1)
MONOCYTES NFR BLD: 7.5 % (ref 4–15)
NEUTROPHILS # BLD AUTO: 5.7 K/UL (ref 1.8–7.7)
NEUTROPHILS NFR BLD: 77.2 % (ref 38–73)
NRBC BLD-RTO: 0 /100 WBC
PLATELET # BLD AUTO: 184 K/UL (ref 150–350)
PMV BLD AUTO: 10.6 FL (ref 9.2–12.9)
POTASSIUM SERPL-SCNC: 4.3 MMOL/L (ref 3.5–5.1)
RBC # BLD AUTO: 4.56 M/UL (ref 4–5.4)
SODIUM SERPL-SCNC: 139 MMOL/L (ref 136–145)
WBC # BLD AUTO: 7.42 K/UL (ref 3.9–12.7)

## 2020-07-21 PROCEDURE — 1159F MED LIST DOCD IN RCRD: CPT | Mod: HCNC,S$GLB,, | Performed by: INTERNAL MEDICINE

## 2020-07-21 PROCEDURE — 1101F PR PT FALLS ASSESS DOC 0-1 FALLS W/OUT INJ PAST YR: ICD-10-PCS | Mod: HCNC,CPTII,S$GLB, | Performed by: INTERNAL MEDICINE

## 2020-07-21 PROCEDURE — 36415 COLL VENOUS BLD VENIPUNCTURE: CPT | Mod: HCNC

## 2020-07-21 PROCEDURE — 99499 RISK ADDL DX/OHS AUDIT: ICD-10-PCS | Mod: HCNC,S$GLB,, | Performed by: INTERNAL MEDICINE

## 2020-07-21 PROCEDURE — 83880 ASSAY OF NATRIURETIC PEPTIDE: CPT | Mod: HCNC

## 2020-07-21 PROCEDURE — 85025 COMPLETE CBC W/AUTO DIFF WBC: CPT | Mod: HCNC

## 2020-07-21 PROCEDURE — 99999 PR PBB SHADOW E&M-EST. PATIENT-LVL IV: ICD-10-PCS | Mod: PBBFAC,HCNC,, | Performed by: INTERNAL MEDICINE

## 2020-07-21 PROCEDURE — 1126F AMNT PAIN NOTED NONE PRSNT: CPT | Mod: HCNC,S$GLB,, | Performed by: INTERNAL MEDICINE

## 2020-07-21 PROCEDURE — 1101F PT FALLS ASSESS-DOCD LE1/YR: CPT | Mod: HCNC,CPTII,S$GLB, | Performed by: INTERNAL MEDICINE

## 2020-07-21 PROCEDURE — 99214 PR OFFICE/OUTPT VISIT, EST, LEVL IV, 30-39 MIN: ICD-10-PCS | Mod: HCNC,S$GLB,, | Performed by: INTERNAL MEDICINE

## 2020-07-21 PROCEDURE — 1126F PR PAIN SEVERITY QUANTIFIED, NO PAIN PRESENT: ICD-10-PCS | Mod: HCNC,S$GLB,, | Performed by: INTERNAL MEDICINE

## 2020-07-21 PROCEDURE — 99999 PR PBB SHADOW E&M-EST. PATIENT-LVL IV: CPT | Mod: PBBFAC,HCNC,, | Performed by: INTERNAL MEDICINE

## 2020-07-21 PROCEDURE — 1159F PR MEDICATION LIST DOCUMENTED IN MEDICAL RECORD: ICD-10-PCS | Mod: HCNC,S$GLB,, | Performed by: INTERNAL MEDICINE

## 2020-07-21 PROCEDURE — 99499 UNLISTED E&M SERVICE: CPT | Mod: HCNC,S$GLB,, | Performed by: INTERNAL MEDICINE

## 2020-07-21 PROCEDURE — 99214 OFFICE O/P EST MOD 30 MIN: CPT | Mod: HCNC,S$GLB,, | Performed by: INTERNAL MEDICINE

## 2020-07-21 PROCEDURE — 80048 BASIC METABOLIC PNL TOTAL CA: CPT | Mod: HCNC

## 2020-07-21 NOTE — PROGRESS NOTES
"Subjective:    Patient ID:  Ngoc Castellanos is a 89 y.o. female who presents for follow-up of No chief complaint on file.      HPI    89 y/o female former pt of Dr. Ford. She has a hx of persistent AFib on chronic anticoagulation with Eliquis, diastolic dysfunction, HTN, mild MR, CKD 3 who presents for f/u. She was on Digoxin that was stopped and atenolol started. Atenolol stopped due to insurance reasons and metoprolol started with HCTZ due to LE edema. Atenolol restarted. Developed "congestion" and worsening LE edema. HCTZ stopped and lasix started. Had significant improvement with resolution of "congestion" and improvement in LE edema.   Has again developed SOB/OLIVIA, orthopnea, PND. Increased lasix dose to 20 mg BID for the past few weeks with some improvement. Continues to feel fatigued. Able to accomplish ADL's. Unknown amount of sodium in food she is served at her living facility. Has been less active during pandemic. She is compliant with her meds.     Review of Systems   Constitution: Negative for malaise/fatigue.   HENT: Negative for congestion.    Eyes: Negative for blurred vision.   Cardiovascular: Positive for dyspnea on exertion, orthopnea and paroxysmal nocturnal dyspnea. Negative for chest pain, claudication, cyanosis, irregular heartbeat, leg swelling, near-syncope, palpitations and syncope.   Respiratory: Positive for shortness of breath.    Endocrine: Negative for polyuria.   Hematologic/Lymphatic: Negative for bleeding problem.   Skin: Negative for itching and rash.   Musculoskeletal: Negative for joint swelling, muscle cramps and muscle weakness.   Gastrointestinal: Negative for abdominal pain, hematemesis, hematochezia, melena, nausea and vomiting.   Genitourinary: Negative for dysuria and hematuria.   Neurological: Negative for dizziness, focal weakness, headaches, light-headedness, loss of balance and weakness.   Psychiatric/Behavioral: Negative for depression. The patient is not " nervous/anxious.         Objective:    Physical Exam   Constitutional: She is oriented to person, place, and time. She appears well-developed and well-nourished.   HENT:   Head: Normocephalic and atraumatic.   Neck: Neck supple. No JVD present.   Cardiovascular: Normal rate, regular rhythm and normal heart sounds.   Pulses:       Carotid pulses are 2+ on the right side and 2+ on the left side.       Radial pulses are 2+ on the right side and 2+ on the left side.        Femoral pulses are 2+ on the right side and 2+ on the left side.       Dorsalis pedis pulses are 2+ on the right side and 2+ on the left side.        Posterior tibial pulses are 2+ on the right side and 2+ on the left side.   Pulmonary/Chest: Effort normal. She has rales in the left lower field.   Abdominal: Soft. Bowel sounds are normal.   Musculoskeletal:         General: No edema.   Neurological: She is alert and oriented to person, place, and time.   Skin: Skin is warm and dry.   Psychiatric: She has a normal mood and affect. Her behavior is normal. Thought content normal.         Assessment:       1. SOB (shortness of breath)    2. Persistent atrial fibrillation    3. Essential hypertension    4. Valvular regurgitation    5. Lumbar spondylosis    6. Idiopathic progressive neuropathy    7. Lung mass    8. Vertigo    9. Chronic anticoagulation    10. Benign neoplasm of brain, unspecified brain region    11. Breast cancer, stage 1, right    12. Leg edema    13. Nodule of upper lobe of left lung      89 y/o pt with hx and presentation as above. Clinically appears mildly decompensated from a HF perspective. Will increase lasix dose. Will check labs. Needs to continue to stay active. Discussed the etiology, evaluation, and management of Afib, HTN, AC, leg edema, deconditioning, SOB. Discussed the importance of med compliance, heart healthy diet, and regular exercise.      Plan:       -Check BMP, CBC, and BNP  -Increase lasix to 40 mg in the AM and 20  mg in the PM  -f/u in 1 month

## 2020-07-23 LAB — NT-PROBNP SERPL-MCNC: 2100 PG/ML

## 2020-07-24 ENCOUNTER — TELEPHONE (OUTPATIENT)
Dept: CARDIOLOGY | Facility: CLINIC | Age: 85
End: 2020-07-24

## 2020-07-24 NOTE — TELEPHONE ENCOUNTER
----- Message from Marcelino Stewart MD sent at 7/24/2020 12:02 PM CDT -----  Your labwork suggests what we assumed during your visit, that you have fluid buildup. Your kidney function is acceptable and please continue the lasix as we discussed.

## 2020-07-24 NOTE — TELEPHONE ENCOUNTER
Pt was advised about test results     Pt requested to have lab results mailed out to her house as well as one month f/u with Dr. Stewart

## 2020-09-21 RX ORDER — FUROSEMIDE 20 MG/1
TABLET ORAL
Qty: 270 TABLET | Refills: 3 | Status: SHIPPED | OUTPATIENT
Start: 2020-09-21 | End: 2021-06-21

## 2020-09-21 NOTE — TELEPHONE ENCOUNTER
During last clinical visit on 7/21, pt's lasix 20 mg tablet was increased to 40 mg in the AM and 20 mg in the PM    Pt is requesting a new Rx request to reflect these changes, is almost out of her old Rx

## 2020-09-21 NOTE — TELEPHONE ENCOUNTER
----- Message from Erin Lantigua sent at 9/21/2020 10:53 AM CDT -----  Regarding: Refill  Type:  RX Refill Request    Who Called:  patient  Refill or New Rx: refill  RX Name and Strength: furosemide (LASIX) 20 MG tablet  How is the patient currently taking it? (ex. 1XDay): as directed 3X day  Is this a 30 day or 90 day RX: 90 day  Preferred Pharmacy with phone number: [facility] - [facility] [address]  Local or Mail Order: Mail order  Ordering Provider: Terry  Would the patient rather a call back or a response via [facility]?  Call back  Best Call Back Number:  [phone]  Additional Information:  please call when sent to the pharmacy

## 2020-11-17 ENCOUNTER — OFFICE VISIT (OUTPATIENT)
Dept: CARDIOLOGY | Facility: CLINIC | Age: 85
End: 2020-11-17
Payer: MEDICARE

## 2020-11-17 VITALS
WEIGHT: 181.56 LBS | DIASTOLIC BLOOD PRESSURE: 84 MMHG | BODY MASS INDEX: 28.87 KG/M2 | SYSTOLIC BLOOD PRESSURE: 132 MMHG | HEART RATE: 75 BPM | OXYGEN SATURATION: 97 %

## 2020-11-17 DIAGNOSIS — I10 ESSENTIAL HYPERTENSION: ICD-10-CM

## 2020-11-17 DIAGNOSIS — I50.33 ACUTE ON CHRONIC HEART FAILURE WITH PRESERVED EJECTION FRACTION: Primary | ICD-10-CM

## 2020-11-17 DIAGNOSIS — R60.0 LEG EDEMA: ICD-10-CM

## 2020-11-17 DIAGNOSIS — I48.19 PERSISTENT ATRIAL FIBRILLATION: ICD-10-CM

## 2020-11-17 DIAGNOSIS — C50.911 BREAST CANCER, STAGE 1, RIGHT: ICD-10-CM

## 2020-11-17 DIAGNOSIS — I38 VALVULAR REGURGITATION: ICD-10-CM

## 2020-11-17 DIAGNOSIS — Z79.01 CHRONIC ANTICOAGULATION: ICD-10-CM

## 2020-11-17 PROCEDURE — 99499 RISK ADDL DX/OHS AUDIT: ICD-10-PCS | Mod: S$GLB,,, | Performed by: INTERNAL MEDICINE

## 2020-11-17 PROCEDURE — 1126F PR PAIN SEVERITY QUANTIFIED, NO PAIN PRESENT: ICD-10-PCS | Mod: HCNC,S$GLB,, | Performed by: INTERNAL MEDICINE

## 2020-11-17 PROCEDURE — 99499 UNLISTED E&M SERVICE: CPT | Mod: S$GLB,,, | Performed by: INTERNAL MEDICINE

## 2020-11-17 PROCEDURE — 1157F PR ADVANCE CARE PLAN OR EQUIV PRESENT IN MEDICAL RECORD: ICD-10-PCS | Mod: HCNC,S$GLB,, | Performed by: INTERNAL MEDICINE

## 2020-11-17 PROCEDURE — 99214 PR OFFICE/OUTPT VISIT, EST, LEVL IV, 30-39 MIN: ICD-10-PCS | Mod: HCNC,S$GLB,, | Performed by: INTERNAL MEDICINE

## 2020-11-17 PROCEDURE — 3288F PR FALLS RISK ASSESSMENT DOCUMENTED: ICD-10-PCS | Mod: HCNC,CPTII,S$GLB, | Performed by: INTERNAL MEDICINE

## 2020-11-17 PROCEDURE — 1159F MED LIST DOCD IN RCRD: CPT | Mod: HCNC,S$GLB,, | Performed by: INTERNAL MEDICINE

## 2020-11-17 PROCEDURE — 99999 PR PBB SHADOW E&M-EST. PATIENT-LVL III: CPT | Mod: PBBFAC,HCNC,, | Performed by: INTERNAL MEDICINE

## 2020-11-17 PROCEDURE — 3288F FALL RISK ASSESSMENT DOCD: CPT | Mod: HCNC,CPTII,S$GLB, | Performed by: INTERNAL MEDICINE

## 2020-11-17 PROCEDURE — 1157F ADVNC CARE PLAN IN RCRD: CPT | Mod: HCNC,S$GLB,, | Performed by: INTERNAL MEDICINE

## 2020-11-17 PROCEDURE — 1101F PT FALLS ASSESS-DOCD LE1/YR: CPT | Mod: HCNC,CPTII,S$GLB, | Performed by: INTERNAL MEDICINE

## 2020-11-17 PROCEDURE — 1101F PR PT FALLS ASSESS DOC 0-1 FALLS W/OUT INJ PAST YR: ICD-10-PCS | Mod: HCNC,CPTII,S$GLB, | Performed by: INTERNAL MEDICINE

## 2020-11-17 PROCEDURE — 99214 OFFICE O/P EST MOD 30 MIN: CPT | Mod: HCNC,S$GLB,, | Performed by: INTERNAL MEDICINE

## 2020-11-17 PROCEDURE — 1159F PR MEDICATION LIST DOCUMENTED IN MEDICAL RECORD: ICD-10-PCS | Mod: HCNC,S$GLB,, | Performed by: INTERNAL MEDICINE

## 2020-11-17 PROCEDURE — 1126F AMNT PAIN NOTED NONE PRSNT: CPT | Mod: HCNC,S$GLB,, | Performed by: INTERNAL MEDICINE

## 2020-11-17 PROCEDURE — 99999 PR PBB SHADOW E&M-EST. PATIENT-LVL III: ICD-10-PCS | Mod: PBBFAC,HCNC,, | Performed by: INTERNAL MEDICINE

## 2020-11-17 NOTE — PROGRESS NOTES
"Subjective:    Patient ID:  Ngoc Castellanos is a 89 y.o. female who presents for follow-up of Atrial Fibrillation and Shortness of Breath      HPI    89 y/o female former pt of Dr. Ford. She has a hx of persistent AFib on chronic anticoagulation with Eliquis, diastolic dysfunction, HTN, mild MR, CKD 3 who presents for f/u. She was on Digoxin that was stopped and atenolol started. Atenolol stopped due to insurance reasons and metoprolol started with HCTZ due to LE edema. Atenolol restarted. Developed "congestion" and worsening LE edema. HCTZ stopped and lasix started. Had significant improvement with resolution of "congestion" and improvement in LE edema.   Developed SOB/OLIVIA, orthopnea, PND. Initially increased lasix dose to 20 mg BID with some improvement and last visit increased to 40 in AM and 20 in PM. Symptoms have significantly improved and she feels almost at baseline. Able to accomplish ADL's. Unknown amount of sodium in food she is served at her living facility. Has been less active during pandemic. She is compliant with her meds.     Review of Systems   Constitution: Positive for malaise/fatigue.   HENT: Negative for congestion.    Eyes: Negative for blurred vision.   Cardiovascular: Positive for dyspnea on exertion. Negative for chest pain, claudication, cyanosis, irregular heartbeat, leg swelling, near-syncope, orthopnea, palpitations, paroxysmal nocturnal dyspnea and syncope.   Respiratory: Negative for shortness of breath.    Endocrine: Negative for polyuria.   Hematologic/Lymphatic: Negative for bleeding problem.   Skin: Negative for itching and rash.   Musculoskeletal: Negative for joint swelling, muscle cramps and muscle weakness.   Gastrointestinal: Negative for abdominal pain, hematemesis, hematochezia, melena, nausea and vomiting.   Genitourinary: Negative for dysuria and hematuria.   Neurological: Negative for dizziness, focal weakness, headaches, light-headedness, loss of balance and weakness. "   Psychiatric/Behavioral: Negative for depression. The patient is not nervous/anxious.         Objective:    Physical Exam   Constitutional: She is oriented to person, place, and time. She appears well-developed and well-nourished.   HENT:   Head: Normocephalic and atraumatic.   Neck: Neck supple. No JVD present.   Cardiovascular: Normal rate, regular rhythm and normal heart sounds.   Pulses:       Carotid pulses are 2+ on the right side and 2+ on the left side.       Radial pulses are 2+ on the right side and 2+ on the left side.        Femoral pulses are 2+ on the right side and 2+ on the left side.       Dorsalis pedis pulses are 2+ on the right side and 2+ on the left side.        Posterior tibial pulses are 2+ on the right side and 2+ on the left side.   Pulmonary/Chest: Effort normal and breath sounds normal.   Abdominal: Soft. Bowel sounds are normal.   Musculoskeletal:         General: No edema.   Neurological: She is alert and oriented to person, place, and time.   Skin: Skin is warm and dry.   Psychiatric: She has a normal mood and affect. Her behavior is normal. Thought content normal.         Assessment:       1. Acute on chronic heart failure with preserved ejection fraction    2. Persistent atrial fibrillation    3. Valvular regurgitation    4. Essential hypertension    5. Chronic anticoagulation    6. Breast cancer, stage 1, right    7. Leg edema      89 y/o pt with hx and presentation as above. Clinically appears no compensated from a HF perspective. Continue lasix once daily and extra dose PRN. Needs to continue to stay active. Discussed the etiology, evaluation, and management of Afib, HTN, AC, leg edema, deconditioning, SOB. Discussed the importance of med compliance, heart healthy diet, and regular exercise.      Plan:       -Lasix once daily  -f/u in 3 months

## 2020-12-02 ENCOUNTER — OFFICE VISIT (OUTPATIENT)
Dept: HEMATOLOGY/ONCOLOGY | Facility: CLINIC | Age: 85
End: 2020-12-02
Payer: MEDICARE

## 2020-12-02 VITALS
DIASTOLIC BLOOD PRESSURE: 76 MMHG | OXYGEN SATURATION: 98 % | SYSTOLIC BLOOD PRESSURE: 143 MMHG | RESPIRATION RATE: 18 BRPM | BODY MASS INDEX: 28.88 KG/M2 | WEIGHT: 181.69 LBS | HEART RATE: 76 BPM | TEMPERATURE: 98 F

## 2020-12-02 DIAGNOSIS — C50.911 BREAST CANCER, STAGE 1, RIGHT: Primary | ICD-10-CM

## 2020-12-02 PROCEDURE — 1159F PR MEDICATION LIST DOCUMENTED IN MEDICAL RECORD: ICD-10-PCS | Mod: HCNC,S$GLB,, | Performed by: INTERNAL MEDICINE

## 2020-12-02 PROCEDURE — 99999 PR PBB SHADOW E&M-EST. PATIENT-LVL III: ICD-10-PCS | Mod: PBBFAC,HCNC,, | Performed by: INTERNAL MEDICINE

## 2020-12-02 PROCEDURE — 1159F MED LIST DOCD IN RCRD: CPT | Mod: HCNC,S$GLB,, | Performed by: INTERNAL MEDICINE

## 2020-12-02 PROCEDURE — 99999 PR PBB SHADOW E&M-EST. PATIENT-LVL III: CPT | Mod: PBBFAC,HCNC,, | Performed by: INTERNAL MEDICINE

## 2020-12-02 PROCEDURE — 99213 OFFICE O/P EST LOW 20 MIN: CPT | Mod: HCNC,S$GLB,, | Performed by: INTERNAL MEDICINE

## 2020-12-02 PROCEDURE — 99213 PR OFFICE/OUTPT VISIT, EST, LEVL III, 20-29 MIN: ICD-10-PCS | Mod: HCNC,S$GLB,, | Performed by: INTERNAL MEDICINE

## 2020-12-02 RX ORDER — A/SINGAPORE/GP1908/2015 IVR-180 (AN A/MICHIGAN/45/2015 (H1N1)PDM09-LIKE VIRUS, A/HONG KONG/4801/2014, NYMC X-263B (H3N2) (AN A/HONG KONG/4801/2014-LIKE VIRUS), AND B/BRISBANE/60/2008, WILD TYPE (A B/BRISBANE/60/2008-LIKE VIRUS) 15; 15; 15 UG/.5ML; UG/.5ML; UG/.5ML
INJECTION, SUSPENSION INTRAMUSCULAR
COMMUNITY
Start: 2020-09-10 | End: 2021-08-05

## 2020-12-02 NOTE — PROGRESS NOTES
Subjective:       Patient ID: Ngoc Castellanos is a 89 y.o. female.    Chief Complaint: Breast Cancer    HPI She is here for followup of right breast cancer.      She was diagnosed with carcinoma of the right breast, underwent right modified radical mastectomy in march 2014. She had a 1.7 cm invasive mammary carcinoma with lobular features.  6 lymph nodes were removed and they were negative.     Her tumor was ER/[place] strongly positive and positive by FISH for HER-2. She was not given adjuvant Herceptin. She started Arimidex in May 2014.    She has history of atrial fibrillation and has been on Coumadin for several years, doing well without bleeding issues, now on Eliquis. Sees .    DEXA scan (9/14/15) showed Osteopenia of both femoral necks with normal bone mineral density of the lumbar spine.     Received the first dose of prolia on 8/5/14. Not interested in further prolia injections or alternative bisphosphonate Rx.    She is here for follow-up today.     Feels well.    Review of Systems   Respiratory: Negative for cough and shortness of breath.    Cardiovascular: Negative for chest pain, palpitations and leg swelling.   Hematological: Negative for adenopathy. Does not bruise/bleed easily.         Objective:      Physical Exam   Constitutional: She is oriented to person, place, and time. She appears well-developed. No distress.   HENT:   Head: Normocephalic and atraumatic.   Mouth/Throat: No oropharyngeal exudate.   Eyes: No scleral icterus.   Neck: Normal range of motion. Neck supple.   Cardiovascular: Normal rate. Exam reveals no gallop.   Irregular.   Pulmonary/Chest: Effort normal and breath sounds normal. No stridor. She has no wheezes. She has no rales.   She is status post right mastectomy with a well-healed incision. Wound has healed well. There are no masses in the left breast.        Abdominal: Soft. She exhibits no distension and no mass. There is no abdominal tenderness.   Lymphadenopathy:      She has no cervical adenopathy.   Neurological: She is alert and oriented to person, place, and time. No cranial nerve deficit.       Assessment:       1. Breast cancer, stage 1, right        Plan:   She has T1cN0 Stage 1a ER/[place] positive and Her-2 positive invasive mammary carcinoma of the right breast.     Adjuvant Arimidex was started in May 2014.     She completed more than 5 years of Arimidex, asked her to discontinue Arimidex    She is not interested in further mammograms, agree with her, will not schedule further mammograms    She is 89 years old, lives in an assisted living facility, does not drive, needs help with transportation.  I asked her to follow-up with her primary care physician for continued medical care given all these aspect and I will see her for follow-up as needed

## 2021-05-18 ENCOUNTER — OFFICE VISIT (OUTPATIENT)
Dept: CARDIOLOGY | Facility: CLINIC | Age: 86
End: 2021-05-18
Payer: MEDICARE

## 2021-05-18 ENCOUNTER — LAB VISIT (OUTPATIENT)
Dept: LAB | Facility: HOSPITAL | Age: 86
End: 2021-05-18
Attending: INTERNAL MEDICINE
Payer: MEDICARE

## 2021-05-18 VITALS
DIASTOLIC BLOOD PRESSURE: 86 MMHG | BODY MASS INDEX: 28.53 KG/M2 | WEIGHT: 177.5 LBS | SYSTOLIC BLOOD PRESSURE: 149 MMHG | OXYGEN SATURATION: 96 % | HEIGHT: 66 IN | HEART RATE: 70 BPM

## 2021-05-18 DIAGNOSIS — I50.32 CHRONIC HEART FAILURE WITH PRESERVED EJECTION FRACTION: Primary | ICD-10-CM

## 2021-05-18 DIAGNOSIS — I50.32 CHRONIC HEART FAILURE WITH PRESERVED EJECTION FRACTION: ICD-10-CM

## 2021-05-18 DIAGNOSIS — R60.0 LEG EDEMA: ICD-10-CM

## 2021-05-18 DIAGNOSIS — I10 ESSENTIAL HYPERTENSION: ICD-10-CM

## 2021-05-18 DIAGNOSIS — Z79.01 CHRONIC ANTICOAGULATION: ICD-10-CM

## 2021-05-18 DIAGNOSIS — I38 VALVULAR REGURGITATION: ICD-10-CM

## 2021-05-18 DIAGNOSIS — I48.19 PERSISTENT ATRIAL FIBRILLATION: ICD-10-CM

## 2021-05-18 PROBLEM — I50.33 ACUTE ON CHRONIC HEART FAILURE WITH PRESERVED EJECTION FRACTION: Status: RESOLVED | Noted: 2020-11-17 | Resolved: 2021-05-18

## 2021-05-18 LAB
ALBUMIN SERPL BCP-MCNC: 4.2 G/DL (ref 3.5–5.2)
ALP SERPL-CCNC: 77 U/L (ref 55–135)
ALT SERPL W/O P-5'-P-CCNC: 12 U/L (ref 10–44)
ANION GAP SERPL CALC-SCNC: 12 MMOL/L (ref 8–16)
AST SERPL-CCNC: 21 U/L (ref 10–40)
BASOPHILS # BLD AUTO: 0.03 K/UL (ref 0–0.2)
BASOPHILS NFR BLD: 0.4 % (ref 0–1.9)
BILIRUB SERPL-MCNC: 0.9 MG/DL (ref 0.1–1)
BUN SERPL-MCNC: 19 MG/DL (ref 10–30)
CALCIUM SERPL-MCNC: 9.8 MG/DL (ref 8.7–10.5)
CHLORIDE SERPL-SCNC: 100 MMOL/L (ref 95–110)
CO2 SERPL-SCNC: 26 MMOL/L (ref 23–29)
CREAT SERPL-MCNC: 1.3 MG/DL (ref 0.5–1.4)
DIFFERENTIAL METHOD: ABNORMAL
EOSINOPHIL # BLD AUTO: 0.2 K/UL (ref 0–0.5)
EOSINOPHIL NFR BLD: 2.3 % (ref 0–8)
ERYTHROCYTE [DISTWIDTH] IN BLOOD BY AUTOMATED COUNT: 14 % (ref 11.5–14.5)
EST. GFR  (AFRICAN AMERICAN): 40 ML/MIN/1.73 M^2
EST. GFR  (NON AFRICAN AMERICAN): 35 ML/MIN/1.73 M^2
GLUCOSE SERPL-MCNC: 96 MG/DL (ref 70–110)
HCT VFR BLD AUTO: 40.8 % (ref 37–48.5)
HGB BLD-MCNC: 13.5 G/DL (ref 12–16)
IMM GRANULOCYTES # BLD AUTO: 0.03 K/UL (ref 0–0.04)
IMM GRANULOCYTES NFR BLD AUTO: 0.4 % (ref 0–0.5)
LYMPHOCYTES # BLD AUTO: 0.9 K/UL (ref 1–4.8)
LYMPHOCYTES NFR BLD: 12 % (ref 18–48)
MCH RBC QN AUTO: 28.7 PG (ref 27–31)
MCHC RBC AUTO-ENTMCNC: 33.1 G/DL (ref 32–36)
MCV RBC AUTO: 87 FL (ref 82–98)
MONOCYTES # BLD AUTO: 0.6 K/UL (ref 0.3–1)
MONOCYTES NFR BLD: 7.3 % (ref 4–15)
NEUTROPHILS # BLD AUTO: 6 K/UL (ref 1.8–7.7)
NEUTROPHILS NFR BLD: 77.6 % (ref 38–73)
NRBC BLD-RTO: 0 /100 WBC
PLATELET # BLD AUTO: 202 K/UL (ref 150–450)
PMV BLD AUTO: 10.3 FL (ref 9.2–12.9)
POTASSIUM SERPL-SCNC: 4.4 MMOL/L (ref 3.5–5.1)
PROT SERPL-MCNC: 7.6 G/DL (ref 6–8.4)
RBC # BLD AUTO: 4.7 M/UL (ref 4–5.4)
SODIUM SERPL-SCNC: 138 MMOL/L (ref 136–145)
WBC # BLD AUTO: 7.69 K/UL (ref 3.9–12.7)

## 2021-05-18 PROCEDURE — 1159F MED LIST DOCD IN RCRD: CPT | Mod: S$GLB,,, | Performed by: INTERNAL MEDICINE

## 2021-05-18 PROCEDURE — 1101F PR PT FALLS ASSESS DOC 0-1 FALLS W/OUT INJ PAST YR: ICD-10-PCS | Mod: CPTII,S$GLB,, | Performed by: INTERNAL MEDICINE

## 2021-05-18 PROCEDURE — 1159F PR MEDICATION LIST DOCUMENTED IN MEDICAL RECORD: ICD-10-PCS | Mod: S$GLB,,, | Performed by: INTERNAL MEDICINE

## 2021-05-18 PROCEDURE — 36415 COLL VENOUS BLD VENIPUNCTURE: CPT | Performed by: INTERNAL MEDICINE

## 2021-05-18 PROCEDURE — 99214 PR OFFICE/OUTPT VISIT, EST, LEVL IV, 30-39 MIN: ICD-10-PCS | Mod: S$GLB,,, | Performed by: INTERNAL MEDICINE

## 2021-05-18 PROCEDURE — 85025 COMPLETE CBC W/AUTO DIFF WBC: CPT | Performed by: INTERNAL MEDICINE

## 2021-05-18 PROCEDURE — 99999 PR PBB SHADOW E&M-EST. PATIENT-LVL IV: ICD-10-PCS | Mod: PBBFAC,,, | Performed by: INTERNAL MEDICINE

## 2021-05-18 PROCEDURE — 99499 UNLISTED E&M SERVICE: CPT | Mod: S$GLB,,, | Performed by: INTERNAL MEDICINE

## 2021-05-18 PROCEDURE — 3288F PR FALLS RISK ASSESSMENT DOCUMENTED: ICD-10-PCS | Mod: CPTII,S$GLB,, | Performed by: INTERNAL MEDICINE

## 2021-05-18 PROCEDURE — 1126F AMNT PAIN NOTED NONE PRSNT: CPT | Mod: S$GLB,,, | Performed by: INTERNAL MEDICINE

## 2021-05-18 PROCEDURE — 99999 PR PBB SHADOW E&M-EST. PATIENT-LVL IV: CPT | Mod: PBBFAC,,, | Performed by: INTERNAL MEDICINE

## 2021-05-18 PROCEDURE — 80053 COMPREHEN METABOLIC PANEL: CPT | Performed by: INTERNAL MEDICINE

## 2021-05-18 PROCEDURE — 3288F FALL RISK ASSESSMENT DOCD: CPT | Mod: CPTII,S$GLB,, | Performed by: INTERNAL MEDICINE

## 2021-05-18 PROCEDURE — 1157F ADVNC CARE PLAN IN RCRD: CPT | Mod: S$GLB,,, | Performed by: INTERNAL MEDICINE

## 2021-05-18 PROCEDURE — 99214 OFFICE O/P EST MOD 30 MIN: CPT | Mod: S$GLB,,, | Performed by: INTERNAL MEDICINE

## 2021-05-18 PROCEDURE — 1101F PT FALLS ASSESS-DOCD LE1/YR: CPT | Mod: CPTII,S$GLB,, | Performed by: INTERNAL MEDICINE

## 2021-05-18 PROCEDURE — 1157F PR ADVANCE CARE PLAN OR EQUIV PRESENT IN MEDICAL RECORD: ICD-10-PCS | Mod: S$GLB,,, | Performed by: INTERNAL MEDICINE

## 2021-05-18 PROCEDURE — 1126F PR PAIN SEVERITY QUANTIFIED, NO PAIN PRESENT: ICD-10-PCS | Mod: S$GLB,,, | Performed by: INTERNAL MEDICINE

## 2021-05-18 PROCEDURE — 99499 RISK ADDL DX/OHS AUDIT: ICD-10-PCS | Mod: S$GLB,,, | Performed by: INTERNAL MEDICINE

## 2021-06-08 ENCOUNTER — TELEPHONE (OUTPATIENT)
Dept: CARDIOLOGY | Facility: CLINIC | Age: 86
End: 2021-06-08

## 2021-07-28 ENCOUNTER — TELEPHONE (OUTPATIENT)
Dept: OTOLARYNGOLOGY | Facility: CLINIC | Age: 86
End: 2021-07-28

## 2021-08-05 ENCOUNTER — OFFICE VISIT (OUTPATIENT)
Dept: FAMILY MEDICINE | Facility: CLINIC | Age: 86
End: 2021-08-05
Payer: MEDICARE

## 2021-08-05 VITALS
BODY MASS INDEX: 28.7 KG/M2 | SYSTOLIC BLOOD PRESSURE: 120 MMHG | DIASTOLIC BLOOD PRESSURE: 68 MMHG | HEIGHT: 66 IN | HEART RATE: 85 BPM | OXYGEN SATURATION: 98 % | WEIGHT: 178.56 LBS

## 2021-08-05 DIAGNOSIS — T14.8XXA HEMATOMA: Primary | ICD-10-CM

## 2021-08-05 PROCEDURE — 99213 PR OFFICE/OUTPT VISIT, EST, LEVL III, 20-29 MIN: ICD-10-PCS | Mod: S$GLB,,, | Performed by: STUDENT IN AN ORGANIZED HEALTH CARE EDUCATION/TRAINING PROGRAM

## 2021-08-05 PROCEDURE — 3288F PR FALLS RISK ASSESSMENT DOCUMENTED: ICD-10-PCS | Mod: CPTII,S$GLB,, | Performed by: STUDENT IN AN ORGANIZED HEALTH CARE EDUCATION/TRAINING PROGRAM

## 2021-08-05 PROCEDURE — 99999 PR PBB SHADOW E&M-EST. PATIENT-LVL IV: CPT | Mod: PBBFAC,,, | Performed by: STUDENT IN AN ORGANIZED HEALTH CARE EDUCATION/TRAINING PROGRAM

## 2021-08-05 PROCEDURE — 1157F PR ADVANCE CARE PLAN OR EQUIV PRESENT IN MEDICAL RECORD: ICD-10-PCS | Mod: CPTII,S$GLB,, | Performed by: STUDENT IN AN ORGANIZED HEALTH CARE EDUCATION/TRAINING PROGRAM

## 2021-08-05 PROCEDURE — 1101F PR PT FALLS ASSESS DOC 0-1 FALLS W/OUT INJ PAST YR: ICD-10-PCS | Mod: CPTII,S$GLB,, | Performed by: STUDENT IN AN ORGANIZED HEALTH CARE EDUCATION/TRAINING PROGRAM

## 2021-08-05 PROCEDURE — 1157F ADVNC CARE PLAN IN RCRD: CPT | Mod: CPTII,S$GLB,, | Performed by: STUDENT IN AN ORGANIZED HEALTH CARE EDUCATION/TRAINING PROGRAM

## 2021-08-05 PROCEDURE — 1101F PT FALLS ASSESS-DOCD LE1/YR: CPT | Mod: CPTII,S$GLB,, | Performed by: STUDENT IN AN ORGANIZED HEALTH CARE EDUCATION/TRAINING PROGRAM

## 2021-08-05 PROCEDURE — 99213 OFFICE O/P EST LOW 20 MIN: CPT | Mod: S$GLB,,, | Performed by: STUDENT IN AN ORGANIZED HEALTH CARE EDUCATION/TRAINING PROGRAM

## 2021-08-05 PROCEDURE — 1160F RVW MEDS BY RX/DR IN RCRD: CPT | Mod: CPTII,S$GLB,, | Performed by: STUDENT IN AN ORGANIZED HEALTH CARE EDUCATION/TRAINING PROGRAM

## 2021-08-05 PROCEDURE — 1160F PR REVIEW ALL MEDS BY PRESCRIBER/CLIN PHARMACIST DOCUMENTED: ICD-10-PCS | Mod: CPTII,S$GLB,, | Performed by: STUDENT IN AN ORGANIZED HEALTH CARE EDUCATION/TRAINING PROGRAM

## 2021-08-05 PROCEDURE — 1126F PR PAIN SEVERITY QUANTIFIED, NO PAIN PRESENT: ICD-10-PCS | Mod: CPTII,S$GLB,, | Performed by: STUDENT IN AN ORGANIZED HEALTH CARE EDUCATION/TRAINING PROGRAM

## 2021-08-05 PROCEDURE — 99999 PR PBB SHADOW E&M-EST. PATIENT-LVL IV: ICD-10-PCS | Mod: PBBFAC,,, | Performed by: STUDENT IN AN ORGANIZED HEALTH CARE EDUCATION/TRAINING PROGRAM

## 2021-08-05 PROCEDURE — 3288F FALL RISK ASSESSMENT DOCD: CPT | Mod: CPTII,S$GLB,, | Performed by: STUDENT IN AN ORGANIZED HEALTH CARE EDUCATION/TRAINING PROGRAM

## 2021-08-05 PROCEDURE — 1159F MED LIST DOCD IN RCRD: CPT | Mod: CPTII,S$GLB,, | Performed by: STUDENT IN AN ORGANIZED HEALTH CARE EDUCATION/TRAINING PROGRAM

## 2021-08-05 PROCEDURE — 1159F PR MEDICATION LIST DOCUMENTED IN MEDICAL RECORD: ICD-10-PCS | Mod: CPTII,S$GLB,, | Performed by: STUDENT IN AN ORGANIZED HEALTH CARE EDUCATION/TRAINING PROGRAM

## 2021-08-05 PROCEDURE — 1126F AMNT PAIN NOTED NONE PRSNT: CPT | Mod: CPTII,S$GLB,, | Performed by: STUDENT IN AN ORGANIZED HEALTH CARE EDUCATION/TRAINING PROGRAM

## 2021-08-05 RX ORDER — MUPIROCIN 20 MG/G
OINTMENT TOPICAL DAILY
Qty: 2 G | Refills: 0 | Status: SHIPPED | OUTPATIENT
Start: 2021-08-05 | End: 2021-12-13

## 2021-08-27 DIAGNOSIS — I10 ESSENTIAL HYPERTENSION: ICD-10-CM

## 2021-08-27 DIAGNOSIS — I48.19 PERSISTENT ATRIAL FIBRILLATION: ICD-10-CM

## 2021-08-30 RX ORDER — LOSARTAN POTASSIUM 25 MG/1
TABLET ORAL
Qty: 90 TABLET | Refills: 0 | Status: SHIPPED | OUTPATIENT
Start: 2021-08-30 | End: 2021-12-09

## 2021-09-23 ENCOUNTER — CLINICAL SUPPORT (OUTPATIENT)
Dept: AUDIOLOGY | Facility: CLINIC | Age: 86
End: 2021-09-23
Payer: MEDICARE

## 2021-09-23 DIAGNOSIS — H90.3 SENSORINEURAL HEARING LOSS, BILATERAL: Primary | ICD-10-CM

## 2021-09-23 PROCEDURE — 99499 UNLISTED E&M SERVICE: CPT | Mod: HCNC,S$GLB,, | Performed by: AUDIOLOGIST

## 2021-09-23 PROCEDURE — 99499 NO LOS: ICD-10-PCS | Mod: HCNC,S$GLB,, | Performed by: AUDIOLOGIST

## 2021-12-08 DIAGNOSIS — I10 ESSENTIAL HYPERTENSION: ICD-10-CM

## 2021-12-08 DIAGNOSIS — I48.19 PERSISTENT ATRIAL FIBRILLATION: ICD-10-CM

## 2021-12-09 RX ORDER — LOSARTAN POTASSIUM 25 MG/1
TABLET ORAL
Qty: 90 TABLET | Refills: 0 | Status: SHIPPED | OUTPATIENT
Start: 2021-12-09 | End: 2022-02-10

## 2021-12-13 ENCOUNTER — LAB VISIT (OUTPATIENT)
Dept: LAB | Facility: HOSPITAL | Age: 86
End: 2021-12-13
Attending: FAMILY MEDICINE
Payer: MEDICARE

## 2021-12-13 ENCOUNTER — OFFICE VISIT (OUTPATIENT)
Dept: FAMILY MEDICINE | Facility: CLINIC | Age: 86
End: 2021-12-13
Payer: MEDICARE

## 2021-12-13 VITALS
DIASTOLIC BLOOD PRESSURE: 62 MMHG | HEIGHT: 66 IN | OXYGEN SATURATION: 96 % | WEIGHT: 175.69 LBS | BODY MASS INDEX: 28.23 KG/M2 | SYSTOLIC BLOOD PRESSURE: 118 MMHG | HEART RATE: 82 BPM

## 2021-12-13 DIAGNOSIS — I10 PRIMARY HYPERTENSION: Primary | ICD-10-CM

## 2021-12-13 DIAGNOSIS — N18.32 STAGE 3B CHRONIC KIDNEY DISEASE: ICD-10-CM

## 2021-12-13 DIAGNOSIS — I48.0 PAROXYSMAL ATRIAL FIBRILLATION: ICD-10-CM

## 2021-12-13 DIAGNOSIS — C50.911 BREAST CANCER, STAGE 1, RIGHT: ICD-10-CM

## 2021-12-13 DIAGNOSIS — R05.9 COUGH: ICD-10-CM

## 2021-12-13 DIAGNOSIS — I10 PRIMARY HYPERTENSION: ICD-10-CM

## 2021-12-13 LAB
ALBUMIN SERPL BCP-MCNC: 3.9 G/DL (ref 3.5–5.2)
ALP SERPL-CCNC: 75 U/L (ref 55–135)
ALT SERPL W/O P-5'-P-CCNC: 10 U/L (ref 10–44)
ANION GAP SERPL CALC-SCNC: 10 MMOL/L (ref 8–16)
AST SERPL-CCNC: 21 U/L (ref 10–40)
BASOPHILS # BLD AUTO: 0.04 K/UL (ref 0–0.2)
BASOPHILS NFR BLD: 0.6 % (ref 0–1.9)
BILIRUB SERPL-MCNC: 1.3 MG/DL (ref 0.1–1)
BUN SERPL-MCNC: 20 MG/DL (ref 10–30)
CALCIUM SERPL-MCNC: 9.8 MG/DL (ref 8.7–10.5)
CHLORIDE SERPL-SCNC: 103 MMOL/L (ref 95–110)
CHOLEST SERPL-MCNC: 155 MG/DL (ref 120–199)
CHOLEST/HDLC SERPL: 3.2 {RATIO} (ref 2–5)
CO2 SERPL-SCNC: 24 MMOL/L (ref 23–29)
CREAT SERPL-MCNC: 1.1 MG/DL (ref 0.5–1.4)
DIFFERENTIAL METHOD: ABNORMAL
EOSINOPHIL # BLD AUTO: 0.2 K/UL (ref 0–0.5)
EOSINOPHIL NFR BLD: 3 % (ref 0–8)
ERYTHROCYTE [DISTWIDTH] IN BLOOD BY AUTOMATED COUNT: 14.7 % (ref 11.5–14.5)
EST. GFR  (AFRICAN AMERICAN): 49 ML/MIN/1.73 M^2
EST. GFR  (NON AFRICAN AMERICAN): 42.5 ML/MIN/1.73 M^2
GLUCOSE SERPL-MCNC: 94 MG/DL (ref 70–110)
HCT VFR BLD AUTO: 40.3 % (ref 37–48.5)
HDLC SERPL-MCNC: 48 MG/DL (ref 40–75)
HDLC SERPL: 31 % (ref 20–50)
HGB BLD-MCNC: 12.8 G/DL (ref 12–16)
IMM GRANULOCYTES # BLD AUTO: 0.02 K/UL (ref 0–0.04)
IMM GRANULOCYTES NFR BLD AUTO: 0.3 % (ref 0–0.5)
LDLC SERPL CALC-MCNC: 92 MG/DL (ref 63–159)
LYMPHOCYTES # BLD AUTO: 0.8 K/UL (ref 1–4.8)
LYMPHOCYTES NFR BLD: 11.9 % (ref 18–48)
MCH RBC QN AUTO: 28.3 PG (ref 27–31)
MCHC RBC AUTO-ENTMCNC: 31.8 G/DL (ref 32–36)
MCV RBC AUTO: 89 FL (ref 82–98)
MONOCYTES # BLD AUTO: 0.5 K/UL (ref 0.3–1)
MONOCYTES NFR BLD: 7.3 % (ref 4–15)
NEUTROPHILS # BLD AUTO: 5 K/UL (ref 1.8–7.7)
NEUTROPHILS NFR BLD: 76.9 % (ref 38–73)
NONHDLC SERPL-MCNC: 107 MG/DL
NRBC BLD-RTO: 0 /100 WBC
PLATELET # BLD AUTO: 183 K/UL (ref 150–450)
PMV BLD AUTO: 11.2 FL (ref 9.2–12.9)
POTASSIUM SERPL-SCNC: 4.7 MMOL/L (ref 3.5–5.1)
PROT SERPL-MCNC: 7.1 G/DL (ref 6–8.4)
RBC # BLD AUTO: 4.52 M/UL (ref 4–5.4)
SODIUM SERPL-SCNC: 137 MMOL/L (ref 136–145)
TRIGL SERPL-MCNC: 75 MG/DL (ref 30–150)
WBC # BLD AUTO: 6.56 K/UL (ref 3.9–12.7)

## 2021-12-13 PROCEDURE — 99999 PR PBB SHADOW E&M-EST. PATIENT-LVL IV: ICD-10-PCS | Mod: PBBFAC,HCNC,, | Performed by: FAMILY MEDICINE

## 2021-12-13 PROCEDURE — 36415 COLL VENOUS BLD VENIPUNCTURE: CPT | Mod: HCNC,PO | Performed by: FAMILY MEDICINE

## 2021-12-13 PROCEDURE — 80061 LIPID PANEL: CPT | Mod: HCNC | Performed by: FAMILY MEDICINE

## 2021-12-13 PROCEDURE — 1157F PR ADVANCE CARE PLAN OR EQUIV PRESENT IN MEDICAL RECORD: ICD-10-PCS | Mod: HCNC,CPTII,S$GLB, | Performed by: FAMILY MEDICINE

## 2021-12-13 PROCEDURE — 99215 OFFICE O/P EST HI 40 MIN: CPT | Mod: HCNC,S$GLB,, | Performed by: FAMILY MEDICINE

## 2021-12-13 PROCEDURE — 99499 RISK ADDL DX/OHS AUDIT: ICD-10-PCS | Mod: S$GLB,,, | Performed by: FAMILY MEDICINE

## 2021-12-13 PROCEDURE — 85025 COMPLETE CBC W/AUTO DIFF WBC: CPT | Mod: HCNC | Performed by: FAMILY MEDICINE

## 2021-12-13 PROCEDURE — 80053 COMPREHEN METABOLIC PANEL: CPT | Mod: HCNC | Performed by: FAMILY MEDICINE

## 2021-12-13 PROCEDURE — 1157F ADVNC CARE PLAN IN RCRD: CPT | Mod: HCNC,CPTII,S$GLB, | Performed by: FAMILY MEDICINE

## 2021-12-13 PROCEDURE — 99999 PR PBB SHADOW E&M-EST. PATIENT-LVL IV: CPT | Mod: PBBFAC,HCNC,, | Performed by: FAMILY MEDICINE

## 2021-12-13 PROCEDURE — 99499 UNLISTED E&M SERVICE: CPT | Mod: S$GLB,,, | Performed by: FAMILY MEDICINE

## 2021-12-13 PROCEDURE — 99215 PR OFFICE/OUTPT VISIT, EST, LEVL V, 40-54 MIN: ICD-10-PCS | Mod: HCNC,S$GLB,, | Performed by: FAMILY MEDICINE

## 2021-12-13 RX ORDER — LEVOCETIRIZINE DIHYDROCHLORIDE 5 MG/1
5 TABLET, FILM COATED ORAL NIGHTLY
Qty: 30 TABLET | Refills: 0 | Status: SHIPPED | OUTPATIENT
Start: 2021-12-13 | End: 2021-12-16

## 2021-12-13 RX ORDER — GUAIFENESIN 600 MG/1
600 TABLET, EXTENDED RELEASE ORAL 2 TIMES DAILY
Qty: 20 TABLET | Refills: 0 | Status: SHIPPED | OUTPATIENT
Start: 2021-12-13 | End: 2021-12-23

## 2021-12-13 RX ORDER — MONTELUKAST SODIUM 10 MG/1
10 TABLET ORAL NIGHTLY
Qty: 30 TABLET | Refills: 0 | Status: SHIPPED | OUTPATIENT
Start: 2021-12-13 | End: 2021-12-16

## 2021-12-16 ENCOUNTER — LAB VISIT (OUTPATIENT)
Dept: LAB | Facility: HOSPITAL | Age: 86
End: 2021-12-16
Attending: FAMILY MEDICINE
Payer: MEDICARE

## 2021-12-16 DIAGNOSIS — I10 PRIMARY HYPERTENSION: ICD-10-CM

## 2021-12-16 PROCEDURE — 81001 URINALYSIS AUTO W/SCOPE: CPT | Mod: HCNC | Performed by: FAMILY MEDICINE

## 2021-12-17 LAB
BACTERIA #/AREA URNS AUTO: NORMAL /HPF
BILIRUB UR QL STRIP: NEGATIVE
CLARITY UR REFRACT.AUTO: CLEAR
COLOR UR AUTO: YELLOW
GLUCOSE UR QL STRIP: NEGATIVE
HGB UR QL STRIP: NEGATIVE
KETONES UR QL STRIP: NEGATIVE
LEUKOCYTE ESTERASE UR QL STRIP: NEGATIVE
MICROSCOPIC COMMENT: NORMAL
NITRITE UR QL STRIP: NEGATIVE
PH UR STRIP: 6 [PH] (ref 5–8)
PROT UR QL STRIP: NEGATIVE
RBC #/AREA URNS AUTO: 1 /HPF (ref 0–4)
SP GR UR STRIP: 1.02 (ref 1–1.03)
URN SPEC COLLECT METH UR: NORMAL

## 2021-12-18 RX ORDER — LEVOCETIRIZINE DIHYDROCHLORIDE 5 MG/1
TABLET, FILM COATED ORAL
Qty: 90 TABLET | Refills: 3 | Status: SHIPPED | OUTPATIENT
Start: 2021-12-18 | End: 2022-06-14

## 2021-12-18 RX ORDER — MONTELUKAST SODIUM 10 MG/1
TABLET ORAL
Qty: 90 TABLET | Refills: 3 | Status: SHIPPED | OUTPATIENT
Start: 2021-12-18 | End: 2022-06-14

## 2021-12-20 ENCOUNTER — TELEPHONE (OUTPATIENT)
Dept: FAMILY MEDICINE | Facility: CLINIC | Age: 86
End: 2021-12-20
Payer: MEDICARE

## 2022-05-31 ENCOUNTER — OFFICE VISIT (OUTPATIENT)
Dept: URGENT CARE | Facility: CLINIC | Age: 87
End: 2022-05-31
Payer: MEDICARE

## 2022-05-31 VITALS
HEIGHT: 66 IN | RESPIRATION RATE: 20 BRPM | OXYGEN SATURATION: 95 % | BODY MASS INDEX: 28.12 KG/M2 | SYSTOLIC BLOOD PRESSURE: 170 MMHG | DIASTOLIC BLOOD PRESSURE: 94 MMHG | HEART RATE: 74 BPM | WEIGHT: 175 LBS | TEMPERATURE: 98 F

## 2022-05-31 DIAGNOSIS — M25.562 PAIN AND SWELLING OF KNEE, LEFT: ICD-10-CM

## 2022-05-31 DIAGNOSIS — M79.605 LEFT LEG PAIN: Primary | ICD-10-CM

## 2022-05-31 DIAGNOSIS — M25.462 PAIN AND SWELLING OF KNEE, LEFT: ICD-10-CM

## 2022-05-31 PROCEDURE — 1160F PR REVIEW ALL MEDS BY PRESCRIBER/CLIN PHARMACIST DOCUMENTED: ICD-10-PCS | Mod: CPTII,S$GLB,, | Performed by: PHYSICIAN ASSISTANT

## 2022-05-31 PROCEDURE — 73562 X-RAY EXAM OF KNEE 3: CPT | Mod: FY,LT,S$GLB, | Performed by: RADIOLOGY

## 2022-05-31 PROCEDURE — 73562 XR KNEE 3 VIEW LEFT: ICD-10-PCS | Mod: FY,LT,S$GLB, | Performed by: RADIOLOGY

## 2022-05-31 PROCEDURE — 99213 OFFICE O/P EST LOW 20 MIN: CPT | Mod: S$GLB,,, | Performed by: PHYSICIAN ASSISTANT

## 2022-05-31 PROCEDURE — 1157F ADVNC CARE PLAN IN RCRD: CPT | Mod: CPTII,S$GLB,, | Performed by: PHYSICIAN ASSISTANT

## 2022-05-31 PROCEDURE — 1157F PR ADVANCE CARE PLAN OR EQUIV PRESENT IN MEDICAL RECORD: ICD-10-PCS | Mod: CPTII,S$GLB,, | Performed by: PHYSICIAN ASSISTANT

## 2022-05-31 PROCEDURE — 1125F PR PAIN SEVERITY QUANTIFIED, PAIN PRESENT: ICD-10-PCS | Mod: CPTII,S$GLB,, | Performed by: PHYSICIAN ASSISTANT

## 2022-05-31 PROCEDURE — 1159F MED LIST DOCD IN RCRD: CPT | Mod: CPTII,S$GLB,, | Performed by: PHYSICIAN ASSISTANT

## 2022-05-31 PROCEDURE — 1125F AMNT PAIN NOTED PAIN PRSNT: CPT | Mod: CPTII,S$GLB,, | Performed by: PHYSICIAN ASSISTANT

## 2022-05-31 PROCEDURE — 1160F RVW MEDS BY RX/DR IN RCRD: CPT | Mod: CPTII,S$GLB,, | Performed by: PHYSICIAN ASSISTANT

## 2022-05-31 PROCEDURE — 99213 PR OFFICE/OUTPT VISIT, EST, LEVL III, 20-29 MIN: ICD-10-PCS | Mod: S$GLB,,, | Performed by: PHYSICIAN ASSISTANT

## 2022-05-31 PROCEDURE — 1159F PR MEDICATION LIST DOCUMENTED IN MEDICAL RECORD: ICD-10-PCS | Mod: CPTII,S$GLB,, | Performed by: PHYSICIAN ASSISTANT

## 2022-05-31 NOTE — PROGRESS NOTES
"Subjective:       Patient ID: Ngoc Castellanos is a 89 y.o. female.    Vitals:  height is 5' 6" (1.676 m) and weight is 79.4 kg (175 lb). Her oral temperature is 98.2 °F (36.8 °C). Her blood pressure is 170/94 (abnormal) and her pulse is 74. Her respiration is 20 and oxygen saturation is 95%.     Chief Complaint: Leg Pain (Left)    Patient provider note starts here:  Patient presents with complaints of left lower leg pain for the past 5 days. Atraumatic in nature. Reports swelling in the left knee and pain from the knee to the foot. Has LE edema but denies this worsened than normal. Also has a history of A-fib but does take Eliquis. Denies numbness or tingling in the leg. She has been taking Tylenol for the pain with some relief. Pain is worsened with standing and walking.     Leg Pain   The incident occurred 5 to 7 days ago (5 days ago). The incident occurred at home. There was no injury mechanism. The pain is present in the left knee and left leg. The quality of the pain is described as stabbing. The pain is at a severity of 9/10. The pain is severe. The pain has been constant since onset. Associated symptoms include an inability to bear weight. Pertinent negatives include no numbness. She reports no foreign bodies present. The symptoms are aggravated by movement and weight bearing. She has tried ice and acetaminophen for the symptoms. The treatment provided mild relief.       Constitution: Negative for fever.   Neck: Negative for neck pain.   Cardiovascular: Negative for chest pain, palpitations and sob on exertion.   Respiratory: Negative for chest tightness and wheezing.    Gastrointestinal: Negative for abdominal pain, vomiting and diarrhea.   Musculoskeletal: Positive for pain. Negative for trauma.   Skin: Negative for color change, wound, erythema and bruising.   Neurological: Negative for numbness and tingling.       Objective:      Physical Exam   Constitutional: She is oriented to person, place, and time. " She appears well-developed. She is cooperative.  Non-toxic appearance. She does not appear ill. No distress.   HENT:   Head: Normocephalic and atraumatic.   Ears:   Right Ear: Hearing and external ear normal.   Left Ear: Hearing and external ear normal.   Nose: Nose normal. No mucosal edema, rhinorrhea or nasal deformity. No epistaxis. Right sinus exhibits no maxillary sinus tenderness and no frontal sinus tenderness. Left sinus exhibits no maxillary sinus tenderness and no frontal sinus tenderness.   Mouth/Throat: Uvula is midline, oropharynx is clear and moist and mucous membranes are normal. No trismus in the jaw. Normal dentition. No uvula swelling. No posterior oropharyngeal erythema.   Eyes: Conjunctivae and lids are normal. Right eye exhibits no discharge. Left eye exhibits no discharge. No scleral icterus.   Neck: Trachea normal and phonation normal. Neck supple.   Cardiovascular: Normal rate and normal pulses. An irregular rhythm present.   Pulmonary/Chest: Effort normal and breath sounds normal. No respiratory distress.   Abdominal: Normal appearance.   Musculoskeletal: Normal range of motion.         General: Swelling present. No deformity. Normal range of motion.      Comments: LLE: there is mild knee swelling noted. There is no tenderness with palpation of the knee. No crepitus. No skin changes to the knee.   There is no tenderness with palpation behind the knee. Mild tenderness with palpation over the lateral aspect of the calf. There is mild LE edema. A few scattered bruises to the LE. Distal pulses noted. No tenderness of the ankle.    Neurological: She is alert and oriented to person, place, and time. No sensory deficit. She exhibits normal muscle tone. Coordination normal.   Skin: Skin is warm, dry, intact, not diaphoretic and not pale. Capillary refill takes 2 to 3 seconds. No erythema   Psychiatric: Her speech is normal and behavior is normal. Judgment and thought content normal.   Nursing note  and vitals reviewed.        Assessment:       1. Left leg pain    2. Pain and swelling of knee, left        XR KNEE 3 VIEW LEFT  Result Date: 5/31/2022  EXAMINATION: XR KNEE 3 VIEW LEFT CLINICAL HISTORY: Pain in left leg FINDINGS: Knee three views left: There is DJD and chondrocalcinosis and a mild varus deformity.  No fracture dislocation bone destruction seen.  There are spurs on the patella.  No OCD seen.   No acute process seen. Right knee: There is DJD, mild varus deformity and chondrocalcinosis.  No fracture dislocation bone destruction or OCD seen. Electronically signed by: Cabrera Dennison MD Date: 05/31/2022 Time: 11:02    Plan:         Left leg pain  -     XR KNEE 3 VIEW LEFT; Future; Expected date: 05/31/2022  -     CV Ultrasound doppler venous DVT leg left; Future  -     BANDAGE ELASTIC 6IN ACE    Pain and swelling of knee, left           Medical Decision Making:   History:   Old Medical Records: I decided to obtain old medical records.  Differential Diagnosis:   Differential diagnosis includes but is not limited to: fracture, soft tissue injury, DVT  Independently Interpreted Test(s):   I have ordered and independently interpreted X-rays - see summary below.       <> Summary of X-Ray Reading(s): Plain films reviewed and interpreted by me- no acute process visualized  Clinical Tests:   Radiological Study: Ordered and Reviewed  Urgent Care Management:  Patient presents with complaints of left lower leg pain for the past 5 days. On exam, she is afebrile and nontoxic appearing. There is mild swelling to the left knee and mild LE edema but no different than right LE. She has a long standing history of A-fib and currently on Eliquis. Plain films negative for acute process. DVT in the differential. I have ordered an outpatient ultrasound for her and advised close follow-up with PCP.         Patient Instructions   Ultrasound of the left leg is scheduled for tomorrow at [facility] main [facility]. This is ordered to rule  out a blood clot in the leg. Follow-up with your primary care provider if pain persists.     If not allergic,take tylenol (acetominophen) for fever control, chills, or body aches every 4 hours. Do not exceed 4000 mg/ day.      You must understand that you've received an Urgent Care treatment only and that you may be released before all your medical problems are known or treated. You, the patient, will arrange for follow up care as instructed.      Follow up with your PCP or specialty clinic as instructed in the next 2-3 days if not improved or as needed. You can call [phone] to schedule an appointment with appropriate provider.      If you condition worsens, we recommend that you receive another evaluation at the emergency room immediately or contact your primary medical clinic's after hours call service to discuss your concerns.      Please return here or go to the Emergency Department for any concerns or worsening condition.

## 2022-05-31 NOTE — Clinical Note
Evaluated patient in urgent care this morning. Left lower leg pain, mild swelling to the knee. DVT study ordered due to long standing history of A-fib (on Eliquis). No fracture.

## 2022-05-31 NOTE — PATIENT INSTRUCTIONS
Ultrasound of the left leg is scheduled for tomorrow at [facility] main [facility]. This is ordered to rule out a blood clot in the leg. Follow-up with your primary care provider if pain persists.     If not allergic,take tylenol (acetominophen) for fever control, chills, or body aches every 4 hours. Do not exceed 4000 mg/ day.      You must understand that you've received an Urgent Care treatment only and that you may be released before all your medical problems are known or treated. You, the patient, will arrange for follow up care as instructed.      Follow up with your PCP or specialty clinic as instructed in the next 2-3 days if not improved or as needed. You can call [phone] to schedule an appointment with appropriate provider.      If you condition worsens, we recommend that you receive another evaluation at the emergency room immediately or contact your primary medical clinic's after hours call service to discuss your concerns.      Please return here or go to the Emergency Department for any concerns or worsening condition.

## 2022-06-01 ENCOUNTER — HOSPITAL ENCOUNTER (OUTPATIENT)
Dept: CARDIOLOGY | Facility: HOSPITAL | Age: 87
Discharge: HOME | End: 2022-06-01
Attending: PHYSICIAN ASSISTANT
Payer: MEDICARE

## 2022-06-01 DIAGNOSIS — M79.605 LEFT LEG PAIN: ICD-10-CM

## 2022-06-01 PROCEDURE — 93971 EXTREMITY STUDY: CPT | Mod: LT

## 2022-06-01 PROCEDURE — 93971 EXTREMITY STUDY: CPT | Mod: 26,LT,, | Performed by: INTERNAL MEDICINE

## 2022-06-01 PROCEDURE — 93971 CV US DOPPLER VENOUS LEG LEFT (CUPID ONLY): ICD-10-PCS | Mod: 26,LT,, | Performed by: INTERNAL MEDICINE

## 2022-06-02 ENCOUNTER — TELEPHONE (OUTPATIENT)
Dept: URGENT CARE | Facility: CLINIC | Age: 87
End: 2022-06-02
Payer: MEDICARE

## 2022-06-03 ENCOUNTER — TELEPHONE (OUTPATIENT)
Dept: FAMILY MEDICINE | Facility: CLINIC | Age: 87
End: 2022-06-03
Payer: MEDICARE

## 2022-06-03 NOTE — TELEPHONE ENCOUNTER
Spoke to patient she is having knee pain she went to  had xray which is normal and she also had US and its also normal. Patient states she was to stay of knee and take Tylenol. She will come in to see Dr Hansen on 6/14/2022. Till then she will rest her leg and use pain patch that she has. Patient voiced understanding

## 2022-06-03 NOTE — TELEPHONE ENCOUNTER
----- Message from Dana Graves sent at 6/3/2022 10:52 AM CDT -----  Type:  Patient Returning Call    Who Called:Pt  Would the patient rather a call back or a response via [employer]? Call back  Best Call Back Number:[phone]  Additional Information:     Pt would like a call back

## 2022-06-04 ENCOUNTER — HOSPITAL ENCOUNTER (EMERGENCY)
Facility: HOSPITAL | Age: 87
Discharge: HOME | End: 2022-06-04
Attending: EMERGENCY MEDICINE
Payer: MEDICARE

## 2022-06-04 VITALS
WEIGHT: 175 LBS | HEART RATE: 70 BPM | SYSTOLIC BLOOD PRESSURE: 132 MMHG | BODY MASS INDEX: 28.12 KG/M2 | OXYGEN SATURATION: 99 % | TEMPERATURE: 98 F | DIASTOLIC BLOOD PRESSURE: 68 MMHG | RESPIRATION RATE: 18 BRPM | HEIGHT: 66 IN

## 2022-06-04 DIAGNOSIS — M25.562 ACUTE PAIN OF LEFT KNEE: Primary | ICD-10-CM

## 2022-06-04 PROCEDURE — 99282 EMERGENCY DEPT VISIT SF MDM: CPT

## 2022-06-04 NOTE — ED NOTES
"Pt to ED with c/o L knee and leg pain x 1 week. Pt reports she has been seen at  and [facility] for the same complaint. She has had x-rays and ultrasounds done and reports they were "normal results". She states that she lives at an independent living facility and this pain is hindering her ambulation and ability to perform ADL's. Utilizes a rollator and cane for help with ambulation.  "

## 2022-06-04 NOTE — DISCHARGE INSTRUCTIONS

## 2022-06-04 NOTE — ED PROVIDER NOTES
Encounter Date: 6/4/2022       History     Chief Complaint   Patient presents with    Knee Pain     Patient complains of left knee pain and swelling x 1 week - denies fall or injury - states x-ray and ultrasound done but pain continues in spite of medication - worse when ambulating       89 yr old female presents to the Er with left knee pain. Pt states she went to see urgent care which an ultrasound was ordered and xrays. Reports US was negative for DVT and no fractures on xray. Reports pain is not improving and wanted to have MRI today. NO fever, rash noted. No erythema to th site.     The history is provided by the patient and a relative.     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    left    Valvular regurgitation     moderate MR     Past Surgical History:   Procedure Laterality Date    APPENDECTOMY      BRAIN SURGERY  2002    meningioma    BREAST BIOPSY  2/2014    Right breast- [facility]    BREAST CYST EXCISION  1960    left breast - benign    BREAST SURGERY  03/17/14    right mastectomy    HEMORRHOID SURGERY      HYSTERECTOMY      MASTECTOMY Right     TONSILLECTOMY       Family History   Problem Relation Age of Onset    Breast cancer Neg Hx     Ovarian cancer Neg Hx     Melanoma Neg Hx      Social History     Tobacco Use    Smoking status: Never Smoker    Smokeless tobacco: Never Used   Substance Use Topics    Alcohol use: No    Drug use: No     Review of Systems   Constitutional: Negative for fever.   Gastrointestinal: Negative for diarrhea, nausea and vomiting.   Genitourinary: Negative for dysuria.   Musculoskeletal: Negative for neck pain  and neck stiffness.        Left knee pain     Skin: Negative for rash and wound.       Physical Exam     Initial Vitals [06/04/22 1239]   BP Pulse Resp Temp SpO2   131/60 74 18 98.4 °F (36.9 °C) 96 %      MAP       --         Physical Exam    Constitutional: She appears well-developed and well-nourished.   HENT:   Head: Normocephalic.   Right Ear: Hearing and tympanic membrane normal.   Left Ear: Hearing and tympanic membrane normal.   Nose: Nose normal.   Mouth/Throat: Oropharynx is clear and moist.   Eyes: Lids are normal. Pupils are equal, round, and reactive to light.   Neck:   Normal range of motion.  Cardiovascular: Normal rate.   Pulmonary/Chest: Breath sounds normal. No respiratory distress. She has no wheezes. She has no rhonchi.   Abdominal: Abdomen is soft. There is no abdominal tenderness.   Musculoskeletal:         General: Normal range of motion.      Cervical back: Normal range of motion. No rigidity.      Left knee: No deformity, effusion, erythema, ecchymosis, lacerations or bony tenderness. Normal pulse.        Legs:       Comments: FULL ROM however states the pain is present with weight bearing. There is swelling to bilateral lower exts. Pt reports this is chronic and no changes.      Neurological: She is alert and oriented to person, place, and time.   Skin: Skin is warm and dry. No rash noted.   Psychiatric: She has a normal mood and affect. Her behavior is normal. Judgment and thought content normal.         ED Course   Procedures  Labs Reviewed - No data to display       Imaging Results    None          Medications - No data to display  Medical Decision Making:   Clinical Tests:   Radiological Study: Ordered and Reviewed             ED Course as of 06/04/22 1306   Sat Jun 04, 2022   1259 Right knee: There is DJD, mild varus deformity and chondrocalcinosis.  No fracture dislocation bone destruction or OCD seen.    [DT]   1301 Pt notified of the need for follow up care with ortho in which pt was  requesting MRI here today. Notified that needs to be schedule and will refer to ortho to have this done and evaluated. Pt is not toxic, does not want any meds for pain outside of tylenol as she lives alone and is scared of falling.  [DT]      ED Course User Index  [DT] Misty Becerril NP             Clinical Impression:   Final diagnoses:  [M25.562] Acute pain of left knee (Primary)          ED Disposition Condition    Discharge Stable        ED Prescriptions     None        Follow-up Information     Follow up With Specialties Details Why Contact Info    Jacobo Mcleod MD Family Medicine Schedule an appointment as soon as possible for a visit in 2 days  [address] [facility] [address]  [phone]             Misty Becerril NP  06/04/22 7123

## 2022-06-07 ENCOUNTER — TELEPHONE (OUTPATIENT)
Dept: FAMILY MEDICINE | Facility: CLINIC | Age: 87
End: 2022-06-07
Payer: MEDICARE

## 2022-06-07 NOTE — TELEPHONE ENCOUNTER
Spoke with patient and sent Amin a message to schedule orthopedic appointment . Patient voiced understanding.

## 2022-06-07 NOTE — TELEPHONE ENCOUNTER
----- Message from Dean Javed sent at 6/7/2022 10:02 AM CDT -----  Contact: pt  Type: Requesting to speak with nurse        Who Called: PT  Regarding: would like a call back   Would the patient rather a call back or a response via [employer]? Call back  Best Call Back Number: [phone]  Additional Information: states she having pain

## 2022-06-14 ENCOUNTER — LAB VISIT (OUTPATIENT)
Dept: LAB | Facility: HOSPITAL | Age: 87
End: 2022-06-14
Attending: FAMILY MEDICINE
Payer: MEDICARE

## 2022-06-14 ENCOUNTER — OFFICE VISIT (OUTPATIENT)
Dept: FAMILY MEDICINE | Facility: CLINIC | Age: 87
End: 2022-06-14
Payer: MEDICARE

## 2022-06-14 VITALS
WEIGHT: 174.38 LBS | BODY MASS INDEX: 28.03 KG/M2 | HEART RATE: 78 BPM | HEIGHT: 66 IN | SYSTOLIC BLOOD PRESSURE: 128 MMHG | DIASTOLIC BLOOD PRESSURE: 70 MMHG | OXYGEN SATURATION: 96 %

## 2022-06-14 DIAGNOSIS — I50.32 CHRONIC HEART FAILURE WITH PRESERVED EJECTION FRACTION: ICD-10-CM

## 2022-06-14 DIAGNOSIS — I48.0 PAROXYSMAL ATRIAL FIBRILLATION: ICD-10-CM

## 2022-06-14 DIAGNOSIS — N18.32 STAGE 3B CHRONIC KIDNEY DISEASE: ICD-10-CM

## 2022-06-14 DIAGNOSIS — M17.12 PRIMARY OSTEOARTHRITIS OF LEFT KNEE: Primary | ICD-10-CM

## 2022-06-14 PROBLEM — C50.911 BREAST CANCER, STAGE 1, RIGHT: Status: RESOLVED | Noted: 2018-05-01 | Resolved: 2022-06-14

## 2022-06-14 LAB
ALBUMIN SERPL BCP-MCNC: 4.3 G/DL (ref 3.5–5.2)
ALP SERPL-CCNC: 95 U/L (ref 55–135)
ALT SERPL W/O P-5'-P-CCNC: 12 U/L (ref 10–44)
ANION GAP SERPL CALC-SCNC: 13 MMOL/L (ref 8–16)
AST SERPL-CCNC: 22 U/L (ref 10–40)
BASOPHILS # BLD AUTO: 0.05 K/UL (ref 0–0.2)
BASOPHILS NFR BLD: 0.7 % (ref 0–1.9)
BILIRUB SERPL-MCNC: 1.2 MG/DL (ref 0.1–1)
BUN SERPL-MCNC: 20 MG/DL (ref 10–30)
CALCIUM SERPL-MCNC: 9.8 MG/DL (ref 8.7–10.5)
CHLORIDE SERPL-SCNC: 101 MMOL/L (ref 95–110)
CHOLEST SERPL-MCNC: 171 MG/DL (ref 120–199)
CHOLEST/HDLC SERPL: 3.5 {RATIO} (ref 2–5)
CO2 SERPL-SCNC: 25 MMOL/L (ref 23–29)
CREAT SERPL-MCNC: 1.2 MG/DL (ref 0.5–1.4)
DIFFERENTIAL METHOD: ABNORMAL
EOSINOPHIL # BLD AUTO: 0.1 K/UL (ref 0–0.5)
EOSINOPHIL NFR BLD: 1.7 % (ref 0–8)
ERYTHROCYTE [DISTWIDTH] IN BLOOD BY AUTOMATED COUNT: 14.9 % (ref 11.5–14.5)
EST. GFR  (AFRICAN AMERICAN): 43.8 ML/MIN/1.73 M^2
EST. GFR  (NON AFRICAN AMERICAN): 38 ML/MIN/1.73 M^2
GLUCOSE SERPL-MCNC: 107 MG/DL (ref 70–110)
HCT VFR BLD AUTO: 42.8 % (ref 37–48.5)
HDLC SERPL-MCNC: 49 MG/DL (ref 40–75)
HDLC SERPL: 28.7 % (ref 20–50)
HGB BLD-MCNC: 13.5 G/DL (ref 12–16)
IMM GRANULOCYTES # BLD AUTO: 0.05 K/UL (ref 0–0.04)
IMM GRANULOCYTES NFR BLD AUTO: 0.7 % (ref 0–0.5)
LDLC SERPL CALC-MCNC: 102.4 MG/DL (ref 63–159)
LYMPHOCYTES # BLD AUTO: 0.7 K/UL (ref 1–4.8)
LYMPHOCYTES NFR BLD: 10.5 % (ref 18–48)
MCH RBC QN AUTO: 29.1 PG (ref 27–31)
MCHC RBC AUTO-ENTMCNC: 31.5 G/DL (ref 32–36)
MCV RBC AUTO: 92 FL (ref 82–98)
MONOCYTES # BLD AUTO: 0.4 K/UL (ref 0.3–1)
MONOCYTES NFR BLD: 6.1 % (ref 4–15)
NEUTROPHILS # BLD AUTO: 5.7 K/UL (ref 1.8–7.7)
NEUTROPHILS NFR BLD: 80.3 % (ref 38–73)
NONHDLC SERPL-MCNC: 122 MG/DL
NRBC BLD-RTO: 0 /100 WBC
PLATELET # BLD AUTO: 245 K/UL (ref 150–450)
PMV BLD AUTO: 10.1 FL (ref 9.2–12.9)
POTASSIUM SERPL-SCNC: 4.6 MMOL/L (ref 3.5–5.1)
PROT SERPL-MCNC: 7.5 G/DL (ref 6–8.4)
RBC # BLD AUTO: 4.64 M/UL (ref 4–5.4)
SODIUM SERPL-SCNC: 139 MMOL/L (ref 136–145)
TRIGL SERPL-MCNC: 98 MG/DL (ref 30–150)
TSH SERPL DL<=0.005 MIU/L-ACNC: 2 UIU/ML (ref 0.4–4)
WBC # BLD AUTO: 7.07 K/UL (ref 3.9–12.7)

## 2022-06-14 PROCEDURE — 1101F PT FALLS ASSESS-DOCD LE1/YR: CPT | Mod: CPTII,S$GLB,, | Performed by: FAMILY MEDICINE

## 2022-06-14 PROCEDURE — 99499 UNLISTED E&M SERVICE: CPT | Mod: S$GLB,,, | Performed by: FAMILY MEDICINE

## 2022-06-14 PROCEDURE — 3288F PR FALLS RISK ASSESSMENT DOCUMENTED: ICD-10-PCS | Mod: CPTII,S$GLB,, | Performed by: FAMILY MEDICINE

## 2022-06-14 PROCEDURE — 99215 PR OFFICE/OUTPT VISIT, EST, LEVL V, 40-54 MIN: ICD-10-PCS | Mod: S$GLB,,, | Performed by: FAMILY MEDICINE

## 2022-06-14 PROCEDURE — 80061 LIPID PANEL: CPT | Performed by: FAMILY MEDICINE

## 2022-06-14 PROCEDURE — 99215 OFFICE O/P EST HI 40 MIN: CPT | Mod: S$GLB,,, | Performed by: FAMILY MEDICINE

## 2022-06-14 PROCEDURE — 1101F PR PT FALLS ASSESS DOC 0-1 FALLS W/OUT INJ PAST YR: ICD-10-PCS | Mod: CPTII,S$GLB,, | Performed by: FAMILY MEDICINE

## 2022-06-14 PROCEDURE — 99499 RISK ADDL DX/OHS AUDIT: ICD-10-PCS | Mod: S$GLB,,, | Performed by: FAMILY MEDICINE

## 2022-06-14 PROCEDURE — 99999 PR PBB SHADOW E&M-EST. PATIENT-LVL III: CPT | Mod: PBBFAC,,, | Performed by: FAMILY MEDICINE

## 2022-06-14 PROCEDURE — 1125F PR PAIN SEVERITY QUANTIFIED, PAIN PRESENT: ICD-10-PCS | Mod: CPTII,S$GLB,, | Performed by: FAMILY MEDICINE

## 2022-06-14 PROCEDURE — 84443 ASSAY THYROID STIM HORMONE: CPT | Performed by: FAMILY MEDICINE

## 2022-06-14 PROCEDURE — 1125F AMNT PAIN NOTED PAIN PRSNT: CPT | Mod: CPTII,S$GLB,, | Performed by: FAMILY MEDICINE

## 2022-06-14 PROCEDURE — 36415 COLL VENOUS BLD VENIPUNCTURE: CPT | Mod: PO | Performed by: FAMILY MEDICINE

## 2022-06-14 PROCEDURE — 80053 COMPREHEN METABOLIC PANEL: CPT | Performed by: FAMILY MEDICINE

## 2022-06-14 PROCEDURE — 3288F FALL RISK ASSESSMENT DOCD: CPT | Mod: CPTII,S$GLB,, | Performed by: FAMILY MEDICINE

## 2022-06-14 PROCEDURE — 85025 COMPLETE CBC W/AUTO DIFF WBC: CPT | Performed by: FAMILY MEDICINE

## 2022-06-14 PROCEDURE — 99999 PR PBB SHADOW E&M-EST. PATIENT-LVL III: ICD-10-PCS | Mod: PBBFAC,,, | Performed by: FAMILY MEDICINE

## 2022-06-14 PROCEDURE — 1157F PR ADVANCE CARE PLAN OR EQUIV PRESENT IN MEDICAL RECORD: ICD-10-PCS | Mod: CPTII,S$GLB,, | Performed by: FAMILY MEDICINE

## 2022-06-14 PROCEDURE — 1157F ADVNC CARE PLAN IN RCRD: CPT | Mod: CPTII,S$GLB,, | Performed by: FAMILY MEDICINE

## 2022-06-14 RX ORDER — PREDNISONE 5 MG/1
5 TABLET ORAL 2 TIMES DAILY
Qty: 14 TABLET | Refills: 0 | Status: SHIPPED | OUTPATIENT
Start: 2022-06-14 | End: 2022-06-21

## 2022-06-14 NOTE — PROGRESS NOTES
Subjective:       Patient ID: Ngoc Castellanos is a 89 y.o. female.    Chief Complaint: Follow-up, Hypertension, and Knee Pain    89 years old female came to the clinic with left knee pain for the last couple of months.  The pain is 6/10 of intensity on and off aggravated with activity better with rest.  No recent flare ups of heart failure or atrial fibrillation.  Patient with decreased kidney function but stable in comparison with previous reports .    Review of Systems   Constitutional: Negative.  Negative for activity change, fatigue and fever.   HENT: Negative.  Negative for nasal congestion, dental problem, ear discharge, ear pain, hearing loss, postnasal drip, rhinorrhea, sore throat and voice change.    Eyes: Negative.  Negative for pain, discharge, itching and visual disturbance.   Respiratory: Negative.  Negative for cough, chest tightness, shortness of breath and wheezing.    Cardiovascular: Negative for chest pain, palpitations, leg swelling and claudication.   Gastrointestinal: Negative.  Negative for abdominal pain, blood in stool, diarrhea, nausea and vomiting.   Genitourinary: Negative.  Negative for difficulty urinating, dyspareunia, dysuria, hematuria, menstrual problem, pelvic pain, urgency, vaginal bleeding, vaginal discharge and vaginal pain.   Musculoskeletal: Negative.  Negative for arthralgias and gait problem.   Integumentary:  Negative for wound.   Neurological: Negative.  Negative for dizziness and seizures.   Hematological: Negative for adenopathy. Does not bruise/bleed easily.   Psychiatric/Behavioral: Negative.  Negative for behavioral problems, decreased concentration, sleep disturbance and suicidal ideas. The patient is not nervous/anxious.          Objective:      Physical Exam  Constitutional:       General: She is not in acute distress.     Appearance: She is well-developed. She is not diaphoretic.   HENT:      Head: Normocephalic and atraumatic.      Right Ear: External ear normal.       Left Ear: External ear normal.      Nose: Nose normal.      Mouth/Throat:      Pharynx: No oropharyngeal exudate.   Eyes:      General: No scleral icterus.        Right eye: No discharge.         Left eye: No discharge.      Conjunctiva/sclera: Conjunctivae normal.      Pupils: Pupils are equal, round, and reactive to light.   Neck:      Thyroid: No thyromegaly.      Vascular: No JVD.      Trachea: No tracheal deviation.   Cardiovascular:      Rate and Rhythm: Normal rate and regular rhythm.      Heart sounds: Normal heart sounds. No murmur heard.    No friction rub. No gallop.   Pulmonary:      Effort: Pulmonary effort is normal. No respiratory distress.      Breath sounds: Normal breath sounds. No stridor. No wheezing or rales.   Chest:      Chest wall: No tenderness.   Abdominal:      General: Bowel sounds are normal. There is no distension.      Palpations: Abdomen is soft. There is no mass.      Tenderness: There is no abdominal tenderness. There is no guarding or rebound.   Musculoskeletal:      Cervical back: Normal range of motion and neck supple.      Left knee: Swelling present. Decreased range of motion. Tenderness present.   Lymphadenopathy:      Cervical: No cervical adenopathy.   Skin:     General: Skin is warm and dry.      Coloration: Skin is not pale.      Findings: No erythema or rash.   Neurological:      Mental Status: She is alert and oriented to person, place, and time.      Cranial Nerves: No cranial nerve deficit.      Motor: No abnormal muscle tone.      Coordination: Coordination abnormal.      Gait: Gait abnormal.      Deep Tendon Reflexes: Reflexes are normal and symmetric.   Psychiatric:         Behavior: Behavior normal.         Thought Content: Thought content normal.         Judgment: Judgment normal.         Assessment:       Problem List Items Addressed This Visit     Stage 3b chronic kidney disease    Chronic heart failure with preserved ejection fraction    Relevant Orders     CBC Auto Differential    Comprehensive Metabolic Panel    Lipid Panel    Urinalysis    TSH      Other Visit Diagnoses     Primary osteoarthritis of left knee    -  Primary    Relevant Medications    predniSONE (DELTASONE) 5 MG tablet    Paroxysmal atrial fibrillation              Plan:         Ngoc was seen today for follow-up, hypertension and knee pain.    Diagnoses and all orders for this visit:    Primary osteoarthritis of left knee  -     predniSONE (DELTASONE) 5 MG tablet; Take 1 tablet (5 mg total) by mouth 2 (two) times daily. for 7 days    Chronic heart failure with preserved ejection fraction  -     CBC Auto Differential; Future  -     Comprehensive Metabolic Panel; Future  -     Lipid Panel; Future  -     Urinalysis; Future  -     TSH; Future    Paroxysmal atrial fibrillation    Stage 3b chronic kidney disease

## 2022-06-15 ENCOUNTER — LAB VISIT (OUTPATIENT)
Dept: LAB | Facility: HOSPITAL | Age: 87
End: 2022-06-15
Attending: FAMILY MEDICINE
Payer: MEDICARE

## 2022-06-15 DIAGNOSIS — I50.32 CHRONIC HEART FAILURE WITH PRESERVED EJECTION FRACTION: ICD-10-CM

## 2022-06-15 LAB
BACTERIA #/AREA URNS AUTO: NORMAL /HPF
BILIRUB UR QL STRIP: NEGATIVE
CLARITY UR REFRACT.AUTO: CLEAR
COLOR UR AUTO: YELLOW
GLUCOSE UR QL STRIP: NEGATIVE
HGB UR QL STRIP: NEGATIVE
KETONES UR QL STRIP: NEGATIVE
LEUKOCYTE ESTERASE UR QL STRIP: NEGATIVE
MICROSCOPIC COMMENT: NORMAL
NITRITE UR QL STRIP: NEGATIVE
PH UR STRIP: 6 [PH] (ref 5–8)
PROT UR QL STRIP: NEGATIVE
RBC #/AREA URNS AUTO: 4 /HPF (ref 0–4)
SP GR UR STRIP: 1.02 (ref 1–1.03)
URN SPEC COLLECT METH UR: NORMAL
WBC #/AREA URNS AUTO: 1 /HPF (ref 0–5)

## 2022-06-15 PROCEDURE — 81001 URINALYSIS AUTO W/SCOPE: CPT | Performed by: FAMILY MEDICINE

## 2022-06-21 ENCOUNTER — OFFICE VISIT (OUTPATIENT)
Dept: ORTHOPEDICS | Facility: CLINIC | Age: 87
End: 2022-06-21
Payer: MEDICARE

## 2022-06-21 VITALS
WEIGHT: 176.38 LBS | DIASTOLIC BLOOD PRESSURE: 73 MMHG | BODY MASS INDEX: 28.34 KG/M2 | SYSTOLIC BLOOD PRESSURE: 135 MMHG | HEART RATE: 81 BPM | HEIGHT: 66 IN

## 2022-06-21 DIAGNOSIS — M25.562 ACUTE PAIN OF LEFT KNEE: ICD-10-CM

## 2022-06-21 DIAGNOSIS — M62.81 QUADRICEPS WEAKNESS: ICD-10-CM

## 2022-06-21 DIAGNOSIS — M17.12 PRIMARY OSTEOARTHRITIS OF LEFT KNEE: Primary | ICD-10-CM

## 2022-06-21 PROCEDURE — 99204 PR OFFICE/OUTPT VISIT, NEW, LEVL IV, 45-59 MIN: ICD-10-PCS | Mod: 25,S$GLB,, | Performed by: ORTHOPAEDIC SURGERY

## 2022-06-21 PROCEDURE — 99999 PR PBB SHADOW E&M-EST. PATIENT-LVL IV: ICD-10-PCS | Mod: PBBFAC,,, | Performed by: ORTHOPAEDIC SURGERY

## 2022-06-21 PROCEDURE — 1157F PR ADVANCE CARE PLAN OR EQUIV PRESENT IN MEDICAL RECORD: ICD-10-PCS | Mod: CPTII,S$GLB,, | Performed by: ORTHOPAEDIC SURGERY

## 2022-06-21 PROCEDURE — 1159F PR MEDICATION LIST DOCUMENTED IN MEDICAL RECORD: ICD-10-PCS | Mod: CPTII,S$GLB,, | Performed by: ORTHOPAEDIC SURGERY

## 2022-06-21 PROCEDURE — 1159F MED LIST DOCD IN RCRD: CPT | Mod: CPTII,S$GLB,, | Performed by: ORTHOPAEDIC SURGERY

## 2022-06-21 PROCEDURE — 1101F PT FALLS ASSESS-DOCD LE1/YR: CPT | Mod: CPTII,S$GLB,, | Performed by: ORTHOPAEDIC SURGERY

## 2022-06-21 PROCEDURE — 1157F ADVNC CARE PLAN IN RCRD: CPT | Mod: CPTII,S$GLB,, | Performed by: ORTHOPAEDIC SURGERY

## 2022-06-21 PROCEDURE — 1101F PR PT FALLS ASSESS DOC 0-1 FALLS W/OUT INJ PAST YR: ICD-10-PCS | Mod: CPTII,S$GLB,, | Performed by: ORTHOPAEDIC SURGERY

## 2022-06-21 PROCEDURE — 99999 PR PBB SHADOW E&M-EST. PATIENT-LVL IV: CPT | Mod: PBBFAC,,, | Performed by: ORTHOPAEDIC SURGERY

## 2022-06-21 PROCEDURE — 1125F PR PAIN SEVERITY QUANTIFIED, PAIN PRESENT: ICD-10-PCS | Mod: CPTII,S$GLB,, | Performed by: ORTHOPAEDIC SURGERY

## 2022-06-21 PROCEDURE — 1125F AMNT PAIN NOTED PAIN PRSNT: CPT | Mod: CPTII,S$GLB,, | Performed by: ORTHOPAEDIC SURGERY

## 2022-06-21 PROCEDURE — 20610 DRAIN/INJ JOINT/BURSA W/O US: CPT | Mod: LT,S$GLB,, | Performed by: ORTHOPAEDIC SURGERY

## 2022-06-21 PROCEDURE — 3288F FALL RISK ASSESSMENT DOCD: CPT | Mod: CPTII,S$GLB,, | Performed by: ORTHOPAEDIC SURGERY

## 2022-06-21 PROCEDURE — 99204 OFFICE O/P NEW MOD 45 MIN: CPT | Mod: 25,S$GLB,, | Performed by: ORTHOPAEDIC SURGERY

## 2022-06-21 PROCEDURE — 20610 LARGE JOINT ASPIRATION/INJECTION: L KNEE: ICD-10-PCS | Mod: LT,S$GLB,, | Performed by: ORTHOPAEDIC SURGERY

## 2022-06-21 PROCEDURE — 3288F PR FALLS RISK ASSESSMENT DOCUMENTED: ICD-10-PCS | Mod: CPTII,S$GLB,, | Performed by: ORTHOPAEDIC SURGERY

## 2022-06-21 RX ORDER — KETOROLAC TROMETHAMINE 30 MG/ML
30 INJECTION, SOLUTION INTRAMUSCULAR; INTRAVENOUS
Status: DISCONTINUED | OUTPATIENT
Start: 2022-06-21 | End: 2022-06-21 | Stop reason: HOSPADM

## 2022-06-21 RX ORDER — BUPIVACAINE HYDROCHLORIDE 5 MG/ML
5 INJECTION, SOLUTION PERINEURAL
Status: DISCONTINUED | OUTPATIENT
Start: 2022-06-21 | End: 2022-06-21 | Stop reason: HOSPADM

## 2022-06-21 RX ORDER — LIDOCAINE HYDROCHLORIDE 10 MG/ML
4 INJECTION INFILTRATION; PERINEURAL
Status: DISCONTINUED | OUTPATIENT
Start: 2022-06-21 | End: 2022-06-21 | Stop reason: HOSPADM

## 2022-06-21 RX ADMIN — KETOROLAC TROMETHAMINE 30 MG: 30 INJECTION, SOLUTION INTRAMUSCULAR; INTRAVENOUS at 03:06

## 2022-06-21 RX ADMIN — LIDOCAINE HYDROCHLORIDE 4 ML: 10 INJECTION INFILTRATION; PERINEURAL at 03:06

## 2022-06-21 RX ADMIN — BUPIVACAINE HYDROCHLORIDE 5 ML: 5 INJECTION, SOLUTION PERINEURAL at 03:06

## 2022-06-21 NOTE — PROCEDURES
Large Joint Aspiration/Injection: L knee    Date/Time: 6/21/2022 3:00 PM  Performed by: Froilan Lomax MD  Authorized by: Froilan Lomax MD     Consent Done?:  Yes (Verbal)  Indications:  Arthritis  Site marked: the procedure site was marked    Timeout: prior to procedure the correct patient, procedure, and site was verified    Prep: patient was prepped and draped in usual sterile fashion      Local anesthesia used?: Yes    Local anesthetic:  Topical anesthetic and lidocaine 1% without epinephrine    Details:  Needle Size:  22 G  Ultrasonic Guidance for needle placement?: No    Approach:  Anteromedial  Location:  Knee  Site:  L knee  Medications:  4 mL LIDOcaine HCL 10 mg/ml (1%) 10 mg/mL (1 %); 5 mL BUPivacaine 0.5 % (5 mg/mL); 30 mg ketorolac 30 mg/mL (1 mL)  Patient tolerance:  Patient tolerated the procedure well with no immediate complications

## 2022-06-21 NOTE — PROGRESS NOTES
Subjective:      Patient ID: Ngoc Castellanos is a 89 y.o. female.    Chief Complaint: Pain of the Left Knee      Patient ID: Ngoc Castellanos is a 89 y.o. female.    Chief Complaint: Pain of the Left Knee      KNEE PAIN: Complains of pain to the leftknee.   PAIN LOCATED: anterior  ONSET: 5 weeks ago.  QUALITY:  Patient states the pain is improving  HISTORY: Previous knee injury/surgery: no  Hx:   ASSOCIATED SYMPTOM AND TRIGGERS:Standing/Weightbearing, walking, trouble w stairs, stiffness, swelling, limping, stiffness w sitting , Knee gives way and Knee Feels Unsteady  USES ASSISTIVE DEVICE: walker  RELIEVED BY:rest, heat, ice, medication: Tylenol used and beneficial  PATIENT DENIES: bruising, redness, deformity         Social History     Occupational History    Not on file   Tobacco Use    Smoking status: Never Smoker    Smokeless tobacco: Never Used   Substance and Sexual Activity    Alcohol use: No    Drug use: No    Sexual activity: Not on file      Review of Systems   Constitutional: Negative for diaphoresis.   HENT: Negative for ear discharge, nosebleeds and stridor.    Eyes: Negative for photophobia.   Cardiovascular: Negative for syncope.   Respiratory: Negative for hemoptysis, shortness of breath and wheezing.    Neurological: Negative for tremors.   Psychiatric/Behavioral: Negative.          Objective:    General    Constitutional: She is oriented to person, place, and time. She appears well-developed and well-nourished.   HENT:   Head: Normocephalic and atraumatic.   Right Ear: External ear normal.   Left Ear: External ear normal.   Eyes: EOM are normal.   Pulmonary/Chest: Effort normal.   Neurological: She is alert and oriented to person, place, and time.   Psychiatric: She has a normal mood and affect. Her behavior is normal. Judgment and thought content normal.     General Musculoskeletal Exam   Gait: antalgic and abnormal       Right Knee Exam     Range of Motion   Extension: 0   Flexion: 120      Left Knee Exam     Inspection   Swelling: present  Effusion: present    Tenderness   The patient tender to palpation of the medial joint line.    Crepitus   The patient has crepitus of the patella.    Range of Motion   Extension: 10   Flexion: 110     Tests   Meniscus   Mihcelle:  Medial - positive   Stability   MCL - Valgus: normal (0 to 2mm)  LCL - Varus: normal (0 to 2mm)    Other   Sensation: normal    Muscle Strength   Left Lower Extremity   Quadriceps:  4/5   Hamstrin/5          Assessment:       1. Primary osteoarthritis of left knee    2. Acute pain of left knee    3. Quadriceps weakness          Plan:       we injected the left knee w 1cc of ketorolac, marcaine and lidocaine under sterile conditions with the patient's informed consent for mild/moderate knee oa. Will also order durolane injections.   we will try a course of PT before ordering an MRI. Patient should f/u with me after approx 1 mo of PT to consider an MRI at that point for possible meniscal pathology

## 2022-06-28 ENCOUNTER — TELEPHONE (OUTPATIENT)
Dept: FAMILY MEDICINE | Facility: CLINIC | Age: 87
End: 2022-06-28
Payer: MEDICARE

## 2022-06-28 NOTE — TELEPHONE ENCOUNTER
----- Message from Dana Graves sent at 6/28/2022  8:54 AM CDT -----  Contact: Pt  Type:  Test Results    Who Called: pt   Name of Test (Lab/Mammo/Etc): lab  Date of Test: 06/14  Ordering Provider: marisa  Where the test was performed: [facility]   Would the patient rather a call back or a response via [facility]?call   Best Call Back Number: [phone]  Additional Information:

## 2022-06-29 NOTE — TELEPHONE ENCOUNTER
Spoke to patient and informed her that No anemia or infection.     Decreased kidney function but stable in comparison with previous reports .  Increase fluid intake.  Avoid over-the-counter medicines like naproxen or ibuprofen.     Normal cholesterol and triglycerides .     Thyroid test was normal.     Urine was normal. Patient voiced understanding

## 2022-06-29 NOTE — TELEPHONE ENCOUNTER
No anemia or infection.    Decreased kidney function but stable in comparison with previous reports .  Increase fluid intake.  Avoid over-the-counter medicines like naproxen or ibuprofen.    Normal cholesterol and triglycerides .    Thyroid test was normal.    Urine was normal.        Letter was sent.    Please notify the patient.

## 2022-07-06 ENCOUNTER — PES CALL (OUTPATIENT)
Dept: ADMINISTRATIVE | Facility: CLINIC | Age: 87
End: 2022-07-06
Payer: MEDICARE

## 2022-07-06 ENCOUNTER — EXTERNAL HOME HEALTH (OUTPATIENT)
Dept: HOME HEALTH SERVICES | Facility: HOSPITAL | Age: 87
End: 2022-07-06
Payer: MEDICARE

## 2022-07-19 DIAGNOSIS — M17.12 PRIMARY OSTEOARTHRITIS OF LEFT KNEE: Primary | ICD-10-CM

## 2022-07-26 ENCOUNTER — OFFICE VISIT (OUTPATIENT)
Dept: ORTHOPEDICS | Facility: CLINIC | Age: 87
End: 2022-07-26
Payer: MEDICARE

## 2022-07-26 VITALS
DIASTOLIC BLOOD PRESSURE: 74 MMHG | HEIGHT: 66 IN | WEIGHT: 176.69 LBS | BODY MASS INDEX: 28.4 KG/M2 | HEART RATE: 91 BPM | SYSTOLIC BLOOD PRESSURE: 125 MMHG

## 2022-07-26 DIAGNOSIS — M17.12 PRIMARY OSTEOARTHRITIS OF LEFT KNEE: Primary | ICD-10-CM

## 2022-07-26 PROCEDURE — 1159F PR MEDICATION LIST DOCUMENTED IN MEDICAL RECORD: ICD-10-PCS | Mod: CPTII,S$GLB,, | Performed by: ORTHOPAEDIC SURGERY

## 2022-07-26 PROCEDURE — 99999 PR PBB SHADOW E&M-EST. PATIENT-LVL III: ICD-10-PCS | Mod: PBBFAC,,, | Performed by: ORTHOPAEDIC SURGERY

## 2022-07-26 PROCEDURE — 99999 PR PBB SHADOW E&M-EST. PATIENT-LVL III: CPT | Mod: PBBFAC,,, | Performed by: ORTHOPAEDIC SURGERY

## 2022-07-26 PROCEDURE — 1157F PR ADVANCE CARE PLAN OR EQUIV PRESENT IN MEDICAL RECORD: ICD-10-PCS | Mod: CPTII,S$GLB,, | Performed by: ORTHOPAEDIC SURGERY

## 2022-07-26 PROCEDURE — 1101F PR PT FALLS ASSESS DOC 0-1 FALLS W/OUT INJ PAST YR: ICD-10-PCS | Mod: CPTII,S$GLB,, | Performed by: ORTHOPAEDIC SURGERY

## 2022-07-26 PROCEDURE — 1125F PR PAIN SEVERITY QUANTIFIED, PAIN PRESENT: ICD-10-PCS | Mod: CPTII,S$GLB,, | Performed by: ORTHOPAEDIC SURGERY

## 2022-07-26 PROCEDURE — 99499 UNLISTED E&M SERVICE: CPT | Mod: S$GLB,,, | Performed by: ORTHOPAEDIC SURGERY

## 2022-07-26 PROCEDURE — 3288F FALL RISK ASSESSMENT DOCD: CPT | Mod: CPTII,S$GLB,, | Performed by: ORTHOPAEDIC SURGERY

## 2022-07-26 PROCEDURE — 99499 NO LOS: ICD-10-PCS | Mod: S$GLB,,, | Performed by: ORTHOPAEDIC SURGERY

## 2022-07-26 PROCEDURE — 3288F PR FALLS RISK ASSESSMENT DOCUMENTED: ICD-10-PCS | Mod: CPTII,S$GLB,, | Performed by: ORTHOPAEDIC SURGERY

## 2022-07-26 PROCEDURE — 20610 DRAIN/INJ JOINT/BURSA W/O US: CPT | Mod: LT,S$GLB,, | Performed by: ORTHOPAEDIC SURGERY

## 2022-07-26 PROCEDURE — 1125F AMNT PAIN NOTED PAIN PRSNT: CPT | Mod: CPTII,S$GLB,, | Performed by: ORTHOPAEDIC SURGERY

## 2022-07-26 PROCEDURE — 1157F ADVNC CARE PLAN IN RCRD: CPT | Mod: CPTII,S$GLB,, | Performed by: ORTHOPAEDIC SURGERY

## 2022-07-26 PROCEDURE — 1101F PT FALLS ASSESS-DOCD LE1/YR: CPT | Mod: CPTII,S$GLB,, | Performed by: ORTHOPAEDIC SURGERY

## 2022-07-26 PROCEDURE — 1159F MED LIST DOCD IN RCRD: CPT | Mod: CPTII,S$GLB,, | Performed by: ORTHOPAEDIC SURGERY

## 2022-07-26 PROCEDURE — 20610 LARGE JOINT ASPIRATION/INJECTION: L KNEE: ICD-10-PCS | Mod: LT,S$GLB,, | Performed by: ORTHOPAEDIC SURGERY

## 2022-07-26 NOTE — PROCEDURES
Large Joint Aspiration/Injection: L knee    Date/Time: 7/26/2022 9:15 AM  Performed by: Froilan Lomax MD  Authorized by: Froilan Lomax MD     Consent Done?:  Yes (Verbal)  Indications:  Arthritis  Site marked: the procedure site was marked    Timeout: prior to procedure the correct patient, procedure, and site was verified    Prep: patient was prepped and draped in usual sterile fashion      Local anesthesia used?: Yes    Local anesthetic:  Topical anesthetic    Details:  Needle Size:  22 G  Ultrasonic Guidance for needle placement?: No    Approach:  Superior  Location:  Knee  Site:  L knee  Medications:  88 mg hyaluronate sodium, stabilized 88 mg/4 mL

## 2022-10-27 ENCOUNTER — OFFICE VISIT (OUTPATIENT)
Dept: ORTHOPEDICS | Facility: CLINIC | Age: 87
End: 2022-10-27
Payer: MEDICARE

## 2022-10-27 VITALS
WEIGHT: 178.38 LBS | BODY MASS INDEX: 28.67 KG/M2 | DIASTOLIC BLOOD PRESSURE: 83 MMHG | SYSTOLIC BLOOD PRESSURE: 131 MMHG | HEIGHT: 66 IN | HEART RATE: 88 BPM

## 2022-10-27 DIAGNOSIS — M17.12 PRIMARY OSTEOARTHRITIS OF LEFT KNEE: Primary | ICD-10-CM

## 2022-10-27 PROCEDURE — 99999 PR PBB SHADOW E&M-EST. PATIENT-LVL III: CPT | Mod: PBBFAC,,, | Performed by: ORTHOPAEDIC SURGERY

## 2022-10-27 PROCEDURE — 1157F ADVNC CARE PLAN IN RCRD: CPT | Mod: CPTII,S$GLB,, | Performed by: ORTHOPAEDIC SURGERY

## 2022-10-27 PROCEDURE — 1101F PR PT FALLS ASSESS DOC 0-1 FALLS W/OUT INJ PAST YR: ICD-10-PCS | Mod: CPTII,S$GLB,, | Performed by: ORTHOPAEDIC SURGERY

## 2022-10-27 PROCEDURE — 99213 OFFICE O/P EST LOW 20 MIN: CPT | Mod: S$GLB,,, | Performed by: ORTHOPAEDIC SURGERY

## 2022-10-27 PROCEDURE — 1126F PR PAIN SEVERITY QUANTIFIED, NO PAIN PRESENT: ICD-10-PCS | Mod: CPTII,S$GLB,, | Performed by: ORTHOPAEDIC SURGERY

## 2022-10-27 PROCEDURE — 1157F PR ADVANCE CARE PLAN OR EQUIV PRESENT IN MEDICAL RECORD: ICD-10-PCS | Mod: CPTII,S$GLB,, | Performed by: ORTHOPAEDIC SURGERY

## 2022-10-27 PROCEDURE — 1159F PR MEDICATION LIST DOCUMENTED IN MEDICAL RECORD: ICD-10-PCS | Mod: CPTII,S$GLB,, | Performed by: ORTHOPAEDIC SURGERY

## 2022-10-27 PROCEDURE — 99999 PR PBB SHADOW E&M-EST. PATIENT-LVL III: ICD-10-PCS | Mod: PBBFAC,,, | Performed by: ORTHOPAEDIC SURGERY

## 2022-10-27 PROCEDURE — 99213 PR OFFICE/OUTPT VISIT, EST, LEVL III, 20-29 MIN: ICD-10-PCS | Mod: S$GLB,,, | Performed by: ORTHOPAEDIC SURGERY

## 2022-10-27 PROCEDURE — 3288F PR FALLS RISK ASSESSMENT DOCUMENTED: ICD-10-PCS | Mod: CPTII,S$GLB,, | Performed by: ORTHOPAEDIC SURGERY

## 2022-10-27 PROCEDURE — 1126F AMNT PAIN NOTED NONE PRSNT: CPT | Mod: CPTII,S$GLB,, | Performed by: ORTHOPAEDIC SURGERY

## 2022-10-27 PROCEDURE — 1101F PT FALLS ASSESS-DOCD LE1/YR: CPT | Mod: CPTII,S$GLB,, | Performed by: ORTHOPAEDIC SURGERY

## 2022-10-27 PROCEDURE — 1159F MED LIST DOCD IN RCRD: CPT | Mod: CPTII,S$GLB,, | Performed by: ORTHOPAEDIC SURGERY

## 2022-10-27 PROCEDURE — 3288F FALL RISK ASSESSMENT DOCD: CPT | Mod: CPTII,S$GLB,, | Performed by: ORTHOPAEDIC SURGERY

## 2022-10-27 NOTE — PROGRESS NOTES
Subjective:      Patient ID: Ngoc Castellanos is a 89 y.o. female.    Chief Complaint: Pain of the Left Knee    Knee Pain: bilateral  swelling: no  worsens with activity: yes  relieved by: injections       Social History     Occupational History    Not on file   Tobacco Use    Smoking status: Never    Smokeless tobacco: Never   Substance and Sexual Activity    Alcohol use: No    Drug use: No    Sexual activity: Not on file      Review of Systems   Constitutional: Negative for diaphoresis.   HENT:  Negative for ear discharge, nosebleeds and stridor.    Eyes:  Negative for photophobia.   Cardiovascular:  Negative for syncope.   Respiratory:  Negative for hemoptysis, shortness of breath and wheezing.    Neurological:  Negative for tremors.   Psychiatric/Behavioral: Negative.         Objective:    General    Constitutional: She is oriented to person, place, and time. She appears well-developed and well-nourished.   HENT:   Head: Normocephalic and atraumatic.   Right Ear: External ear normal.   Left Ear: External ear normal.   Eyes: EOM are normal.   Cardiovascular:  Intact distal pulses.            Pulmonary/Chest: Effort normal.   Neurological: She is alert and oriented to person, place, and time.   Psychiatric: She has a normal mood and affect. Her behavior is normal. Judgment and thought content normal.     General Musculoskeletal Exam   Gait: antalgic and abnormal       Right Knee Exam     Inspection   Swelling: present  Effusion: present    Tenderness   The patient is tender to palpation of the medial joint line.    Crepitus   The patient has crepitus of the patella.    Range of Motion   Flexion:  abnormal     Other   Sensation: normal    Left Knee Exam     Inspection   Swelling: present  Effusion: present    Tenderness   The patient tender to palpation of the medial joint line.    Crepitus   The patient has crepitus of the patella.    Other   Sensation: normal    Muscle Strength   Right Lower Extremity   Quadriceps:   4/5   Hamstrin/5   Left Lower Extremity   Quadriceps:  4/5   Hamstrin/5        Assessment:       1. Primary osteoarthritis of left knee          Plan:       Doing well  Min pain  F/u 6 mos

## 2022-12-14 ENCOUNTER — OFFICE VISIT (OUTPATIENT)
Dept: FAMILY MEDICINE | Facility: CLINIC | Age: 87
End: 2022-12-14
Payer: MEDICARE

## 2022-12-14 VITALS
HEART RATE: 73 BPM | HEIGHT: 66 IN | BODY MASS INDEX: 28.27 KG/M2 | DIASTOLIC BLOOD PRESSURE: 82 MMHG | SYSTOLIC BLOOD PRESSURE: 146 MMHG | WEIGHT: 175.94 LBS | OXYGEN SATURATION: 96 %

## 2022-12-14 DIAGNOSIS — I48.19 PERSISTENT ATRIAL FIBRILLATION: ICD-10-CM

## 2022-12-14 DIAGNOSIS — N18.32 STAGE 3B CHRONIC KIDNEY DISEASE: ICD-10-CM

## 2022-12-14 DIAGNOSIS — I10 PRIMARY HYPERTENSION: Primary | ICD-10-CM

## 2022-12-14 DIAGNOSIS — R42 VERTIGO: ICD-10-CM

## 2022-12-14 DIAGNOSIS — R53.82 CHRONIC FATIGUE: ICD-10-CM

## 2022-12-14 PROCEDURE — 3288F FALL RISK ASSESSMENT DOCD: CPT | Mod: CPTII,S$GLB,, | Performed by: FAMILY MEDICINE

## 2022-12-14 PROCEDURE — 1159F MED LIST DOCD IN RCRD: CPT | Mod: CPTII,S$GLB,, | Performed by: FAMILY MEDICINE

## 2022-12-14 PROCEDURE — 1126F PR PAIN SEVERITY QUANTIFIED, NO PAIN PRESENT: ICD-10-PCS | Mod: CPTII,S$GLB,, | Performed by: FAMILY MEDICINE

## 2022-12-14 PROCEDURE — 3288F PR FALLS RISK ASSESSMENT DOCUMENTED: ICD-10-PCS | Mod: CPTII,S$GLB,, | Performed by: FAMILY MEDICINE

## 2022-12-14 PROCEDURE — 1101F PT FALLS ASSESS-DOCD LE1/YR: CPT | Mod: CPTII,S$GLB,, | Performed by: FAMILY MEDICINE

## 2022-12-14 PROCEDURE — 1160F RVW MEDS BY RX/DR IN RCRD: CPT | Mod: CPTII,S$GLB,, | Performed by: FAMILY MEDICINE

## 2022-12-14 PROCEDURE — 99999 PR PBB SHADOW E&M-EST. PATIENT-LVL IV: CPT | Mod: PBBFAC,,, | Performed by: FAMILY MEDICINE

## 2022-12-14 PROCEDURE — 99215 OFFICE O/P EST HI 40 MIN: CPT | Mod: S$GLB,,, | Performed by: FAMILY MEDICINE

## 2022-12-14 PROCEDURE — 1160F PR REVIEW ALL MEDS BY PRESCRIBER/CLIN PHARMACIST DOCUMENTED: ICD-10-PCS | Mod: CPTII,S$GLB,, | Performed by: FAMILY MEDICINE

## 2022-12-14 PROCEDURE — 1159F PR MEDICATION LIST DOCUMENTED IN MEDICAL RECORD: ICD-10-PCS | Mod: CPTII,S$GLB,, | Performed by: FAMILY MEDICINE

## 2022-12-14 PROCEDURE — 99999 PR PBB SHADOW E&M-EST. PATIENT-LVL IV: ICD-10-PCS | Mod: PBBFAC,,, | Performed by: FAMILY MEDICINE

## 2022-12-14 PROCEDURE — 1101F PR PT FALLS ASSESS DOC 0-1 FALLS W/OUT INJ PAST YR: ICD-10-PCS | Mod: CPTII,S$GLB,, | Performed by: FAMILY MEDICINE

## 2022-12-14 PROCEDURE — 1126F AMNT PAIN NOTED NONE PRSNT: CPT | Mod: CPTII,S$GLB,, | Performed by: FAMILY MEDICINE

## 2022-12-14 PROCEDURE — 1157F PR ADVANCE CARE PLAN OR EQUIV PRESENT IN MEDICAL RECORD: ICD-10-PCS | Mod: CPTII,S$GLB,, | Performed by: FAMILY MEDICINE

## 2022-12-14 PROCEDURE — 1157F ADVNC CARE PLAN IN RCRD: CPT | Mod: CPTII,S$GLB,, | Performed by: FAMILY MEDICINE

## 2022-12-14 PROCEDURE — 99215 PR OFFICE/OUTPT VISIT, EST, LEVL V, 40-54 MIN: ICD-10-PCS | Mod: S$GLB,,, | Performed by: FAMILY MEDICINE

## 2022-12-14 RX ORDER — MECLIZINE HCL 12.5 MG 12.5 MG/1
12.5 TABLET ORAL 3 TIMES DAILY PRN
Qty: 90 TABLET | Refills: 3 | Status: ON HOLD | OUTPATIENT
Start: 2022-12-14 | End: 2023-12-11 | Stop reason: SDUPTHER

## 2022-12-14 NOTE — PROGRESS NOTES
Subjective:       Patient ID: Ngoc Castellanos is a 89 y.o. female.    Chief Complaint: Follow-up    89 years old female came to the clinic for blood pressure check.  Blood pressure today was stable.  No chest pain, palpitation, orthopnea or PND.  No recent flare ups of atrial fibrillation.  Patient with decreased kidney function but stable in comparison with previous reports.  Patient feels tired most of the time associated with episodic shortness of breath.    Follow-up  Pertinent negatives include no chest pain.   Review of Systems   Constitutional: Negative.    HENT: Negative.     Eyes: Negative.    Respiratory:  Positive for shortness of breath.    Cardiovascular:  Negative for chest pain, palpitations, leg swelling and claudication.   Gastrointestinal: Negative.    Endocrine: Negative for polydipsia, polyphagia and polyuria.   Genitourinary: Negative.    Musculoskeletal: Negative.    Neurological:  Positive for dizziness.   Psychiatric/Behavioral: Negative.         Objective:      Physical Exam  Constitutional:       General: She is not in acute distress.     Appearance: She is well-developed. She is not diaphoretic.   HENT:      Head: Normocephalic and atraumatic.      Right Ear: External ear normal.      Left Ear: External ear normal.      Nose: Nose normal.      Mouth/Throat:      Pharynx: No oropharyngeal exudate.   Eyes:      General: No scleral icterus.        Right eye: No discharge.         Left eye: No discharge.      Conjunctiva/sclera: Conjunctivae normal.      Pupils: Pupils are equal, round, and reactive to light.   Neck:      Thyroid: No thyromegaly.      Vascular: No JVD.      Trachea: No tracheal deviation.   Cardiovascular:      Rate and Rhythm: Normal rate and regular rhythm.      Heart sounds: Normal heart sounds. No murmur heard.    No friction rub. No gallop.   Pulmonary:      Effort: Pulmonary effort is normal. No respiratory distress.      Breath sounds: Normal breath sounds. No stridor.  No wheezing or rales.   Chest:      Chest wall: No tenderness.   Abdominal:      General: Bowel sounds are normal. There is no distension.      Palpations: Abdomen is soft. There is no mass.      Tenderness: There is no abdominal tenderness. There is no guarding or rebound.   Musculoskeletal:         General: No tenderness. Normal range of motion.      Cervical back: Normal range of motion and neck supple.   Lymphadenopathy:      Cervical: No cervical adenopathy.   Skin:     General: Skin is warm and dry.      Coloration: Skin is not pale.      Findings: No erythema or rash.   Neurological:      Mental Status: She is alert and oriented to person, place, and time.      Cranial Nerves: No cranial nerve deficit.      Motor: No abnormal muscle tone.      Coordination: Coordination normal.      Deep Tendon Reflexes: Reflexes are normal and symmetric.   Psychiatric:         Behavior: Behavior normal.         Thought Content: Thought content normal.         Judgment: Judgment normal.       Assessment:       Problem List Items Addressed This Visit       Hypertension - Primary    Relevant Orders    CBC Auto Differential    Comprehensive Metabolic Panel    Lipid Panel    TSH    Urinalysis    Persistent atrial fibrillation    Relevant Orders    CBC Auto Differential    Comprehensive Metabolic Panel    TSH    Urinalysis    Stage 3b chronic kidney disease    Relevant Orders    CBC Auto Differential    Comprehensive Metabolic Panel    Urinalysis    Vertigo    Relevant Medications    meclizine (ANTIVERT) 12.5 mg tablet     Other Visit Diagnoses       Chronic fatigue        Relevant Orders    CBC Auto Differential    Comprehensive Metabolic Panel              Plan:     Ngoc was seen today for follow-up.    Diagnoses and all orders for this visit:    Primary hypertension  -     CBC Auto Differential; Future  -     Comprehensive Metabolic Panel; Future  -     Lipid Panel; Future  -     TSH; Future  -     Urinalysis;  Future    Chronic fatigue  -     CBC Auto Differential; Future  -     Comprehensive Metabolic Panel; Future    Stage 3b chronic kidney disease  -     CBC Auto Differential; Future  -     Comprehensive Metabolic Panel; Future  -     Urinalysis; Future    Persistent atrial fibrillation  -     CBC Auto Differential; Future  -     Comprehensive Metabolic Panel; Future  -     TSH; Future  -     Urinalysis; Future    Vertigo  -     meclizine (ANTIVERT) 12.5 mg tablet; Take 1 tablet (12.5 mg total) by mouth 3 (three) times daily as needed for Dizziness.

## 2023-01-25 NOTE — PROGRESS NOTES
"Subjective:    Patient ID:  Ngoc Castellanos is a 89 y.o. female who presents for follow-up of Congestive Heart Failure      HPI     89 y/o female former pt of Dr. Ford. She has a hx of persistent AFib on chronic anticoagulation with Eliquis, diastolic dysfunction, HTN, mild MR, CKD 3 who presents for f/u. She was on Digoxin that was stopped and atenolol started. Atenolol stopped due to insurance reasons and metoprolol started with HCTZ due to LE edema. Atenolol restarted. Developed "congestion" and worsening LE edema. HCTZ stopped and lasix started last clinic visit. Has had significant improvement with resolution of "congestion" and improvement in LE edema. Continues to feel fatigued, although this also has improved. Able to accomplish ADL's without cardiac issues. She denies CP, orthopnea, PND, syncope, palps. She has stopped eating chips, however, unknown amount of sodium in food she is served. She is compliant with her meds. She remains very active.     Review of Systems   Constitution: Positive for weakness and malaise/fatigue.   HENT: Negative for congestion.    Eyes: Negative for blurred vision.   Cardiovascular: Positive for dyspnea on exertion. Negative for chest pain, claudication, cyanosis, irregular heartbeat, leg swelling, near-syncope, orthopnea, palpitations, paroxysmal nocturnal dyspnea and syncope.   Respiratory: Negative for shortness of breath.    Endocrine: Negative for polyuria.   Hematologic/Lymphatic: Negative for bleeding problem.   Skin: Negative for itching and rash.   Musculoskeletal: Negative for joint swelling, muscle cramps and muscle weakness.   Gastrointestinal: Negative for abdominal pain, hematemesis, hematochezia, melena, nausea and vomiting.   Genitourinary: Negative for dysuria and hematuria.   Neurological: Negative for dizziness, focal weakness, headaches, light-headedness and loss of balance.   Psychiatric/Behavioral: Negative for depression. The patient is not " [FreeTextEntry1] : Ms. TERESE PARRY is a 33 year  year old woman. She presents to the office on 01/04/2023 , her primary is Unable to Collect PCP .\par \par ruq pain since summer\par don’t have lfts\par us and mri at  [facility]\par cbd i s3 mm\par will scheudle for lap/robo cholecystectomy possible ioc\par 1/25/23: She had lap thomas , she is recovering well. \par she is eating well and having regular bowel movement\par no pain \par \par \par exam : healing scars\par \par \par impression: healing well post lap thomas\par \par We explained in great detail the pathophysiology of the disease process. We discussed the workup for diagnosis and management. The Post operative care was explained to the patient. He was counselled on diet , exercise and wound care. The Procedure was explained to the patient. The Risk , benefit and alternatives were discussed. We discussed recovery and possible complications. We discussed complications including sever complications like injury of the surrounding organs like the bile duct. We discussed about the benefits and disadvantage of converting the laparoscopic surgery to open. We also discussed about post cholecystectomy syndrome and  symptom management after surgery.\par \par We discussed the importance of close follow up. \par We informed that she needs to follow up as needed\par We also informed that she can call us if anything changes or has any questions.\par  nervous/anxious.         Objective:    Physical Exam   Constitutional: She is oriented to person, place, and time. She appears well-developed and well-nourished.   HENT:   Head: Normocephalic and atraumatic.   Neck: Neck supple. No JVD present.   Cardiovascular: Normal rate.  An irregularly irregular rhythm present.   Murmur heard.   Systolic murmur is present with a grade of 2/6   Pulses:       Carotid pulses are 2+ on the right side, and 2+ on the left side.       Radial pulses are 2+ on the right side, and 2+ on the left side.        Femoral pulses are 2+ on the right side, and 2+ on the left side.       Posterior tibial pulses are 1+ on the right side, and 1+ on the left side.   Pulmonary/Chest: Effort normal and breath sounds normal.   Abdominal: Soft. Bowel sounds are normal.   Musculoskeletal: She exhibits edema.   Neurological: She is alert and oriented to person, place, and time.   Skin: Skin is warm and dry.   Psychiatric: She has a normal mood and affect. Her behavior is normal. Thought content normal.         Assessment:       1. Persistent atrial fibrillation    2. Essential hypertension    3. Valvular regurgitation    4. Lumbar spondylosis    5. Idiopathic progressive neuropathy    6. Tremor    7. Vertigo    8. Chronic anticoagulation    9. Benign neoplasm of brain, unspecified brain region    10. Breast cancer, stage 1, right    11. Osteopenia, unspecified location      89 y/o pt with hx and presentation as above. Doing well from a cardiac perspective and compensated from a HF perspective. Symptoms improved and will continue with current regimen.          Plan:       -Continue current medical management  -f/u in 6 months

## 2023-01-27 ENCOUNTER — OFFICE VISIT (OUTPATIENT)
Dept: URGENT CARE | Facility: CLINIC | Age: 88
End: 2023-01-27
Payer: MEDICARE

## 2023-01-27 VITALS
BODY MASS INDEX: 28.12 KG/M2 | WEIGHT: 175 LBS | TEMPERATURE: 98 F | RESPIRATION RATE: 16 BRPM | OXYGEN SATURATION: 95 % | DIASTOLIC BLOOD PRESSURE: 80 MMHG | HEART RATE: 71 BPM | SYSTOLIC BLOOD PRESSURE: 155 MMHG | HEIGHT: 66 IN

## 2023-01-27 DIAGNOSIS — L97.911 LEG ULCER, RIGHT, LIMITED TO BREAKDOWN OF SKIN: Primary | ICD-10-CM

## 2023-01-27 DIAGNOSIS — I50.9 CONGESTIVE HEART FAILURE, UNSPECIFIED HF CHRONICITY, UNSPECIFIED HEART FAILURE TYPE: ICD-10-CM

## 2023-01-27 PROCEDURE — 1157F PR ADVANCE CARE PLAN OR EQUIV PRESENT IN MEDICAL RECORD: ICD-10-PCS | Mod: CPTII,S$GLB,, | Performed by: FAMILY MEDICINE

## 2023-01-27 PROCEDURE — 1159F MED LIST DOCD IN RCRD: CPT | Mod: CPTII,S$GLB,, | Performed by: FAMILY MEDICINE

## 2023-01-27 PROCEDURE — 1157F ADVNC CARE PLAN IN RCRD: CPT | Mod: CPTII,S$GLB,, | Performed by: FAMILY MEDICINE

## 2023-01-27 PROCEDURE — 1126F AMNT PAIN NOTED NONE PRSNT: CPT | Mod: CPTII,S$GLB,, | Performed by: FAMILY MEDICINE

## 2023-01-27 PROCEDURE — 99214 OFFICE O/P EST MOD 30 MIN: CPT | Mod: S$GLB,,, | Performed by: FAMILY MEDICINE

## 2023-01-27 PROCEDURE — 99214 PR OFFICE/OUTPT VISIT, EST, LEVL IV, 30-39 MIN: ICD-10-PCS | Mod: S$GLB,,, | Performed by: FAMILY MEDICINE

## 2023-01-27 PROCEDURE — 1126F PR PAIN SEVERITY QUANTIFIED, NO PAIN PRESENT: ICD-10-PCS | Mod: CPTII,S$GLB,, | Performed by: FAMILY MEDICINE

## 2023-01-27 PROCEDURE — 1159F PR MEDICATION LIST DOCUMENTED IN MEDICAL RECORD: ICD-10-PCS | Mod: CPTII,S$GLB,, | Performed by: FAMILY MEDICINE

## 2023-01-27 RX ORDER — MUPIROCIN 20 MG/G
OINTMENT TOPICAL
Qty: 22 G | Refills: 1 | Status: ON HOLD | OUTPATIENT
Start: 2023-01-27 | End: 2023-11-21 | Stop reason: HOSPADM

## 2023-01-27 RX ORDER — CLINDAMYCIN HYDROCHLORIDE 300 MG/1
300 CAPSULE ORAL 3 TIMES DAILY
Qty: 30 CAPSULE | Refills: 0 | Status: SHIPPED | OUTPATIENT
Start: 2023-01-27 | End: 2023-02-06

## 2023-01-27 NOTE — PROGRESS NOTES
"Subjective:       Patient ID: Ngoc Castellanos is a 89 y.o. female.    Vitals:  height is 5' 6" (1.676 m) and weight is 79.4 kg (175 lb). Her temperature is 97.9 °F (36.6 °C). Her blood pressure is 155/80 (abnormal) and her pulse is 71. Her respiration is 16 and oxygen saturation is 95%.     Chief Complaint: Leg Swelling    Patient presents to the clinic with a possible abscess on the right leg x a week     Abscess  Chronicity:  New  Location:  Leg  Characteristics: draining and swelling    Pain Scale:  2/10  Relieved by:  Draining/squeezing    Skin:  Positive for abscess.     Objective:      Physical Exam   Constitutional: She does not appear ill. No distress. obesity  Neck: Neck supple.   Cardiovascular: Normal rate, regular rhythm, normal heart sounds and normal pulses.   Pulmonary/Chest: Effort normal and breath sounds normal.   Abdominal: Normal appearance.   Neurological: She is alert.   Skin: lesion (0.25 circular ulcer with mild surrounding erythema draining clear serosanquinous liquid. trace edema noted lower extremity bilaterally)   Nursing note and vitals reviewed.      Assessment:       1. Leg ulcer, right, limited to breakdown of skin    2. Congestive heart failure, unspecified HF chronicity, unspecified heart failure type          Plan:         Leg ulcer, right, limited to breakdown of skin  -     mupirocin (BACTROBAN) 2 % ointment; Apply to affected area 3 times daily  Dispense: 22 g; Refill: 1  -     clindamycin (CLEOCIN) 300 MG capsule; Take 1 capsule (300 mg total) by mouth 3 (three) times daily. for 10 days  Dispense: 30 capsule; Refill: 0    Congestive heart failure, unspecified HF chronicity, unspecified heart failure type    Discussed wound care and signs and symptoms of worsening infection. To followup with PCP                 "

## 2023-02-15 ENCOUNTER — OFFICE VISIT (OUTPATIENT)
Dept: URGENT CARE | Facility: CLINIC | Age: 88
End: 2023-02-15
Payer: MEDICARE

## 2023-02-15 VITALS
BODY MASS INDEX: 28.12 KG/M2 | DIASTOLIC BLOOD PRESSURE: 81 MMHG | SYSTOLIC BLOOD PRESSURE: 147 MMHG | TEMPERATURE: 98 F | HEART RATE: 83 BPM | OXYGEN SATURATION: 95 % | WEIGHT: 175 LBS | HEIGHT: 66 IN | RESPIRATION RATE: 22 BRPM

## 2023-02-15 DIAGNOSIS — B96.89 BACTERIAL SINUSITIS: Primary | ICD-10-CM

## 2023-02-15 DIAGNOSIS — J32.9 BACTERIAL SINUSITIS: Primary | ICD-10-CM

## 2023-02-15 DIAGNOSIS — R05.9 COUGH, UNSPECIFIED TYPE: ICD-10-CM

## 2023-02-15 PROCEDURE — 1160F PR REVIEW ALL MEDS BY PRESCRIBER/CLIN PHARMACIST DOCUMENTED: ICD-10-PCS | Mod: CPTII,S$GLB,, | Performed by: NURSE PRACTITIONER

## 2023-02-15 PROCEDURE — 1157F PR ADVANCE CARE PLAN OR EQUIV PRESENT IN MEDICAL RECORD: ICD-10-PCS | Mod: CPTII,S$GLB,, | Performed by: NURSE PRACTITIONER

## 2023-02-15 PROCEDURE — 1159F MED LIST DOCD IN RCRD: CPT | Mod: CPTII,S$GLB,, | Performed by: NURSE PRACTITIONER

## 2023-02-15 PROCEDURE — 1126F AMNT PAIN NOTED NONE PRSNT: CPT | Mod: CPTII,S$GLB,, | Performed by: NURSE PRACTITIONER

## 2023-02-15 PROCEDURE — 1126F PR PAIN SEVERITY QUANTIFIED, NO PAIN PRESENT: ICD-10-PCS | Mod: CPTII,S$GLB,, | Performed by: NURSE PRACTITIONER

## 2023-02-15 PROCEDURE — 71046 XR CHEST PA AND LATERAL: ICD-10-PCS | Mod: FY,S$GLB,, | Performed by: RADIOLOGY

## 2023-02-15 PROCEDURE — 1159F PR MEDICATION LIST DOCUMENTED IN MEDICAL RECORD: ICD-10-PCS | Mod: CPTII,S$GLB,, | Performed by: NURSE PRACTITIONER

## 2023-02-15 PROCEDURE — 1160F RVW MEDS BY RX/DR IN RCRD: CPT | Mod: CPTII,S$GLB,, | Performed by: NURSE PRACTITIONER

## 2023-02-15 PROCEDURE — 99213 OFFICE O/P EST LOW 20 MIN: CPT | Mod: S$GLB,,, | Performed by: NURSE PRACTITIONER

## 2023-02-15 PROCEDURE — 71046 X-RAY EXAM CHEST 2 VIEWS: CPT | Mod: FY,S$GLB,, | Performed by: RADIOLOGY

## 2023-02-15 PROCEDURE — 1157F ADVNC CARE PLAN IN RCRD: CPT | Mod: CPTII,S$GLB,, | Performed by: NURSE PRACTITIONER

## 2023-02-15 PROCEDURE — 99213 PR OFFICE/OUTPT VISIT, EST, LEVL III, 20-29 MIN: ICD-10-PCS | Mod: S$GLB,,, | Performed by: NURSE PRACTITIONER

## 2023-02-15 RX ORDER — AMOXICILLIN 500 MG/1
500 TABLET, FILM COATED ORAL EVERY 12 HOURS
Qty: 20 TABLET | Refills: 0 | Status: SHIPPED | OUTPATIENT
Start: 2023-02-15 | End: 2023-02-25

## 2023-02-15 RX ORDER — FLUTICASONE PROPIONATE 50 MCG
1 SPRAY, SUSPENSION (ML) NASAL DAILY
Qty: 18.2 ML | Refills: 0 | Status: SHIPPED | OUTPATIENT
Start: 2023-02-15 | End: 2023-02-15

## 2023-02-15 NOTE — PROGRESS NOTES
"Subjective:       Patient ID: Ngoc Castellanos is a 89 y.o. female.    Vitals:  height is 5' 6" (1.676 m) and weight is 79.4 kg (175 lb). Her temperature is 97.8 °F (36.6 °C). Her blood pressure is 147/81 (abnormal) and her pulse is 83. Her respiration is 22 (abnormal) and oxygen saturation is 95%.     Chief Complaint: Sinus Problem    Patient reports sinus and chest congestion that started a week ago. Reports she gets short winded when she walks around, this is normal for her. She got short winded walking in the clinic today, but feels better now and is here to be seen for the congestion.   Provider note begins below    Pt was on NTG years ago and has stopped.  Asking if she can take this.  History of CHF.  She self regulates her Lasix according to how she is feeling.  Declines EKG.    Sinus Problem  There has been no fever. Associated symptoms include congestion and coughing. Pertinent negatives include no diaphoresis or sore throat. Past treatments include nothing. The treatment provided no relief.     Constitution: Negative for sweating, fatigue and fever.   HENT:  Positive for congestion and postnasal drip. Negative for sore throat.    Cardiovascular:  Negative for chest pain and sob on exertion.   Respiratory:  Positive for cough.    Gastrointestinal:  Negative for nausea, vomiting and constipation.     Objective:      Physical Exam   Constitutional: She is oriented to person, place, and time.   HENT:   Head: Normocephalic and atraumatic.   Ears:   Right Ear: Tympanic membrane, external ear and ear canal normal.   Left Ear: Tympanic membrane, external ear and ear canal normal.   Nose: Rhinorrhea and congestion present.   Cardiovascular: Normal rate and regular rhythm.   Pulmonary/Chest: Effort normal and breath sounds normal. No stridor. No respiratory distress. She has no wheezes. She has no rhonchi. She has no rales. She exhibits no tenderness.   Abdominal: Normal appearance.   Neurological: She is alert and " oriented to person, place, and time.   Skin: Skin is warm and dry.   Psychiatric: Her behavior is normal. Mood normal.         X-Ray Chest PA And Lateral    Result Date: 2/15/2023  EXAMINATION: XR CHEST PA AND LATERAL CLINICAL HISTORY: Cough, unspecified TECHNIQUE: PA and lateral views of the chest were performed. COMPARISON: 08/01/2019 none FINDINGS: Mild cardiomegaly.  Opacification at the left lung base with blunted costophrenic angle consistent with pleural effusion and associated atelectatic changes.  The right lung is clear.     See above Electronically signed by: Mervin Vera MD Date:    02/15/2023 Time:    10:34    Assessment:       1. Bacterial sinusitis    2. Cough, unspecified type          Plan:       Chest x ray shows enlarged heart and pleural effusion    Flonase, amoxicillin    Follow up as needed  Pt asking if she can take NTG.  Pt will contact cardiologist.      Bacterial sinusitis  -     amoxicillin (AMOXIL) 500 MG Tab; Take 1 tablet (500 mg total) by mouth every 12 (twelve) hours. for 10 days  Dispense: 20 tablet; Refill: 0  -     fluticasone propionate (FLONASE) 50 mcg/actuation nasal spray; 1 spray (50 mcg total) by Each Nostril route once daily. for 7 days  Dispense: 18.2 mL; Refill: 0    Cough, unspecified type  -     X-Ray Chest PA And Lateral; Future; Expected date: 02/15/2023

## 2023-06-14 ENCOUNTER — OFFICE VISIT (OUTPATIENT)
Dept: FAMILY MEDICINE | Facility: CLINIC | Age: 88
End: 2023-06-14
Payer: MEDICARE

## 2023-06-14 VITALS
DIASTOLIC BLOOD PRESSURE: 84 MMHG | WEIGHT: 178.13 LBS | HEART RATE: 81 BPM | OXYGEN SATURATION: 95 % | BODY MASS INDEX: 28.63 KG/M2 | SYSTOLIC BLOOD PRESSURE: 130 MMHG | HEIGHT: 66 IN

## 2023-06-14 DIAGNOSIS — N18.32 STAGE 3B CHRONIC KIDNEY DISEASE: ICD-10-CM

## 2023-06-14 DIAGNOSIS — I10 ESSENTIAL HYPERTENSION: Primary | ICD-10-CM

## 2023-06-14 DIAGNOSIS — I48.19 PERSISTENT ATRIAL FIBRILLATION: ICD-10-CM

## 2023-06-14 PROCEDURE — 1160F PR REVIEW ALL MEDS BY PRESCRIBER/CLIN PHARMACIST DOCUMENTED: ICD-10-PCS | Mod: HCNC,CPTII,S$GLB, | Performed by: FAMILY MEDICINE

## 2023-06-14 PROCEDURE — 99999 PR PBB SHADOW E&M-EST. PATIENT-LVL IV: ICD-10-PCS | Mod: PBBFAC,HCNC,, | Performed by: FAMILY MEDICINE

## 2023-06-14 PROCEDURE — 99999 PR PBB SHADOW E&M-EST. PATIENT-LVL IV: CPT | Mod: PBBFAC,HCNC,, | Performed by: FAMILY MEDICINE

## 2023-06-14 PROCEDURE — 1157F ADVNC CARE PLAN IN RCRD: CPT | Mod: HCNC,CPTII,S$GLB, | Performed by: FAMILY MEDICINE

## 2023-06-14 PROCEDURE — 3288F PR FALLS RISK ASSESSMENT DOCUMENTED: ICD-10-PCS | Mod: HCNC,CPTII,S$GLB, | Performed by: FAMILY MEDICINE

## 2023-06-14 PROCEDURE — 1126F PR PAIN SEVERITY QUANTIFIED, NO PAIN PRESENT: ICD-10-PCS | Mod: HCNC,CPTII,S$GLB, | Performed by: FAMILY MEDICINE

## 2023-06-14 PROCEDURE — 99215 OFFICE O/P EST HI 40 MIN: CPT | Mod: HCNC,S$GLB,, | Performed by: FAMILY MEDICINE

## 2023-06-14 PROCEDURE — 1159F MED LIST DOCD IN RCRD: CPT | Mod: HCNC,CPTII,S$GLB, | Performed by: FAMILY MEDICINE

## 2023-06-14 PROCEDURE — 3288F FALL RISK ASSESSMENT DOCD: CPT | Mod: HCNC,CPTII,S$GLB, | Performed by: FAMILY MEDICINE

## 2023-06-14 PROCEDURE — 1101F PT FALLS ASSESS-DOCD LE1/YR: CPT | Mod: HCNC,CPTII,S$GLB, | Performed by: FAMILY MEDICINE

## 2023-06-14 PROCEDURE — 1159F PR MEDICATION LIST DOCUMENTED IN MEDICAL RECORD: ICD-10-PCS | Mod: HCNC,CPTII,S$GLB, | Performed by: FAMILY MEDICINE

## 2023-06-14 PROCEDURE — 1101F PR PT FALLS ASSESS DOC 0-1 FALLS W/OUT INJ PAST YR: ICD-10-PCS | Mod: HCNC,CPTII,S$GLB, | Performed by: FAMILY MEDICINE

## 2023-06-14 PROCEDURE — 1160F RVW MEDS BY RX/DR IN RCRD: CPT | Mod: HCNC,CPTII,S$GLB, | Performed by: FAMILY MEDICINE

## 2023-06-14 PROCEDURE — 1157F PR ADVANCE CARE PLAN OR EQUIV PRESENT IN MEDICAL RECORD: ICD-10-PCS | Mod: HCNC,CPTII,S$GLB, | Performed by: FAMILY MEDICINE

## 2023-06-14 PROCEDURE — 1126F AMNT PAIN NOTED NONE PRSNT: CPT | Mod: HCNC,CPTII,S$GLB, | Performed by: FAMILY MEDICINE

## 2023-06-14 PROCEDURE — 99215 PR OFFICE/OUTPT VISIT, EST, LEVL V, 40-54 MIN: ICD-10-PCS | Mod: HCNC,S$GLB,, | Performed by: FAMILY MEDICINE

## 2023-06-14 NOTE — PROGRESS NOTES
Subjective     Patient ID: Ngoc Castellanos is a 89 y.o. female.    Chief Complaint: Follow-up    89 years old female came to the clinic for blood pressure check.  Blood pressure today was stable.  No chest pain, palpitation, orthopnea or PND.  Patient with decreased kidney function but stable in comparison with previous reports.  No significant flare ups of atrial fibrillation.    Follow-up  Associated symptoms include fatigue. Pertinent negatives include no chest pain.   Review of Systems   Constitutional:  Positive for fatigue.   HENT: Negative.     Eyes: Negative.    Respiratory:  Positive for shortness of breath.    Cardiovascular:  Negative for chest pain, palpitations, leg swelling and claudication.   Gastrointestinal: Negative.    Genitourinary: Negative.    Musculoskeletal: Negative.    Neurological: Negative.    Psychiatric/Behavioral: Negative.          Objective     Physical Exam  Constitutional:       General: She is not in acute distress.     Appearance: She is well-developed. She is not diaphoretic.   HENT:      Head: Normocephalic and atraumatic.      Right Ear: External ear normal.      Left Ear: External ear normal.      Nose: Nose normal.      Mouth/Throat:      Pharynx: No oropharyngeal exudate.   Eyes:      General: No scleral icterus.        Right eye: No discharge.         Left eye: No discharge.      Conjunctiva/sclera: Conjunctivae normal.      Pupils: Pupils are equal, round, and reactive to light.   Neck:      Thyroid: No thyromegaly.      Vascular: No JVD.      Trachea: No tracheal deviation.   Cardiovascular:      Rate and Rhythm: Normal rate and regular rhythm.      Heart sounds: Normal heart sounds. No murmur heard.    No friction rub. No gallop.   Pulmonary:      Effort: Pulmonary effort is normal. No respiratory distress.      Breath sounds: Normal breath sounds. No stridor. No wheezing or rales.   Chest:      Chest wall: No tenderness.   Abdominal:      General: Bowel sounds are  normal. There is no distension.      Palpations: Abdomen is soft. There is no mass.      Tenderness: There is no abdominal tenderness. There is no guarding or rebound.   Musculoskeletal:         General: No tenderness. Normal range of motion.      Cervical back: Normal range of motion and neck supple.   Lymphadenopathy:      Cervical: No cervical adenopathy.   Skin:     General: Skin is warm and dry.      Coloration: Skin is not pale.      Findings: No erythema or rash.   Neurological:      Mental Status: She is alert and oriented to person, place, and time.      Cranial Nerves: No cranial nerve deficit.      Motor: No abnormal muscle tone.      Coordination: Coordination normal.      Deep Tendon Reflexes: Reflexes are normal and symmetric.   Psychiatric:         Behavior: Behavior normal.         Thought Content: Thought content normal.         Judgment: Judgment normal.          Assessment and Plan     1. Essential hypertension  -     Urinalysis; Future  -     Comprehensive Metabolic Panel; Future; Expected date: 06/14/2023  -     Lipid Panel; Future; Expected date: 06/14/2023  -     TSH; Future; Expected date: 06/14/2023  -     CBC Auto Differential; Future; Expected date: 06/14/2023    2. Stage 3b chronic kidney disease  -     Comprehensive Metabolic Panel; Future; Expected date: 06/14/2023  -     CBC Auto Differential; Future; Expected date: 06/14/2023    3. Persistent atrial fibrillation        Continue monitoring blood pressure at home, low sodium diet.   Decreased kidney function but stable in comparison with previous reports .  Increase fluid intake.  Avoid over-the-counter medicines like naproxen or ibuprofen.          Follow up in about 6 months (around 12/14/2023), or if symptoms worsen or fail to improve.

## 2023-06-30 RX ORDER — FUROSEMIDE 20 MG/1
TABLET ORAL
Qty: 270 TABLET | Refills: 3 | Status: ON HOLD | OUTPATIENT
Start: 2023-06-30 | End: 2023-12-11 | Stop reason: SDUPTHER

## 2023-08-22 ENCOUNTER — PES CALL (OUTPATIENT)
Dept: ADMINISTRATIVE | Facility: CLINIC | Age: 88
End: 2023-08-22
Payer: MEDICARE

## 2023-09-11 RX ORDER — APIXABAN 5 MG/1
TABLET, FILM COATED ORAL
Qty: 180 TABLET | Refills: 3 | Status: ON HOLD | OUTPATIENT
Start: 2023-09-11 | End: 2023-12-11 | Stop reason: SDUPTHER

## 2023-11-18 ENCOUNTER — HOSPITAL ENCOUNTER (INPATIENT)
Facility: HOSPITAL | Age: 88
LOS: 4 days | Discharge: NURSING, REHAB OR LONG-TERM CARE | DRG: 481 | End: 2023-11-22
Attending: EMERGENCY MEDICINE | Admitting: EMERGENCY MEDICINE
Payer: MEDICARE

## 2023-11-18 DIAGNOSIS — Z01.810 PREOP CARDIOVASCULAR EXAM: ICD-10-CM

## 2023-11-18 DIAGNOSIS — I48.19 PERSISTENT ATRIAL FIBRILLATION: ICD-10-CM
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
## 2023-11-18 DIAGNOSIS — I10 ESSENTIAL HYPERTENSION: ICD-10-CM

## 2023-11-18 DIAGNOSIS — S72.144A CLOSED NONDISPLACED INTERTROCHANTERIC FRACTURE OF RIGHT FEMUR, INITIAL ENCOUNTER: Primary | ICD-10-CM

## 2023-11-18 DIAGNOSIS — M25.551 RIGHT HIP PAIN: ICD-10-CM

## 2023-11-18 PROBLEM — S72.141A CLOSED INTERTROCHANTERIC FRACTURE, RIGHT, INITIAL ENCOUNTER: Status: ACTIVE | Noted: 2023-11-18

## 2023-11-18 LAB
25(OH)D3+25(OH)D2 SERPL-MCNC: 51 NG/ML (ref 30–96)
ABO + RH BLD: NORMAL
ALBUMIN SERPL BCP-MCNC: 3.6 G/DL (ref 3.5–5.2)
ALP SERPL-CCNC: 100 U/L (ref 55–135)
ALT SERPL W/O P-5'-P-CCNC: 10 U/L (ref 10–44)
ANION GAP SERPL CALC-SCNC: 12 MMOL/L (ref 8–16)
AST SERPL-CCNC: 21 U/L (ref 10–40)
BASOPHILS # BLD AUTO: 0.03 K/UL (ref 0–0.2)
BASOPHILS NFR BLD: 0.3 % (ref 0–1.9)
BILIRUB SERPL-MCNC: 0.8 MG/DL (ref 0.1–1)
BILIRUB UR QL STRIP: NEGATIVE
BLD GP AB SCN CELLS X3 SERPL QL: NORMAL
BUN SERPL-MCNC: 21 MG/DL (ref 10–30)
CALCIUM SERPL-MCNC: 8.8 MG/DL (ref 8.7–10.5)
CHLORIDE SERPL-SCNC: 106 MMOL/L (ref 95–110)
CLARITY UR REFRACT.AUTO: CLEAR
CO2 SERPL-SCNC: 22 MMOL/L (ref 23–29)
COLOR UR AUTO: YELLOW
CREAT SERPL-MCNC: 1.1 MG/DL (ref 0.5–1.4)
DIFFERENTIAL METHOD: ABNORMAL
EOSINOPHIL # BLD AUTO: 0.1 K/UL (ref 0–0.5)
EOSINOPHIL NFR BLD: 0.8 % (ref 0–8)
ERYTHROCYTE [DISTWIDTH] IN BLOOD BY AUTOMATED COUNT: 14.9 % (ref 11.5–14.5)
EST. GFR  (NO RACE VARIABLE): 45.4 ML/MIN/1.73 M^2
ESTIMATED AVG GLUCOSE: 134 MG/DL (ref 68–131)
GLUCOSE SERPL-MCNC: 188 MG/DL (ref 70–110)
GLUCOSE UR QL STRIP: NEGATIVE
HBA1C MFR BLD: 6.3 % (ref 4–5.6)
HCT VFR BLD AUTO: 35.3 % (ref 37–48.5)
HGB BLD-MCNC: 11.3 G/DL (ref 12–16)
HGB UR QL STRIP: NEGATIVE
IMM GRANULOCYTES # BLD AUTO: 0.05 K/UL (ref 0–0.04)
IMM GRANULOCYTES NFR BLD AUTO: 0.5 % (ref 0–0.5)
INFLUENZA A, MOLECULAR: NOT DETECTED
INFLUENZA B, MOLECULAR: NOT DETECTED
INR PPP: 1.3 (ref 0.8–1.2)
KETONES UR QL STRIP: NEGATIVE
LEUKOCYTE ESTERASE UR QL STRIP: NEGATIVE
LYMPHOCYTES # BLD AUTO: 0.5 K/UL (ref 1–4.8)
LYMPHOCYTES NFR BLD: 4.7 % (ref 18–48)
MAGNESIUM SERPL-MCNC: 1.8 MG/DL (ref 1.6–2.6)
MCH RBC QN AUTO: 28.8 PG (ref 27–31)
MCHC RBC AUTO-ENTMCNC: 32 G/DL (ref 32–36)
MCV RBC AUTO: 90 FL (ref 82–98)
MONOCYTES # BLD AUTO: 0.6 K/UL (ref 0.3–1)
MONOCYTES NFR BLD: 6.4 % (ref 4–15)
NEUTROPHILS # BLD AUTO: 8.3 K/UL (ref 1.8–7.7)
NEUTROPHILS NFR BLD: 87.3 % (ref 38–73)
NITRITE UR QL STRIP: NEGATIVE
NRBC BLD-RTO: 0 /100 WBC
PH UR STRIP: 5 [PH] (ref 5–8)
PHOSPHATE SERPL-MCNC: 1.8 MG/DL (ref 2.7–4.5)
PLATELET # BLD AUTO: 173 K/UL (ref 150–450)
PMV BLD AUTO: 10.4 FL (ref 9.2–12.9)
POTASSIUM SERPL-SCNC: 3.9 MMOL/L (ref 3.5–5.1)
PREALB SERPL-MCNC: 15 MG/DL (ref 20–43)
PROT SERPL-MCNC: 6.7 G/DL (ref 6–8.4)
PROT UR QL STRIP: ABNORMAL
PROTHROMBIN TIME: 13.3 SEC (ref 9–12.5)
RBC # BLD AUTO: 3.92 M/UL (ref 4–5.4)
RSV AG BY MOLECULAR METHOD: NOT DETECTED
SARS-COV-2 RNA RESP QL NAA+PROBE: NOT DETECTED
SODIUM SERPL-SCNC: 140 MMOL/L (ref 136–145)
SP GR UR STRIP: 1.02 (ref 1–1.03)
SPECIMEN OUTDATE: NORMAL
TRANSFERRIN SERPL-MCNC: 215 MG/DL (ref 200–375)
URN SPEC COLLECT METH UR: ABNORMAL
WBC # BLD AUTO: 9.52 K/UL (ref 3.9–12.7)

## 2023-11-18 PROCEDURE — 25000003 PHARM REV CODE 250: Mod: HCNC | Performed by: FAMILY MEDICINE

## 2023-11-18 PROCEDURE — 63600175 PHARM REV CODE 636 W HCPCS: Mod: HCNC

## 2023-11-18 PROCEDURE — 80053 COMPREHEN METABOLIC PANEL: CPT | Mod: HCNC

## 2023-11-18 PROCEDURE — 85025 COMPLETE CBC W/AUTO DIFF WBC: CPT | Mod: HCNC

## 2023-11-18 PROCEDURE — 83735 ASSAY OF MAGNESIUM: CPT | Mod: HCNC

## 2023-11-18 PROCEDURE — 0241U SARS-COV2 (COVID) WITH FLU/RSV BY PCR: CPT | Mod: HCNC | Performed by: EMERGENCY MEDICINE

## 2023-11-18 PROCEDURE — 84134 ASSAY OF PREALBUMIN: CPT | Mod: HCNC

## 2023-11-18 PROCEDURE — 25000003 PHARM REV CODE 250: Mod: HCNC

## 2023-11-18 PROCEDURE — 96376 TX/PRO/DX INJ SAME DRUG ADON: CPT | Mod: HCNC

## 2023-11-18 PROCEDURE — 63600175 PHARM REV CODE 636 W HCPCS: Mod: HCNC | Performed by: FAMILY MEDICINE

## 2023-11-18 PROCEDURE — 83036 HEMOGLOBIN GLYCOSYLATED A1C: CPT | Mod: HCNC

## 2023-11-18 PROCEDURE — 85610 PROTHROMBIN TIME: CPT | Mod: HCNC

## 2023-11-18 PROCEDURE — 96375 TX/PRO/DX INJ NEW DRUG ADDON: CPT | Mod: HCNC

## 2023-11-18 PROCEDURE — 99285 EMERGENCY DEPT VISIT HI MDM: CPT | Mod: 25,HCNC

## 2023-11-18 PROCEDURE — 82306 VITAMIN D 25 HYDROXY: CPT | Mod: HCNC

## 2023-11-18 PROCEDURE — 93010 ELECTROCARDIOGRAM REPORT: CPT | Mod: HCNC,76,, | Performed by: INTERNAL MEDICINE

## 2023-11-18 PROCEDURE — 84466 ASSAY OF TRANSFERRIN: CPT | Mod: HCNC

## 2023-11-18 PROCEDURE — 86850 RBC ANTIBODY SCREEN: CPT | Mod: HCNC

## 2023-11-18 PROCEDURE — 93010 EKG 12-LEAD: ICD-10-PCS | Mod: HCNC,76,, | Performed by: INTERNAL MEDICINE

## 2023-11-18 PROCEDURE — 93005 ELECTROCARDIOGRAM TRACING: CPT | Mod: HCNC

## 2023-11-18 PROCEDURE — 84100 ASSAY OF PHOSPHORUS: CPT | Mod: HCNC

## 2023-11-18 PROCEDURE — 81003 URINALYSIS AUTO W/O SCOPE: CPT | Mod: HCNC

## 2023-11-18 PROCEDURE — 12000002 HC ACUTE/MED SURGE SEMI-PRIVATE ROOM: Mod: HCNC

## 2023-11-18 PROCEDURE — 96374 THER/PROPH/DIAG INJ IV PUSH: CPT | Mod: HCNC

## 2023-11-18 PROCEDURE — 93010 ELECTROCARDIOGRAM REPORT: CPT | Mod: HCNC,,, | Performed by: INTERNAL MEDICINE

## 2023-11-18 RX ORDER — MORPHINE SULFATE 2 MG/ML
2 INJECTION, SOLUTION INTRAMUSCULAR; INTRAVENOUS
Status: DISCONTINUED | OUTPATIENT
Start: 2023-11-18 | End: 2023-11-19

## 2023-11-18 RX ORDER — ACETAMINOPHEN 500 MG
1000 TABLET ORAL EVERY 8 HOURS
Status: DISCONTINUED | OUTPATIENT
Start: 2023-11-18 | End: 2023-11-19

## 2023-11-18 RX ORDER — ONDANSETRON 2 MG/ML
4 INJECTION INTRAMUSCULAR; INTRAVENOUS EVERY 12 HOURS PRN
Status: DISCONTINUED | OUTPATIENT
Start: 2023-11-18 | End: 2023-11-22 | Stop reason: HOSPADM

## 2023-11-18 RX ORDER — TALC
6 POWDER (GRAM) TOPICAL NIGHTLY PRN
Status: DISCONTINUED | OUTPATIENT
Start: 2023-11-18 | End: 2023-11-22 | Stop reason: HOSPADM

## 2023-11-18 RX ORDER — OXYCODONE HYDROCHLORIDE 5 MG/1
5 TABLET ORAL
Status: DISCONTINUED | OUTPATIENT
Start: 2023-11-18 | End: 2023-11-19

## 2023-11-18 RX ORDER — LOSARTAN POTASSIUM 25 MG/1
25 TABLET ORAL DAILY
Status: DISCONTINUED | OUTPATIENT
Start: 2023-11-19 | End: 2023-11-20

## 2023-11-18 RX ORDER — BISACODYL 10 MG
10 SUPPOSITORY, RECTAL RECTAL DAILY PRN
Status: DISCONTINUED | OUTPATIENT
Start: 2023-11-18 | End: 2023-11-22 | Stop reason: HOSPADM

## 2023-11-18 RX ORDER — METHOCARBAMOL 500 MG/1
500 TABLET, FILM COATED ORAL EVERY 6 HOURS PRN
Status: DISCONTINUED | OUTPATIENT
Start: 2023-11-18 | End: 2023-11-19

## 2023-11-18 RX ORDER — MORPHINE SULFATE 4 MG/ML
4 INJECTION, SOLUTION INTRAMUSCULAR; INTRAVENOUS
Status: COMPLETED | OUTPATIENT
Start: 2023-11-18 | End: 2023-11-18

## 2023-11-18 RX ORDER — SODIUM CHLORIDE 9 MG/ML
INJECTION, SOLUTION INTRAVENOUS CONTINUOUS
Status: ACTIVE | OUTPATIENT
Start: 2023-11-18 | End: 2023-11-19

## 2023-11-18 RX ORDER — ATENOLOL 25 MG/1
25 TABLET ORAL DAILY
Status: DISCONTINUED | OUTPATIENT
Start: 2023-11-19 | End: 2023-11-22 | Stop reason: HOSPADM

## 2023-11-18 RX ORDER — MUPIROCIN 20 MG/G
1 OINTMENT TOPICAL 2 TIMES DAILY
Status: CANCELLED | OUTPATIENT
Start: 2023-11-19 | End: 2023-11-24

## 2023-11-18 RX ORDER — FUROSEMIDE 10 MG/ML
60 INJECTION INTRAMUSCULAR; INTRAVENOUS ONCE
Status: COMPLETED | OUTPATIENT
Start: 2023-11-18 | End: 2023-11-18

## 2023-11-18 RX ORDER — SODIUM CHLORIDE 0.9 % (FLUSH) 0.9 %
10 SYRINGE (ML) INJECTION
Status: DISCONTINUED | OUTPATIENT
Start: 2023-11-18 | End: 2023-11-22 | Stop reason: HOSPADM

## 2023-11-18 RX ORDER — SODIUM CHLORIDE 0.9 % (FLUSH) 0.9 %
10 SYRINGE (ML) INJECTION
Status: DISCONTINUED | OUTPATIENT
Start: 2023-11-19 | End: 2023-11-22 | Stop reason: HOSPADM

## 2023-11-18 RX ADMIN — MORPHINE SULFATE 4 MG: 4 INJECTION INTRAVENOUS at 08:11

## 2023-11-18 RX ADMIN — TRANEXAMIC ACID 1000 MG: 100 INJECTION, SOLUTION INTRAVENOUS at 10:11

## 2023-11-18 RX ADMIN — MORPHINE SULFATE 4 MG: 4 INJECTION INTRAVENOUS at 09:11

## 2023-11-18 RX ADMIN — FUROSEMIDE 60 MG: 10 INJECTION, SOLUTION INTRAMUSCULAR; INTRAVENOUS at 11:11

## 2023-11-18 RX ADMIN — ACETAMINOPHEN 1000 MG: 500 TABLET ORAL at 11:11

## 2023-11-18 RX ADMIN — SODIUM CHLORIDE: 9 INJECTION, SOLUTION INTRAVENOUS at 11:11

## 2023-11-18 NOTE — Clinical Note
Diagnosis: Fall [066513]   Future Attending Provider: ANGELIQUE DRAKE [[id number]]   Admitting Provider:: MICHAEL GILMORE [[id number]]   Reason for IP Medical Treatment  (Clinical interventions that can only be accomplished in the IP setting? ) :: fall, right intertrochanteric fracture   I certify that Inpatient services for greater than or equal to 2 midnights are medically necessary:: Yes   Plans for Post-Acute care--if anticipated (pick the single best option):: A. No post acute care anticipated at this time

## 2023-11-19 ENCOUNTER — ANESTHESIA (OUTPATIENT)
Dept: SURGERY | Facility: HOSPITAL | Age: 88
DRG: 481 | End: 2023-11-19
Payer: MEDICARE

## 2023-11-19 ENCOUNTER — ANESTHESIA EVENT (OUTPATIENT)
Dept: SURGERY | Facility: HOSPITAL | Age: 88
DRG: 481 | End: 2023-11-19
Payer: MEDICARE

## 2023-11-19 PROBLEM — M25.562 ACUTE PAIN OF LEFT KNEE: Status: RESOLVED | Noted: 2022-06-21 | Resolved: 2023-11-19

## 2023-11-19 PROBLEM — Z71.89 ACP (ADVANCE CARE PLANNING): Status: ACTIVE | Noted: 2023-11-19

## 2023-11-19 PROBLEM — R73.03 PREDIABETES: Status: ACTIVE | Noted: 2023-11-19

## 2023-11-19 PROBLEM — S72.141D CLOSED DISPLACED INTERTROCHANTERIC FRACTURE OF RIGHT FEMUR WITH ROUTINE HEALING: Status: ACTIVE | Noted: 2023-11-18

## 2023-11-19 PROBLEM — D62 ACUTE BLOOD LOSS AS CAUSE OF POSTOPERATIVE ANEMIA: Status: ACTIVE | Noted: 2023-11-19

## 2023-11-19 PROBLEM — R91.8 LUNG MASS: Status: RESOLVED | Noted: 2019-08-01 | Resolved: 2023-11-19

## 2023-11-19 PROBLEM — M94.9 DISORDER OF CARTILAGE, UNSPECIFIED: Status: RESOLVED | Noted: 2019-11-04 | Resolved: 2023-11-19

## 2023-11-19 PROBLEM — R60.0 LEG EDEMA: Status: RESOLVED | Noted: 2018-09-13 | Resolved: 2023-11-19

## 2023-11-19 LAB
ABO + RH BLD: NORMAL
ALBUMIN SERPL BCP-MCNC: 3.6 G/DL (ref 3.5–5.2)
ALP SERPL-CCNC: 84 U/L (ref 55–135)
ALT SERPL W/O P-5'-P-CCNC: 10 U/L (ref 10–44)
ANION GAP SERPL CALC-SCNC: 11 MMOL/L (ref 8–16)
ANION GAP SERPL CALC-SCNC: 13 MMOL/L (ref 8–16)
AST SERPL-CCNC: 20 U/L (ref 10–40)
BASOPHILS # BLD AUTO: 0.01 K/UL (ref 0–0.2)
BASOPHILS # BLD AUTO: 0.02 K/UL (ref 0–0.2)
BASOPHILS # BLD AUTO: 0.02 K/UL (ref 0–0.2)
BASOPHILS NFR BLD: 0.1 % (ref 0–1.9)
BASOPHILS NFR BLD: 0.1 % (ref 0–1.9)
BASOPHILS NFR BLD: 0.2 % (ref 0–1.9)
BILIRUB SERPL-MCNC: 1 MG/DL (ref 0.1–1)
BLD GP AB SCN CELLS X3 SERPL QL: NORMAL
BUN SERPL-MCNC: 23 MG/DL (ref 10–30)
BUN SERPL-MCNC: 23 MG/DL (ref 10–30)
CALCIUM SERPL-MCNC: 9 MG/DL (ref 8.7–10.5)
CALCIUM SERPL-MCNC: 9 MG/DL (ref 8.7–10.5)
CHLORIDE SERPL-SCNC: 105 MMOL/L (ref 95–110)
CHLORIDE SERPL-SCNC: 105 MMOL/L (ref 95–110)
CO2 SERPL-SCNC: 21 MMOL/L (ref 23–29)
CO2 SERPL-SCNC: 22 MMOL/L (ref 23–29)
CREAT SERPL-MCNC: 1.2 MG/DL (ref 0.5–1.4)
CREAT SERPL-MCNC: 1.2 MG/DL (ref 0.5–1.4)
DIFFERENTIAL METHOD: ABNORMAL
EOSINOPHIL # BLD AUTO: 0 K/UL (ref 0–0.5)
EOSINOPHIL NFR BLD: 0.1 % (ref 0–8)
EOSINOPHIL NFR BLD: 0.1 % (ref 0–8)
EOSINOPHIL NFR BLD: 0.2 % (ref 0–8)
ERYTHROCYTE [DISTWIDTH] IN BLOOD BY AUTOMATED COUNT: 14.6 % (ref 11.5–14.5)
ERYTHROCYTE [DISTWIDTH] IN BLOOD BY AUTOMATED COUNT: 14.7 % (ref 11.5–14.5)
ERYTHROCYTE [DISTWIDTH] IN BLOOD BY AUTOMATED COUNT: 15.1 % (ref 11.5–14.5)
EST. GFR  (NO RACE VARIABLE): 40.9 ML/MIN/1.73 M^2
EST. GFR  (NO RACE VARIABLE): 40.9 ML/MIN/1.73 M^2
GLUCOSE SERPL-MCNC: 210 MG/DL (ref 70–110)
GLUCOSE SERPL-MCNC: 213 MG/DL (ref 70–110)
HCT VFR BLD AUTO: 31.3 % (ref 37–48.5)
HCT VFR BLD AUTO: 34.3 % (ref 37–48.5)
HCT VFR BLD AUTO: 34.4 % (ref 37–48.5)
HGB BLD-MCNC: 10.9 G/DL (ref 12–16)
HGB BLD-MCNC: 10.9 G/DL (ref 12–16)
HGB BLD-MCNC: 9.9 G/DL (ref 12–16)
IMM GRANULOCYTES # BLD AUTO: 0.05 K/UL (ref 0–0.04)
IMM GRANULOCYTES # BLD AUTO: 0.06 K/UL (ref 0–0.04)
IMM GRANULOCYTES # BLD AUTO: 0.08 K/UL (ref 0–0.04)
IMM GRANULOCYTES NFR BLD AUTO: 0.4 % (ref 0–0.5)
IMM GRANULOCYTES NFR BLD AUTO: 0.5 % (ref 0–0.5)
IMM GRANULOCYTES NFR BLD AUTO: 0.6 % (ref 0–0.5)
LYMPHOCYTES # BLD AUTO: 0.3 K/UL (ref 1–4.8)
LYMPHOCYTES # BLD AUTO: 0.3 K/UL (ref 1–4.8)
LYMPHOCYTES # BLD AUTO: 0.5 K/UL (ref 1–4.8)
LYMPHOCYTES NFR BLD: 2.4 % (ref 18–48)
LYMPHOCYTES NFR BLD: 2.6 % (ref 18–48)
LYMPHOCYTES NFR BLD: 3.4 % (ref 18–48)
MAGNESIUM SERPL-MCNC: 1.9 MG/DL (ref 1.6–2.6)
MCH RBC QN AUTO: 28.3 PG (ref 27–31)
MCH RBC QN AUTO: 28.4 PG (ref 27–31)
MCH RBC QN AUTO: 28.6 PG (ref 27–31)
MCHC RBC AUTO-ENTMCNC: 31.6 G/DL (ref 32–36)
MCHC RBC AUTO-ENTMCNC: 31.7 G/DL (ref 32–36)
MCHC RBC AUTO-ENTMCNC: 31.8 G/DL (ref 32–36)
MCV RBC AUTO: 89 FL (ref 82–98)
MCV RBC AUTO: 89 FL (ref 82–98)
MCV RBC AUTO: 91 FL (ref 82–98)
MONOCYTES # BLD AUTO: 0.4 K/UL (ref 0.3–1)
MONOCYTES # BLD AUTO: 0.5 K/UL (ref 0.3–1)
MONOCYTES # BLD AUTO: 0.9 K/UL (ref 0.3–1)
MONOCYTES NFR BLD: 3.5 % (ref 4–15)
MONOCYTES NFR BLD: 3.7 % (ref 4–15)
MONOCYTES NFR BLD: 6.4 % (ref 4–15)
NEUTROPHILS # BLD AUTO: 11.2 K/UL (ref 1.8–7.7)
NEUTROPHILS # BLD AUTO: 11.3 K/UL (ref 1.8–7.7)
NEUTROPHILS # BLD AUTO: 12.1 K/UL (ref 1.8–7.7)
NEUTROPHILS NFR BLD: 89.3 % (ref 38–73)
NEUTROPHILS NFR BLD: 93 % (ref 38–73)
NEUTROPHILS NFR BLD: 93.4 % (ref 38–73)
NRBC BLD-RTO: 0 /100 WBC
PHOSPHATE SERPL-MCNC: 3.4 MG/DL (ref 2.7–4.5)
PLATELET # BLD AUTO: 183 K/UL (ref 150–450)
PLATELET # BLD AUTO: 192 K/UL (ref 150–450)
PLATELET # BLD AUTO: 196 K/UL (ref 150–450)
PMV BLD AUTO: 10.7 FL (ref 9.2–12.9)
PMV BLD AUTO: 10.7 FL (ref 9.2–12.9)
PMV BLD AUTO: 10.8 FL (ref 9.2–12.9)
POTASSIUM SERPL-SCNC: 4.3 MMOL/L (ref 3.5–5.1)
POTASSIUM SERPL-SCNC: 4.4 MMOL/L (ref 3.5–5.1)
PROT SERPL-MCNC: 6.9 G/DL (ref 6–8.4)
RBC # BLD AUTO: 3.46 M/UL (ref 4–5.4)
RBC # BLD AUTO: 3.84 M/UL (ref 4–5.4)
RBC # BLD AUTO: 3.85 M/UL (ref 4–5.4)
SODIUM SERPL-SCNC: 138 MMOL/L (ref 136–145)
SODIUM SERPL-SCNC: 139 MMOL/L (ref 136–145)
WBC # BLD AUTO: 12.07 K/UL (ref 3.9–12.7)
WBC # BLD AUTO: 12.13 K/UL (ref 3.9–12.7)
WBC # BLD AUTO: 13.56 K/UL (ref 3.9–12.7)

## 2023-11-19 PROCEDURE — 85025 COMPLETE CBC W/AUTO DIFF WBC: CPT | Mod: 91,HCNC | Performed by: FAMILY MEDICINE

## 2023-11-19 PROCEDURE — 63600175 PHARM REV CODE 636 W HCPCS: Mod: HCNC | Performed by: ANESTHESIOLOGY

## 2023-11-19 PROCEDURE — 36415 COLL VENOUS BLD VENIPUNCTURE: CPT | Mod: HCNC

## 2023-11-19 PROCEDURE — 21400001 HC TELEMETRY ROOM: Mod: HCNC

## 2023-11-19 PROCEDURE — D9220A PRA ANESTHESIA: Mod: HCNC,CRNA,, | Performed by: NURSE ANESTHETIST, CERTIFIED REGISTERED

## 2023-11-19 PROCEDURE — 63600175 PHARM REV CODE 636 W HCPCS: Mod: HCNC | Performed by: ORTHOPAEDIC SURGERY

## 2023-11-19 PROCEDURE — 25000003 PHARM REV CODE 250: Mod: HCNC | Performed by: ANESTHESIOLOGY

## 2023-11-19 PROCEDURE — 25000003 PHARM REV CODE 250: Mod: HCNC | Performed by: INTERNAL MEDICINE

## 2023-11-19 PROCEDURE — 36415 COLL VENOUS BLD VENIPUNCTURE: CPT | Mod: HCNC | Performed by: INTERNAL MEDICINE

## 2023-11-19 PROCEDURE — C1713 ANCHOR/SCREW BN/BN,TIS/BN: HCPCS | Mod: HCNC | Performed by: ORTHOPAEDIC SURGERY

## 2023-11-19 PROCEDURE — 63600175 PHARM REV CODE 636 W HCPCS: Mod: HCNC | Performed by: FAMILY MEDICINE

## 2023-11-19 PROCEDURE — 63600175 PHARM REV CODE 636 W HCPCS: Mod: HCNC | Performed by: NURSE ANESTHETIST, CERTIFIED REGISTERED

## 2023-11-19 PROCEDURE — 25000003 PHARM REV CODE 250: Mod: HCNC | Performed by: FAMILY MEDICINE

## 2023-11-19 PROCEDURE — C1769 GUIDE WIRE: HCPCS | Mod: HCNC | Performed by: ORTHOPAEDIC SURGERY

## 2023-11-19 PROCEDURE — 85025 COMPLETE CBC W/AUTO DIFF WBC: CPT | Mod: 91,HCNC

## 2023-11-19 PROCEDURE — 71000033 HC RECOVERY, INTIAL HOUR: Mod: HCNC | Performed by: ORTHOPAEDIC SURGERY

## 2023-11-19 PROCEDURE — 86920 COMPATIBILITY TEST SPIN: CPT | Mod: HCNC

## 2023-11-19 PROCEDURE — 27245 TREAT THIGH FRACTURE: CPT | Mod: HCNC,RT,, | Performed by: ORTHOPAEDIC SURGERY

## 2023-11-19 PROCEDURE — 76942 ECHO GUIDE FOR BIOPSY: CPT | Mod: 26,HCNC,, | Performed by: ANESTHESIOLOGY

## 2023-11-19 PROCEDURE — 99233 SBSQ HOSP IP/OBS HIGH 50: CPT | Mod: HCNC,,, | Performed by: INTERNAL MEDICINE

## 2023-11-19 PROCEDURE — 25000003 PHARM REV CODE 250: Mod: HCNC | Performed by: NURSE ANESTHETIST, CERTIFIED REGISTERED

## 2023-11-19 PROCEDURE — 27245 PR OPEN FIX INTER/SUBTROCH FX,IMPLNT: ICD-10-PCS | Mod: HCNC,RT,, | Performed by: ORTHOPAEDIC SURGERY

## 2023-11-19 PROCEDURE — 76942 RIGHT SIFI CATHETER: ICD-10-PCS | Mod: 26,HCNC,, | Performed by: ANESTHESIOLOGY

## 2023-11-19 PROCEDURE — 36000711: Mod: HCNC | Performed by: ORTHOPAEDIC SURGERY

## 2023-11-19 PROCEDURE — 37000009 HC ANESTHESIA EA ADD 15 MINS: Mod: HCNC | Performed by: ORTHOPAEDIC SURGERY

## 2023-11-19 PROCEDURE — 99233 PR SUBSEQUENT HOSPITAL CARE,LEVL III: ICD-10-PCS | Mod: HCNC,,, | Performed by: INTERNAL MEDICINE

## 2023-11-19 PROCEDURE — 84100 ASSAY OF PHOSPHORUS: CPT | Mod: HCNC | Performed by: FAMILY MEDICINE

## 2023-11-19 PROCEDURE — 80048 BASIC METABOLIC PNL TOTAL CA: CPT | Mod: HCNC,XB | Performed by: FAMILY MEDICINE

## 2023-11-19 PROCEDURE — 36000710: Mod: HCNC | Performed by: ORTHOPAEDIC SURGERY

## 2023-11-19 PROCEDURE — 71000015 HC POSTOP RECOV 1ST HR: Mod: HCNC | Performed by: ORTHOPAEDIC SURGERY

## 2023-11-19 PROCEDURE — 83735 ASSAY OF MAGNESIUM: CPT | Mod: HCNC | Performed by: FAMILY MEDICINE

## 2023-11-19 PROCEDURE — 37000008 HC ANESTHESIA 1ST 15 MINUTES: Mod: HCNC | Performed by: ORTHOPAEDIC SURGERY

## 2023-11-19 PROCEDURE — C1751 CATH, INF, PER/CENT/MIDLINE: HCPCS | Mod: HCNC | Performed by: ANESTHESIOLOGY

## 2023-11-19 PROCEDURE — 86901 BLOOD TYPING SEROLOGIC RH(D): CPT | Mod: HCNC | Performed by: INTERNAL MEDICINE

## 2023-11-19 PROCEDURE — 64448 NJX AA&/STRD FEM NRV NFS IMG: CPT | Mod: HCNC | Performed by: ANESTHESIOLOGY

## 2023-11-19 PROCEDURE — D9220A PRA ANESTHESIA: ICD-10-PCS | Mod: HCNC,CRNA,, | Performed by: NURSE ANESTHETIST, CERTIFIED REGISTERED

## 2023-11-19 PROCEDURE — 25000003 PHARM REV CODE 250: Mod: HCNC

## 2023-11-19 PROCEDURE — 64999 RIGHT SIFI CATHETER: ICD-10-PCS | Mod: HCNC,,, | Performed by: ANESTHESIOLOGY

## 2023-11-19 PROCEDURE — 64999 UNLISTED PX NERVOUS SYSTEM: CPT | Mod: HCNC,,, | Performed by: ANESTHESIOLOGY

## 2023-11-19 PROCEDURE — 27201423 OPTIME MED/SURG SUP & DEVICES STERILE SUPPLY: Mod: HCNC | Performed by: ORTHOPAEDIC SURGERY

## 2023-11-19 PROCEDURE — 80053 COMPREHEN METABOLIC PANEL: CPT | Mod: HCNC

## 2023-11-19 PROCEDURE — D9220A PRA ANESTHESIA: ICD-10-PCS | Mod: HCNC,ANES,, | Performed by: ANESTHESIOLOGY

## 2023-11-19 PROCEDURE — D9220A PRA ANESTHESIA: Mod: HCNC,ANES,, | Performed by: ANESTHESIOLOGY

## 2023-11-19 DEVICE — SCREW LOCKING BONE T2 5X45MM: Type: IMPLANTABLE DEVICE | Site: FEMUR | Status: FUNCTIONAL

## 2023-11-19 DEVICE — IMPLANTABLE DEVICE: Type: IMPLANTABLE DEVICE | Site: FEMUR | Status: FUNCTIONAL

## 2023-11-19 RX ORDER — ROCURONIUM BROMIDE 10 MG/ML
INJECTION, SOLUTION INTRAVENOUS
Status: DISCONTINUED | OUTPATIENT
Start: 2023-11-19 | End: 2023-11-19

## 2023-11-19 RX ORDER — ACETAMINOPHEN 500 MG
1000 TABLET ORAL EVERY 6 HOURS
Status: DISCONTINUED | OUTPATIENT
Start: 2023-11-19 | End: 2023-11-19

## 2023-11-19 RX ORDER — ONDANSETRON 2 MG/ML
INJECTION INTRAMUSCULAR; INTRAVENOUS
Status: DISCONTINUED | OUTPATIENT
Start: 2023-11-19 | End: 2023-11-19

## 2023-11-19 RX ORDER — FENTANYL CITRATE 50 UG/ML
25 INJECTION, SOLUTION INTRAMUSCULAR; INTRAVENOUS EVERY 5 MIN PRN
Status: CANCELLED | OUTPATIENT
Start: 2023-11-19

## 2023-11-19 RX ORDER — GUAIFENESIN 600 MG/1
600 TABLET, EXTENDED RELEASE ORAL 2 TIMES DAILY
Status: DISCONTINUED | OUTPATIENT
Start: 2023-11-19 | End: 2023-11-22 | Stop reason: HOSPADM

## 2023-11-19 RX ORDER — AMOXICILLIN 250 MG
1 CAPSULE ORAL 2 TIMES DAILY
Status: DISCONTINUED | OUTPATIENT
Start: 2023-11-19 | End: 2023-11-22 | Stop reason: HOSPADM

## 2023-11-19 RX ORDER — EPINEPHRINE 1 MG/ML
INJECTION, SOLUTION, CONCENTRATE INTRAVENOUS
Status: DISPENSED
Start: 2023-11-19 | End: 2023-11-19

## 2023-11-19 RX ORDER — HYDROMORPHONE HYDROCHLORIDE 1 MG/ML
0.2 INJECTION, SOLUTION INTRAMUSCULAR; INTRAVENOUS; SUBCUTANEOUS EVERY 5 MIN PRN
Status: DISCONTINUED | OUTPATIENT
Start: 2023-11-19 | End: 2023-11-19 | Stop reason: HOSPADM

## 2023-11-19 RX ORDER — METHOCARBAMOL 500 MG/1
500 TABLET, FILM COATED ORAL EVERY 6 HOURS PRN
Status: DISCONTINUED | OUTPATIENT
Start: 2023-11-19 | End: 2023-11-19

## 2023-11-19 RX ORDER — FENTANYL CITRATE 50 UG/ML
INJECTION, SOLUTION INTRAMUSCULAR; INTRAVENOUS
Status: DISCONTINUED | OUTPATIENT
Start: 2023-11-19 | End: 2023-11-19

## 2023-11-19 RX ORDER — SODIUM CHLORIDE 0.9 % (FLUSH) 0.9 %
10 SYRINGE (ML) INJECTION
Status: DISCONTINUED | OUTPATIENT
Start: 2023-11-19 | End: 2023-11-19 | Stop reason: HOSPADM

## 2023-11-19 RX ORDER — MEPERIDINE HYDROCHLORIDE 50 MG/ML
12.5 INJECTION INTRAMUSCULAR; INTRAVENOUS; SUBCUTANEOUS EVERY 10 MIN PRN
Status: DISCONTINUED | OUTPATIENT
Start: 2023-11-19 | End: 2023-11-19 | Stop reason: HOSPADM

## 2023-11-19 RX ORDER — EPHEDRINE SULFATE 50 MG/ML
INJECTION, SOLUTION INTRAVENOUS
Status: DISCONTINUED | OUTPATIENT
Start: 2023-11-19 | End: 2023-11-19

## 2023-11-19 RX ORDER — METHOCARBAMOL 500 MG/1
1000 TABLET, FILM COATED ORAL EVERY 6 HOURS PRN
Status: DISCONTINUED | OUTPATIENT
Start: 2023-11-19 | End: 2023-11-19

## 2023-11-19 RX ORDER — PHENYLEPHRINE HYDROCHLORIDE 10 MG/ML
INJECTION INTRAVENOUS
Status: DISCONTINUED | OUTPATIENT
Start: 2023-11-19 | End: 2023-11-19

## 2023-11-19 RX ORDER — ONDANSETRON 2 MG/ML
4 INJECTION INTRAMUSCULAR; INTRAVENOUS DAILY PRN
Status: DISCONTINUED | OUTPATIENT
Start: 2023-11-19 | End: 2023-11-19 | Stop reason: HOSPADM

## 2023-11-19 RX ORDER — ROPIVACAINE HYDROCHLORIDE 2 MG/ML
0.1 INJECTION, SOLUTION EPIDURAL; INFILTRATION; PERINEURAL CONTINUOUS
Status: DISCONTINUED | OUTPATIENT
Start: 2023-11-19 | End: 2023-11-22

## 2023-11-19 RX ORDER — ONDANSETRON 2 MG/ML
4 INJECTION INTRAMUSCULAR; INTRAVENOUS EVERY 12 HOURS PRN
Status: DISCONTINUED | OUTPATIENT
Start: 2023-11-19 | End: 2023-11-19

## 2023-11-19 RX ORDER — ACETAMINOPHEN 500 MG
1000 TABLET ORAL EVERY 8 HOURS
Status: DISCONTINUED | OUTPATIENT
Start: 2023-11-19 | End: 2023-11-22 | Stop reason: HOSPADM

## 2023-11-19 RX ORDER — ROPIVACAINE HYDROCHLORIDE 5 MG/ML
INJECTION, SOLUTION EPIDURAL; INFILTRATION; PERINEURAL
Status: COMPLETED
Start: 2023-11-19 | End: 2023-11-19

## 2023-11-19 RX ORDER — PROPOFOL 10 MG/ML
VIAL (ML) INTRAVENOUS
Status: DISCONTINUED | OUTPATIENT
Start: 2023-11-19 | End: 2023-11-19

## 2023-11-19 RX ORDER — FENTANYL CITRATE 50 UG/ML
25 INJECTION, SOLUTION INTRAMUSCULAR; INTRAVENOUS EVERY 5 MIN PRN
Status: DISCONTINUED | OUTPATIENT
Start: 2023-11-19 | End: 2023-11-19 | Stop reason: HOSPADM

## 2023-11-19 RX ORDER — VANCOMYCIN HYDROCHLORIDE 1 G/20ML
INJECTION, POWDER, LYOPHILIZED, FOR SOLUTION INTRAVENOUS
Status: DISCONTINUED | OUTPATIENT
Start: 2023-11-19 | End: 2023-11-19 | Stop reason: HOSPADM

## 2023-11-19 RX ORDER — METHOCARBAMOL 500 MG/1
500 TABLET, FILM COATED ORAL 3 TIMES DAILY
Status: DISCONTINUED | OUTPATIENT
Start: 2023-11-19 | End: 2023-11-20

## 2023-11-19 RX ORDER — ROPIVACAINE HYDROCHLORIDE 5 MG/ML
INJECTION, SOLUTION EPIDURAL; INFILTRATION; PERINEURAL
Status: COMPLETED | OUTPATIENT
Start: 2023-11-19 | End: 2023-11-19

## 2023-11-19 RX ORDER — MIDAZOLAM HYDROCHLORIDE 1 MG/ML
1 INJECTION INTRAMUSCULAR; INTRAVENOUS
Status: DISCONTINUED | OUTPATIENT
Start: 2023-11-19 | End: 2023-11-19

## 2023-11-19 RX ORDER — TRANEXAMIC ACID 100 MG/ML
INJECTION, SOLUTION INTRAVENOUS
Status: DISCONTINUED | OUTPATIENT
Start: 2023-11-19 | End: 2023-11-19

## 2023-11-19 RX ORDER — POLYETHYLENE GLYCOL 3350 17 G/17G
17 POWDER, FOR SOLUTION ORAL DAILY
Status: DISCONTINUED | OUTPATIENT
Start: 2023-11-19 | End: 2023-11-22 | Stop reason: HOSPADM

## 2023-11-19 RX ORDER — LIDOCAINE HYDROCHLORIDE 20 MG/ML
INJECTION, SOLUTION EPIDURAL; INFILTRATION; INTRACAUDAL; PERINEURAL
Status: DISCONTINUED | OUTPATIENT
Start: 2023-11-19 | End: 2023-11-19

## 2023-11-19 RX ORDER — MUPIROCIN 20 MG/G
1 OINTMENT TOPICAL
Status: COMPLETED | OUTPATIENT
Start: 2023-11-19 | End: 2023-11-19

## 2023-11-19 RX ORDER — CEFAZOLIN SODIUM 1 G/3ML
INJECTION, POWDER, FOR SOLUTION INTRAMUSCULAR; INTRAVENOUS
Status: DISCONTINUED | OUTPATIENT
Start: 2023-11-19 | End: 2023-11-19

## 2023-11-19 RX ORDER — HALOPERIDOL 5 MG/ML
0.5 INJECTION INTRAMUSCULAR EVERY 10 MIN PRN
Status: DISCONTINUED | OUTPATIENT
Start: 2023-11-19 | End: 2023-11-19 | Stop reason: HOSPADM

## 2023-11-19 RX ORDER — FENTANYL CITRATE 50 UG/ML
25 INJECTION, SOLUTION INTRAMUSCULAR; INTRAVENOUS
Status: DISCONTINUED | OUTPATIENT
Start: 2023-11-19 | End: 2023-11-19

## 2023-11-19 RX ORDER — MUPIROCIN 20 MG/G
OINTMENT TOPICAL
Status: DISCONTINUED | OUTPATIENT
Start: 2023-11-19 | End: 2023-11-19

## 2023-11-19 RX ADMIN — ONDANSETRON 4 MG: 2 INJECTION INTRAMUSCULAR; INTRAVENOUS at 02:11

## 2023-11-19 RX ADMIN — MUPIROCIN 1 G: 20 OINTMENT TOPICAL at 08:11

## 2023-11-19 RX ADMIN — ROCURONIUM BROMIDE 40 MG: 10 INJECTION INTRAVENOUS at 10:11

## 2023-11-19 RX ADMIN — SODIUM CHLORIDE: 0.9 INJECTION, SOLUTION INTRAVENOUS at 09:11

## 2023-11-19 RX ADMIN — FENTANYL CITRATE 25 MCG: 50 INJECTION INTRAMUSCULAR; INTRAVENOUS at 09:11

## 2023-11-19 RX ADMIN — MORPHINE SULFATE 2 MG: 2 INJECTION, SOLUTION INTRAMUSCULAR; INTRAVENOUS at 02:11

## 2023-11-19 RX ADMIN — GUAIFENESIN 600 MG: 600 TABLET, EXTENDED RELEASE ORAL at 04:11

## 2023-11-19 RX ADMIN — CEFAZOLIN 2 G: 330 INJECTION, POWDER, FOR SOLUTION INTRAMUSCULAR; INTRAVENOUS at 10:11

## 2023-11-19 RX ADMIN — PHENYLEPHRINE HYDROCHLORIDE 100 MCG: 10 INJECTION INTRAVENOUS at 11:11

## 2023-11-19 RX ADMIN — APIXABAN 5 MG: 5 TABLET, FILM COATED ORAL at 08:11

## 2023-11-19 RX ADMIN — ACETAMINOPHEN 1000 MG: 500 TABLET ORAL at 06:11

## 2023-11-19 RX ADMIN — ONDANSETRON 4 MG: 2 INJECTION INTRAMUSCULAR; INTRAVENOUS at 12:11

## 2023-11-19 RX ADMIN — ROPIVACAINE HYDROCHLORIDE 20 ML: 5 INJECTION EPIDURAL; INFILTRATION; PERINEURAL at 09:11

## 2023-11-19 RX ADMIN — SUGAMMADEX 150 MG: 100 INJECTION, SOLUTION INTRAVENOUS at 12:11

## 2023-11-19 RX ADMIN — EPHEDRINE SULFATE 10 MG: 50 INJECTION INTRAVENOUS at 12:11

## 2023-11-19 RX ADMIN — Medication 6 MG: at 08:11

## 2023-11-19 RX ADMIN — ACETAMINOPHEN 1000 MG: 500 TABLET ORAL at 10:11

## 2023-11-19 RX ADMIN — SENNOSIDES AND DOCUSATE SODIUM 1 TABLET: 8.6; 5 TABLET ORAL at 01:11

## 2023-11-19 RX ADMIN — POLYETHYLENE GLYCOL 3350 17 G: 17 POWDER, FOR SOLUTION ORAL at 01:11

## 2023-11-19 RX ADMIN — ATENOLOL 25 MG: 25 TABLET ORAL at 08:11

## 2023-11-19 RX ADMIN — SENNOSIDES AND DOCUSATE SODIUM 1 TABLET: 8.6; 5 TABLET ORAL at 08:11

## 2023-11-19 RX ADMIN — TRANEXAMIC ACID 1000 MG: 100 INJECTION, SOLUTION INTRAVENOUS at 10:11

## 2023-11-19 RX ADMIN — TRANEXAMIC ACID 1000 MG: 100 INJECTION, SOLUTION INTRAVENOUS at 11:11

## 2023-11-19 RX ADMIN — PROPOFOL 150 MG: 10 INJECTION, EMULSION INTRAVENOUS at 10:11

## 2023-11-19 RX ADMIN — SODIUM CHLORIDE, SODIUM GLUCONATE, SODIUM ACETATE, POTASSIUM CHLORIDE, MAGNESIUM CHLORIDE, SODIUM PHOSPHATE, DIBASIC, AND POTASSIUM PHOSPHATE: .53; .5; .37; .037; .03; .012; .00082 INJECTION, SOLUTION INTRAVENOUS at 10:11

## 2023-11-19 RX ADMIN — METHOCARBAMOL 500 MG: 500 TABLET ORAL at 08:11

## 2023-11-19 RX ADMIN — OXYCODONE HYDROCHLORIDE 5 MG: 5 TABLET ORAL at 04:11

## 2023-11-19 RX ADMIN — LIDOCAINE HYDROCHLORIDE 60 MG: 20 INJECTION, SOLUTION EPIDURAL; INFILTRATION; INTRACAUDAL at 10:11

## 2023-11-19 RX ADMIN — ROPIVACAINE HYDROCHLORIDE 0.1 ML/HR: 2 INJECTION, SOLUTION EPIDURAL; INFILTRATION at 12:11

## 2023-11-19 RX ADMIN — ACETAMINOPHEN 1000 MG: 500 TABLET ORAL at 01:11

## 2023-11-19 RX ADMIN — FENTANYL CITRATE 25 MCG: 50 INJECTION INTRAMUSCULAR; INTRAVENOUS at 10:11

## 2023-11-19 RX ADMIN — CEFAZOLIN 2 G: 2 INJECTION, POWDER, FOR SOLUTION INTRAMUSCULAR; INTRAVENOUS at 04:11

## 2023-11-19 NOTE — ASSESSMENT & PLAN NOTE
Patient's anemia is currently controlled. Has not received any PRBCs to date. Etiology likely d/t acute blood loss which was from hip fracture and hip fracture surgery and expected blood loss.  Current CBC reviewed-   Lab Results   Component Value Date    HGB 9.9 (L) 11/19/2023    HCT 31.3 (L) 11/19/2023     Monitor serial CBC and transfuse if patient becomes hemodynamically unstable, symptomatic or H/H drops below 7/21.

## 2023-11-19 NOTE — PROGRESS NOTES
"[facility]  Orthopedics  Progress Note    Patient Name: Ngoc Castellanos  MRN: [id number]  Admission Date: 11/18/2023  Hospital Length of Stay: 1 days  Attending Provider: Tierra Stapleton MD  Primary Care Provider: Jacobo Mcleod MD  Follow-up For: Procedure(s) (LRB):  INSERTION, INTRAMEDULLARY RAMSES, FEMUR, RIGHT, HANA TABLE, MIRIAN, C- ARM DOOR SIDE (Right)    Post-Operative Day:    Subjective:     Principal Problem:Closed intertrochanteric fracture, right, initial encounter    Principal Orthopedic Problem: same    Interval History: NAEON. VSS. AF. Patient states that pain is well controlled. Voiding spontaneously. Dressing CDI. Hgb stable at 11.3, morning labs pending. Received TXA yesterday.     Plan for OR today. Remain NPO.       Review of patient's allergies indicates:   Allergen Reactions    Clindamycin     Levofloxacin Other (See Comments)    Lidocaine Other (See Comments)    Adhesive tape-silicones Rash       Current Facility-Administered Medications   Medication    0.9%  NaCl infusion    acetaminophen tablet 1,000 mg    atenoloL tablet 25 mg    bisacodyL suppository 10 mg    losartan tablet 25 mg    melatonin tablet 6 mg    methocarbamoL tablet 500 mg    morphine injection 2 mg    morphine injection 2 mg    ondansetron injection 4 mg    oxyCODONE immediate release tablet 5 mg    oxyCODONE immediate release tablet 5 mg    sodium chloride 0.9% flush 10 mL    sodium chloride 0.9% flush 10 mL     Objective:     Vital Signs (Most Recent):  Temp: 98.3 °F (36.8 °C) (11/19/23 0451)  Pulse: 84 (11/19/23 0509)  Resp: 16 (11/19/23 0451)  BP: 135/63 (11/19/23 0451)  SpO2: (!) 92 % (11/19/23 0451) Vital Signs (24h Range):  Temp:  [97.6 °F (36.4 °C)-98.3 °F (36.8 °C)] 98.3 °F (36.8 °C)  Pulse:  [] 84  Resp:  [16-38] 16  SpO2:  [92 %-97 %] 92 %  BP: (135-176)/(60-82) 135/63     Weight: 81.9 kg (180 lb 8.9 oz)  Height: 5' 7" (170.2 cm)  Body mass index is 28.28 kg/m².      Intake/Output Summary " "(Last 24 hours) at 11/19/2023 0527  Last data filed at 11/18/2023 2302  Gross per 24 hour   Intake 100 ml   Output --   Net 100 ml        Ortho/SPM Exam   Gen:  No acute distress, well-developed, well nourished.  CV:  Peripherally well-perfused. 2+ radial pulses, symmetric.  Respiratory:  Normal respiratory effort. No accessory muscle use.   Head/Neck:  Normocephalic.  Atraumatic. Sclera anicteric. TM. Neck supple.  Neuro: No FND. Awake. Alert. Oriented to person, place, time, and situation.   Abdomen: Soft, NTND.        MSK:  Left Upper Extremity  Inspection  - Skin intact throughout, no open wounds  - No swelling  - No ecchymosis, erythema, or signs of cellulitis  Palpation  - NonTTP throughout, no palpable abnormality   Range of motion  - AROM and PROM of the shoulder, elbow, wrist, and hand intact  Stability  - No evidence of joint dislocation or abnormal laxity   Neurovascular  - AIN/PIN/Radial/Median/Ulnar Nerves assessed in isolation without deficit  - Able to give thumbs up, make "OK" sign, cross IF/LF, abduct/adduct fingers, make fist  - SILT throughout  - Compartments soft  - Radial artery palpated  - Capillary Refill <3s  - Muscle tone normal     Right Upper Extremity  Inspection  - Skin intact throughout, no open wounds  - No swelling  - No ecchymosis, erythema, or signs of cellulitis  Palpation  - NonTTP throughout, no palpable abnormality  Range of motion  - AROM and PROM of the shoulder, elbow, wrist, and hand intact  Stability  - No evidence of joint dislocation or abnormal laxity   Neurovascular  - AIN/PIN/Radial/Median/Ulnar Nerves assessed in isolation without deficit  - Able to give thumbs up, make "OK" sign, cross IF/LF, abduct/adduct fingers, make fist  - SILT throughout  - Compartments soft  - Radial artery palpated  - Capillary Refill <3s  - Muscle tone normal        Left Lower Extremity  Inspection  - Draining superficial wound over anterior tibia  - 2+ pitting edema  - No ecchymosis, " erythema, or signs of cellulitis  Palpation  - NonTTP throughout, no palpable abnormality   Range of motion  - AROM and PROM of the hip, knee, ankle, and foot intact  Stability  - No evidence of joint dislocation or abnormal laxity  Neurovascular  - TA/EHL/Gastroc/FHL assessed in isolation without deficit  - SILT throughout  - Compartments soft  - DP palpated   - Capillary Refill <3s  - Negative Log roll  - Negative Stinchfield  - Muscle tone normal     Right Lower Extremity  Inspection  - Skin intact throughout, no open wounds  - 2+ pitting edema  - No ecchymosis, erythema, or signs of cellulitis  Palpation  - TTP over the right hip, nontender over the knee and ankle  Range of motion  - AROM and PROM of the hip, knee, ankle, and foot intact  Stability  - No evidence of joint dislocation or abnormal laxity  Neurovascular  - TA/EHL/Gastroc/FHL assessed in isolation without deficit  - SILT throughout  - Compartments soft  - DP palpated   - Capillary Refill <3s  - Negative Log roll  - Negative Stinchfield  - Muscle tone normal     Spine/pelvis/axial body:  No tenderness to palpation of cervical, thoracic, or lumbar spine  No pain with compression of pelvis  No chest wall or abdominal tenderness  No decubitus ulcers  Muscle tone normal  Significant Labs: CMP:   Recent Labs   Lab 11/18/23  2027      K 3.9      CO2 22*   *   BUN 21   CREATININE 1.1   CALCIUM 8.8   PROT 6.7   ALBUMIN 3.6   BILITOT 0.8   ALKPHOS 100   AST 21   ALT 10   ANIONGAP 12     Coagulation:   Recent Labs   Lab 11/18/23  2140   LABPROT 13.3*   INR 1.3*     All pertinent labs within the past 24 hours have been reviewed.    Significant Imaging: I have reviewed and interpreted all pertinent imaging results/findings.  Assessment/Plan:     * Closed intertrochanteric fracture, right, initial encounter  Ngoc Castellanos is a 89 y.o. female with a right sided IT fracture, closed, NVI. They take eliquis as anticoagulation at home. They  required a walker for ambulatory ambulatory assistance prior to this injury.     -Admitted to medicine hip fracture service for pre-operative clearance and medical evaluation  -To OR today for operative fixation of IT fracture  -Pt marked, booked, and consented for surgery  -DVT PPx: Hold anticoagulation  -Abx: Preop abx ordered  -Labs: Preop labs pending, HGB 11.3. One gram of TXA ordered at this time. Other lab results pending.  -Bed rest, simmons, NPO  -Iv: ordered for contralateral arm    Patient was explained in detail the severity of the injury that was suffered. Patient was explained the risks/benefits/and alternatives to operative management in detail including infection, bleeding, pain, nerve and vascular damage, heterotopic ossification, leg length discrepancies, rotational deformities and they express full understanding.  We discussed the 30% national first year mortality rate with hip fractures, the need for early mobilization, and the expected rehab course.  Patient expresses full understanding of the condition and expresses that they want to proceed with surgery. The patient is admitted to the medicine hip fracture service for optimization of medical comorbidities. Will plan for OR tomorrow. No guarantees were made, informed consent was obtained. All questions were answered to patient's and family's satisfaction.             RANDOLPH HUNT MD  Orthopedics  [facility] - Surgery

## 2023-11-19 NOTE — SUBJECTIVE & OBJECTIVE
Interval History: Patient went to OR today and underwent right femur IM nail to repair intertrochanteric fracture. Patient back in her room from surgery. Patient complaining of a lot of mucus and cough after surgery and likely related to her intubation during surgery. Added Mucinex to help with cough and mucus and oral suction prn. Patient also complaining of sore throat and that is also related to intubation and added throat lozenges to help. Patient reports minimal pain to right hip. Perineural catheter in place for post-op pain control. Patient's home Apixiban restarted post-op. Labs reviewed. Vitamin D normal at 51. Hgb with slight decrease to 9.9 today from 11.3 on admit and expected blood loss related to hip fracture and surgery.     Review of Systems   Constitutional:  Negative for fever.   HENT:  Positive for hearing loss and sore throat.    Respiratory:  Positive for cough. Negative for shortness of breath.    Cardiovascular:  Negative for chest pain.   Gastrointestinal:  Negative for abdominal pain and nausea.   Musculoskeletal:  Positive for arthralgias (Right hip).   Neurological:  Negative for dizziness.   Psychiatric/Behavioral:  Negative for agitation and confusion.      Objective:     Vital Signs (Most Recent):  Temp: 97.4 °F (36.3 °C) (11/19/23 1533)  Pulse: 82 (11/19/23 1533)  Resp: 16 (11/19/23 1533)  BP: 119/58 (11/19/23 1533)  SpO2: 96 % (11/19/23 1533) on room air Vital Signs (24h Range):  Temp:  [97 °F (36.1 °C)-98.3 °F (36.8 °C)] 97.4 °F (36.3 °C)  Pulse:  [] 82  Resp:  [16-38] 16  SpO2:  [92 %-100 %] 96 %  BP: (101-176)/(54-82) 119/58     Weight: 81.9 kg (180 lb 8.9 oz)  Body mass index is 28.28 kg/m².    Intake/Output Summary (Last 24 hours) at 11/19/2023 1408  Last data filed at 11/19/2023 1200  Gross per 24 hour   Intake 1600 ml   Output 1100 ml   Net 500 ml         Physical Exam  Vitals and nursing note reviewed.   Constitutional:       General: She is awake. She is not in acute  distress.     Appearance: Normal appearance. She is well-developed and normal weight. She is not ill-appearing.   Eyes:      Conjunctiva/sclera: Conjunctivae normal.   Cardiovascular:      Rate and Rhythm: Normal rate. Rhythm irregularly irregular.      Heart sounds: Normal heart sounds. No murmur heard.  Pulmonary:      Effort: Pulmonary effort is normal. No respiratory distress.      Breath sounds: Normal breath sounds. No wheezing.   Abdominal:      General: Abdomen is flat. Bowel sounds are normal. There is no distension.      Palpations: Abdomen is soft.      Tenderness: There is no abdominal tenderness.   Musculoskeletal:      Right lower leg: No edema.      Left lower leg: No edema.   Skin:     General: Skin is warm.      Findings: No erythema.      Comments: Surgical dressings noted on right hip. Dressings are clean and dry and intact.   Neurological:      Mental Status: She is alert and oriented to person, place, and time.   Psychiatric:         Mood and Affect: Mood normal.         Behavior: Behavior normal. Behavior is cooperative.         Thought Content: Thought content normal.         Judgment: Judgment normal.             Significant Labs: CBC:   Recent Labs   Lab 11/18/23 2027 11/19/23  0515 11/19/23  1300   WBC 9.52 12.13  12.07 13.56*   HGB 11.3* 10.9*  10.9* 9.9*   HCT 35.3* 34.3*  34.4* 31.3*    192  196 183     CMP:   Recent Labs   Lab 11/18/23 2027 11/19/23  0515    139  138   K 3.9 4.4  4.3    105  105   CO2 22* 21*  22*   * 210*  213*   BUN 21 23  23   CREATININE 1.1 1.2  1.2   CALCIUM 8.8 9.0  9.0   PROT 6.7 6.9   ALBUMIN 3.6 3.6   BILITOT 0.8 1.0   ALKPHOS 100 84   AST 21 20   ALT 10 10   ANIONGAP 12 13  11     Magnesium:   Recent Labs   Lab 11/18/23 2140 11/19/23  0515   MG 1.8 1.9     Lab Results   Component Value Date    [id number] 51 11/18/2023       Significant Imaging: I have reviewed all pertinent imaging results/findings within the past 24  hours.

## 2023-11-19 NOTE — ASSESSMENT & PLAN NOTE
Chronic anticoagulation   Patient with Long standing persistent (>12 months) atrial fibrillation which is:controlled currently with home  Atenolol  and will continue in hospital. Patient is currently in atrial fibrillation.[id number] Score: 3. Anticoagulation indicated. Anticoagulation done with Apixiban 5 mg po BID at home and held for surgery but resumed post-op on 11/19.

## 2023-11-19 NOTE — ASSESSMENT & PLAN NOTE
Patient is identified as having Diastolic (HFpEF) heart failure that is Chronic. CHF is currently controlled.   Continue home Atenolol to treat but home Lasix held for surgery but will resume post-op once tolerating oral diet. Monitor clinical status closely. Monitor on telemetry. Patient is off CHF pathway.  Monitor strict Is&Os and daily weights. Continue on fluid restriction of 1.5 L. Cardiology has not been consulted. Continue to stress to patient importance of self efficacy and  on diet for CHF.

## 2023-11-19 NOTE — SUBJECTIVE & OBJECTIVE
"Principal Problem:Closed intertrochanteric fracture, right, initial encounter    Principal Orthopedic Problem: same    Interval History: NAEON. VSS. AF. Patient states that pain is well controlled. Voiding spontaneously. Dressing CDI. Hgb stable at 11.3, morning labs pending. Received TXA yesterday.     Plan for OR today. Remain NPO.       Review of patient's allergies indicates:   Allergen Reactions    Clindamycin     Levofloxacin Other (See Comments)    Lidocaine Other (See Comments)    Adhesive tape-silicones Rash       Current Facility-Administered Medications   Medication    0.9%  NaCl infusion    acetaminophen tablet 1,000 mg    atenoloL tablet 25 mg    bisacodyL suppository 10 mg    losartan tablet 25 mg    melatonin tablet 6 mg    methocarbamoL tablet 500 mg    morphine injection 2 mg    morphine injection 2 mg    ondansetron injection 4 mg    oxyCODONE immediate release tablet 5 mg    oxyCODONE immediate release tablet 5 mg    sodium chloride 0.9% flush 10 mL    sodium chloride 0.9% flush 10 mL     Objective:     Vital Signs (Most Recent):  Temp: 98.3 °F (36.8 °C) (11/19/23 0451)  Pulse: 84 (11/19/23 0509)  Resp: 16 (11/19/23 0451)  BP: 135/63 (11/19/23 0451)  SpO2: (!) 92 % (11/19/23 0451) Vital Signs (24h Range):  Temp:  [97.6 °F (36.4 °C)-98.3 °F (36.8 °C)] 98.3 °F (36.8 °C)  Pulse:  [] 84  Resp:  [16-38] 16  SpO2:  [92 %-97 %] 92 %  BP: (135-176)/(60-82) 135/63     Weight: 81.9 kg (180 lb 8.9 oz)  Height: 5' 7" (170.2 cm)  Body mass index is 28.28 kg/m².      Intake/Output Summary (Last 24 hours) at 11/19/2023 4694  Last data filed at 11/18/2023 2302  Gross per 24 hour   Intake 100 ml   Output --   Net 100 ml        Ortho/SPM Exam   Gen:  No acute distress, well-developed, well nourished.  CV:  Peripherally well-perfused. 2+ radial pulses, symmetric.  Respiratory:  Normal respiratory effort. No accessory muscle use.   Head/Neck:  Normocephalic.  Atraumatic. Sclera anicteric. TM. Neck " "supple.  Neuro: No FND. Awake. Alert. Oriented to person, place, time, and situation.   Abdomen: Soft, NTND.        MSK:  Left Upper Extremity  Inspection  - Skin intact throughout, no open wounds  - No swelling  - No ecchymosis, erythema, or signs of cellulitis  Palpation  - NonTTP throughout, no palpable abnormality   Range of motion  - AROM and PROM of the shoulder, elbow, wrist, and hand intact  Stability  - No evidence of joint dislocation or abnormal laxity   Neurovascular  - AIN/PIN/Radial/Median/Ulnar Nerves assessed in isolation without deficit  - Able to give thumbs up, make "OK" sign, cross IF/LF, abduct/adduct fingers, make fist  - SILT throughout  - Compartments soft  - Radial artery palpated  - Capillary Refill <3s  - Muscle tone normal     Right Upper Extremity  Inspection  - Skin intact throughout, no open wounds  - No swelling  - No ecchymosis, erythema, or signs of cellulitis  Palpation  - NonTTP throughout, no palpable abnormality  Range of motion  - AROM and PROM of the shoulder, elbow, wrist, and hand intact  Stability  - No evidence of joint dislocation or abnormal laxity   Neurovascular  - AIN/PIN/Radial/Median/Ulnar Nerves assessed in isolation without deficit  - Able to give thumbs up, make "OK" sign, cross IF/LF, abduct/adduct fingers, make fist  - SILT throughout  - Compartments soft  - Radial artery palpated  - Capillary Refill <3s  - Muscle tone normal        Left Lower Extremity  Inspection  - Draining superficial wound over anterior tibia  - 2+ pitting edema  - No ecchymosis, erythema, or signs of cellulitis  Palpation  - NonTTP throughout, no palpable abnormality   Range of motion  - AROM and PROM of the hip, knee, ankle, and foot intact  Stability  - No evidence of joint dislocation or abnormal laxity  Neurovascular  - TA/EHL/Gastroc/FHL assessed in isolation without deficit  - SILT throughout  - Compartments soft  - DP palpated   - Capillary Refill <3s  - Negative Log roll  - " Negative Stinchfield  - Muscle tone normal     Right Lower Extremity  Inspection  - Skin intact throughout, no open wounds  - 2+ pitting edema  - No ecchymosis, erythema, or signs of cellulitis  Palpation  - TTP over the right hip, nontender over the knee and ankle  Range of motion  - AROM and PROM of the hip, knee, ankle, and foot intact  Stability  - No evidence of joint dislocation or abnormal laxity  Neurovascular  - TA/EHL/Gastroc/FHL assessed in isolation without deficit  - SILT throughout  - Compartments soft  - DP palpated   - Capillary Refill <3s  - Negative Log roll  - Negative Stinchfield  - Muscle tone normal     Spine/pelvis/axial body:  No tenderness to palpation of cervical, thoracic, or lumbar spine  No pain with compression of pelvis  No chest wall or abdominal tenderness  No decubitus ulcers  Muscle tone normal  Significant Labs: CMP:   Recent Labs   Lab 11/18/23 2027      K 3.9      CO2 22*   *   BUN 21   CREATININE 1.1   CALCIUM 8.8   PROT 6.7   ALBUMIN 3.6   BILITOT 0.8   ALKPHOS 100   AST 21   ALT 10   ANIONGAP 12     Coagulation:   Recent Labs   Lab 11/18/23  2140   LABPROT 13.3*   INR 1.3*     All pertinent labs within the past 24 hours have been reviewed.    Significant Imaging: I have reviewed and interpreted all pertinent imaging results/findings.

## 2023-11-19 NOTE — SUBJECTIVE & OBJECTIVE
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    left    Valvular regurgitation     moderate MR       Past Surgical History:   Procedure Laterality Date    APPENDECTOMY      BRAIN SURGERY  2002    meningioma    BREAST BIOPSY  2/2014    Right breast- [facility]    BREAST CYST EXCISION  1960    left breast - benign    BREAST SURGERY  03/17/14    right mastectomy    HEMORRHOID SURGERY      HYSTERECTOMY      MASTECTOMY Right     TONSILLECTOMY         Review of patient's allergies indicates:   Allergen Reactions    Clindamycin     Levofloxacin Other (See Comments)    Lidocaine Other (See Comments)    Adhesive tape-silicones Rash       Current Facility-Administered Medications   Medication    0.9%  NaCl infusion    acetaminophen tablet 1,000 mg    [START ON 11/19/2023] atenoloL tablet 25 mg    bisacodyL suppository 10 mg    [START ON 11/19/2023] losartan tablet 25 mg    melatonin tablet 6 mg    methocarbamoL tablet 500 mg    morphine injection 2 mg    morphine injection 2 mg    ondansetron injection 4 mg    oxyCODONE immediate release tablet 5 mg    oxyCODONE immediate release tablet 5 mg    sodium chloride 0.9% flush 10 mL    [START ON 11/19/2023] sodium chloride 0.9% flush 10 mL     Current Outpatient Medications   Medication Sig    acetaminophen (TYLENOL) 500 MG tablet Take 1,000 mg by mouth every 6 (six) hours as needed for Pain.    atenoloL (TENORMIN) 25 MG tablet TAKE 1 TABLET EVERY DAY    calcium carbonate (OS-CELIA) 600 mg calcium (1,500 mg) Tab Take 600 mg by mouth 2 (two) times daily with meals.     ELIQUIS 5 mg Tab TAKE 1 TABLET TWICE DAILY    fluticasone propionate (FLONASE) 50 mcg/actuation nasal spray SHAKE LIQUID AND USE 1 SPRAY(50 MCG) IN EACH NOSTRIL EVERY DAY FOR 7  DAYS    furosemide (LASIX) 20 MG tablet TAKE 2 TABLETS EVERY MORNING  AND TAKE 1 TABLET EVERY EVENING    losartan (COZAAR) 25 MG tablet TAKE 1 TABLET EVERY DAY    meclizine (ANTIVERT) 12.5 mg tablet Take 1 tablet (12.5 mg total) by mouth 3 (three) times daily as needed for Dizziness.    mupirocin (BACTROBAN) 2 % ointment Apply to affected area 3 times daily (Patient not taking: Reported on 6/14/2023)    polyethylene glycol (GLYCOLAX) 17 gram/dose powder Take 17 g by mouth once daily.    sodium chloride (OCEAN) 0.65 % nasal spray 1 spray by Nasal route as needed for Congestion.    VIT C/VIT E/LUTEIN/MIN/OMEGA-3 (OCUVITE ORAL) Take 1 tablet by mouth once daily.     Family History    None       Tobacco Use    Smoking status: Never    Smokeless tobacco: Never   Substance and Sexual Activity    Alcohol use: No    Drug use: No    Sexual activity: Not on file     ROS    Constitutional: negative for fevers  Eyes: negative visual changes  ENT: negative for hearing loss  Respiratory: negative for dyspnea  Cardiovascular: negative for chest pain  Gastrointestinal: negative for abdominal pain  Genitourinary: negative for dysuria  Neurological: negative for headaches  Behavioral/Psych: negative for hallucinations  Endocrine: negative for temperature intolerance      Objective:     Vital Signs (Most Recent):  Temp: 98 °F (36.7 °C) (11/18/23 1909)  Pulse: 100 (11/18/23 2330)  Resp: 20 (11/18/23 2330)  BP: 135/60 (11/18/23 2330)  SpO2: 95 % (11/18/23 2330) Vital Signs (24h Range):  Temp:  [98 °F (36.7 °C)] 98 °F (36.7 °C)  Pulse:  [] 100  Resp:  [18-38] 20  SpO2:  [95 %-97 %] 95 %  BP: (135-176)/(60-80) 135/60           There is no height or weight on file to calculate BMI.      Intake/Output Summary (Last 24 hours) at 11/18/2023 2341  Last data filed at 11/18/2023 2302  Gross per 24 hour   Intake 100 ml   Output --   Net 100 ml        Ortho/SPM Exam     Gen:  No acute distress, well-developed, well nourished.  CV:   "Peripherally well-perfused. 2+ radial pulses, symmetric.  Respiratory:  Normal respiratory effort. No accessory muscle use.   Head/Neck:  Normocephalic.  Atraumatic. Sclera anicteric. TM. Neck supple.  Neuro: No FND. Awake. Alert. Oriented to person, place, time, and situation.   Abdomen: Soft, NTND.      MSK:  Left Upper Extremity  Inspection  - Skin intact throughout, no open wounds  - No swelling  - No ecchymosis, erythema, or signs of cellulitis  Palpation  - NonTTP throughout, no palpable abnormality   Range of motion  - AROM and PROM of the shoulder, elbow, wrist, and hand intact  Stability  - No evidence of joint dislocation or abnormal laxity   Neurovascular  - AIN/PIN/Radial/Median/Ulnar Nerves assessed in isolation without deficit  - Able to give thumbs up, make "OK" sign, cross IF/LF, abduct/adduct fingers, make fist  - SILT throughout  - Compartments soft  - Radial artery palpated  - Capillary Refill <3s  - Muscle tone normal    Right Upper Extremity  Inspection  - Skin intact throughout, no open wounds  - No swelling  - No ecchymosis, erythema, or signs of cellulitis  Palpation  - NonTTP throughout, no palpable abnormality  Range of motion  - AROM and PROM of the shoulder, elbow, wrist, and hand intact  Stability  - No evidence of joint dislocation or abnormal laxity   Neurovascular  - AIN/PIN/Radial/Median/Ulnar Nerves assessed in isolation without deficit  - Able to give thumbs up, make "OK" sign, cross IF/LF, abduct/adduct fingers, make fist  - SILT throughout  - Compartments soft  - Radial artery palpated  - Capillary Refill <3s  - Muscle tone normal      Left Lower Extremity  Inspection  - Draining superficial wound over anterior tibia  - 2+ pitting edema  - No ecchymosis, erythema, or signs of cellulitis  Palpation  - NonTTP throughout, no palpable abnormality   Range of motion  - AROM and PROM of the hip, knee, ankle, and foot intact  Stability  - No evidence of joint dislocation or abnormal " laxity  Neurovascular  - TA/EHL/Gastroc/FHL assessed in isolation without deficit  - SILT throughout  - Compartments soft  - DP palpated   - Capillary Refill <3s  - Negative Log roll  - Negative Stinchfield  - Muscle tone normal    Right Lower Extremity  Inspection  - Skin intact throughout, no open wounds  - 2+ pitting edema  - No ecchymosis, erythema, or signs of cellulitis  Palpation  - TTP over the right hip, nontender over the knee and ankle  Range of motion  - AROM and PROM of the hip, knee, ankle, and foot intact  Stability  - No evidence of joint dislocation or abnormal laxity  Neurovascular  - TA/EHL/Gastroc/FHL assessed in isolation without deficit  - SILT throughout  - Compartments soft  - DP palpated   - Capillary Refill <3s  - Negative Log roll  - Negative Stinchfield  - Muscle tone normal    Spine/pelvis/axial body:  No tenderness to palpation of cervical, thoracic, or lumbar spine  No pain with compression of pelvis  No chest wall or abdominal tenderness  No decubitus ulcers  Muscle tone normal        Significant Labs: CBC:   Recent Labs   Lab 11/18/23 2027   WBC 9.52   HGB 11.3*   HCT 35.3*        CMP:   Recent Labs   Lab 11/18/23 2027      K 3.9      CO2 22*   *   BUN 21   CREATININE 1.1   CALCIUM 8.8   PROT 6.7   ALBUMIN 3.6   BILITOT 0.8   ALKPHOS 100   AST 21   ALT 10   ANIONGAP 12     Coagulation:   Recent Labs   Lab 11/18/23  2140   LABPROT 13.3*   INR 1.3*     All pertinent labs within the past 24 hours have been reviewed.    Significant Imaging: X-Ray: I have reviewed all pertinent results/findings and my personal findings are:  XR of right femur and pelvis show a right sided intertrochanteric femur fracture.   I have reviewed and interpreted all pertinent imaging results/findings.

## 2023-11-19 NOTE — PROGRESS NOTES
[facility] Medicine  Progress Note    Patient Name: Ngoc Castellanos  MRN: [id number]  Patient Class: IP- Inpatient   Admission Date: 11/18/2023  Length of Stay: 1 days  Attending Physician: Tierra Stapleton MD  Primary Care Provider: Jacobo Mcleod MD        Subjective:     Principal Problem:Closed displaced intertrochanteric fracture of right femur with routine healing        HPI:  This is a very pleasant 89yo female with a past medical history of Afib on Eliquis, HFpEF on lasix, HTN, and CKD stage III who presents to the ED with chief complaint of fall and leg pain. Patient reports that she regularly uses either cane or walker to ambulate. She reports that she is often unsteady feeling on her feet and when at her home sometimes uses objects nearby for balance instead of cane or walker. States that today she was ambulating at home when she had trip and fall without LOC or hitting her head. Immediate severe right hip pain and inability to ambulate. Crawled across floor and able to call for help. EMS arrived and noted shortening and rotation of right leg, placed in pelvic binder and gave pain meds and brought to ED.    In the ED patient afebrile and hemodynamically stable saturating well on room air. 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 Displacement of the lesser trochanter fracture fragment medially. CXR with stable pleural effusion compared to prior study. Orthopedic surgery consulted and patient admitted to the care of medicine for further evaluation and management.     Overview/Hospital Course:  Patient admitted to [facility] Medicine Team H: Hip Fracture team and started on Hip Fracture Pathway with Orthopedic surgery consult for hip fracture. Patient was seen and evaluated by Orthopedic surgery who recommended operative repair of hip fracture.  Patient was medically optimized prior to surgery and was taken to OR after optimization on 11/19/2023. Patient underwent right hip cephalomedullary nail fixation by Dr. Henry Perez. Post-op patient weight bear as tolerated to the right lower extremity as per Orthopedics recommendation. Patient restarted on her home Apixiban 5 mg po BID post-op for her known persistent atrial fibrillation so no other chemical DVT prophylaxis needed post-op. Perineural pain catheter placed by Anesthesia Pain Service with continuous infusion of Ropivacaine to help with pain control post-op and Anesthesia Pain Service managing while patient in the hospital. Patient placed on multimodal pain management post-op with Tylenol 1000 mg po every 8 hours and Robaxin 500 mg po 3 times daily post-op and will continue. PT/OT consulted post-op. Patient noted to be hyperglycemic on admit with -210. HgA1C ordered and returned at 6.3% consistent with prediabetes which is a new diagnosis. With patient's advanced age recommendation would be conservative mangement with diet control management with diabetic diet.       Interval History: Patient went to OR today and underwent right femur IM nail to repair intertrochanteric fracture. Patient back in her room from surgery. Patient complaining of a lot of mucus and cough after surgery and likely related to her intubation during surgery. Added Mucinex to help with cough and mucus and oral suction prn. Patient also complaining of sore throat and that is also related to intubation and added throat lozenges to help. Patient reports minimal pain to right hip. Perineural catheter in place for post-op pain control. Patient's home Apixiban restarted post-op. Labs reviewed. Vitamin D normal at 51. Hgb with slight decrease to 9.9 today from 11.3 on admit and expected blood loss related to hip fracture and surgery.     Review of Systems   Constitutional:  Negative for fever.   HENT:  Positive for hearing loss  and sore throat.    Respiratory:  Positive for cough. Negative for shortness of breath.    Cardiovascular:  Negative for chest pain.   Gastrointestinal:  Negative for abdominal pain and nausea.   Musculoskeletal:  Positive for arthralgias (Right hip).   Neurological:  Negative for dizziness.   Psychiatric/Behavioral:  Negative for agitation and confusion.      Objective:     Vital Signs (Most Recent):  Temp: 97.4 °F (36.3 °C) (11/19/23 1533)  Pulse: 82 (11/19/23 1533)  Resp: 16 (11/19/23 1533)  BP: 119/58 (11/19/23 1533)  SpO2: 96 % (11/19/23 1533) on room air Vital Signs (24h Range):  Temp:  [97 °F (36.1 °C)-98.3 °F (36.8 °C)] 97.4 °F (36.3 °C)  Pulse:  [] 82  Resp:  [16-38] 16  SpO2:  [92 %-100 %] 96 %  BP: (101-176)/(54-82) 119/58     Weight: 81.9 kg (180 lb 8.9 oz)  Body mass index is 28.28 kg/m².    Intake/Output Summary (Last 24 hours) at 11/19/2023 1618  Last data filed at 11/19/2023 1200  Gross per 24 hour   Intake 1600 ml   Output 1100 ml   Net 500 ml         Physical Exam  Vitals and nursing note reviewed.   Constitutional:       General: She is awake. She is not in acute distress.     Appearance: Normal appearance. She is well-developed and normal weight. She is not ill-appearing.   Eyes:      Conjunctiva/sclera: Conjunctivae normal.   Cardiovascular:      Rate and Rhythm: Normal rate. Rhythm irregularly irregular.      Heart sounds: Normal heart sounds. No murmur heard.  Pulmonary:      Effort: Pulmonary effort is normal. No respiratory distress.      Breath sounds: Normal breath sounds. No wheezing.   Abdominal:      General: Abdomen is flat. Bowel sounds are normal. There is no distension.      Palpations: Abdomen is soft.      Tenderness: There is no abdominal tenderness.   Musculoskeletal:      Right lower leg: No edema.      Left lower leg: No edema.   Skin:     General: Skin is warm.      Findings: No erythema.      Comments: Surgical dressings noted on right hip. Dressings are clean and dry  and intact.   Neurological:      Mental Status: She is alert and oriented to person, place, and time.   Psychiatric:         Mood and Affect: Mood normal.         Behavior: Behavior normal. Behavior is cooperative.         Thought Content: Thought content normal.         Judgment: Judgment normal.             Significant Labs: CBC:   Recent Labs   Lab 11/18/23 2027 11/19/23  0515 11/19/23  1300   WBC 9.52 12.13  12.07 13.56*   HGB 11.3* 10.9*  10.9* 9.9*   HCT 35.3* 34.3*  34.4* 31.3*    192  196 183     CMP:   Recent Labs   Lab 11/18/23 2027 11/19/23  0515    139  138   K 3.9 4.4  4.3    105  105   CO2 22* 21*  22*   * 210*  213*   BUN 21 23 23   CREATININE 1.1 1.2  1.2   CALCIUM 8.8 9.0  9.0   PROT 6.7 6.9   ALBUMIN 3.6 3.6   BILITOT 0.8 1.0   ALKPHOS 100 84   AST 21 20   ALT 10 10   ANIONGAP 12 13  11     Magnesium:   Recent Labs   Lab 11/18/23 2140 11/19/23  0515   MG 1.8 1.9     Lab Results   Component Value Date    [id number] 51 11/18/2023       Significant Imaging: I have reviewed all pertinent imaging results/findings within the past 24 hours.    Assessment/Plan:      * Closed displaced intertrochanteric fracture of right femur s/p IM nail on 11/19/2023  Patient underwent right femur IM nail to repair closed displaced intertrochanteric fracture on 11/29/2023 by Dr. Henry Perez.   Patient reports minimal pain in right hip.   Plan is to start PT/OT for gait training and strengthening and restoration of ADL's. Patient is weight bear as tolerated to right lower extremity as per Orthopedic recommendations.   Plan to resume home Apixiban 5 mg po BID post-op for long term anticoagulation for her persistent atrial fibrillation so no other chemical DVT prophylaxis needed post-op.   Orthopedics is following and managing hip fracture and surgical site.   Perineural pain catheter in place with continuous Ropivacaine infusion for pain control and being managed by Anesthesia  Pain Service.   Patient on multimodal pain management post-op with Tylenol 1000 mg po every 8 hours and Robaxin 500 mg po 3 times daily post-op and will continue.    Acute blood loss as cause of postoperative anemia  Patient's anemia is currently controlled. Has not received any PRBCs to date. Etiology likely d/t acute blood loss which was from hip fracture and hip fracture surgery and expected blood loss.  Current CBC reviewed-   Lab Results   Component Value Date    HGB 9.9 (L) 11/19/2023    HCT 31.3 (L) 11/19/2023     Monitor serial CBC and transfuse if patient becomes hemodynamically unstable, symptomatic or H/H drops below 7/21.    Persistent atrial fibrillation  Chronic anticoagulation   Patient with Long standing persistent (>12 months) atrial fibrillation which is:controlled currently with home  Atenolol  and will continue in hospital. Patient is currently in atrial fibrillation.[id number] Score: 3. Anticoagulation indicated. Anticoagulation done with Apixiban 5 mg po BID at home and held for surgery but resumed post-op on 11/19.    Chronic heart failure with preserved ejection fraction  Patient is identified as having Diastolic (HFpEF) heart failure that is Chronic. CHF is currently controlled.   Continue home Atenolol to treat but home Lasix held for surgery but will resume post-op once tolerating oral diet. Monitor clinical status closely. Monitor on telemetry. Patient is off CHF pathway.  Monitor strict Is&Os and daily weights. Continue on fluid restriction of 1.5 L. Cardiology has not been consulted. Continue to stress to patient importance of self efficacy and  on diet for CHF.     Prediabetes  Patient noted to be hyperglycemic on admit with -210. HgA1C ordered and returned at 6.3% consistent with prediabetes which is a new diagnosis. With patient's advanced age recommendation would be conservative mangement with diet control management with diabetic diet.       Primary  hypertension  Chronic, controlled. Latest blood pressure and vitals reviewed-     Temp:  [97 °F (36.1 °C)-98.3 °F (36.8 °C)]   Pulse:  []   Resp:  [16-38]   BP: (101-176)/(54-82)   SpO2:  [92 %-100 %] .   Home meds for hypertension were reviewed and noted below.   Hypertension Medications               atenoloL (TENORMIN) 25 MG tablet TAKE 1 TABLET EVERY DAY    furosemide (LASIX) 20 MG tablet TAKE 2 TABLETS EVERY MORNING  AND TAKE 1 TABLET EVERY EVENING    losartan (COZAAR) 25 MG tablet TAKE 1 TABLET EVERY DAY            While in the hospital, will manage blood pressure as follows; Continue home antihypertensive regimen except for Lasix that was held for surgery.     Will utilize p.r.n. blood pressure medication only if patient's blood pressure greater than 180/110 and she develops symptoms such as worsening chest pain or shortness of breath.    ACP (advance care planning)  Advance Care Planning    Date: 11/19/2023    Code Status  In light of the patients advanced and life limiting illness,I engaged the the patient in a voluntary conversation about the patient's preferences for care at the very end of life. The patient wishes to have a natural, peaceful death.  Along those lines, the patient does not wish to have CPR or other invasive treatments performed when her heart and/or breathing stops. I communicated to the patient that a DNR order would be placed in her medical record to reflect this preference. I spent a total of 15 minutes engaging the patient in this advance care planning discussion.          Stage 3b chronic kidney disease  Creatine stable for now. BMP reviewed- noted Estimated Creatinine Clearance: 28.8 mL/min (based on SCr of 1.2 mg/dL). according to latest data. Based on current GFR, CKD stage is stage 3 - GFR 30-59.  Monitor UOP and serial BMP and adjust therapy as needed. Renally dose meds. Avoid nephrotoxic medications and procedures.      VTE Risk Mitigation (From admission, onward)            Ordered     apixaban tablet 5 mg  2 times daily         11/19/23 1232     Place sequential compression device  Until discontinued         11/18/23 2344     IP VTE HIGH RISK PATIENT  Once         11/18/23 2344     Place sequential compression device  Until discontinued         11/18/23 2301                    Discharge Planning   WES: 11/22/2023     Code Status: DNR   Is the patient medically ready for discharge?: No    Reason for patient still in hospital (select all that apply): Patient trending condition             Tierra Stapleton MD  Department of [facility] Medicine   [facility] - Surgery

## 2023-11-19 NOTE — CONSULTS
[facility]  Orthopedics  Consult Note    Patient Name: Ngoc Castellanos  MRN: [id number]  Admission Date: 11/18/2023  Hospital Length of Stay: 0 days  Attending Provider: Tierra Stapleton MD  Primary Care Provider: Jacobo Mcleod MD        Inpatient consult to Orthopedic Surgery  Consult performed by: Jesus Bush MD  Consult ordered by: Tee Davison MD        Subjective:     Principal Problem:Closed intertrochanteric fracture, right, initial encounter    Chief Complaint:   Chief Complaint   Patient presents with    Fall     Pt had a trip and fall at home and is having pain to her right hip. Deformity noted with shortening and rotation of right leg and foot. Pt is pelvic binded by EMS and 50mcg of fentanyl given enroute. Pt denies LOC or head injury. +blood thinners        HPI:   Ngoc Castellanos is a 89 y.o. female  with pmhx significant for HTN, a fib on eliquis, CHF, CKD, hx breast cancer s/p partial mastectomy who presents with a right sided intertrochanteric fracture after fall from standing. She reported immediate pain and inability to bear weight. The patient denies head trauma. Patient denies numbness or tingling in extremitiy. She denies history of prior fractures and denies pain anywhere else. Patient lives at home alone, makes her own medical decisions, and ambulates with a walker.    She denies tobacco use, alcohol use, IVDU, chemotherapy, radiation therapy, MI, CVA, DVT, or PE.     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    left    Valvular regurgitation     moderate MR       Past Surgical History:   Procedure Laterality Date    APPENDECTOMY      BRAIN SURGERY  2002    meningioma    BREAST BIOPSY  2/2014    Right breast-  [facility]    BREAST CYST EXCISION  1960    left breast - benign    BREAST SURGERY  03/17/14    right mastectomy    HEMORRHOID SURGERY      HYSTERECTOMY      MASTECTOMY Right     TONSILLECTOMY         Review of patient's allergies indicates:   Allergen Reactions    Clindamycin     Levofloxacin Other (See Comments)    Lidocaine Other (See Comments)    Adhesive tape-silicones Rash       Current Facility-Administered Medications   Medication    0.9%  NaCl infusion    acetaminophen tablet 1,000 mg    [START ON 11/19/2023] atenoloL tablet 25 mg    bisacodyL suppository 10 mg    [START ON 11/19/2023] losartan tablet 25 mg    melatonin tablet 6 mg    methocarbamoL tablet 500 mg    morphine injection 2 mg    morphine injection 2 mg    ondansetron injection 4 mg    oxyCODONE immediate release tablet 5 mg    oxyCODONE immediate release tablet 5 mg    sodium chloride 0.9% flush 10 mL    [START ON 11/19/2023] sodium chloride 0.9% flush 10 mL     Current Outpatient Medications   Medication Sig    acetaminophen (TYLENOL) 500 MG tablet Take 1,000 mg by mouth every 6 (six) hours as needed for Pain.    atenoloL (TENORMIN) 25 MG tablet TAKE 1 TABLET EVERY DAY    calcium carbonate (OS-CELIA) 600 mg calcium (1,500 mg) Tab Take 600 mg by mouth 2 (two) times daily with meals.     ELIQUIS 5 mg Tab TAKE 1 TABLET TWICE DAILY    fluticasone propionate (FLONASE) 50 mcg/actuation nasal spray SHAKE LIQUID AND USE 1 SPRAY(50 MCG) IN EACH NOSTRIL EVERY DAY FOR 7 DAYS    furosemide (LASIX) 20 MG tablet TAKE 2 TABLETS EVERY MORNING  AND TAKE 1 TABLET EVERY EVENING    losartan (COZAAR) 25 MG tablet TAKE 1 TABLET EVERY DAY    meclizine (ANTIVERT) 12.5 mg tablet Take 1 tablet (12.5 mg total) by mouth 3 (three) times daily as needed for Dizziness.    mupirocin (BACTROBAN) 2 % ointment Apply to affected area 3 times daily (Patient not taking: Reported on 6/14/2023)    polyethylene glycol (GLYCOLAX) 17 gram/dose powder Take 17 g by mouth once daily.    sodium  chloride (OCEAN) 0.65 % nasal spray 1 spray by Nasal route as needed for Congestion.    VIT C/VIT E/LUTEIN/MIN/OMEGA-3 (OCUVITE ORAL) Take 1 tablet by mouth once daily.     Family History    None       Tobacco Use    Smoking status: Never    Smokeless tobacco: Never   Substance and Sexual Activity    Alcohol use: No    Drug use: No    Sexual activity: Not on file     ROS    Constitutional: negative for fevers  Eyes: negative visual changes  ENT: negative for hearing loss  Respiratory: negative for dyspnea  Cardiovascular: negative for chest pain  Gastrointestinal: negative for abdominal pain  Genitourinary: negative for dysuria  Neurological: negative for headaches  Behavioral/Psych: negative for hallucinations  Endocrine: negative for temperature intolerance      Objective:     Vital Signs (Most Recent):  Temp: 98 °F (36.7 °C) (11/18/23 1909)  Pulse: 100 (11/18/23 2330)  Resp: 20 (11/18/23 2330)  BP: 135/60 (11/18/23 2330)  SpO2: 95 % (11/18/23 2330) Vital Signs (24h Range):  Temp:  [98 °F (36.7 °C)] 98 °F (36.7 °C)  Pulse:  [] 100  Resp:  [18-38] 20  SpO2:  [95 %-97 %] 95 %  BP: (135-176)/(60-80) 135/60           There is no height or weight on file to calculate BMI.      Intake/Output Summary (Last 24 hours) at 11/18/2023 2349  Last data filed at 11/18/2023 2302  Gross per 24 hour   Intake 100 ml   Output --   Net 100 ml        Ortho/SPM Exam     Gen:  No acute distress, well-developed, well nourished.  CV:  Peripherally well-perfused. 2+ radial pulses, symmetric.  Respiratory:  Normal respiratory effort. No accessory muscle use.   Head/Neck:  Normocephalic.  Atraumatic. Sclera anicteric. TM. Neck supple.  Neuro: No FND. Awake. Alert. Oriented to person, place, time, and situation.   Abdomen: Soft, NTND.      MSK:  Left Upper Extremity  Inspection  - Skin intact throughout, no open wounds  - No swelling  - No ecchymosis, erythema, or signs of cellulitis  Palpation  - NonTTP throughout, no palpable  "abnormality   Range of motion  - AROM and PROM of the shoulder, elbow, wrist, and hand intact  Stability  - No evidence of joint dislocation or abnormal laxity   Neurovascular  - AIN/PIN/Radial/Median/Ulnar Nerves assessed in isolation without deficit  - Able to give thumbs up, make "OK" sign, cross IF/LF, abduct/adduct fingers, make fist  - SILT throughout  - Compartments soft  - Radial artery palpated  - Capillary Refill <3s  - Muscle tone normal    Right Upper Extremity  Inspection  - Skin intact throughout, no open wounds  - No swelling  - No ecchymosis, erythema, or signs of cellulitis  Palpation  - NonTTP throughout, no palpable abnormality  Range of motion  - AROM and PROM of the shoulder, elbow, wrist, and hand intact  Stability  - No evidence of joint dislocation or abnormal laxity   Neurovascular  - AIN/PIN/Radial/Median/Ulnar Nerves assessed in isolation without deficit  - Able to give thumbs up, make "OK" sign, cross IF/LF, abduct/adduct fingers, make fist  - SILT throughout  - Compartments soft  - Radial artery palpated  - Capillary Refill <3s  - Muscle tone normal      Left Lower Extremity  Inspection  - Draining superficial wound over anterior tibia  - 2+ pitting edema  - No ecchymosis, erythema, or signs of cellulitis  Palpation  - NonTTP throughout, no palpable abnormality   Range of motion  - AROM and PROM of the hip, knee, ankle, and foot intact  Stability  - No evidence of joint dislocation or abnormal laxity  Neurovascular  - TA/EHL/Gastroc/FHL assessed in isolation without deficit  - SILT throughout  - Compartments soft  - DP palpated   - Capillary Refill <3s  - Negative Log roll  - Negative Stinchfield  - Muscle tone normal    Right Lower Extremity  Inspection  - Skin intact throughout, no open wounds  - 2+ pitting edema  - No ecchymosis, erythema, or signs of cellulitis  Palpation  - TTP over the right hip, nontender over the knee and ankle  Range of motion  - AROM and PROM of the hip, knee, " ankle, and foot intact  Stability  - No evidence of joint dislocation or abnormal laxity  Neurovascular  - TA/EHL/Gastroc/FHL assessed in isolation without deficit  - SILT throughout  - Compartments soft  - DP palpated   - Capillary Refill <3s  - Negative Log roll  - Negative Stinchfield  - Muscle tone normal    Spine/pelvis/axial body:  No tenderness to palpation of cervical, thoracic, or lumbar spine  No pain with compression of pelvis  No chest wall or abdominal tenderness  No decubitus ulcers  Muscle tone normal        Significant Labs: CBC:   Recent Labs   Lab 11/18/23 2027   WBC 9.52   HGB 11.3*   HCT 35.3*        CMP:   Recent Labs   Lab 11/18/23 2027      K 3.9      CO2 22*   *   BUN 21   CREATININE 1.1   CALCIUM 8.8   PROT 6.7   ALBUMIN 3.6   BILITOT 0.8   ALKPHOS 100   AST 21   ALT 10   ANIONGAP 12     Coagulation:   Recent Labs   Lab 11/18/23  2140   LABPROT 13.3*   INR 1.3*     All pertinent labs within the past 24 hours have been reviewed.    Significant Imaging: X-Ray: I have reviewed all pertinent results/findings and my personal findings are:  XR of right femur and pelvis show a right sided intertrochanteric femur fracture.   I have reviewed and interpreted all pertinent imaging results/findings.  Assessment/Plan:     * Closed intertrochanteric fracture, right, initial encounter  Ngoc Castellanos is a 89 y.o. female with a right sided IT fracture, closed, NVI. They take eliquis as anticoagulation at home. They required a walker for ambulatory ambulatory assistance prior to this injury.     -Admitted to medicine hip fracture service for pre-operative clearance and medical evaluation  -To OR tomorrow for operative fixation of IT fracture  -Pt marked, booked, and consented for surgery  -DVT PPx: Hold anticoagulation  -Abx: Preop abx ordered  -Labs: Preop labs pending, HGB 11.3. One gram of TXA ordered at this time. Other lab results pending.  -Bed rest, simmons, NPO at  midnight  -Iv: ordered for contralateral arm    Patient was explained in detail the severity of the injury that was suffered. Patient was explained the risks/benefits/and alternatives to operative management in detail including infection, bleeding, pain, nerve and vascular damage, heterotopic ossification, leg length discrepancies, rotational deformities and they express full understanding.  We discussed the 30% national first year mortality rate with hip fractures, the need for early mobilization, and the expected rehab course.  Patient expresses full understanding of the condition and expresses that they want to proceed with surgery. The patient is admitted to the medicine hip fracture service for optimization of medical comorbidities. Will plan for OR tomorrow. No guarantees were made, informed consent was obtained. All questions were answered to patient's and family's satisfaction.           RANDOLPH HUNT MD  Orthopedics  [facility]

## 2023-11-19 NOTE — ANESTHESIA POSTPROCEDURE EVALUATION
Anesthesia Post Evaluation    Patient: Ngoc Castellanos    Procedure(s) Performed: Procedure(s) (LRB):  INSERTION, INTRAMEDULLARY RAMSES, FEMUR, RIGHT, HANA TABLE, MIRIAN, C- ARM DOOR SIDE (Right)    Final Anesthesia Type: general      Patient location during evaluation: PACU  Patient participation: Yes- Able to Participate  Level of consciousness: awake  Post-procedure vital signs: reviewed and stable  Pain management: adequate  Airway patency: patent    PONV status at discharge: No PONV  Anesthetic complications: no      Cardiovascular status: hemodynamically stable  Respiratory status: spontaneous ventilation  Hydration status: hypovolemic  Follow-up not needed.          Vitals Value Taken Time   /58 11/19/23 1432   Temp 36.1 °C (97 °F) 11/19/23 1330   Pulse 77 11/19/23 1432   Resp 16 11/19/23 1432   SpO2 93 % 11/19/23 1432         Event Time   Out of Recovery 11/19/2023 13:30:00         Pain/Yuridia Score: Pain Rating Prior to Med Admin: 2 (11/19/2023  1:50 PM)  Pain Rating Post Med Admin: 3 (11/19/2023  5:09 AM)  Yuridia Score: 9 (11/19/2023  2:15 PM)

## 2023-11-19 NOTE — ASSESSMENT & PLAN NOTE
Creatine stable for now. BMP reviewed- noted Estimated Creatinine Clearance: 28.8 mL/min (based on SCr of 1.2 mg/dL). according to latest data. Based on current GFR, CKD stage is stage 3 - GFR 30-59.  Monitor UOP and serial BMP and adjust therapy as needed. Renally dose meds. Avoid nephrotoxic medications and procedures.

## 2023-11-19 NOTE — H&P
[facility]  Hospital Medicine  History & Physical    Patient Name: Ngoc Castellanos  MRN: [id number]  Patient Class: IP- Inpatient  Admission Date: 11/18/2023  Attending Physician: Tierra Stapleton MD   Primary Care Provider: Jacobo Mcleod MD         Patient information was obtained from patient, past medical records, and ER records.     Subjective:     Principal Problem:Closed intertrochanteric fracture, right, initial encounter    Chief Complaint:   Chief Complaint   Patient presents with    Fall     Pt had a trip and fall at home and is having pain to her right hip. Deformity noted with shortening and rotation of right leg and foot. Pt is pelvic binded by EMS and 50mcg of fentanyl given enroute. Pt denies LOC or head injury. +blood thinners        HPI: This is a very pleasant 89yo female with a past medical history of Afib on Eliquis, HFpEF on lasix, HTN, and CKD stage III who presents to the ED with chief complaint of fall and leg pain. Patient reports that she regularly uses either cane or walker to ambulate. She reports that she is often unsteady feeling on her feet and when at her home sometimes uses objects nearby for balance instead of cane or walker. States that today she was ambulating at home when she had trip and fall without LOC or hitting her head. Immediate severe right hip pain and inability to ambulate. Crawled across floor and able to call for help. EMS arrived and noted shortening and rotation of right leg, placed in pelvic binder and gave pain meds and brought to ED.    In the ED patient afebrile and hemodynamically stable saturating well on room air. 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 Displacement of the lesser trochanter fracture fragment medially. CXR with stable pleural effusion compared to prior study. Orthopedic surgery consulted  and patient admitted to the care of medicine for further evaluation and management.     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    left    Valvular regurgitation     moderate MR       Past Surgical History:   Procedure Laterality Date    APPENDECTOMY      BRAIN SURGERY  2002    meningioma    BREAST BIOPSY  2/2014    Right breast- [facility]    BREAST CYST EXCISION  1960    left breast - benign    BREAST SURGERY  03/17/14    right mastectomy    HEMORRHOID SURGERY      HYSTERECTOMY      MASTECTOMY Right     TONSILLECTOMY         Review of patient's allergies indicates:   Allergen Reactions    Clindamycin     Levofloxacin Other (See Comments)    Lidocaine Other (See Comments)    Adhesive tape-silicones Rash       No current facility-administered medications on file prior to encounter.     Current Outpatient Medications on File Prior to Encounter   Medication Sig    acetaminophen (TYLENOL) 500 MG tablet Take 1,000 mg by mouth every 6 (six) hours as needed for Pain.    atenoloL (TENORMIN) 25 MG tablet TAKE 1 TABLET EVERY DAY    calcium carbonate (OS-CELIA) 600 mg calcium (1,500 mg) Tab Take 600 mg by mouth 2 (two) times daily with meals.     ELIQUIS 5 mg Tab TAKE 1 TABLET TWICE DAILY    fluticasone propionate (FLONASE) 50 mcg/actuation nasal spray SHAKE LIQUID AND USE 1 SPRAY(50 MCG) IN EACH NOSTRIL EVERY DAY FOR 7 DAYS    furosemide (LASIX) 20 MG tablet TAKE 2 TABLETS EVERY MORNING  AND TAKE 1 TABLET EVERY EVENING    losartan (COZAAR) 25 MG tablet TAKE 1 TABLET EVERY DAY    meclizine (ANTIVERT) 12.5 mg tablet Take 1 tablet (12.5 mg total) by mouth 3 (three) times daily as needed for Dizziness.    mupirocin (BACTROBAN) 2 % ointment Apply to affected area 3 times daily (Patient  not taking: Reported on 6/14/2023)    polyethylene glycol (GLYCOLAX) 17 gram/dose powder Take 17 g by mouth once daily.    sodium chloride (OCEAN) 0.65 % nasal spray 1 spray by Nasal route as needed for Congestion.    VIT C/VIT E/LUTEIN/MIN/OMEGA-3 (OCUVITE ORAL) Take 1 tablet by mouth once daily.     Family History    None       Tobacco Use    Smoking status: Never    Smokeless tobacco: Never   Substance and Sexual Activity    Alcohol use: No    Drug use: No    Sexual activity: Not on file     Review of Systems   Constitutional:  Negative for chills, fatigue and fever.   HENT:  Negative for sore throat and trouble swallowing.    Eyes:  Negative for photophobia and visual disturbance.   Respiratory:  Negative for cough, shortness of breath and wheezing.    Cardiovascular:  Negative for chest pain, palpitations and leg swelling.   Gastrointestinal:  Negative for abdominal distention, abdominal pain, diarrhea, nausea and vomiting.   Genitourinary:  Negative for dysuria and hematuria.   Musculoskeletal:  Positive for arthralgias and gait problem. Negative for neck pain and neck stiffness.   Skin:  Negative for rash and wound.   Neurological:  Negative for seizures, syncope, weakness, light-headedness, numbness and headaches.   Psychiatric/Behavioral:  Negative for confusion and decreased concentration.      Objective:     Vital Signs (Most Recent):  Temp: 98 °F (36.7 °C) (11/18/23 1909)  Pulse: 96 (11/18/23 2248)  Resp: 19 (11/18/23 2248)  BP: (!) 164/76 (11/18/23 2248)  SpO2: 95 % (11/18/23 2233) Vital Signs (24h Range):  Temp:  [98 °F (36.7 °C)] 98 °F (36.7 °C)  Pulse:  [] 96  Resp:  [18-38] 19  SpO2:  [95 %-97 %] 95 %  BP: (153-176)/(69-80) 164/76        There is no height or weight on file to calculate BMI.     Physical Exam  Constitutional:       General: She is not in acute distress.     Appearance: She is not toxic-appearing or diaphoretic.   HENT:      Head: Normocephalic and atraumatic.      Nose: Nose  "normal.   Eyes:      General: No scleral icterus.     Extraocular Movements: Extraocular movements intact.      Pupils: Pupils are equal, round, and reactive to light.   Cardiovascular:      Rate and Rhythm: Tachycardia present. Rhythm irregular.   Pulmonary:      Effort: Pulmonary effort is normal. No respiratory distress.      Breath sounds: No wheezing or rales.   Abdominal:      General: Abdomen is flat. There is no distension.      Palpations: Abdomen is soft.      Tenderness: There is no abdominal tenderness. There is no guarding.   Musculoskeletal:      Cervical back: Normal range of motion and neck supple. No rigidity.      Comments: RLE shortened rotated. Tender to palpation and movement. Neurovascularly intact.   Skin:     General: Skin is warm and dry.      Coloration: Skin is not jaundiced.   Neurological:      General: No focal deficit present.      Mental Status: She is alert and oriented to person, place, and time.      Cranial Nerves: No cranial nerve deficit.   Psychiatric:         Mood and Affect: Mood normal.         Behavior: Behavior normal.              CRANIAL NERVES     CN III, IV, VI   Pupils are equal, round, and reactive to light.       Significant Labs: All pertinent labs within the past 24 hours have been reviewed.  CMP:   Recent Labs   Lab 11/18/23 2027      K 3.9      CO2 22*   *   BUN 21   CREATININE 1.1   CALCIUM 8.8   PROT 6.7   ALBUMIN 3.6   BILITOT 0.8   ALKPHOS 100   AST 21   ALT 10   ANIONGAP 12     Cardiac Markers: No results for input(s): "CKMB", "MYOGLOBIN", "BNP", "TROPISTAT" in the last 48 hours.  Urine Studies:   Recent Labs   Lab 11/18/23  2206   COLORU Yellow   APPEARANCEUA Clear   PHUR 5.0   SPECGRAV 1.025   PROTEINUA Trace*   GLUCUA Negative   KETONESU Negative   BILIRUBINUA Negative   OCCULTUA Negative   NITRITE Negative   LEUKOCYTESUR Negative       Significant Imaging: I have reviewed all pertinent imaging results/findings within the past 24 " hours.  Assessment/Plan:     * Closed intertrochanteric fracture, right, initial encounter  - IP HIP Fracture Pathway initiated  - Orthopedic surgery consulted ; appreciate recs  - anticipate plan for OR in am  - npo   - monitor tele  - pain control  - simmons cath  - lasix 60mg iv tonight  ;  further diuresis pending clinical course  - holding home Eliquis  - PT/OT consulted  - Revised Cardiac Risk Index for Pre-Operative Risk Score I  ;  Class II Risk  - NSQIP Score calculated and attached in plan of care note  - Patient medically appropriate for OR in am      Chronic heart failure with preserved ejection fraction  - patient stable but suspect mild hypervolemia  - lasix 60mg iv once tonight to optimize prior to procedure  - strict I/Os  - daily weights  - monitor tele  - ECHO ordered  - monitor tele  -- further diuresis pending clinical course      Persistent atrial fibrillation  - holding home Eliquis  - continue home atenolol  - monitor tele    Hypertension  - continue home losartan and atenolol      VTE Risk Mitigation (From admission, onward)           Ordered     IP VTE HIGH RISK PATIENT  Once         11/18/23 2301     Place sequential compression device  Until discontinued         11/18/23 2301                                    Car Hines MD  Department of Hospital Medicine  [facility]    COMPLETED  Family History   Problem Relation Age of Onset    Breast cancer Neg Hx     Ovarian cancer Neg Hx     Melanoma Neg Hx

## 2023-11-19 NOTE — HPI
This is a very pleasant 89yo female with a past medical history of Afib on Eliquis, HFpEF on lasix, HTN, and CKD stage III who presents to the ED with chief complaint of fall and leg pain. Patient reports that she regularly uses either cane or walker to ambulate. She reports that she is often unsteady feeling on her feet and when at her home sometimes uses objects nearby for balance instead of cane or walker. States that today she was ambulating at home when she had trip and fall without LOC or hitting her head. Immediate severe right hip pain and inability to ambulate. Crawled across floor and able to call for help. EMS arrived and noted shortening and rotation of right leg, placed in pelvic binder and gave pain meds and brought to ED.    In the ED patient afebrile and hemodynamically stable saturating well on room air. 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 Displacement of the lesser trochanter fracture fragment medially. CXR with stable pleural effusion compared to prior study. Orthopedic surgery consulted and patient admitted to the care of medicine for further evaluation and management.

## 2023-11-19 NOTE — ASSESSMENT & PLAN NOTE
- IP HIP Fracture Pathway initiated  - Orthopedic surgery consulted ; appreciate recs  - anticipate plan for OR in am  - npo   - monitor tele  - pain control  - simmons cath  - lasix 60mg iv tonight  ;  further diuresis pending clinical course  - holding home Eliquis  - PT/OT consulted  - Revised Cardiac Risk Index for Pre-Operative Risk Score I  ;  Class II Risk  - NSQIP Score calculated and attached in plan of care note  - Patient medically appropriate for OR in am

## 2023-11-19 NOTE — ASSESSMENT & PLAN NOTE
Chronic, controlled. Latest blood pressure and vitals reviewed-     Temp:  [97 °F (36.1 °C)-98.3 °F (36.8 °C)]   Pulse:  []   Resp:  [16-38]   BP: (101-176)/(54-82)   SpO2:  [92 %-100 %] .   Home meds for hypertension were reviewed and noted below.   Hypertension Medications               atenoloL (TENORMIN) 25 MG tablet TAKE 1 TABLET EVERY DAY    furosemide (LASIX) 20 MG tablet TAKE 2 TABLETS EVERY MORNING  AND TAKE 1 TABLET EVERY EVENING    losartan (COZAAR) 25 MG tablet TAKE 1 TABLET EVERY DAY            While in the hospital, will manage blood pressure as follows; Continue home antihypertensive regimen except for Lasix that was held for surgery.     Will utilize p.r.n. blood pressure medication only if patient's blood pressure greater than 180/110 and she develops symptoms such as worsening chest pain or shortness of breath.

## 2023-11-19 NOTE — ED TRIAGE NOTES
"Patient comes into the emergency department by EMS after falling at home. Patient states that she was getting up from her table and her "leg gave out" and then she "was on the floor." Patient denies LOC, hitting her head, nausea, HA. Pt states she feels dizzy and weak.   "

## 2023-11-19 NOTE — ASSESSMENT & PLAN NOTE
- patient stable but suspect mild hypervolemia  - lasix 60mg iv once tonight to optimize prior to procedure  - strict I/Os  - daily weights  - monitor tele  - ECHO ordered  - monitor tele  -- further diuresis pending clinical course

## 2023-11-19 NOTE — TRANSFER OF CARE
"Anesthesia Transfer of Care Note    Patient: Ngoc Castellanos    Procedure(s) Performed: Procedure(s) (LRB):  INSERTION, INTRAMEDULLARY RAMSES, FEMUR, RIGHT, HANA TABLE, MIRIAN, C- ARM DOOR SIDE (Right)    Patient location: PACU    Anesthesia Type: general    Transport from OR: Transported from OR on 6-10 L/min O2 by face mask with adequate spontaneous ventilation    Post pain: adequate analgesia    Post assessment: no apparent anesthetic complications    Post vital signs: stable    Level of consciousness: awake    Nausea/Vomiting: no nausea/vomiting    Complications: none    Transfer of care protocol was followed      Last vitals: Visit Vitals  BP (!) 123/59   Pulse 88   Temp 36.7 °C (98.1 °F) (Oral)   Resp 20   Ht 5' 7" (1.702 m)   Wt 81.9 kg (180 lb 8.9 oz)   SpO2 100%   Breastfeeding No   BMI 28.28 kg/m²     "

## 2023-11-19 NOTE — ED PROVIDER NOTES
Encounter Date: 11/18/2023       History     Chief Complaint   Patient presents with    Fall     Pt had a trip and fall at home and is having pain to her right hip. Deformity noted with shortening and rotation of right leg and foot. Pt is pelvic binded by EMS and 50mcg of fentanyl given enroute. Pt denies LOC or head injury. +blood thinners     89-year-old female who presents to the ED after a fall today.  Patient states that she was trying to get up from a table when her leg gave out and she fell to the floor.  Patient states that she fell onto her right side, did not hit head, and did not have LOC.  Patient does take blood thinners.  She endorses pain of her right hip and leg.  She denies any headache, chest pain, abdominal pain, nausea, or vomiting.  Patient currently rates her pain 8/10 in severity.  Enroute to the hospital, EMS reports a deformity and shortening of the right leg.  EMS put a pelvic binder in place and it administered 50 mcg of fentanyl.    The history is provided by the patient and the EMS personnel. No  was used.     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    left    Valvular regurgitation     moderate MR     Past Surgical History:   Procedure Laterality Date    APPENDECTOMY      BRAIN SURGERY  2002    meningioma    BREAST BIOPSY  2/2014    Right breast- [facility]    BREAST CYST EXCISION  1960    left breast - benign    BREAST SURGERY  03/17/14    right mastectomy    HEMORRHOID SURGERY      HYSTERECTOMY      MASTECTOMY Right     TONSILLECTOMY        Family History   Problem Relation Age of Onset    Breast cancer Neg Hx     Ovarian cancer Neg Hx     Melanoma Neg Hx      Social History     Tobacco Use    Smoking status: Never    Smokeless tobacco: Never   Substance Use Topics    Alcohol use: No    Drug use: No     Review of Systems    Physical Exam     Initial Vitals [11/18/23 1909]   BP Pulse Resp Temp SpO2   (!) 160/80 94 18 98 °F (36.7 °C) 97 %      MAP       --         Physical Exam    Nursing note and vitals reviewed.  Constitutional: She appears well-developed. No distress.   HENT:   Head: Normocephalic.   Mouth/Throat: Oropharynx is clear and moist.   No head contusions   Eyes: Conjunctivae and EOM are normal. No scleral icterus.   Neck: Neck supple.   Normal range of motion.  Cardiovascular:  Normal rate, regular rhythm, normal heart sounds and intact distal pulses.           Pulmonary/Chest: Breath sounds normal. No respiratory distress. She has no wheezes. She has no rhonchi. She has no rales. She exhibits no tenderness.   Abdominal: Abdomen is soft. She exhibits no distension. There is no abdominal tenderness. There is no rebound and no guarding.   Musculoskeletal:         General: Tenderness (Right hip and rLE tenderness to palpation) and edema (Pitting edema of BLE) present. Normal range of motion.      Cervical back: Normal range of motion and neck supple.      Comments: No midline spinal tenderness.  Right leg is rotated externally and limb is shorter compared to the left.     Neurological: She is alert and oriented to person, place, and time.   Skin: Skin is warm. Capillary refill takes less than 2 seconds.   Psychiatric: She has a normal mood and affect.         ED Course   Procedures  Labs Reviewed   CBC W/ AUTO DIFFERENTIAL - Abnormal; Notable for the following components:       Result Value    RBC 3.92 (*)     Hemoglobin 11.3 (*)     Hematocrit 35.3 (*)     RDW 14.9 (*)     Gran # (ANC) 8.3 (*)     Immature Grans (Abs) 0.05 (*)     Lymph #  0.5 (*)     Gran % 87.3 (*)     Lymph % 4.7 (*)     All other components within normal limits   COMPREHENSIVE METABOLIC PANEL - Abnormal; Notable for the following components:    CO2 22 (*)     Glucose 188 (*)     eGFR 45.4 (*)     All other components within normal limits   URINALYSIS, REFLEX TO URINE CULTURE - Abnormal; Notable for the following components:    Protein, UA Trace (*)     All other components within normal limits    Narrative:     Specimen Source->Urine   HEMOGLOBIN A1C - Abnormal; Notable for the following components:    Hemoglobin A1C 6.3 (*)     Estimated Avg Glucose 134 (*)     All other components within normal limits   PHOSPHORUS - Abnormal; Notable for the following components:    Phosphorus 1.8 (*)     All other components within normal limits   PROTIME-INR - Abnormal; Notable for the following components:    Prothrombin Time 13.3 (*)     INR 1.3 (*)     All other components within normal limits   PREALBUMIN - Abnormal; Notable for the following components:    Prealbumin 15 (*)     All other components within normal limits   SARS-COV2 (COVID) WITH FLU/RSV BY PCR   MAGNESIUM   VITAMIN D   TRANSFERRIN   TYPE & SCREEN   PREPARE RBC SOFT          Imaging Results              X-Ray Knee 3 View Right (Final result)  Result time 11/18/23 21:13:11      Final result by Angel Hannah MD (11/18/23 21:13:11)                   Impression:      Acute comminuted intertrochanteric fracture right hip with impaction of the base of the femoral neck into the intertrochanteric region 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 Displacement of the lesser trochanter fracture fragment medially.    No additional acute fracture.  No dislocation.      Electronically signed by: Angel Hannah MD  Date:    11/18/2023  Time:    21:13               Narrative:    EXAMINATION:  XR PELVIS ROUTINE AP; XR FEMUR 2 VIEW RIGHT; XR KNEE 3 VIEW RIGHT    CLINICAL HISTORY:  Fall;  Unspecified fall, initial encounter    TECHNIQUE:  AP view of the  pelvis was performed.  AP and lateral views right femur and three views right knee were performed.    COMPARISON:  Right tibia and fibula 11/18/2023.    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  Displacement of the lesser trochanter fracture fragment medially.  No dislocation.  No additional acute fracture of the remaining mid to distal right femur or right knee.  No acute displaced pelvic fracture.  No suprapatellar effusion.                                       X-Ray Tibia Fibula 2 View Right (Final result)  Result time 11/18/23 21:16:13      Final result by Angel Hannah MD (11/18/23 21:16:13)                   Impression:      No acute fracture of the right tibia or fibula.    Electronically signed by resident: Aleshia Massey  Date:    11/18/2023  Time:    21:03    Electronically signed by: Angel Hannah MD  Date:    11/18/2023  Time:    21:16               Narrative:    EXAMINATION:  XR TIBIA FIBULA 2 VIEW RIGHT    CLINICAL HISTORY:  Unspecified fall, initial encounter    TECHNIQUE:  AP and lateral views of the right tibia and fibula were performed.    COMPARISON:  None.    FINDINGS:  Tibia, fibula, and partially visualized portions of the right ankle appear intact.  No acute fracture.  No dislocation.  The osseous structures maintain normal alignment.  There is mild degenerative change involving the knee, ankle and partially visualized midfoot.  The soft tissues show nothing unusual.                                       X-Ray Chest AP Portable (Final result)  Result time 11/18/23 21:09:30      Final result by Angel Hannah MD (11/18/23 21:09:30)                   Impression:      Cardiomegaly with bilateral edema.  Opacified left lung base suggestive of left pleural effusion with associated atelectasis, similar to prior.      Electronically signed by: Angel Hannah  MD  Date:    11/18/2023  Time:    21:09               Narrative:    EXAMINATION:  XR CHEST AP PORTABLE    CLINICAL HISTORY:  pre-operative;    TECHNIQUE:  Single frontal view of the chest was performed.    COMPARISON:  02/15/2023.    FINDINGS:  Cardiomegaly with bilateral edema.  Opacified left lung base suggestive of left pleural effusion with associated atelectasis, similar to prior.    Heart and lungs otherwise appear unchanged when allowing for differences in technique and positioning.                                       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 Displacement of the lesser trochanter fracture fragment medially.    No additional acute fracture.  No dislocation.      Electronically signed by: Angel Hannah MD  Date:    11/18/2023  Time:    21:13               Narrative:    EXAMINATION:  XR PELVIS ROUTINE AP; XR FEMUR 2 VIEW RIGHT; XR KNEE 3 VIEW RIGHT    CLINICAL HISTORY:  Fall;  Unspecified fall, initial encounter    TECHNIQUE:  AP view of the pelvis was performed.  AP and lateral views right femur and three views right knee were performed.    COMPARISON:  Right tibia and fibula 11/18/2023.    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  Displacement of the lesser trochanter fracture fragment medially.  No dislocation.  No additional acute fracture of the remaining mid to distal right femur or right knee.  No acute displaced pelvic fracture.  No suprapatellar effusion.                                        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 Displacement of the lesser trochanter fracture fragment medially.    No additional acute fracture.  No dislocation.      Electronically signed by: Angel Hannah MD  Date:    11/18/2023  Time:    21:13               Narrative:    EXAMINATION:  XR PELVIS ROUTINE AP; XR FEMUR 2 VIEW RIGHT; XR KNEE 3 VIEW RIGHT    CLINICAL HISTORY:  Fall;  Unspecified fall, initial encounter    TECHNIQUE:  AP view of the pelvis was performed.  AP and lateral views right femur and three views right knee were performed.    COMPARISON:  Right tibia and fibula 11/18/2023.    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  Displacement of the lesser trochanter fracture fragment medially.  No dislocation.  No additional acute fracture of the remaining mid to distal right femur or right knee.  No acute displaced pelvic fracture.  No suprapatellar effusion.                                       Medications   atenoloL tablet 25 mg (has no administration in time range)   losartan tablet 25 mg (has no administration in time range)   0.9%  NaCl infusion ( Intravenous New Bag 11/18/23 5270)   sodium chloride 0.9% flush 10 mL (has no administration in time range)   acetaminophen tablet 1,000 mg (1,000 mg Oral Given 11/18/23 2319)   melatonin tablet 6 mg (has no administration in time range)   bisacodyL suppository 10 mg (has no administration in time range)   ondansetron injection 4 mg (has no administration in time range)   methocarbamoL tablet 500 mg (has no administration in time range)   oxyCODONE  immediate release tablet 5 mg (has no administration in time range)   oxyCODONE immediate release tablet 5 mg (has no administration in time range)   morphine injection 2 mg (has no administration in time range)   morphine injection 2 mg (has no administration in time range)   sodium chloride 0.9% flush 10 mL (has no administration in time range)   morphine injection 4 mg (4 mg Intravenous Given 11/18/23 2032)   morphine injection 4 mg (4 mg Intravenous Given 11/18/23 2115)   tranexamic acid (CYKLOKAPRON) 1,000 mg in sodium chloride 0.9 % 100 mL IVPB (MB+) (0 mg Intravenous Stopped 11/18/23 2302)   furosemide injection 60 mg (60 mg Intravenous Given 11/18/23 2319)     Medical Decision Making  89-year-old female who presents after a fall.  Her vitals are stable.  Patient's right hip and right lower extremity are tender to palpation.  Her right leg is externally rotated and the limb is shorter compared to the left.  Please see physical exam findings above for additional details.  Differential diagnoses include but are not limited to hip fracture, femur fracture/dislocation, knee fracture/dislocation, tibia and fibula fracture/dislocation.  Administered morphine for pain management.  Labs were obtained.  X-rays were ordered for the pelvis, right femur, right knee, right tibia/fibula, and chest.  X-rays of the right pelvis and lower extremity show an acute comminuted right intertrochanteric fracture.  CXR shows cardiomegaly with bilateral edema and a left pleural effusion.  Consulted and discussed patient with orthopedic surgery team.    Patient will be admitted to the hospital in the setting of her acute right intertrochanteric fracture.    Amount and/or Complexity of Data Reviewed  Labs: ordered.  Radiology: ordered.    Risk  Prescription drug management.  Decision regarding hospitalization.                                   Clinical Impression:  Final diagnoses:  [W19.XXXA] Fall  [M25.551] Right hip pain  [S72.144A]  Closed nondisplaced intertrochanteric fracture of right femur, initial encounter (Primary)          ED Disposition Condition    Admit Stable                Tee Davison MD  Resident  11/18/23 7524

## 2023-11-19 NOTE — ASSESSMENT & PLAN NOTE
Patient noted to be hyperglycemic on admit with -210. HgA1C ordered and returned at 6.3% consistent with prediabetes which is a new diagnosis. With patient's advanced age recommendation would be conservative mangement with diet control management with diabetic diet.

## 2023-11-19 NOTE — ANESTHESIA PREPROCEDURE EVALUATION
"                       [facility]  Anesthesia Pre-Operative Evaluation         Patient Name: Ngoc Castellanos  YOB: 1925  MRN: [id number]    SUBJECTIVE:     Pre-operative evaluation for Procedure(s) (LRB):  INSERTION, INTRAMEDULLARY RAMSES, FEMUR, RIGHT, HANA TABLE, MIRIAN, C- ARM DOOR SIDE (Right)     11/19/2023    Ngoc Castellanos is a 89 y.o. female w/ a significant PMHx of Afib on Eliquis, HFpEF on lasix, HTN, and CKD stage III. Admitted with R intertrochanteric femur fracture.    Patient now presents for the above procedure(s).    TTE 2018  Normal left ventricular systolic function (EF 60-65%).     2 - Biatrial enlargement.     3 - Indeterminate LV diastolic function.     4 - Normal right ventricular systolic function .     5 - Pulmonary hypertension. The estimated PA systolic pressure is 48 mmHg.     6 - Mild mitral regurgitation.     7 - Intermediate central venous pressure.     LDA:        Peripheral IV - Single Lumen 11/18/23 2000 20 G Anterior;Proximal;Right Forearm (Active)   Site Assessment Clean;Dry;Intact;No redness;No swelling 11/19/23 0229   Extremity Assessment Distal to IV No redness;No swelling;No warmth;Warm;Dry 11/19/23 0229   Line Status Infusing 11/19/23 0229   Dressing Status Clean;Dry;Intact 11/19/23 0229   Dressing Intervention Integrity maintained 11/19/23 0229   Dressing Change Due 11/22/23 11/19/23 0229   Site Change Due 11/22/23 11/19/23 0229   Reason Not Rotated Not due 11/19/23 0229   Number of days: 0            Urethral Catheter 11/18/23 2334 Silicone 14 Fr. (Active)   Site Assessment Clean;Intact;Dry 11/19/23 0232   Collection Container Urimeter 11/19/23 0232   Securement Method secured to top of thigh w/ adhesive device 11/19/23 0232   Catheter Care Performed no 11/19/23 0232   Reason for Continuing Urinary Catheterization Required immobilization 11/19/23 0232   CAUTI Prevention Bundle Securement Device in place with 1" slack;Intact seal between catheter " & drainage tubing;Drainage bag/urimeter off the floor;Sheeting clip in use;No dependent loops or kinks;Drainage bag/urimeter not overfilled (<2/3 full);Drainage bag/urimeter below bladder 11/19/23 0232   Number of days: 0       Prev airway: LMA 4     Drips:    sodium chloride 0.9% 100 mL/hr at 11/18/23 2328       Patient Active Problem List   Diagnosis    Hypertension    Valvular regurgitation    Tremor    Peripheral neuropathy    Persistent atrial fibrillation    Osteopenia    Generalized headaches    Stage 3b chronic kidney disease    Vertigo    Aromatase inhibitor use    Essential hypertension    Gait disorder    Pain in left lower leg    Chronic anticoagulation    Lumbar spondylosis    Leg edema    Lung mass    Nodule of upper lobe of left lung    Disorder of cartilage, unspecified     Chronic heart failure with preserved ejection fraction    Acute pain of left knee    Primary osteoarthritis of left knee    Quadriceps weakness    Closed intertrochanteric fracture, right, initial encounter       Review of patient's allergies indicates:   Allergen Reactions    Clindamycin     Levofloxacin Other (See Comments)    Lidocaine Other (See Comments)    Adhesive tape-silicones Rash       Current Inpatient Medications:   acetaminophen  1,000 mg Oral Q8H    atenoloL  25 mg Oral Daily    losartan  25 mg Oral Daily       No current facility-administered medications on file prior to encounter.     Current Outpatient Medications on File Prior to Encounter   Medication Sig Dispense Refill    acetaminophen (TYLENOL) 500 MG tablet Take 1,000 mg by mouth every 6 (six) hours as needed for Pain.      atenoloL (TENORMIN) 25 MG tablet TAKE 1 TABLET EVERY DAY 90 tablet 3    calcium carbonate (OS-CELIA) 600 mg calcium (1,500 mg) Tab Take 600 mg by mouth 2 (two) times daily with meals.       ELIQUIS 5 mg Tab TAKE 1 TABLET TWICE DAILY 180 tablet 3    fluticasone propionate (FLONASE) 50 mcg/actuation nasal spray SHAKE LIQUID AND USE 1  SPRAY(50 MCG) IN EACH NOSTRIL EVERY DAY FOR 7 DAYS 48 g 0    furosemide (LASIX) 20 MG tablet TAKE 2 TABLETS EVERY MORNING  AND TAKE 1 TABLET EVERY EVENING 270 tablet 3    losartan (COZAAR) 25 MG tablet TAKE 1 TABLET EVERY DAY 90 tablet 3    meclizine (ANTIVERT) 12.5 mg tablet Take 1 tablet (12.5 mg total) by mouth 3 (three) times daily as needed for Dizziness. 90 tablet 03    mupirocin (BACTROBAN) 2 % ointment Apply to affected area 3 times daily (Patient not taking: Reported on 6/14/2023) 22 g 1    polyethylene glycol (GLYCOLAX) 17 gram/dose powder Take 17 g by mouth once daily.      sodium chloride (OCEAN) 0.65 % nasal spray 1 spray by Nasal route as needed for Congestion.      VIT C/VIT E/LUTEIN/MIN/OMEGA-3 (OCUVITE ORAL) Take 1 tablet by mouth once daily.         Past Surgical History:   Procedure Laterality Date    APPENDECTOMY      BRAIN SURGERY  2002    meningioma    BREAST BIOPSY  2/2014    Right breast- [facility]    BREAST CYST EXCISION  1960    left breast - benign    BREAST SURGERY  03/17/14    right mastectomy    HEMORRHOID SURGERY      HYSTERECTOMY      MASTECTOMY Right     TONSILLECTOMY         Social History     Socioeconomic History    Marital status:    Tobacco Use    Smoking status: Never    Smokeless tobacco: Never   Substance and Sexual Activity    Alcohol use: No    Drug use: No       OBJECTIVE:     Vital Signs Range (Last 24H):  Temp:  [36.4 °C (97.6 °F)-36.8 °C (98.3 °F)]   Pulse:  []   Resp:  [16-38]   BP: (135-176)/(60-82)   SpO2:  [92 %-97 %]       Significant Labs:  Lab Results   Component Value Date    WBC 9.52 11/18/2023    HGB 11.3 (L) 11/18/2023    HCT 35.3 (L) 11/18/2023     11/18/2023    CHOL 152 06/12/2023    TRIG 82 06/12/2023    HDL 44 06/12/2023    ALT 10 11/18/2023    AST 21 11/18/2023     11/18/2023    K 3.9 11/18/2023     11/18/2023    CREATININE 1.1 11/18/2023    BUN 21 11/18/2023    CO2 22 (L) 11/18/2023    TSH 2.293 06/12/2023    INR 1.3 (H)  11/18/2023    HGBA1C 6.3 (H) 11/18/2023       Diagnostic Studies: No relevant studies.    EKG:   Results for orders placed or performed in visit on 06/12/18   IN OFFICE EKG 12-LEAD (to Mongo)    Collection Time: 06/12/18  2:20 PM    Narrative    Test Reason : I51.9,I48.2,  Blood Pressure : **/** mmHG  Vent. Rate : 062 BPM     Atrial Rate : 064 BPM     P-R Int : 000 ms          QRS Dur : 096 ms      QT Int : 450 ms       P-R-T Axes : 000 008 134 degrees     QTc Int : 456 ms    Atrial fibrillation  Minimal voltage criteria for LVH, may be normal variant  ST and Marked T wave abnormality, consider anterolateral ischemia  Abnormal ECG  When compared with ECG of 23-AUG-2016 15:49,  Incomplete right bundle branch block is no longer Present  Criteria for Septal infarct are no longer Present  Confirmed by Fouzia Ellsworth MD ([id number]) on 6/15/2018 2:53:08 PM    Referred By:  Terry           Confirmed By:Fouzia Ellsworth MD       2D ECHO:  TTE:  No results found for this or any previous visit.    MASTER:  No results found for this or any previous visit.    ASSESSMENT/PLAN:           Pre-op Assessment    I have reviewed the Patient Summary Reports.     I have reviewed the Nursing Notes. I have reviewed the NPO Status.   I have reviewed the Medications.     Review of Systems  Anesthesia Hx:  No problems with previous Anesthesia   History of prior surgery of interest to airway management or planning:          Denies Family Hx of Anesthesia complications.    Denies Personal Hx of Anesthesia complications.                    Social:  Non-Smoker, No Alcohol Use       Hematology/Oncology:  Hematology Normal   Oncology Normal                                   EENT/Dental:  EENT/Dental Normal           Cardiovascular:  Exercise tolerance: good   Hypertension    Denies CAD.    Dysrhythmias atrial fibrillation                                   Pulmonary:    Denies COPD.      Denies Sleep Apnea.                Renal/:  Chronic Renal Disease, CKD                 Hepatic/GI:      Denies GERD.             Neurological:    Denies Neuromuscular Disease.                                   Endocrine:  Denies Diabetes.           Psych:  Denies Psychiatric History.                  Physical Exam  General: Well nourished, Cooperative, Alert and Oriented    Airway:  Mallampati: II   Mouth Opening: Normal  TM Distance: Normal  Tongue: Normal  Neck ROM: Normal ROM    Dental:  Dentures    Chest/Lungs:  Clear to auscultation, Normal Respiratory Rate    Heart:  Rate: Normal  Rhythm: Regular Rhythm  Sounds: Normal    Abdomen:  Nontender, Soft, Normal        Anesthesia Plan  Type of Anesthesia, risks & benefits discussed:    Anesthesia Type: Gen ETT, Regional  Intra-op Monitoring Plan: Standard ASA Monitors  Post Op Pain Control Plan: multimodal analgesia  Induction:  IV  Airway Plan: Direct, Post-Induction  Informed Consent: Informed consent signed with the Patient and all parties understand the risks and agree with anesthesia plan.  All questions answered.   ASA Score: 3  Day of Surgery Review of History & Physical: H&P Update referred to the surgeon/provider.    Ready For Surgery From Anesthesia Perspective.     .

## 2023-11-19 NOTE — ASSESSMENT & PLAN NOTE
Patient underwent right femur IM nail to repair closed displaced intertrochanteric fracture on 11/29/2023 by Dr. Henry Perez.   Patient reports minimal pain in right hip.   Plan is to start PT/OT for gait training and strengthening and restoration of ADL's. Patient is weight bear as tolerated to right lower extremity as per Orthopedic recommendations.   Plan to resume home Apixiban 5 mg po BID post-op for long term anticoagulation for her persistent atrial fibrillation so no other chemical DVT prophylaxis needed post-op.   Orthopedics is following and managing hip fracture and surgical site.   Perineural pain catheter in place with continuous Ropivacaine infusion for pain control and being managed by Anesthesia Pain Service.   Patient on multimodal pain management post-op with Tylenol 1000 mg po every 8 hours and Robaxin 500 mg po 3 times daily post-op and will continue.

## 2023-11-19 NOTE — NURSING TRANSFER
Nursing Transfer Note      11/19/2023   12:02 PM    Nurse giving handoff:Jorge Saleh Rn  Nurse receiving handoff: Kristie Freedman    Reason patient is being transferred: postop    Transfer To: 543    Transfer via bed    Transfer with cardiac monitoring    Transported by hospital escort    Transfer Vital Signs:  Blood Pressure:110/55  Heart Rate:75  O2:95  Temperature:97  Respirations:16    Telemetry: yes  Order for Tele Monitor? Yes    Additional Lines: Ospina Catheter      Medicines sent: Ropivacaine     Any special needs or follow-up needed: n/a    Patient belongings transferred with patient: No    Chart send with patient: Yes    Notified: son via text    Patient reassessed at: 11/19/23  1  Upon arrival to floor: bed in lowest position

## 2023-11-19 NOTE — ASSESSMENT & PLAN NOTE
Ngoc Castellanos is a 89 y.o. female with a right sided IT fracture, closed, NVI. They take eliquis as anticoagulation at home. They required a walker for ambulatory ambulatory assistance prior to this injury.     -Admitted to medicine hip fracture service for pre-operative clearance and medical evaluation  -To OR tomorrow for operative fixation of IT fracture  -Pt marked, booked, and consented for surgery  -DVT PPx: Hold anticoagulation  -Abx: Preop abx ordered  -Labs: Preop labs pending, HGB 11.3. One gram of TXA ordered at this time. Other lab results pending.  -Bed rest, simmons, NPO at midnight  -Iv: ordered for contralateral arm    Patient was explained in detail the severity of the injury that was suffered. Patient was explained the risks/benefits/and alternatives to operative management in detail including infection, bleeding, pain, nerve and vascular damage, heterotopic ossification, leg length discrepancies, rotational deformities and they express full understanding.  We discussed the 30% national first year mortality rate with hip fractures, the need for early mobilization, and the expected rehab course.  Patient expresses full understanding of the condition and expresses that they want to proceed with surgery. The patient is admitted to the medicine hip fracture service for optimization of medical comorbidities. Will plan for OR tomorrow. No guarantees were made, informed consent was obtained. All questions were answered to patient's and family's satisfaction.

## 2023-11-19 NOTE — HPI
Ngoc Castellanos is a 89 y.o. female  with pmhx significant for HTN, a fib on eliquis, CHF, CKD, hx breast cancer s/p partial mastectomy who presents with a right sided intertrochanteric fracture after fall from standing. She reported immediate pain and inability to bear weight. The patient denies head trauma. Patient denies numbness or tingling in extremitiy. She denies history of prior fractures and denies pain anywhere else. Patient lives at home alone, makes her own medical decisions, and ambulates with a walker.    She denies tobacco use, alcohol use, IVDU, chemotherapy, radiation therapy, MI, CVA, DVT, or PE.

## 2023-11-19 NOTE — SUBJECTIVE & OBJECTIVE
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    left    Valvular regurgitation     moderate MR       Past Surgical History:   Procedure Laterality Date    APPENDECTOMY      BRAIN SURGERY  2002    meningioma    BREAST BIOPSY  2/2014    Right breast- [facility]    BREAST CYST EXCISION  1960    left breast - benign    BREAST SURGERY  03/17/14    right mastectomy    HEMORRHOID SURGERY      HYSTERECTOMY      MASTECTOMY Right     TONSILLECTOMY         Review of patient's allergies indicates:   Allergen Reactions    Clindamycin     Levofloxacin Other (See Comments)    Lidocaine Other (See Comments)    Adhesive tape-silicones Rash       No current facility-administered medications on file prior to encounter.     Current Outpatient Medications on File Prior to Encounter   Medication Sig    acetaminophen (TYLENOL) 500 MG tablet Take 1,000 mg by mouth every 6 (six) hours as needed for Pain.    atenoloL (TENORMIN) 25 MG tablet TAKE 1 TABLET EVERY DAY    calcium carbonate (OS-CELIA) 600 mg calcium (1,500 mg) Tab Take 600 mg by mouth 2 (two) times daily with meals.     ELIQUIS 5 mg Tab TAKE 1 TABLET TWICE DAILY    fluticasone propionate (FLONASE) 50 mcg/actuation nasal spray SHAKE LIQUID AND USE 1 SPRAY(50 MCG) IN EACH NOSTRIL EVERY DAY FOR 7 DAYS    furosemide (LASIX) 20 MG tablet TAKE 2 TABLETS EVERY MORNING  AND TAKE 1 TABLET EVERY EVENING    losartan (COZAAR) 25 MG tablet TAKE 1 TABLET EVERY DAY    meclizine (ANTIVERT) 12.5 mg tablet Take 1 tablet (12.5 mg total) by mouth 3 (three) times daily as needed for Dizziness.    mupirocin (BACTROBAN) 2 % ointment Apply to affected area 3 times daily (Patient not taking: Reported on 6/14/2023)    polyethylene glycol (GLYCOLAX) 17 gram/dose powder  Take 17 g by mouth once daily.    sodium chloride (OCEAN) 0.65 % nasal spray 1 spray by Nasal route as needed for Congestion.    VIT C/VIT E/LUTEIN/MIN/OMEGA-3 (OCUVITE ORAL) Take 1 tablet by mouth once daily.     Family History    None       Tobacco Use    Smoking status: Never    Smokeless tobacco: Never   Substance and Sexual Activity    Alcohol use: No    Drug use: No    Sexual activity: Not on file     Review of Systems   Constitutional:  Negative for chills, fatigue and fever.   HENT:  Negative for sore throat and trouble swallowing.    Eyes:  Negative for photophobia and visual disturbance.   Respiratory:  Negative for cough, shortness of breath and wheezing.    Cardiovascular:  Negative for chest pain, palpitations and leg swelling.   Gastrointestinal:  Negative for abdominal distention, abdominal pain, diarrhea, nausea and vomiting.   Genitourinary:  Negative for dysuria and hematuria.   Musculoskeletal:  Positive for arthralgias and gait problem. Negative for neck pain and neck stiffness.   Skin:  Negative for rash and wound.   Neurological:  Negative for seizures, syncope, weakness, light-headedness, numbness and headaches.   Psychiatric/Behavioral:  Negative for confusion and decreased concentration.      Objective:     Vital Signs (Most Recent):  Temp: 98 °F (36.7 °C) (11/18/23 1909)  Pulse: 96 (11/18/23 2248)  Resp: 19 (11/18/23 2248)  BP: (!) 164/76 (11/18/23 2248)  SpO2: 95 % (11/18/23 2233) Vital Signs (24h Range):  Temp:  [98 °F (36.7 °C)] 98 °F (36.7 °C)  Pulse:  [] 96  Resp:  [18-38] 19  SpO2:  [95 %-97 %] 95 %  BP: (153-176)/(69-80) 164/76        There is no height or weight on file to calculate BMI.     Physical Exam  Constitutional:       General: She is not in acute distress.     Appearance: She is not toxic-appearing or diaphoretic.   HENT:      Head: Normocephalic and atraumatic.      Nose: Nose normal.   Eyes:      General: No scleral icterus.     Extraocular Movements: Extraocular  "movements intact.      Pupils: Pupils are equal, round, and reactive to light.   Cardiovascular:      Rate and Rhythm: Tachycardia present. Rhythm irregular.   Pulmonary:      Effort: Pulmonary effort is normal. No respiratory distress.      Breath sounds: No wheezing or rales.   Abdominal:      General: Abdomen is flat. There is no distension.      Palpations: Abdomen is soft.      Tenderness: There is no abdominal tenderness. There is no guarding.   Musculoskeletal:      Cervical back: Normal range of motion and neck supple. No rigidity.      Comments: RLE shortened rotated. Tender to palpation and movement. Neurovascularly intact.   Skin:     General: Skin is warm and dry.      Coloration: Skin is not jaundiced.   Neurological:      General: No focal deficit present.      Mental Status: She is alert and oriented to person, place, and time.      Cranial Nerves: No cranial nerve deficit.   Psychiatric:         Mood and Affect: Mood normal.         Behavior: Behavior normal.              CRANIAL NERVES     CN III, IV, VI   Pupils are equal, round, and reactive to light.       Significant Labs: All pertinent labs within the past 24 hours have been reviewed.  CMP:   Recent Labs   Lab 11/18/23 2027      K 3.9      CO2 22*   *   BUN 21   CREATININE 1.1   CALCIUM 8.8   PROT 6.7   ALBUMIN 3.6   BILITOT 0.8   ALKPHOS 100   AST 21   ALT 10   ANIONGAP 12     Cardiac Markers: No results for input(s): "CKMB", "MYOGLOBIN", "BNP", "TROPISTAT" in the last 48 hours.  Urine Studies:   Recent Labs   Lab 11/18/23  2206   COLORU Yellow   APPEARANCEUA Clear   PHUR 5.0   SPECGRAV 1.025   PROTEINUA Trace*   GLUCUA Negative   KETONESU Negative   BILIRUBINUA Negative   OCCULTUA Negative   NITRITE Negative   LEUKOCYTESUR Negative       Significant Imaging: I have reviewed all pertinent imaging results/findings within the past 24 hours.  "

## 2023-11-19 NOTE — NURSING
Pt arrived from the ER via stretcher; no family present at  the bedside; simmons secured and intact; NS at 100 ml infusing; pt complaining of right leg and hip pain; pt rating pain as a 10/10; will administer morphine 2 mg iv and zofran 4 mg as ordered prn; pt has moderate lower extremity edema bilaterally; pt also has a small wound on her lower left leg that has a gauze and some tape on it; will clean it and change it to a foam dressing; pt also refusing to be turned every 2 hours due to increased pain with movement despite education; SCD machine ordered; will apply to pts left leg when machine arrives; will continue to monitor

## 2023-11-19 NOTE — PLAN OF CARE
Pt remained stable during pacu stay. PT AAOX4. VSS. Patient denies pain. Ropivacaine infusing to SIFI catheter. No N&V. Pt tolerated sips of water. Xray done in pacu and labs sent off. Incision to site intact with guaze, tegaderm and aquacel. Teds/SCD's in place throughout duration in PACU. Transferred to the next Phase of Care.

## 2023-11-19 NOTE — ANESTHESIA PROCEDURE NOTES
Right SIFI catheter    Patient location during procedure: pre-op   Block not for primary anesthetic.  Reason for block: at surgeon's request and post-op pain management   Post-op Pain Location: Right hip pain   Start time: 11/19/2023 9:15 AM  Timeout: 11/19/2023 9:14 AM   End time: 11/19/2023 9:25 AM    Staffing  Authorizing Provider: Ana Pope MD  Performing Provider: Ana Pope MD    Staffing  Performed by: Ana Pope MD  Authorized by: Ana Pope MD    Preanesthetic Checklist  Completed: patient identified, IV checked, site marked, risks and benefits discussed, surgical consent, monitors and equipment checked, pre-op evaluation and timeout performed  Peripheral Block  Patient position: supine  Prep: ChloraPrep and site prepped and draped  Patient monitoring: heart rate, cardiac monitor, continuous pulse ox, continuous capnometry and frequent blood pressure checks  Block type: fascia iliaca  Laterality: right  Injection technique: continuous  Needle  Needle type: Tuohy   Needle gauge: 17 G  Needle length: 3.5 in  Needle localization: anatomical landmarks and ultrasound guidance  Catheter type: spring wound  Catheter size: 19 G  Test dose: lidocaine 1.5% with Epi 1-to-200,000 and negative   -ultrasound image captured on disc.  Assessment  Injection assessment: negative aspiration, negative parasthesia and local visualized surrounding nerve  Paresthesia pain: none  Heart rate change: no  Slow fractionated injection: yes    Medications:    Medications: ropivacaine (NAROPIN) injection 0.5% - Perineural   20 mL - 11/19/2023 9:20:00 AM    Additional Notes  VSS.  [facility] RN monitoring vitals throughout procedure.  Patient tolerated procedure well.    With 20mL normal saline, 1:200,000 epi

## 2023-11-19 NOTE — ASSESSMENT & PLAN NOTE
Advance Care Planning     Date: 11/19/2023    Code Status  In light of the patients advanced and life limiting illness,I engaged the the patient in a voluntary conversation about the patient's preferences for care at the very end of life. The patient wishes to have a natural, peaceful death.  Along those lines, the patient does not wish to have CPR or other invasive treatments performed when her heart and/or breathing stops. I communicated to the patient that a DNR order would be placed in her medical record to reflect this preference. I spent a total of 15 minutes engaging the patient in this advance care planning discussion.

## 2023-11-19 NOTE — NURSING
Nurses Note -- 4 Eyes      11/19/2023   6:39 AM      Skin assessed during: Q Shift Change      [] No Altered Skin Integrity Present    []Prevention Measures Documented      [x] Yes- Altered Skin Integrity Present or Discovered   [x] LDA Added if Not in Epic (Describe Wound)   [x] New Altered Skin Integrity was Present on Admit and Documented in LDA   [] Wound Image Taken    Wound Care Consulted? Yes    Attending Nurse:  Olga Lidia see RN    Second RN/Staff Member:  Pooja BELL

## 2023-11-19 NOTE — CARE UPDATE
Patient has living will in EMR; however, I am unable to access it at this time. Patient is alert and oriented to person, time, place, and situation, and she states that she makes her own health care decisions.     She does not want any efforts at resuscitation should her heart or lungs stop functioning. She is adamant that she wishes her code status to be DNR.   --  Jesus Bush MD

## 2023-11-19 NOTE — ED NOTES
Telemetry Verification   Patient placed on Telemetry Box  Verified with War Room  Tech    Box #  [id number]   Rate 102   Rhythm afib      English

## 2023-11-19 NOTE — HOSPITAL COURSE
Patient admitted to [facility] Medicine Team H: Hip Fracture team and started on Hip Fracture Pathway with Orthopedic surgery consult for hip fracture. Patient was seen and evaluated by Orthopedic surgery who recommended operative repair of hip fracture. Patient was medically optimized prior to surgery and was taken to OR after optimization on 11/19/2023. Patient underwent right hip cephalomedullary nail fixation by Dr. Henry Perez. Post-op patient weight bear as tolerated to the right lower extremity as per Orthopedics recommendation. Patient restarted on her home Apixiban 5 mg po BID post-op for her known persistent atrial fibrillation so no other chemical DVT prophylaxis needed post-op. Perineural pain catheter placed by Anesthesia Pain Service with continuous infusion of Ropivacaine to help with pain control post-op and Anesthesia Pain Service managing while patient in the hospital. Patient placed on multimodal pain management post-op with Tylenol 1000 mg po every 8 hours and Robaxin 500 mg po 3 times daily post-op and will continue. PT/OT consulted post-op. Patient noted to be hyperglycemic on admit with -210. HgA1C ordered and returned at 6.3% consistent with prediabetes which is a new diagnosis. With patient's advanced age recommendation would be conservative mangement with diet control management with diabetic diet. I discussed with patient and she is in agreement with diabetic diet and no medications to treat her prediabetes. Patient will need skilled nursing facility placement on discharge based on performance with PT/OT in hospital and CM/SW aware an sent referrals. Patient having post-op hypotension. Losartan held and giving IVF's on 11/20. BP improved after a total of 2 liters of normal saline given on 11/20. Lactic acid returned back as normal at 1.9. Hgb with drop post-op to 8.5 on 11/21 but that is expected blood loss from hip fracture and surgery and would not account totally for reason  for post-op hypotension. Home Losartan on hold for BP management post-op. Patient progressing with PT/OT and requiring moderate intensity therapy. Patient requiring skilled nursing facility placement on discharge and accepted to [facility] and discharged in good condition to [facility] on 11/22/2023. Patient having some edema to her right surgical leg on discharge and to resume her home Lasix on discharge to [facility] and should help. BP much improved and plan to resume home Losartan and Atenolol on discharge to [facility]. Patient to continue her home Apixiban on discharge to [facility] for long term anticoagulation for atrial fibrillation. Patent discharged on diabetic diet to treat her prediabetes. Perineural catheter removed by Anesthesia prior to hospital discharge.

## 2023-11-19 NOTE — OP NOTE
OPERATIVE NOTE    DOS:  11/19/2023    Preop Dx: Right intertrochanteric femur fracture    Postop Dx: Right intertrochanteric femur fracture    Procedure: Cephalomedullary nail fixation of right intertrochanteric femur fracture - 15976    Surgeon: Henry Perez M.D.    Asst:  DANIEL Rosa M.D.  Jesus Bush M.D    Anesthesia: GETA    EBL:  50    IVF:  100cc NS    Implants: 400mm Rodriguez Nail, 95mm CMN, 45mm distal interlocking screw    Specimens: None    Findings: R IT Fx    Dispo:  To PACU extubated/stable      Indications for Procedure:   Ngoc Castellanos is a 89 y.o. female  who sustained a ground level fall resulting in a right intertrochanteric femur fracture.  We are proceeding to the operating room for cephalomedullary nail fixation.  The risks, benefits and alternatives to surgery were discussed with the patient and family at length prior to going to the operating room.  Informed consent was obtained for fixation.  The patient was optimized by Internal Medicine prior to going to the operating room.    Procedure in Detail:    The patient was identified in the preoperative holding area and the site was marked.  Regional analgesia was performed in the form of super inguinal fascia iliaca catheter.  Patient was wheeled into the operating room and general endotracheal anesthesia was induced on the patient's hospital bed.  Preoperative antibiotics were administered.  The patient was placed onto the [place] fracture table with all bony prominences well padded and both lower extremities in traction boots.  A time-out was undertaken to confirm patient, side, site, surgery, surgeon and the administration of preoperative antibiotics.  All agreed and we proceeded.    Closed reduction maneuvers were performed on the table gaining good alignment.  The right hip and lower extremity were prepped and draped in sterile fashion.  A starting incision was made proximal to the greater trochanter and the  guidewire for the entry reamer was placed in the appropriate position.  Entry reamer was then used.  A guide aundrea was passed distally and measurement undertaken.  The canal was then reamed with a 11 mm reamer through the isthmus.  A 400mm Robert Gamma Nail was placed in direct distal lateral visualization to ensure good central position in the canal.    Attention was then turned proximally to the hip and the nail seated at its final position.  A guidewire for the hip screw was then placed in a center center position in the femoral head under fluoroscopic guidance.  This was then reamed and measured and a 95 hip screw was placed gaining good compression through the insertion handle.  Final position was confirmed under fluoroscopy and insertion handle was removed.    Attention was then turned distally and a single distal interlocking screw was placed without difficulty.  The wounds were then copiously irrigated with normal saline solution, and then vancomycin powder was placed in each incision.The proximal wound closed with 0 Vicryl suture the fascia, and all wounds closed with 3-0 Vicryl suture in the subcutaneous tissue, followed by 3-0 Monocryl suture and Dermabond in the skin.  Sterile dressings were applied.    All instrument and sponge counts were reported correct at the end of the case. There were no complications. The patient was returned to the hospital bed, extubated, awakened and taken to the recovery room in stable condition.    Plan for the Patient:    Immediate physical and occupational therapy, needs to walk daily. WBAT to the RLE. Eliquis 5mg BID DVT prophylaxis, which she already takes for her a fib.24 hours of post operative ancef will be given. Multimodal pain management limiting narcotics.  Anticoagulation x1 month. Return to clinic in 2 weeks for wound check and post operative XR of right femur.     Jesus Bush MD

## 2023-11-19 NOTE — PROGRESS NOTES
Pre op assessment complete. Tele box delivered to PACU.     Anesthesia MD aware of eliquis adminstration on 11/17 PM and aware of current controlled a fib rhythm. Dr Renteria aware as well. Okay to proceed with procedure    Dr Renteria spoke to patient about code status prior to bedside block.    Bedside block in process.    [facility]

## 2023-11-20 PROBLEM — I95.81 POSTPROCEDURAL HYPOTENSION: Status: ACTIVE | Noted: 2023-11-20

## 2023-11-20 LAB
ANION GAP SERPL CALC-SCNC: 11 MMOL/L (ref 8–16)
BASOPHILS # BLD AUTO: 0.02 K/UL (ref 0–0.2)
BASOPHILS NFR BLD: 0.2 % (ref 0–1.9)
BUN SERPL-MCNC: 26 MG/DL (ref 10–30)
CALCIUM SERPL-MCNC: 8.5 MG/DL (ref 8.7–10.5)
CHLORIDE SERPL-SCNC: 104 MMOL/L (ref 95–110)
CO2 SERPL-SCNC: 23 MMOL/L (ref 23–29)
CREAT SERPL-MCNC: 1.3 MG/DL (ref 0.5–1.4)
DIFFERENTIAL METHOD: ABNORMAL
EOSINOPHIL # BLD AUTO: 0 K/UL (ref 0–0.5)
EOSINOPHIL NFR BLD: 0 % (ref 0–8)
ERYTHROCYTE [DISTWIDTH] IN BLOOD BY AUTOMATED COUNT: 15.3 % (ref 11.5–14.5)
ERYTHROCYTE [DISTWIDTH] IN BLOOD BY AUTOMATED COUNT: 15.6 % (ref 11.5–14.5)
EST. GFR  (NO RACE VARIABLE): 37.2 ML/MIN/1.73 M^2
GLUCOSE SERPL-MCNC: 164 MG/DL (ref 70–110)
HCT VFR BLD AUTO: 27.4 % (ref 37–48.5)
HCT VFR BLD AUTO: 28.4 % (ref 37–48.5)
HGB BLD-MCNC: 8.7 G/DL (ref 12–16)
HGB BLD-MCNC: 9.2 G/DL (ref 12–16)
IMM GRANULOCYTES # BLD AUTO: 0.06 K/UL (ref 0–0.04)
IMM GRANULOCYTES NFR BLD AUTO: 0.5 % (ref 0–0.5)
LACTATE SERPL-SCNC: 1.9 MMOL/L (ref 0.5–2.2)
LYMPHOCYTES # BLD AUTO: 0.6 K/UL (ref 1–4.8)
LYMPHOCYTES NFR BLD: 5.2 % (ref 18–48)
MAGNESIUM SERPL-MCNC: 2 MG/DL (ref 1.6–2.6)
MCH RBC QN AUTO: 28.9 PG (ref 27–31)
MCH RBC QN AUTO: 29.4 PG (ref 27–31)
MCHC RBC AUTO-ENTMCNC: 31.8 G/DL (ref 32–36)
MCHC RBC AUTO-ENTMCNC: 32.4 G/DL (ref 32–36)
MCV RBC AUTO: 91 FL (ref 82–98)
MCV RBC AUTO: 91 FL (ref 82–98)
MONOCYTES # BLD AUTO: 1 K/UL (ref 0.3–1)
MONOCYTES NFR BLD: 9.2 % (ref 4–15)
NEUTROPHILS # BLD AUTO: 9.4 K/UL (ref 1.8–7.7)
NEUTROPHILS NFR BLD: 84.9 % (ref 38–73)
NRBC BLD-RTO: 0 /100 WBC
PLATELET # BLD AUTO: 155 K/UL (ref 150–450)
PLATELET # BLD AUTO: 174 K/UL (ref 150–450)
PMV BLD AUTO: 10.8 FL (ref 9.2–12.9)
PMV BLD AUTO: 11 FL (ref 9.2–12.9)
POTASSIUM SERPL-SCNC: 4.2 MMOL/L (ref 3.5–5.1)
RBC # BLD AUTO: 3.01 M/UL (ref 4–5.4)
RBC # BLD AUTO: 3.13 M/UL (ref 4–5.4)
SODIUM SERPL-SCNC: 138 MMOL/L (ref 136–145)
WBC # BLD AUTO: 11.1 K/UL (ref 3.9–12.7)
WBC # BLD AUTO: 13.04 K/UL (ref 3.9–12.7)

## 2023-11-20 PROCEDURE — 80048 BASIC METABOLIC PNL TOTAL CA: CPT | Mod: HCNC | Performed by: FAMILY MEDICINE

## 2023-11-20 PROCEDURE — 97161 PT EVAL LOW COMPLEX 20 MIN: CPT | Mod: HCNC

## 2023-11-20 PROCEDURE — 25000003 PHARM REV CODE 250: Mod: HCNC | Performed by: INTERNAL MEDICINE

## 2023-11-20 PROCEDURE — 97165 OT EVAL LOW COMPLEX 30 MIN: CPT | Mod: HCNC

## 2023-11-20 PROCEDURE — 21400001 HC TELEMETRY ROOM: Mod: HCNC

## 2023-11-20 PROCEDURE — 36415 COLL VENOUS BLD VENIPUNCTURE: CPT | Mod: HCNC | Performed by: FAMILY MEDICINE

## 2023-11-20 PROCEDURE — 83735 ASSAY OF MAGNESIUM: CPT | Mod: HCNC | Performed by: FAMILY MEDICINE

## 2023-11-20 PROCEDURE — 25000003 PHARM REV CODE 250: Mod: HCNC | Performed by: PHYSICIAN ASSISTANT

## 2023-11-20 PROCEDURE — 25000003 PHARM REV CODE 250: Mod: HCNC

## 2023-11-20 PROCEDURE — 83605 ASSAY OF LACTIC ACID: CPT | Mod: HCNC | Performed by: PHYSICIAN ASSISTANT

## 2023-11-20 PROCEDURE — 63600175 PHARM REV CODE 636 W HCPCS: Mod: HCNC | Performed by: FAMILY MEDICINE

## 2023-11-20 PROCEDURE — 25000003 PHARM REV CODE 250: Mod: HCNC | Performed by: STUDENT IN AN ORGANIZED HEALTH CARE EDUCATION/TRAINING PROGRAM

## 2023-11-20 PROCEDURE — 97530 THERAPEUTIC ACTIVITIES: CPT | Mod: HCNC

## 2023-11-20 PROCEDURE — 99231 PR SUBSEQUENT HOSPITAL CARE,LEVL I: ICD-10-PCS | Mod: HCNC,,, | Performed by: ANESTHESIOLOGY

## 2023-11-20 PROCEDURE — 36415 COLL VENOUS BLD VENIPUNCTURE: CPT | Mod: HCNC | Performed by: PHYSICIAN ASSISTANT

## 2023-11-20 PROCEDURE — 25000003 PHARM REV CODE 250: Mod: HCNC | Performed by: ANESTHESIOLOGY

## 2023-11-20 PROCEDURE — 97535 SELF CARE MNGMENT TRAINING: CPT | Mod: HCNC

## 2023-11-20 PROCEDURE — 85025 COMPLETE CBC W/AUTO DIFF WBC: CPT | Mod: HCNC | Performed by: FAMILY MEDICINE

## 2023-11-20 PROCEDURE — 25000003 PHARM REV CODE 250: Mod: HCNC | Performed by: FAMILY MEDICINE

## 2023-11-20 PROCEDURE — 97112 NEUROMUSCULAR REEDUCATION: CPT | Mod: HCNC

## 2023-11-20 PROCEDURE — 85027 COMPLETE CBC AUTOMATED: CPT | Mod: HCNC | Performed by: PHYSICIAN ASSISTANT

## 2023-11-20 PROCEDURE — 99233 PR SUBSEQUENT HOSPITAL CARE,LEVL III: ICD-10-PCS | Mod: HCNC,,, | Performed by: INTERNAL MEDICINE

## 2023-11-20 PROCEDURE — 99233 SBSQ HOSP IP/OBS HIGH 50: CPT | Mod: HCNC,,, | Performed by: INTERNAL MEDICINE

## 2023-11-20 PROCEDURE — 63600175 PHARM REV CODE 636 W HCPCS: Mod: HCNC | Performed by: ANESTHESIOLOGY

## 2023-11-20 PROCEDURE — 99231 SBSQ HOSP IP/OBS SF/LOW 25: CPT | Mod: HCNC,,, | Performed by: ANESTHESIOLOGY

## 2023-11-20 RX ORDER — METHOCARBAMOL 500 MG/1
500 TABLET, FILM COATED ORAL EVERY 8 HOURS
Status: DISCONTINUED | OUTPATIENT
Start: 2023-11-20 | End: 2023-11-22 | Stop reason: HOSPADM

## 2023-11-20 RX ADMIN — SODIUM CHLORIDE 1000 ML: 9 INJECTION, SOLUTION INTRAVENOUS at 02:11

## 2023-11-20 RX ADMIN — SENNOSIDES AND DOCUSATE SODIUM 1 TABLET: 8.6; 5 TABLET ORAL at 08:11

## 2023-11-20 RX ADMIN — GUAIFENESIN 600 MG: 600 TABLET, EXTENDED RELEASE ORAL at 09:11

## 2023-11-20 RX ADMIN — APIXABAN 5 MG: 5 TABLET, FILM COATED ORAL at 09:11

## 2023-11-20 RX ADMIN — GUAIFENESIN 600 MG: 600 TABLET, EXTENDED RELEASE ORAL at 08:11

## 2023-11-20 RX ADMIN — METHOCARBAMOL 500 MG: 500 TABLET ORAL at 01:11

## 2023-11-20 RX ADMIN — APIXABAN 5 MG: 5 TABLET, FILM COATED ORAL at 08:11

## 2023-11-20 RX ADMIN — SODIUM CHLORIDE 1000 ML: 9 INJECTION, SOLUTION INTRAVENOUS at 12:11

## 2023-11-20 RX ADMIN — ACETAMINOPHEN 1000 MG: 500 TABLET ORAL at 09:11

## 2023-11-20 RX ADMIN — POLYETHYLENE GLYCOL 3350 17 G: 17 POWDER, FOR SOLUTION ORAL at 08:11

## 2023-11-20 RX ADMIN — BISACODYL 10 MG: 10 SUPPOSITORY RECTAL at 08:11

## 2023-11-20 RX ADMIN — SENNOSIDES AND DOCUSATE SODIUM 1 TABLET: 8.6; 5 TABLET ORAL at 09:11

## 2023-11-20 RX ADMIN — CEFAZOLIN 2 G: 2 INJECTION, POWDER, FOR SOLUTION INTRAMUSCULAR; INTRAVENOUS at 12:11

## 2023-11-20 RX ADMIN — METHOCARBAMOL 500 MG: 500 TABLET ORAL at 09:11

## 2023-11-20 RX ADMIN — ROPIVACAINE HYDROCHLORIDE 0.1 ML/HR: 2 INJECTION, SOLUTION EPIDURAL; INFILTRATION at 05:11

## 2023-11-20 RX ADMIN — ACETAMINOPHEN 1000 MG: 500 TABLET ORAL at 05:11

## 2023-11-20 RX ADMIN — ACETAMINOPHEN 1000 MG: 500 TABLET ORAL at 01:11

## 2023-11-20 NOTE — ASSESSMENT & PLAN NOTE
Patient underwent right femur IM nail to repair closed displaced intertrochanteric fracture on 11/29/2023 by Dr. Henry Perez.   Patient reports minimal pain in right hip.   Plan is to start PT/OT for gait training and strengthening and restoration of ADL's. Patient is weight bear as tolerated to right lower extremity as per Orthopedic recommendations. Patient with her advanced age and hip fracture and being from assisted living will likely need skilled nursing facility placement on discharge for continue inpatient therapy.   Patient resumed home Apixiban 5 mg po BID post-op for long term anticoagulation for her persistent atrial fibrillation so no other chemical DVT prophylaxis needed post-op.   Orthopedics is following and managing hip fracture and surgical site.   Perineural pain catheter in place with continuous Ropivacaine infusion for pain control and being managed by Anesthesia Pain Service.   Patient on multimodal pain management post-op with Tylenol 1000 mg po every 8 hours and Robaxin 500 mg po 3 times daily post-op and will continue.

## 2023-11-20 NOTE — ASSESSMENT & PLAN NOTE
Patient noted to be hyperglycemic on admit with -210. HgA1C ordered and returned at 6.3% consistent with prediabetes which is a new diagnosis. With patient's advanced age recommendation would be conservative mangement with diet control management with diabetic diet. Discussed with patient on 11/20 and she is in agreement with plan.

## 2023-11-20 NOTE — ASSESSMENT & PLAN NOTE
Chronic anticoagulation   Patient with Long standing persistent (>12 months) atrial fibrillation which is:controlled currently with home  Atenolol  and will continue in hospital. Patient is currently in atrial fibrillation.[id number] Score: 3. Anticoagulation indicated. Anticoagulation done with Apixiban 5 mg po BID at home and held for surgery but resumed post-op on 11/19.

## 2023-11-20 NOTE — NURSING
Nurses Note -- 4 Eyes      11/20/2023   4:13 PM      Skin assessed during: Q Shift Change      [x] No Altered Skin Integrity Present    []Prevention Measures Documented      [] Yes- Altered Skin Integrity Present or Discovered   [] LDA Added if Not in Epic (Describe Wound)   [] New Altered Skin Integrity was Present on Admit and Documented in LDA   [] Wound Image Taken    Wound Care Consulted? No    Attending Nurse:  Berny Alberto LPN    Second RN/Staff Member:  Ibis BELL

## 2023-11-20 NOTE — ASSESSMENT & PLAN NOTE
Creatine stable for now. BMP reviewed- noted Estimated Creatinine Clearance: 26.6 mL/min (based on SCr of 1.3 mg/dL). according to latest data. Based on current GFR, CKD stage is stage 3 - GFR 30-59.  Monitor UOP and serial BMP and adjust therapy as needed. Renally dose meds. Avoid nephrotoxic medications and procedures.

## 2023-11-20 NOTE — PLAN OF CARE
Problem: Infection  Goal: Absence of Infection Signs and Symptoms  Outcome: Ongoing, Progressing     Problem: Adult Inpatient Plan of Care  Goal: Plan of Care Review  Outcome: Ongoing, Progressing  Goal: Patient-Specific Goal (Individualized)  Outcome: Ongoing, Progressing  Goal: Absence of Hospital-Acquired Illness or Injury  Outcome: Ongoing, Progressing  Goal: Optimal Comfort and Wellbeing  Outcome: Ongoing, Progressing  Goal: Readiness for Transition of Care  Outcome: Ongoing, Progressing     Problem: Adjustment to Injury (Hip Fracture Medical Management)  Goal: Optimal Coping with Change in Health Status  Outcome: Ongoing, Progressing     Problem: Bleeding (Hip Fracture Medical Management)  Goal: Absence of Bleeding  Outcome: Ongoing, Progressing     Problem: Bowel Elimination Impaired (Hip Fracture Medical Management)  Goal: Effective Bowel Elimination  Outcome: Ongoing, Progressing     Problem: Cognitive Decline Risk (Hip Fracture Medical Management)  Goal: Baseline Cognitive Function Maintained  Outcome: Ongoing, Progressing     Problem: Embolism (Hip Fracture Medical Management)  Goal: Absence of Embolism  Outcome: Ongoing, Progressing     Problem: Fracture Stabilization and Management (Hip Fracture Medical Management)  Goal: Fracture Stability  Outcome: Ongoing, Progressing     Problem: Functional Ability Impaired (Hip Fracture Medical Management)  Goal: Optimal Functional Performance  Outcome: Ongoing, Progressing     Problem: Pain (Hip Fracture Medical Management)  Goal: Acceptable Pain Level  Outcome: Ongoing, Progressing     Problem: Urinary Elimination Impaired (Hip Fracture Medical Management)  Goal: Effective Urinary Elimination  Outcome: Ongoing, Progressing     Problem: Bleeding (Surgery Nonspecified)  Goal: Absence of Bleeding  Outcome: Ongoing, Progressing     Problem: Bowel Motility Impaired (Surgery Nonspecified)  Goal: Effective Bowel Elimination  Outcome: Ongoing, Progressing     Problem:  Fluid and Electrolyte Imbalance (Surgery Nonspecified)  Goal: Fluid and Electrolyte Balance  Outcome: Ongoing, Progressing     Problem: Glycemic Control Impaired (Surgery Nonspecified)  Goal: Blood Glucose Level Within Targeted Range  Outcome: Ongoing, Progressing     Problem: Infection (Surgery Nonspecified)  Goal: Absence of Infection Signs and Symptoms  Outcome: Ongoing, Progressing     Problem: Ongoing Anesthesia Effects (Surgery Nonspecified)  Goal: Anesthesia/Sedation Recovery  Outcome: Ongoing, Progressing     Problem: Pain (Surgery Nonspecified)  Goal: Acceptable Pain Control  Outcome: Ongoing, Progressing     Problem: Postoperative Nausea and Vomiting (Surgery Nonspecified)  Goal: Nausea and Vomiting Relief  Outcome: Ongoing, Progressing     Problem: Postoperative Urinary Retention (Surgery Nonspecified)  Goal: Effective Urinary Elimination  Outcome: Ongoing, Progressing     Problem: Respiratory Compromise (Surgery Nonspecified)  Goal: Effective Oxygenation and Ventilation  Outcome: Ongoing, Progressing     Problem: Device-Related Complication Risk (Anesthesia/Analgesia, Neuraxial)  Goal: Safe Infusion Delivery Completion  Outcome: Ongoing, Progressing     Problem: Infection (Anesthesia/Analgesia, Neuraxial)  Goal: Absence of Infection Signs and Symptoms  Outcome: Ongoing, Progressing     Problem: Nausea and Vomiting (Anesthesia/Analgesia, Neuraxial)  Goal: Nausea and Vomiting Relief  Outcome: Ongoing, Progressing     Problem: Pain (Anesthesia/Analgesia, Neuraxial)  Goal: Effective Pain Control  Outcome: Ongoing, Progressing     Problem: Respiratory Compromise (Anesthesia/Analgesia, Neuraxial)  Goal: Effective Oxygenation and Ventilation  Outcome: Ongoing, Progressing     Problem: Sensorimotor Impairment (Anesthesia/Analgesia, Neuraxial)  Goal: Baseline Motor Function  Outcome: Ongoing, Progressing     Problem: Urinary Retention (Anesthesia/Analgesia, Neuraxial)  Goal: Effective Urinary  Elimination  Outcome: Ongoing, Progressing     Problem: Fall Injury Risk  Goal: Absence of Fall and Fall-Related Injury  Outcome: Ongoing, Progressing     Problem: Skin Injury Risk Increased  Goal: Skin Health and Integrity  Outcome: Ongoing, Progressing     Problem: Impaired Wound Healing  Goal: Optimal Wound Healing  Outcome: Ongoing, Progressing

## 2023-11-20 NOTE — ASSESSMENT & PLAN NOTE
Hgb decreased to 9.2 on 11/20 from 9.9 on 11/19 and 11.3 on admit. Blood loss expected and related to hip fracture and surgery. Patient asymptomatic from her anemia. Recheck Hgb on afternoon of 11/20 due to persistent post-op hypotension.   Patient's anemia is currently controlled. Has not received any PRBCs to date. Etiology likely d/t acute blood loss which was from hip fracture and hip fracture surgery and expected blood loss.  Current CBC reviewed-   Lab Results   Component Value Date    HGB 9.2 (L) 11/20/2023    HCT 28.4 (L) 11/20/2023     Monitor serial CBC and transfuse if patient becomes hemodynamically unstable, symptomatic or H/H drops below 7/21.

## 2023-11-20 NOTE — SUBJECTIVE & OBJECTIVE
Interval History: Patient noted to be hyperglycemic on admit with -210. HgA1C ordered and returned at 6.3% consistent with prediabetes which is a new diagnosis. With patient's advanced age recommendation would be conservative mangement with diet control management with diabetic diet and ordered for patient. I discussed diagnosis and recommendation for treatment with patient at bedside today and patient is in agreement with diabetic diet and no medications to treat her prediabetes. Patient will need skilled nursing facility placement on discharge based on performance with PT/OT in hospital and CM/SW aware an sent referrals. Patient having post-op hypotension today with BP 84/50. Losartan held and giving IVF's. Patient bolused 1 liter of normal saline but remained hypotensive even after 1 liter of NS so in process of receiving another 1 liter of normal saline. Will recheck Hgb and lactic acid level. Hgb this am 9.2 and drop since admit of 11.3 and expected blood loss related to hip fracture and surgery but would not account for hypotension. Patient denies any lightheadedness or dizziness. Patient awake and alert and responsive and asymptomatic related to her hypotension. Suspect related to dehydration as on Lasix at home and given Lasix IV in ED for concerns for hypervolemia on admit but suspect she was not hypervolemic and got extra Lasix that would adequate for volume depletion. Creatinine stable at 1.3. Patient reports pain in right hip controlled and 2/10. Patient reports sore throat from yesterday resolved but still having cough with mucus production but has been using her incentive spirometer post-op and she states is helping with her cough and mucus.     Review of Systems   Constitutional:  Negative for fever.   HENT:  Positive for hearing loss.    Respiratory:  Positive for cough. Negative for shortness of breath.    Cardiovascular:  Negative for chest pain.   Gastrointestinal:  Negative for abdominal  pain and nausea.   Musculoskeletal:  Positive for arthralgias (Right hip).   Neurological:  Negative for dizziness and light-headedness.   Psychiatric/Behavioral:  Negative for agitation and confusion.      Objective:     Vital Signs (Most Recent):  Temp: 98.2 °F (36.8 °C) (11/20/23 1223)  Pulse: 91 (11/20/23 1409)  Resp: 18 (11/20/23 1223)  BP:  84/50 (11/20/23 1409)  SpO2: 92 % (11/20/23 1223) on room air Vital Signs (24h Range):  Temp:  [97.4 °F (36.3 °C)-98.3 °F (36.8 °C)] 98.2 °F (36.8 °C)  Pulse:  [72-91] 91  Resp:  [15-18] 18  SpO2:  [90 %-96 %] 92 %  BP: ()/(42-58) 84/50     Weight: 81.9 kg (180 lb 8.9 oz)  Body mass index is 28.28 kg/m².    Intake/Output Summary (Last 24 hours) at 11/20/2023 1444  Last data filed at 11/20/2023 1255  Gross per 24 hour   Intake 100 ml   Output 550 ml   Net -450 ml         Physical Exam  Vitals and nursing note reviewed.   Constitutional:       General: She is awake. She is not in acute distress.     Appearance: Normal appearance. She is well-developed and normal weight. She is not ill-appearing.   Cardiovascular:      Rate and Rhythm: Normal rate. Rhythm irregularly irregular.      Heart sounds: Normal heart sounds. No murmur heard.  Pulmonary:      Effort: Pulmonary effort is normal. No respiratory distress.      Breath sounds: Normal breath sounds. No wheezing.   Abdominal:      General: Abdomen is flat. Bowel sounds are normal. There is no distension.      Palpations: Abdomen is soft.      Tenderness: There is no abdominal tenderness.   Musculoskeletal:      Right lower leg: No edema.      Left lower leg: No edema.   Skin:     General: Skin is warm.      Findings: No erythema.      Comments: Surgical dressings noted on right hip. Dressings are clean and dry and intact.   Neurological:      Mental Status: She is alert and oriented to person, place, and time.   Psychiatric:         Mood and Affect: Mood normal.         Behavior: Behavior normal. Behavior is  cooperative.         Thought Content: Thought content normal.         Judgment: Judgment normal.             Significant Labs: CBC:   Recent Labs   Lab 11/19/23  0515 11/19/23  1300 11/20/23  0414   WBC 12.13  12.07 13.56* 11.10   HGB 10.9*  10.9* 9.9* 9.2*   HCT 34.3*  34.4* 31.3* 28.4*     196 183 174     CMP:   Recent Labs   Lab 11/18/23 2027 11/19/23  0515 11/20/23  0414    139  138 138   K 3.9 4.4  4.3 4.2    105  105 104   CO2 22* 21*  22* 23   * 210*  213* 164*   BUN 21 23  23 26   CREATININE 1.1 1.2  1.2 1.3   CALCIUM 8.8 9.0  9.0 8.5*   PROT 6.7 6.9  --    ALBUMIN 3.6 3.6  --    BILITOT 0.8 1.0  --    ALKPHOS 100 84  --    AST 21 20  --    ALT 10 10  --    ANIONGAP 12 13  11 11     Magnesium:   Recent Labs   Lab 11/18/23  2140 11/19/23  0515 11/20/23  0414   MG 1.8 1.9 2.0       Significant Imaging: I have reviewed all pertinent imaging results/findings within the past 24 hours.

## 2023-11-20 NOTE — PROGRESS NOTES
[facility]  Progress Note    Patient Name: Ngoc Castellanos  MRN: [id number]  Patient Class: IP- Inpatient   Admission Date: 11/18/2023  Length of Stay: 2 days  Attending Physician: Tierra Stapleton MD  Primary Care Provider: Jacobo Mcleod MD        Subjective:     Principal Problem:Closed displaced intertrochanteric fracture of right femur with routine healing        HPI:  This is a very pleasant 89yo female with a past medical history of Afib on Eliquis, HFpEF on lasix, HTN, and CKD stage III who presents to the ED with chief complaint of fall and leg pain. Patient reports that she regularly uses either cane or walker to ambulate. She reports that she is often unsteady feeling on her feet and when at her home sometimes uses objects nearby for balance instead of cane or walker. States that today she was ambulating at home when she had trip and fall without LOC or hitting her head. Immediate severe right hip pain and inability to ambulate. Crawled across floor and able to call for help. EMS arrived and noted shortening and rotation of right leg, placed in pelvic binder and gave pain meds and brought to ED.    In the ED patient afebrile and hemodynamically stable saturating well on room air. 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 Displacement of the lesser trochanter fracture fragment medially. CXR with stable pleural effusion compared to prior study. Orthopedic surgery consulted and patient admitted to the care of medicine for further evaluation and management.     Overview/Hospital Course:  Patient admitted to [facility] Medicine Team H: Hip Fracture team and started on Hip Fracture Pathway with Orthopedic surgery consult for hip fracture. Patient was seen and evaluated by Orthopedic surgery who recommended operative repair of hip fracture.  Patient was medically optimized prior to surgery and was taken to OR after optimization on 11/19/2023. Patient underwent right hip cephalomedullary nail fixation by Dr. Henry Perez. Post-op patient weight bear as tolerated to the right lower extremity as per Orthopedics recommendation. Patient restarted on her home Apixiban 5 mg po BID post-op for her known persistent atrial fibrillation so no other chemical DVT prophylaxis needed post-op. Perineural pain catheter placed by Anesthesia Pain Service with continuous infusion of Ropivacaine to help with pain control post-op and Anesthesia Pain Service managing while patient in the hospital. Patient placed on multimodal pain management post-op with Tylenol 1000 mg po every 8 hours and Robaxin 500 mg po 3 times daily post-op and will continue. PT/OT consulted post-op. Patient noted to be hyperglycemic on admit with -210. HgA1C ordered and returned at 6.3% consistent with prediabetes which is a new diagnosis. With patient's advanced age recommendation would be conservative mangement with diet control management with diabetic diet. I discussed with patient and she is in agreement with diabetic diet and no medications to treat her prediabetes. Patient will need skilled nursing facility placement on discharge based on performance with PT/OT in hospital and CM/SW aware an sent referrals. Patient having post-op hypotension. Losartan held and giving IVF's on 11/20.       Interval History: Patient noted to be hyperglycemic on admit with -210. HgA1C ordered and returned at 6.3% consistent with prediabetes which is a new diagnosis. With patient's advanced age recommendation would be conservative mangement with diet control management with diabetic diet and ordered for patient. I discussed diagnosis and recommendation for treatment with patient at bedside today and patient is in agreement with diabetic diet and no medications to treat her prediabetes. Patient will need  skilled nursing facility placement on discharge based on performance with PT/OT in hospital and CM/SW aware an sent referrals. Patient having post-op hypotension today with BP 84/50. Losartan held and giving IVF's. Patient bolused 1 liter of normal saline but remained hypotensive even after 1 liter of NS so in process of receiving another 1 liter of normal saline. Will recheck Hgb and lactic acid level. Hgb this am 9.2 and drop since admit of 11.3 and expected blood loss related to hip fracture and surgery but would not account for hypotension. Patient denies any lightheadedness or dizziness. Patient awake and alert and responsive and asymptomatic related to her hypotension. Suspect related to dehydration as on Lasix at home and given Lasix IV in ED for concerns for hypervolemia on admit but suspect she was not hypervolemic and got extra Lasix that would adequate for volume depletion. Creatinine stable at 1.3. Patient reports pain in right hip controlled and 2/10. Patient reports sore throat from yesterday resolved but still having cough with mucus production but has been using her incentive spirometer post-op and she states is helping with her cough and mucus.     Review of Systems   Constitutional:  Negative for fever.   HENT:  Positive for hearing loss.    Respiratory:  Positive for cough. Negative for shortness of breath.    Cardiovascular:  Negative for chest pain.   Gastrointestinal:  Negative for abdominal pain and nausea.   Musculoskeletal:  Positive for arthralgias (Right hip).   Neurological:  Negative for dizziness and light-headedness.   Psychiatric/Behavioral:  Negative for agitation and confusion.      Objective:     Vital Signs (Most Recent):  Temp: 98.2 °F (36.8 °C) (11/20/23 1223)  Pulse: 91 (11/20/23 1409)  Resp: 18 (11/20/23 1223)  BP:  84/50 (11/20/23 1409)  SpO2: 92 % (11/20/23 1223) on room air Vital Signs (24h Range):  Temp:  [97.4 °F (36.3 °C)-98.3 °F (36.8 °C)] 98.2 °F (36.8 °C)  Pulse:   [72-91] 91  Resp:  [15-18] 18  SpO2:  [90 %-96 %] 92 %  BP: ()/(42-58) 84/50     Weight: 81.9 kg (180 lb 8.9 oz)  Body mass index is 28.28 kg/m².    Intake/Output Summary (Last 24 hours) at 11/20/2023 1444  Last data filed at 11/20/2023 1255  Gross per 24 hour   Intake 100 ml   Output 550 ml   Net -450 ml         Physical Exam  Vitals and nursing note reviewed.   Constitutional:       General: She is awake. She is not in acute distress.     Appearance: Normal appearance. She is well-developed and normal weight. She is not ill-appearing.   Cardiovascular:      Rate and Rhythm: Normal rate. Rhythm irregularly irregular.      Heart sounds: Normal heart sounds. No murmur heard.  Pulmonary:      Effort: Pulmonary effort is normal. No respiratory distress.      Breath sounds: Normal breath sounds. No wheezing.   Abdominal:      General: Abdomen is flat. Bowel sounds are normal. There is no distension.      Palpations: Abdomen is soft.      Tenderness: There is no abdominal tenderness.   Musculoskeletal:      Right lower leg: No edema.      Left lower leg: No edema.   Skin:     General: Skin is warm.      Findings: No erythema.      Comments: Surgical dressings noted on right hip. Dressings are clean and dry and intact.   Neurological:      Mental Status: She is alert and oriented to person, place, and time.   Psychiatric:         Mood and Affect: Mood normal.         Behavior: Behavior normal. Behavior is cooperative.         Thought Content: Thought content normal.         Judgment: Judgment normal.             Significant Labs: CBC:   Recent Labs   Lab 11/19/23 0515 11/19/23  1300 11/20/23  0414   WBC 12.13  12.07 13.56* 11.10   HGB 10.9*  10.9* 9.9* 9.2*   HCT 34.3*  34.4* 31.3* 28.4*     196 183 174     CMP:   Recent Labs   Lab 11/18/23 2027 11/19/23  0515 11/20/23  0414    139  138 138   K 3.9 4.4  4.3 4.2    105  105 104   CO2 22* 21*  22* 23   * 210*  213* 164*   BUN 21 23   23 26   CREATININE 1.1 1.2  1.2 1.3   CALCIUM 8.8 9.0  9.0 8.5*   PROT 6.7 6.9  --    ALBUMIN 3.6 3.6  --    BILITOT 0.8 1.0  --    ALKPHOS 100 84  --    AST 21 20  --    ALT 10 10  --    ANIONGAP 12 13  11 11     Magnesium:   Recent Labs   Lab 11/18/23  2140 11/19/23  0515 11/20/23  0414   MG 1.8 1.9 2.0       Significant Imaging: I have reviewed all pertinent imaging results/findings within the past 24 hours.    Assessment/Plan:      * Closed displaced intertrochanteric fracture of right femur s/p IM nail on 11/19/2023  Patient underwent right femur IM nail to repair closed displaced intertrochanteric fracture on 11/29/2023 by Dr. Henry Perez.   Patient reports minimal pain in right hip.   Plan is to start PT/OT for gait training and strengthening and restoration of ADL's. Patient is weight bear as tolerated to right lower extremity as per Orthopedic recommendations. Patient with her advanced age and hip fracture and being from assisted living will likely need skilled nursing facility placement on discharge for continue inpatient therapy.   Patient resumed home Apixiban 5 mg po BID post-op for long term anticoagulation for her persistent atrial fibrillation so no other chemical DVT prophylaxis needed post-op.   Orthopedics is following and managing hip fracture and surgical site.   Perineural pain catheter in place with continuous Ropivacaine infusion for pain control and being managed by Anesthesia Pain Service.   Patient on multimodal pain management post-op with Tylenol 1000 mg po every 8 hours and Robaxin 500 mg po 3 times daily post-op and will continue.    Postprocedural hypotension  Patient having post-op hypotension on 11/20 with BP 84/50. Losartan held this am and giving IVF's. Patient bolused 1 liter of normal saline but remained hypotensive even after 1 liter of NS so in process of receiving another 1 liter of normal saline. Will recheck Hgb this afternoon and lactic acid level. Hgb this am 9.2  and drop since admit of 11.3 and expected blood loss related to hip fracture and surgery but would not account for hypotension. Patient with no obvious signs of active bleeding on exam. HR normal and afebrile and normal WBC so do not suspect sepsis.   Patient denies any lightheadedness or dizziness. Patient awake and alert and responsive and asymptomatic related to her hypotension.   Suspect related to dehydration as on Lasix at home and given Lasix IV in ED for concerns for hypervolemia on admit but suspect she was not hypervolemic and got extra Lasix that would adequate for volume depletion.       Acute blood loss as cause of postoperative anemia  Hgb decreased to 9.2 on 11/20 from 9.9 on 11/19 and 11.3 on admit. Blood loss expected and related to hip fracture and surgery. Patient asymptomatic from her anemia. Recheck Hgb on afternoon of 11/20 due to persistent post-op hypotension.   Patient's anemia is currently controlled. Has not received any PRBCs to date. Etiology likely d/t acute blood loss which was from hip fracture and hip fracture surgery and expected blood loss.  Current CBC reviewed-   Lab Results   Component Value Date    HGB 9.2 (L) 11/20/2023    HCT 28.4 (L) 11/20/2023     Monitor serial CBC and transfuse if patient becomes hemodynamically unstable, symptomatic or H/H drops below 7/21.    Persistent atrial fibrillation  Chronic anticoagulation   Patient with Long standing persistent (>12 months) atrial fibrillation which is:controlled currently with home  Atenolol  and will continue in hospital. Patient is currently in atrial fibrillation.[id number] Score: 3. Anticoagulation indicated. Anticoagulation done with Apixiban 5 mg po BID at home and held for surgery but resumed post-op on 11/19.    Chronic heart failure with preserved ejection fraction  Patient is identified as having Diastolic (HFpEF) heart failure that is Chronic. CHF is currently controlled.   Continue home Atenolol to treat but home  Lasix held for surgery but will resume post-op once tolerating oral diet. Home Losartan on hold due to post-op hypotension. Monitor clinical status closely. Monitor on telemetry. Patient is off CHF pathway.  Monitor strict Is&Os and daily weights. Continue on fluid restriction of 1.5 L. Cardiology has not been consulted. Continue to stress to patient importance of self efficacy and  on diet for CHF.     Prediabetes  Patient noted to be hyperglycemic on admit with -210. HgA1C ordered and returned at 6.3% consistent with prediabetes which is a new diagnosis. With patient's advanced age recommendation would be conservative mangement with diet control management with diabetic diet. Discussed with patient on 11/20 and she is in agreement with plan.       Primary hypertension  Chronic, controlled. Latest blood pressure and vitals reviewed-     Temp:  [97.4 °F (36.3 °C)-98.3 °F (36.8 °C)]   Pulse:  [72-91]   Resp:  [15-18]   BP: ()/(42-58)   SpO2:  [90 %-96 %] .   Home meds for hypertension were reviewed and noted below.   Hypertension Medications               atenoloL (TENORMIN) 25 MG tablet TAKE 1 TABLET EVERY DAY    furosemide (LASIX) 20 MG tablet TAKE 2 TABLETS EVERY MORNING  AND TAKE 1 TABLET EVERY EVENING    losartan (COZAAR) 25 MG tablet TAKE 1 TABLET EVERY DAY            While in the hospital, will manage blood pressure as follows; Hold all home anti-hypertensives on 11/20 due to post-op hypotension.     Will utilize p.r.n. blood pressure medication only if patient's blood pressure greater than 180/110 and she develops symptoms such as worsening chest pain or shortness of breath.    ACP (advance care planning)  Advance Care Planning    Date: 11/19/2023    Code Status  In light of the patients advanced and life limiting illness,I engaged the the patient in a voluntary conversation about the patient's preferences for care at the very end of life. The patient wishes to have a natural, peaceful  death.  Along those lines, the patient does not wish to have CPR or other invasive treatments performed when her heart and/or breathing stops. I communicated to the patient that a DNR order would be placed in her medical record to reflect this preference. I spent a total of 15 minutes engaging the patient in this advance care planning discussion.         Stage 3b chronic kidney disease  Creatine stable for now. BMP reviewed- noted Estimated Creatinine Clearance: 26.6 mL/min (based on SCr of 1.3 mg/dL). according to latest data. Based on current GFR, CKD stage is stage 3 - GFR 30-59.  Monitor UOP and serial BMP and adjust therapy as needed. Renally dose meds. Avoid nephrotoxic medications and procedures.      VTE Risk Mitigation (From admission, onward)           Ordered     apixaban tablet 5 mg  2 times daily         11/19/23 1232     Place sequential compression device  Until discontinued         11/18/23 2344     IP VTE HIGH RISK PATIENT  Once         11/18/23 2344     Place sequential compression device  Until discontinued         11/18/23 2301                    Discharge Planning   WES: 11/22/2023     Code Status: DNR   Is the patient medically ready for discharge?: No    Reason for patient still in hospital (select all that apply): Patient new problem and Patient trending condition  Discharge Plan A: (P) Skilled Nursing Facility          Tierra Stapleton MD  Department of Hospital Medicine   [facility] - Surgery

## 2023-11-20 NOTE — ANESTHESIA POST-OP PAIN MANAGEMENT
Acute Pain Service Progress Note    Ngoc Castellanos is a 89 y.o., female, [id number].    Surgery:  INSERTION, INTRAMEDULLARY RAMSES, FEMUR, RIGHT, HANA TABLE, MIRIAN, C- ARM DOOR SIDE     Post Op Day #: 1    Catheter type: perineural  R SIFI    Infusion type: Ropivacaine 0.2%  15q3    Problem List:    Active Hospital Problems    Diagnosis  POA    *Closed displaced intertrochanteric fracture of right femur s/p IM nail on 11/19/2023 [S72.141D]  Not Applicable    ACP (advance care planning) [Z71.89]  Not Applicable    Acute blood loss as cause of postoperative anemia [D62]  No    Prediabetes [R73.03]  Yes    Chronic heart failure with preserved ejection fraction [I50.32]  Yes    Chronic anticoagulation [Z79.01]  Not Applicable    Stage 3b chronic kidney disease [N18.32]  Yes    Persistent atrial fibrillation [I48.19]  Yes    Primary hypertension [I10]  Yes      Resolved Hospital Problems   No resolved problems to display.       Subjective:     General appearance of alert, oriented, no complaints   Pain with rest: 2    Numbers   Pain with movement: 4    Numbers   Side Effects    1. Pruritis No    2. Nausea No    3. Motor Blockade No, 0=Ability to raise lower extremities off bed      Objective:   Catheter site clean, dry, intact         Vitals   Vitals:    11/20/23 0745   BP: (!) 94/53   Pulse: 83   Resp: 15   Temp: 36.4 °C (97.6 °F)        Labs    No results displayed because visit has over 200 results.           Meds   Current Facility-Administered Medications   Medication Dose Route Frequency Provider Last Rate Last Admin    acetaminophen tablet 1,000 mg  1,000 mg Oral Q8H Ana Pope MD   1,000 mg at 11/20/23 0500    apixaban tablet 5 mg  5 mg Oral BID Jesus Bush MD   5 mg at 11/19/23 2022    atenoloL tablet 25 mg  25 mg Oral Daily Car Hines MD   25 mg at 11/19/23 0816    bisacodyL suppository 10 mg  10 mg Rectal Daily PRN Car Hines MD        guaiFENesin 12 hr tablet 600 mg  600 mg Oral  BID Tierra Stapleton MD   600 mg at 11/19/23 1640    losartan tablet 25 mg  25 mg Oral Daily Car Hines MD        melatonin tablet 6 mg  6 mg Oral Nightly PRN Car Hines MD   6 mg at 11/19/23 2024    methocarbamoL tablet 500 mg  500 mg Oral Q8H Sergei Avalos DO        ondansetron injection 4 mg  4 mg Intravenous Q12H PRN Car Hines MD   4 mg at 11/19/23 0200    polyethylene glycol packet 17 g  17 g Oral Daily Car Hines MD   17 g at 11/19/23 1350    ROPIvacaine (PF) 2 mg/ml (0.2%) solution  0.1 mL/hr Perineural Continuous Ana Pope MD 0.1 mL/hr at 11/19/23 1242 0.1 mL/hr at 11/19/23 1242    senna-docusate 8.6-50 mg per tablet 1 tablet  1 tablet Oral BID Car Hines MD   1 tablet at 11/19/23 2024    sodium chloride 0.9% flush 10 mL  10 mL Intravenous PRN Car Hines MD        sodium chloride 0.9% flush 10 mL  10 mL Intravenous PRN Jesus Bush MD            Anticoagulant dose 5mg at BID    Assessment:   Pain control adequate    Plan:   Patient doing well, continue present treatment.  -SIFI 15q3  -Tylenol 1000q8  -Robaxin 750q6  -Lyrica 75qhs    Sergei Avalos DO, PGY4  Department of Anesthesiology

## 2023-11-20 NOTE — ASSESSMENT & PLAN NOTE
Patient is identified as having Diastolic (HFpEF) heart failure that is Chronic. CHF is currently controlled.   Continue home Atenolol to treat but home Lasix held for surgery but will resume post-op once tolerating oral diet. Home Losartan on hold due to post-op hypotension. Monitor clinical status closely. Monitor on telemetry. Patient is off CHF pathway.  Monitor strict Is&Os and daily weights. Continue on fluid restriction of 1.5 L. Cardiology has not been consulted. Continue to stress to patient importance of self efficacy and  on diet for CHF.

## 2023-11-20 NOTE — CARE UPDATE
[facility] Update    Vitals:    11/20/23 1104 11/20/23 1223 11/20/23 1230 11/20/23 1409   BP:   (!) 84/42 (!) 84/50   Pulse: 88 84  91   Resp:  18     Temp:  98.2 °F (36.8 °C)     TempSrc:  Oral     SpO2:  (!) 92%     Weight:       Height:         Blood pressure unchanged after fluid bolus- CBC and Lactic acid ordered. Low suspicion of sepsis- no antibiotics warranted at this time.     Discussed with Attending MD. Salma Pereira PA-C  [facility]

## 2023-11-20 NOTE — ASSESSMENT & PLAN NOTE
Patient having post-op hypotension on 11/20 with BP 84/50. Losartan held this am and giving IVF's. Patient bolused 1 liter of normal saline but remained hypotensive even after 1 liter of NS so in process of receiving another 1 liter of normal saline. Will recheck Hgb this afternoon and lactic acid level. Hgb this am 9.2 and drop since admit of 11.3 and expected blood loss related to hip fracture and surgery but would not account for hypotension. Patient with no obvious signs of active bleeding on exam. HR normal and afebrile and normal WBC so do not suspect sepsis.   Patient denies any lightheadedness or dizziness. Patient awake and alert and responsive and asymptomatic related to her hypotension.   Suspect related to dehydration as on Lasix at home and given Lasix IV in ED for concerns for hypervolemia on admit but suspect she was not hypervolemic and got extra Lasix that would adequate for volume depletion.

## 2023-11-20 NOTE — PLAN OF CARE
PT eval completed- see note for details, goals and POC established.     Problem: Physical Therapy  Goal: Physical Therapy Goal  Description: Goals to be met by: 23     Patient will increase functional independence with mobility by performin. Supine to sit with Contact Guard Assistance  2. Sit to stand transfer with Contact Guard Assistance  3. Bed to chair transfer with Minimal Assistance using Rolling Walker  4. Gait  x 25 feet with Moderate Assistance using Rolling Walker.   5. Lower extremity exercise program x20 reps per handout, with supervision    Outcome: Ongoing, Progressing   2023

## 2023-11-20 NOTE — PLAN OF CARE
11/20/23 1503   Post-Acute Status   Post-Acute Authorization Placement   Post-Acute Placement Status Referrals Sent   Discharge Plan   Discharge Plan A Skilled Nursing Facility     SW faxed referral to [facility] (1st choice), [facility], and [facility] via [facility] for review. SW will follow up as needed.      Betsy Negrete LMSW  Case Management   [facility]   Ext. [phone]

## 2023-11-20 NOTE — PT/OT/SLP EVAL
Occupational Therapy   Co-Evaluation  Co-evaluation/treatment performed due to patient's multiple deficits requiring two skilled therapists to appropriately and safely assess patient's strength and endurance while facilitating functional tasks in addition to accommodating for patient's activity tolerance.        Name: Ngoc Castellanos  MRN: [id number]  Admitting Diagnosis: Closed displaced intertrochanteric fracture of right femur with routine healing  Recent Surgery: Procedure(s) (LRB):  INSERTION, INTRAMEDULLARY RAMSES, FEMUR, RIGHT, HANA TABLE, MIRIAN, C- ARM DOOR SIDE (Right) 1 Day Post-Op    Recommendations:     Discharge Recommendations: Moderate Intensity Therapy  Discharge Equipment Recommendations:   (TBD at later date)  Barriers to discharge:  Decreased caregiver support    Assessment:     Ngoc Castellanos is a 89 y.o. female with a medical diagnosis of Closed displaced intertrochanteric fracture of right femur with routine healing.  She presents with the following performance deficits affecting function: weakness, impaired endurance, impaired self care skills, impaired functional mobility, gait instability, impaired balance, decreased coordination, decreased upper extremity function, decreased lower extremity function, orthopedic precautions.     Pt agreeable to therapy and tolerated fairly. At this time, pt is unable to tolerate mobility related ADLs secondary to LE weakness & poor gross coordination/RW management. Pt's occupational performance was also limited by fatigue and difficulty with BM.    Pt remains limited in ADLs, functional mobility, and functional transfers and is currently not performing tasks at OF.  Pt would continue to benefit from skilled OT services to maximize functional independence with ADLs and functional mobility, reduce caregiver burden, and facilitate safe discharge in the least restrictive environment.    Rehab Prognosis: Good; patient would benefit from acute skilled OT services to  "address these deficits and reach maximum level of function.       Plan:     Patient to be seen daily to address the above listed problems via self-care/home management, therapeutic activities, therapeutic exercises  Plan of Care Expires: 12/20/23  Plan of Care Reviewed with: patient    Subjective     Chief Complaint: "I feel weak"  Patient/Family Comments/goals: Pt is motivated to try therapy again tomorrow    Occupational Profile:  Living Environment: pt lives alone in an independent living facility. Her apt is on the 1st floor with 0STE. Her bathroom consists of a walk-in shower with grab bars  Previous level of function: Mod I with ADLs & functional mobility/transfers  Roles and Routines: Pt enjoys watching tv and reading books  Equipment Used at Home: rollator, grab bar, cane, straight  Assistance upon Discharge: Not clearly verbalized. Will gather more information during next therapy session    Pain/Comfort:  Pain Rating 1: 0/10    Patients cultural, spiritual, Religion conflicts given the current situation: no    Objective:     Communicated with: RN prior to session.  Patient found HOB elevated with perineural catheter, SCD, FCD upon OT entry to room.    General Precautions: Standard, fall  Orthopedic Precautions: RLE weight bearing as tolerated  Braces: N/A  Respiratory Status: Room air    Occupational Performance:    Bed Mobility:    Patient completed Scooting to EOB with minimum assistance  Patient completed Supine to Sit with moderate assistance and 2 persons  Patient completed Sit to Supine with maximal assistance and 2 persons  Require assistance with LE & trunk    Functional Mobility/Transfers:  Patient completed 3 Sit <> Stand Transfers  First 2 from EOB required Mod A of 2 persons  3rd STS from BSC required Max A x 2 persons  Patient completed Toilet Transfer Stand Pivot technique with Max->Max A x 2 persons with  rolling walker  From EOB<->BSC  Pt required increased time, consistent verbal cues for " hand place, RW management and LE sequencing  Functional Mobility: pt is unable to ambulate more than 2-3 steps at this time    Activities of Daily Living:  Grooming: stand by assistance to complete oral hygiene while long sitting in bed with HOB elevated  Upper Body Dressing: minimum assistance to don gown while seated EOB  Toileting: maximal assistance and of 2 persons to complete bottom hygiene & manage clothing    Cognitive/Visual Perceptual:  Cognitive/Psychosocial Skills:     -       Oriented to: Person, Place, Time, and Situation   Visual/Perceptual:      -Intact  tracking      Physical Exam:  Upper Extremity Range of Motion:     -       Right Upper Extremity: WFL  -       Left Upper Extremity: WFL  Upper Extremity Strength:    -       Right Upper Extremity: WFL  -       Left Upper Extremity: WFL   Strength:    -       Right Upper Extremity: decreased  strength  -       Left Upper Extremity: decreased  strength  Fine Motor Coordination:    -       Intact  Left hand, finger to nose, Right hand, finger to nose, Left hand thumb/finger opposition skills, and Right hand thumb/finger opposition skills  Gross motor coordination:   WFL    AMPAC 6 Click ADL:  AMPAC Total Score: 16    Treatment & Education:  .edu    Patient left HOB elevated with all lines intact, call button in reach, and RN notified    GOALS:   Multidisciplinary Problems       Occupational Therapy Goals          Problem: Occupational Therapy    Goal Priority Disciplines Outcome Interventions   Occupational Therapy Goal     OT, PT/OT Ongoing, Progressing    Description: Goals to be met by: 12/20/2023     Patient will increase functional independence with ADLs by performing:    UE Dressing with Modified [place].  LE Dressing with Modified [place].  Grooming while EOB with Set-up Assistance.  Toileting from bedside commode with Contact Guard Assistance for hygiene and clothing management.   Stand pivot transfers with Contact Guard  Assistance.  Step transfer with Contact Guard Assistance  Toilet transfer to toilet with Minimal Assistance.                         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 OR [address] [place] [address];  Service: Orthopedics;  Laterality: Right;    MASTECTOMY Right     TONSILLECTOMY         Time Tracking:     OT Date of Treatment: 11/20/23  OT Start Time: 1027  OT Stop Time: 1141  OT Total Time (min): 74 min    Billable Minutes:Evaluation 10  Self Care/Home Management 45  Therapeutic Activity 19 11/20/2023

## 2023-11-20 NOTE — PT/OT/SLP EVAL
Physical Therapy Co-Evaluation and Treatment    OT present for coeval due to pt's multiple medical comorbidities and functional/cognition deficits requiring two skilled therapists to appropriately progress pt's musculoskeletal strength, neuromuscular control, and endurance while taking into consideration medical acuity and pt safety.    Patient Name: Ngoc Castellanos   MRN: [id number]  Recent Surgery: Procedure(s) (LRB):  INSERTION, INTRAMEDULLARY RAMSES, FEMUR, RIGHT, HANA TABLE, MIRIAN, C- ARM DOOR SIDE (Right) 1 Day Post-Op    Recommendations:     Discharge Recommendations: Moderate Intensity Therapy   Discharge Equipment Recommendations: to be determined by next level of care   Barriers to discharge: None    Assessment:     Ngoc Castellanos is a 89 y.o. female admitted with a medical diagnosis of Closed displaced intertrochanteric fracture of right femur with routine healing. She presents with the following impairments/functional limitations: weakness, impaired endurance, impaired functional mobility, gait instability, impaired self care skills, impaired balance, decreased lower extremity function, decreased upper extremity function, decreased safety awareness, orthopedic precautions, decreased coordination.     Pt receptive and tolerated PT co-eval with OT fairly well. Pt educated on WBAT: right lower extremity precautions prior to start of treatment with pt verbalizing understanding. Pt able to work on transfers this session to/from bedside commode with moderate progressing to maximal assistance from 2 people due to increasing fatigue. Patient currently demonstrates a need for moderate intensity therapy on a daily basis post acute secondary to a decline in functional status due to surgical procedure.    Rehab Prognosis: Good; patient would benefit from acute PT services to address these deficits and reach maximum level of function.    Plan:     During this hospitalization, patient to be seen 4 x/week (Hip pathway  11/20-11/22 then 4x/week after) to address the above listed problems via gait training, therapeutic activities, therapeutic exercises, neuromuscular re-education    Plan of Care Expires: 12/20/23    Subjective     Chief Complaint: needing to use the commode  Patient Comments/Goals: pt agreeable to PT  Pain/Comfort:  Pain Rating 1: 0/10  Location - Side 1: Right  Location - Orientation 1: generalized  Location 1: hip  Pain Addressed 1: Reposition, Distraction, Cessation of Activity  Pain Rating Post-Intervention 1:  (pt did not rate)    Patient History:     Living Environment: Pt lives alone in an independent living facility on the 1st floor with no MARGO. Pt reports cafeteria is on 3rd floor but has elevator access. Bathroom: walk-in shower   Prior Level of Function: mod I using rollator for amb  DME owned: rollator, SPC, grab bars in bathroom  Caregiver Assistance: Facility has staff available for assistance       Objective:     Communicated with RN prior to session. Patient found HOB elevated with perineural catheter, FCD upon PT entry to room.    General Precautions: Standard, fall   Orthopedic Precautions: RLE weight bearing as tolerated   Braces: N/A    Respiratory Status: Room air    Exams:  RLE ROM: WFL  RLE Strength:  grossly 3/5  LLE ROM: WFL  LLE Strength:  grossly 3+/5  Cognitive: Patient is oriented to Person, Place, Time, Situation  Sensation:    -       Intact    Functional Mobility:  Gait belt applied - Yes  Bed Mobility  Scooting: moderate assistance  Supine to Sit: moderate assistance for LE management and trunk management  Sit to Supine: maximal assistance and of 2 persons for LE management and trunk management    Transfers  Sit to Stand: moderate assistance and of 2 persons with rolling walker and with cues for hand placement and foot placement from EOB x2 Trials  Sit to stand from commode: maximal progressing to maximal assistance of 2 persons x2 Trials  Cues for hand placement    Bed to/from  Bedside Commode: maximal assistance and of 2 persons with rolling walker and with cues for hand placement and foot placement using Step Transfer  Pt needed to perform stand pivot when transferring back to bed due to increased fatigue    Gait  Patient ambulated 4 steps to bedside commode with rolling walker and maximal assistance and of 2 persons. Patient required cues for position in walker, sequencing, and weight shift  to increase independence and safety.   Pt had decreased foot clearance and decreased step length  Due to fatigue pt performed stand pivot back to bed  Balance  Sitting: FAIR: Cannot move trunk without losing balance  Standin: N/A      Therapeutic Activities and Exercises:  Pt able to work on transfer training and WB through RLE when standing and when performing bed to/from commode transfers. Pt needed frequent cues for increasing foot clearance and position in walker during transfers.  Patient educated on role of acute care PT and PT POC, safety while in hospital including calling nurse for mobility, and call light usage.  Educated about weightbearing precautions and provided cuing for adherence as appropriate during session.  Pt educated on tip to reduce fall risk and safety with mobility and using call button for assistance from nursing staff with OOB mobility.  All questions answered within the scope of PT.  White board updated accordingly.      AM-PAC 6 CLICK MOBILITY  Total Score:11    Patient left HOB elevated with all lines intact, call button in reach, and RN notified.    GOALS:   Multidisciplinary Problems       Physical Therapy Goals          Problem: Physical Therapy    Goal Priority Disciplines Outcome Goal Variances Interventions   Physical Therapy Goal     PT, PT/OT Ongoing, Progressing     Description: Goals to be met by: 23     Patient will increase functional independence with mobility by performin. Supine to sit with Contact Guard Assistance  2. Sit to stand  transfer with Contact Guard Assistance  3. Bed to chair transfer with Minimal Assistance using Rolling Walker  4. Gait  x 25 feet with Moderate Assistance using Rolling Walker.   5. 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    left    Valvular regurgitation     moderate MR       Past Surgical History:   Procedure Laterality Date    APPENDECTOMY      BRAIN SURGERY  2002    meningioma    BREAST BIOPSY  2/2014    Right breast- [facility]    BREAST CYST EXCISION  1960    left breast - benign    BREAST SURGERY  03/17/14    right mastectomy    HEMORRHOID SURGERY      HYSTERECTOMY      INTRAMEDULLARY RODDING OF FEMUR Right 11/19/2023    Procedure: INSERTION, INTRAMEDULLARY RAMSES, FEMUR, RIGHT, HANA TABLE, MIRIAN, C- ARM DOOR SIDE;  Surgeon: Henry Perez MD;  Location: [facility] OR [address] [place] [address];  Service: Orthopedics;  Laterality: Right;    MASTECTOMY Right     TONSILLECTOMY         Time Tracking:     PT Received On: 11/20/23  PT Start Time: 1027  PT Stop Time: 1141  PT Total Time (min): 74 min     Billable Minutes: Evaluation 10, Therapeutic Activity 45, and Neuromuscular Re-education 19    11/20/2023

## 2023-11-20 NOTE — ASSESSMENT & PLAN NOTE
Chronic, controlled. Latest blood pressure and vitals reviewed-     Temp:  [97.4 °F (36.3 °C)-98.3 °F (36.8 °C)]   Pulse:  [72-91]   Resp:  [15-18]   BP: ()/(42-58)   SpO2:  [90 %-96 %] .   Home meds for hypertension were reviewed and noted below.   Hypertension Medications               atenoloL (TENORMIN) 25 MG tablet TAKE 1 TABLET EVERY DAY    furosemide (LASIX) 20 MG tablet TAKE 2 TABLETS EVERY MORNING  AND TAKE 1 TABLET EVERY EVENING    losartan (COZAAR) 25 MG tablet TAKE 1 TABLET EVERY DAY            While in the hospital, will manage blood pressure as follows; Hold all home anti-hypertensives on 11/20 due to post-op hypotension.     Will utilize p.r.n. blood pressure medication only if patient's blood pressure greater than 180/110 and she develops symptoms such as worsening chest pain or shortness of breath.

## 2023-11-20 NOTE — ADDENDUM NOTE
Addendum  created 11/20/23 1134 by Nani Aiken MD    Charge Capture section accepted, Cosign clinical note with attestation

## 2023-11-20 NOTE — PLAN OF CARE
[facility] - [facility]  Initial Discharge Assessment       Primary Care Provider: Jacobo Mcleod MD    Admission Diagnosis: Fall [W19.XXXA]  Preop cardiovascular exam [Z01.810]  Right hip pain [M25.551]  Closed nondisplaced intertrochanteric fracture of right femur, initial encounter [S72.144A]    Admission Date: 11/18/2023  Expected Discharge Date: 11/22/2023    Transition of Care Barriers: None    Payor: HUMANA MANAGED MEDICARE / Plan: HUMANA TOTAL CARE ADVANTAGE / Product Type: Medicare Advantage /     Extended Emergency Contact Information  Primary Emergency Contact: Jeremy Castellanos  Mobile Phone: [phone]  Relation: Son   needed? No  Secondary Emergency Contact: Toi Castellanos  Mobile Phone: [phone]  Relation: Grandchild   needed? No    Discharge Plan A: Skilled Nursing Facility  Discharge Plan B: Home with family, [facility]      [facility] - [facility] [address]  [address] [facility] [address]  Phone: [phone] Fax: [phone]    [id number] - [place] - [address] [facility]  [address] [address]  Phone: [phone] Fax: [phone]      Initial Assessment (most recent)       Adult Discharge Assessment - 11/20/23 1148          Discharge Assessment    Assessment Type Discharge Planning Assessment     Confirmed/corrected address, phone number and insurance Yes     Confirmed Demographics Correct on Facesheet     Source of Information patient     Communicated WES with patient/caregiver Yes     People in Home alone     Do you expect to return to your current living situation? Yes     Prior to hospitilization cognitive status: Alert/Oriented     Current cognitive status: Alert/Oriented     Equipment Currently Used at Home rollator;cane, straight     Do you currently have service(s) that help you manage your care at home? No     Do you take prescription  medications? Yes     Do you have prescription coverage? Yes     Do you have any problems affording any of your prescribed medications? No     Is the patient taking medications as prescribed? yes     How do you get to doctors appointments? health plan transportation     Are you on dialysis? No     Do you take coumadin? No     DME Needed Upon Discharge  none     Discharge Plan discussed with: Patient     Transition of Care Barriers None     Discharge Plan A Skilled Nursing Facility     Discharge Plan B Home with family;Home Health        Physical Activity    On average, how many days per week do you engage in moderate to strenuous exercise (like a brisk walk)? 0 days     On average, how many minutes do you engage in exercise at this level? 0 min        Financial Resource Strain    How hard is it for you to pay for the very basics like food, housing, medical care, and heating? Not hard at all        Housing Stability    In the last 12 months, was there a time when you were not able to pay the mortgage or rent on time? No     In the last 12 months, how many places have you lived? 1     In the last 12 months, was there a time when you did not have a steady place to sleep or slept in a shelter (including now)? No        Transportation Needs    In the past 12 months, has lack of transportation kept you from medical appointments or from getting medications? No     In the past 12 months, has lack of transportation kept you from meetings, work, or from getting things needed for daily living? No        Food Insecurity    Within the past 12 months, you worried that your food would run out before you got the money to buy more. Never true     Within the past 12 months, the food you bought just didn't last and you didn't have money to get more. Never true        Stress    Do you feel stress - tense, restless, nervous, or anxious, or unable to sleep at night because your mind is troubled all the time - these days? Only a little         Social Connections    In a typical week, how many times do you talk on the phone with family, friends, or neighbors? Three times a week     How often do you get together with friends or relatives? Once a week     How often do you attend Buddhist or Moravian services? 1 to 4 times per year     Do you belong to any clubs or organizations such as Buddhist groups, unions, fraternal or athletic groups, or school groups? No     How often do you attend meetings of the clubs or organizations you belong to? Never     Are you , , , , never , or living with a partner?         Alcohol Use    Q1: How often do you have a drink containing alcohol? Never     Q2: How many drinks containing alcohol do you have on a typical day when you are drinking? Patient does not drink     Q3: How often do you have six or more drinks on one occasion? Never                   [facility] Patient amenable to therapy recommendation for SNF placement post hospitalization . Her first choice is [facility].  Pending accepting facility.

## 2023-11-21 LAB
ANION GAP SERPL CALC-SCNC: 10 MMOL/L (ref 8–16)
BASOPHILS # BLD AUTO: 0.03 K/UL (ref 0–0.2)
BASOPHILS NFR BLD: 0.3 % (ref 0–1.9)
BUN SERPL-MCNC: 35 MG/DL (ref 10–30)
CALCIUM SERPL-MCNC: 8.3 MG/DL (ref 8.7–10.5)
CHLORIDE SERPL-SCNC: 106 MMOL/L (ref 95–110)
CO2 SERPL-SCNC: 20 MMOL/L (ref 23–29)
CREAT SERPL-MCNC: 1.5 MG/DL (ref 0.5–1.4)
DIFFERENTIAL METHOD: ABNORMAL
EOSINOPHIL # BLD AUTO: 0 K/UL (ref 0–0.5)
EOSINOPHIL NFR BLD: 0.2 % (ref 0–8)
ERYTHROCYTE [DISTWIDTH] IN BLOOD BY AUTOMATED COUNT: 15.3 % (ref 11.5–14.5)
EST. GFR  (NO RACE VARIABLE): 31.3 ML/MIN/1.73 M^2
GLUCOSE SERPL-MCNC: 158 MG/DL (ref 70–110)
HCT VFR BLD AUTO: 25.9 % (ref 37–48.5)
HGB BLD-MCNC: 8.5 G/DL (ref 12–16)
IMM GRANULOCYTES # BLD AUTO: 0.05 K/UL (ref 0–0.04)
IMM GRANULOCYTES NFR BLD AUTO: 0.5 % (ref 0–0.5)
LYMPHOCYTES # BLD AUTO: 0.6 K/UL (ref 1–4.8)
LYMPHOCYTES NFR BLD: 5.8 % (ref 18–48)
MAGNESIUM SERPL-MCNC: 2 MG/DL (ref 1.6–2.6)
MCH RBC QN AUTO: 29.4 PG (ref 27–31)
MCHC RBC AUTO-ENTMCNC: 32.8 G/DL (ref 32–36)
MCV RBC AUTO: 90 FL (ref 82–98)
MONOCYTES # BLD AUTO: 0.9 K/UL (ref 0.3–1)
MONOCYTES NFR BLD: 8.9 % (ref 4–15)
NEUTROPHILS # BLD AUTO: 9 K/UL (ref 1.8–7.7)
NEUTROPHILS NFR BLD: 84.3 % (ref 38–73)
NRBC BLD-RTO: 0 /100 WBC
PLATELET # BLD AUTO: 153 K/UL (ref 150–450)
PMV BLD AUTO: 10.9 FL (ref 9.2–12.9)
POTASSIUM SERPL-SCNC: 4.1 MMOL/L (ref 3.5–5.1)
RBC # BLD AUTO: 2.89 M/UL (ref 4–5.4)
SODIUM SERPL-SCNC: 136 MMOL/L (ref 136–145)
WBC # BLD AUTO: 10.62 K/UL (ref 3.9–12.7)

## 2023-11-21 PROCEDURE — 99233 PR SUBSEQUENT HOSPITAL CARE,LEVL III: ICD-10-PCS | Mod: HCNC,,, | Performed by: INTERNAL MEDICINE

## 2023-11-21 PROCEDURE — 25000003 PHARM REV CODE 250: Mod: HCNC

## 2023-11-21 PROCEDURE — 25000003 PHARM REV CODE 250: Mod: HCNC | Performed by: ANESTHESIOLOGY

## 2023-11-21 PROCEDURE — 25000003 PHARM REV CODE 250: Mod: HCNC | Performed by: STUDENT IN AN ORGANIZED HEALTH CARE EDUCATION/TRAINING PROGRAM

## 2023-11-21 PROCEDURE — 36415 COLL VENOUS BLD VENIPUNCTURE: CPT | Mod: HCNC | Performed by: FAMILY MEDICINE

## 2023-11-21 PROCEDURE — 25000003 PHARM REV CODE 250: Mod: HCNC | Performed by: PHYSICIAN ASSISTANT

## 2023-11-21 PROCEDURE — 97535 SELF CARE MNGMENT TRAINING: CPT | Mod: HCNC

## 2023-11-21 PROCEDURE — 85025 COMPLETE CBC W/AUTO DIFF WBC: CPT | Mod: HCNC | Performed by: FAMILY MEDICINE

## 2023-11-21 PROCEDURE — 25000003 PHARM REV CODE 250: Mod: HCNC | Performed by: FAMILY MEDICINE

## 2023-11-21 PROCEDURE — 99233 SBSQ HOSP IP/OBS HIGH 50: CPT | Mod: HCNC,,, | Performed by: INTERNAL MEDICINE

## 2023-11-21 PROCEDURE — 83735 ASSAY OF MAGNESIUM: CPT | Mod: HCNC | Performed by: FAMILY MEDICINE

## 2023-11-21 PROCEDURE — 21400001 HC TELEMETRY ROOM: Mod: HCNC

## 2023-11-21 PROCEDURE — 80048 BASIC METABOLIC PNL TOTAL CA: CPT | Mod: HCNC | Performed by: FAMILY MEDICINE

## 2023-11-21 PROCEDURE — 97116 GAIT TRAINING THERAPY: CPT | Mod: HCNC

## 2023-11-21 PROCEDURE — 97112 NEUROMUSCULAR REEDUCATION: CPT | Mod: HCNC

## 2023-11-21 PROCEDURE — 97530 THERAPEUTIC ACTIVITIES: CPT | Mod: HCNC

## 2023-11-21 PROCEDURE — 25000003 PHARM REV CODE 250: Mod: HCNC | Performed by: INTERNAL MEDICINE

## 2023-11-21 RX ORDER — AMOXICILLIN 250 MG
1 CAPSULE ORAL 2 TIMES DAILY
Status: ON HOLD
Start: 2023-11-21 | End: 2023-12-11 | Stop reason: SDUPTHER

## 2023-11-21 RX ORDER — ACETAMINOPHEN 500 MG
1000 TABLET ORAL EVERY 8 HOURS
Status: ON HOLD
Start: 2023-11-21 | End: 2023-12-11 | Stop reason: HOSPADM

## 2023-11-21 RX ORDER — METHOCARBAMOL 500 MG/1
500 TABLET, FILM COATED ORAL EVERY 8 HOURS
Status: ON HOLD
Start: 2023-11-21 | End: 2023-12-11 | Stop reason: HOSPADM

## 2023-11-21 RX ORDER — TALC
6 POWDER (GRAM) TOPICAL NIGHTLY PRN
Refills: 0 | Status: ON HOLD
Start: 2023-11-21 | End: 2023-12-11 | Stop reason: HOSPADM

## 2023-11-21 RX ADMIN — ATENOLOL 25 MG: 25 TABLET ORAL at 09:11

## 2023-11-21 RX ADMIN — METHOCARBAMOL 500 MG: 500 TABLET ORAL at 08:11

## 2023-11-21 RX ADMIN — APIXABAN 5 MG: 5 TABLET, FILM COATED ORAL at 09:11

## 2023-11-21 RX ADMIN — GUAIFENESIN 600 MG: 600 TABLET, EXTENDED RELEASE ORAL at 08:11

## 2023-11-21 RX ADMIN — SENNOSIDES AND DOCUSATE SODIUM 1 TABLET: 8.6; 5 TABLET ORAL at 08:11

## 2023-11-21 RX ADMIN — ACETAMINOPHEN 1000 MG: 500 TABLET ORAL at 01:11

## 2023-11-21 RX ADMIN — SENNOSIDES AND DOCUSATE SODIUM 1 TABLET: 8.6; 5 TABLET ORAL at 09:11

## 2023-11-21 RX ADMIN — METHOCARBAMOL 500 MG: 500 TABLET ORAL at 01:11

## 2023-11-21 RX ADMIN — ACETAMINOPHEN 1000 MG: 500 TABLET ORAL at 08:11

## 2023-11-21 RX ADMIN — POLYETHYLENE GLYCOL 3350 17 G: 17 POWDER, FOR SOLUTION ORAL at 09:11

## 2023-11-21 RX ADMIN — GUAIFENESIN 600 MG: 600 TABLET, EXTENDED RELEASE ORAL at 09:11

## 2023-11-21 RX ADMIN — METHOCARBAMOL 500 MG: 500 TABLET ORAL at 05:11

## 2023-11-21 RX ADMIN — ACETAMINOPHEN 1000 MG: 500 TABLET ORAL at 05:11

## 2023-11-21 RX ADMIN — APIXABAN 5 MG: 5 TABLET, FILM COATED ORAL at 08:11

## 2023-11-21 NOTE — ASSESSMENT & PLAN NOTE
Patient underwent right femur IM nail to repair closed displaced intertrochanteric fracture on 11/29/2023 by Dr. Henry Perez.   Patient reports minimal pain in right hip.   Patient to continue PT/OT for gait training and strengthening and restoration of ADL's. Patient is weight bear as tolerated to right lower extremity as per Orthopedic recommendations. Patient with her advanced age and hip fracture and being from assisted living will likely need skilled nursing facility placement on discharge for continue inpatient therapy. Patient has been accepted to [facility] for 11/22 if medically ready.   Patient resumed home Apixiban 5 mg po BID post-op for long term anticoagulation for her persistent atrial fibrillation so no other chemical DVT prophylaxis needed post-op.   Orthopedics is following and managing hip fracture and surgical site.   Perineural pain catheter in place with continuous Ropivacaine infusion for pain control and being managed by Anesthesia Pain Service.   Pain controlled. Patient on multimodal pain management post-op with Tylenol 1000 mg po every 8 hours and Robaxin 500 mg po 3 times daily post-op and will continue.

## 2023-11-21 NOTE — ASSESSMENT & PLAN NOTE
Controlled. Patient noted to be hyperglycemic on admit with -210. HgA1C ordered and returned at 6.3% consistent with prediabetes which is a new diagnosis. With patient's advanced age recommendation would be conservative mangement with diet control management with diabetic diet. Discussed with patient on 11/20 and she is in agreement with plan.

## 2023-11-21 NOTE — ASSESSMENT & PLAN NOTE
Chronic, controlled. Latest blood pressure and vitals reviewed-     Temp:  [97.2 °F (36.2 °C)-98.3 °F (36.8 °C)]   Pulse:  [72-90]   Resp:  [17-20]   BP: ()/(52-55)   SpO2:  [91 %-93 %] .   Home meds for hypertension were reviewed and noted below.   Hypertension Medications               atenoloL (TENORMIN) 25 MG tablet TAKE 1 TABLET EVERY DAY    furosemide (LASIX) 20 MG tablet TAKE 2 TABLETS EVERY MORNING  AND TAKE 1 TABLET EVERY EVENING    losartan (COZAAR) 25 MG tablet TAKE 1 TABLET EVERY DAY            While in the hospital, will manage blood pressure as follows; Hold all home anti-hypertensives at this time due to post-op hypotension.     Will utilize p.r.n. blood pressure medication only if patient's blood pressure greater than 180/110 and she develops symptoms such as worsening chest pain or shortness of breath.

## 2023-11-21 NOTE — ASSESSMENT & PLAN NOTE
Hgb 8.7 on recheck on 11/20 and Hgb 8.5 on 11/21. BP stable at this time on 11/21. No signs of active bleeding so will monitor for now. Patient asymptomatic.   Hgb decreased to 9.2 on 11/20 from 9.9 on 11/19 and 11.3 on admit. Blood loss expected and related to hip fracture and surgery. Patient asymptomatic from her anemia. Recheck Hgb on afternoon of 11/20 due to persistent post-op hypotension.   Patient's anemia is currently controlled. Has not received any PRBCs to date. Etiology likely d/t acute blood loss which was from hip fracture and hip fracture surgery and expected blood loss.  Current CBC reviewed-   Lab Results   Component Value Date    HGB 8.5 (L) 11/21/2023    HCT 25.9 (L) 11/21/2023     Monitor serial CBC and transfuse if patient becomes hemodynamically unstable, symptomatic or H/H drops below 7/21.

## 2023-11-21 NOTE — PT/OT/SLP PROGRESS
Occupational Therapy   Co-Treatment  Co-treatment performed due to patient's multiple deficits requiring two skilled therapists to appropriately and safely assess patient's strength and endurance while facilitating functional tasks in addition to accommodating for patient's activity tolerance.        Name: Ngoc Castellanos  MRN: [id number]  Admitting Diagnosis:  Closed displaced intertrochanteric fracture of right femur with routine healing  2 Days Post-Op    Recommendations:     Discharge Recommendations: Moderate Intensity Therapy  Discharge Equipment Recommendations:  bedside commode  Barriers to discharge:  Decreased caregiver support    Assessment:     Ngoc Castellanos is a 89 y.o. female with a medical diagnosis of Closed displaced intertrochanteric fracture of right femur with routine healing.  She presents with the following performance deficits affecting function are weakness, impaired endurance, impaired self care skills, impaired functional mobility, gait instability, impaired balance, decreased lower extremity function, orthopedic precautions. Pt agreeable to therapy and tolerated fair. Pt continues to demonstrate difficulty with functional mobility secondary to poor ability to lift feet up while weight bearing through UE using rolling walker.     Pt remains limited in ADLs, functional mobility, and functional transfers and is currently not performing tasks at [facility]. Pt would continue to benefit from skilled OT services to maximize functional independence with ADLs and functional mobility, reduce caregiver burden, and facilitate safe discharge in the least restrictive environment.     Rehab Prognosis:  Good; patient would benefit from acute skilled OT services to address these deficits and reach maximum level of function.       Plan:     Patient to be seen daily to address the above listed problems via therapeutic activities, self-care/home management, therapeutic exercises  Plan of Care Expires: 12/20/23  Plan  of Care Reviewed with: patient    Subjective     Chief Complaint: I feel dizzy  Patient/Family Comments/goals: pt reports that she will try her best every day  Pain/Comfort:  Pain Rating 1: 0/10    Objective:     Communicated with: RN prior to session.  Patient found HOB elevated with FCD, perineural catheter upon OT entry to room.    General Precautions: Standard, fall    Orthopedic Precautions:RLE weight bearing as tolerated  Braces: N/A  Respiratory Status: Room air     Occupational Performance:     Bed Mobility:    Patient completed Scooting to EOB with moderate assistance  Patient completed Supine to Sit with moderate assistance and 2 persons  Patient completed Sit to Supine with maximal assistance and 2 persons     Functional Mobility/Transfers:  Patient completed Sit <> Stand Transfer with moderate assistance  with  rolling walker   Functional Mobility: Pt completed 2 lateral steps to the left using RW with Max A of 2 persons  Pt is unable to ambulate more than a 2-3 steps in any direction 2/2 to inability to left feet up & minimal weightbearing through UE while using RW  Bilateral knee buckling noted    Activities of Daily Living:  Grooming: stand by assistance to complete oral hygiene while seated EOB  Upper Body Dressing: minimum assistance to don hospital gown over back while seated EOB      [facility] 6 Click ADL: 17    Treatment & Education:  -Education on energy conservation/breathing techniques and task modification to maximize safety and (I) during ADLs and mobility  -Education on importance of OOB activity to improve overall activity tolerance and promote recovery  -Pt educated on hand placement for transfers  -Pt educated on RW placement for ADLs  -Pt educated on weightbearing and surgical precautions with pt verbalizing understanding  -Pt educated to call for assistance and to transfer with hospital staff only Pt had no further questions & when asked whether there were any concerns pt reported none.      Patient left HOB elevated with all lines intact, call button in reach, and RN notified    GOALS:   Multidisciplinary Problems       Occupational Therapy Goals          Problem: Occupational Therapy    Goal Priority Disciplines Outcome Interventions   Occupational Therapy Goal     OT, PT/OT Ongoing, Progressing    Description: Goals to be met by: 12/20/2023     Patient will increase functional independence with ADLs by performing:    UE Dressing with Modified Meagher.  LE Dressing with Modified Meagher.  Grooming while EOB with Set-up Assistance.  Toileting from bedside commode with Contact Guard Assistance for hygiene and clothing management.   Stand pivot transfers with Contact Guard Assistance.  Step transfer with Contact Guard Assistance  Toilet transfer to toilet with Minimal Assistance.                         Time Tracking:     OT Date of Treatment: 11/21/23  OT Start Time: 1041  OT Stop Time: 1119  OT Total Time (min): 38 min    Billable Minutes:Self Care/Home Management 10  Therapeutic Activity 28    OT/LOUIE: OT          11/21/2023

## 2023-11-21 NOTE — PROGRESS NOTES
[facility]  Progress Note    Patient Name: Ngoc Castellanos  MRN: [id number]  Patient Class: IP- Inpatient   Admission Date: 11/18/2023  Length of Stay: 3 days  Attending Physician: Tierra Stapleton MD  Primary Care Provider: Jacobo Mcleod MD        Subjective:     Principal Problem:Closed displaced intertrochanteric fracture of right femur with routine healing        HPI:  This is a very pleasant 89yo female with a past medical history of Afib on Eliquis, HFpEF on lasix, HTN, and CKD stage III who presents to the ED with chief complaint of fall and leg pain. Patient reports that she regularly uses either cane or walker to ambulate. She reports that she is often unsteady feeling on her feet and when at her home sometimes uses objects nearby for balance instead of cane or walker. States that today she was ambulating at home when she had trip and fall without LOC or hitting her head. Immediate severe right hip pain and inability to ambulate. Crawled across floor and able to call for help. EMS arrived and noted shortening and rotation of right leg, placed in pelvic binder and gave pain meds and brought to ED.    In the ED patient afebrile and hemodynamically stable saturating well on room air. 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 Displacement of the lesser trochanter fracture fragment medially. CXR with stable pleural effusion compared to prior study. Orthopedic surgery consulted and patient admitted to the care of medicine for further evaluation and management.     Overview/Hospital Course:  Patient admitted to [facility] Team H: Hip Fracture team and started on Hip Fracture Pathway with Orthopedic surgery consult for hip fracture. Patient was seen and evaluated by Orthopedic surgery who recommended operative repair of hip fracture.  Patient was medically optimized prior to surgery and was taken to OR after optimization on 11/19/2023. Patient underwent right hip cephalomedullary nail fixation by Dr. Henry Perez. Post-op patient weight bear as tolerated to the right lower extremity as per Orthopedics recommendation. Patient restarted on her home Apixiban 5 mg po BID post-op for her known persistent atrial fibrillation so no other chemical DVT prophylaxis needed post-op. Perineural pain catheter placed by Anesthesia Pain Service with continuous infusion of Ropivacaine to help with pain control post-op and Anesthesia Pain Service managing while patient in the hospital. Patient placed on multimodal pain management post-op with Tylenol 1000 mg po every 8 hours and Robaxin 500 mg po 3 times daily post-op and will continue. PT/OT consulted post-op. Patient noted to be hyperglycemic on admit with -210. HgA1C ordered and returned at 6.3% consistent with prediabetes which is a new diagnosis. With patient's advanced age recommendation would be conservative mangement with diet control management with diabetic diet. I discussed with patient and she is in agreement with diabetic diet and no medications to treat her prediabetes. Patient will need skilled nursing facility placement on discharge based on performance with PT/OT in hospital and CM/SW aware an sent referrals. Patient having post-op hypotension. Losartan held and giving IVF's on 11/20. BP I'm[roved after a total of 2 liters of normal saline given on 11/20. Lactic acid returned back as normal at 1.9. Hgb with drop post-op to 8.5 on 11/21 but that is expected blood loss from hip fracture and surgery and would not account totally for reason for post-op hypotension. Home Losartan on hold for BP management post-op. Patient progressing with PT/OT and requiring moderate intensity therapy. Patient requiring skilled nursing facility placement on discharge and accepted to [facility] and should have a  bed on 11/22/2023 if medically ready.       Interval History: Patient having issues with post-op hypotension. Losartan held yesterday and given IVF's on 11/20. BP improved after a total of 2 liters of normal saline given yesterday. Lactic acid returned back as normal at 1.9 yesterday. Hgb dropped post-op to 8.5 on 11/21 from 8.7 on 11/20 but Hgb was 11.3 on admit but this is expected blood loss from hip fracture and surgery and would not account totally for reason for post-op hypotension. Home Losartan on hold for BP management post-op. BP doing better today and encouraging oral fluid intake. Patient progressing with PT/OT and requiring moderate intensity therapy. Patient requiring skilled nursing facility placement on discharge and accepted to [facility] and should have a bed on 11/22/2023 if medically ready. I reviewed other labs from patient today and she did have a mild increase in Creatinine from 1.3 yesterday to 1.5 today and likely related to hypovolemia. Need to closely monitor daily BMP to watch real function. Patient noted this am to have blistering along surgical dressings in upper incision area. Patient reports issues with tape allergy in past and concern her skin is having reaction to the surgical dressing. Bedside nurse aware and states she will let Ortho team know as may need to change to different type of dressing over incision sites to right leg. Patient reports 2/10 pain to right hip.     Review of Systems   Constitutional:  Negative for fever.   HENT:  Positive for hearing loss.    Respiratory:  Positive for cough. Negative for shortness of breath.    Cardiovascular:  Negative for chest pain.   Gastrointestinal:  Negative for abdominal pain and nausea.   Musculoskeletal:  Positive for arthralgias (Right hip).   Neurological:  Negative for dizziness and light-headedness.   Psychiatric/Behavioral:  Negative for agitation and confusion.      Objective:     Vital Signs (Most Recent):  Temp: 98.2 °F  (36.8 °C) (11/21/23 1146)  Pulse: 83 (11/21/23 1146)  Resp: 18 (11/21/23 1146)  BP: 103/54 (11/21/23 1146)  SpO2: 91 % (11/21/23 1146) on room air Vital Signs (24h Range):  Temp:  [97.2 °F (36.2 °C)-98.2 °F (36.8 °C)] 98.2 °F (36.8 °C)  Pulse:  [72-90] 83  Resp:  [16-20] 18  SpO2:  [91 %-93 %] 91 %  BP: ()/(51-54) 93/54     Weight: 81.9 kg (180 lb 8.9 oz)  Body mass index is 28.28 kg/m².    Intake/Output Summary (Last 24 hours) at 11/21/2023 1448  Last data filed at 11/20/2023 1720  Gross per 24 hour   Intake 180 ml   Output --   Net 180 ml         Physical Exam  Vitals and nursing note reviewed.   Constitutional:       General: She is awake. She is not in acute distress.     Appearance: Normal appearance. She is well-developed and normal weight. She is not ill-appearing.   Cardiovascular:      Rate and Rhythm: Normal rate. Rhythm irregularly irregular.      Heart sounds: Normal heart sounds. No murmur heard.  Pulmonary:      Effort: Pulmonary effort is normal. No respiratory distress.      Breath sounds: Normal breath sounds. No wheezing.   Abdominal:      General: Abdomen is flat. Bowel sounds are normal. There is no distension.      Palpations: Abdomen is soft.      Tenderness: There is no abdominal tenderness.   Musculoskeletal:      Right lower leg: No edema.      Left lower leg: No edema.   Skin:     General: Skin is warm.      Findings: No erythema.      Comments: Surgical dressings noted on right hip. Dressings are clean and dry and intact.Blistering of skin noted along lines of surgical dressings to right hip with largest on upper incision.    Neurological:      Mental Status: She is alert and oriented to person, place, and time.   Psychiatric:         Mood and Affect: Mood normal.         Behavior: Behavior normal. Behavior is cooperative.         Thought Content: Thought content normal.         Judgment: Judgment normal.             Significant Labs: CBC:   Recent Labs   Lab 11/20/23  9198  11/20/23  1711 11/21/23  0307   WBC 11.10 13.04* 10.62   HGB 9.2* 8.7* 8.5*   HCT 28.4* 27.4* 25.9*    155 153     CMP:   Recent Labs   Lab 11/20/23  0414 11/21/23  0307    136   K 4.2 4.1    106   CO2 23 20*   * 158*   BUN 26 35*   CREATININE 1.3 1.5*   CALCIUM 8.5* 8.3*   ANIONGAP 11 10       Significant Imaging: I have reviewed all pertinent imaging results/findings within the past 24 hours.    Assessment/Plan:      * Closed displaced intertrochanteric fracture of right femur s/p IM nail on 11/19/2023  Patient underwent right femur IM nail to repair closed displaced intertrochanteric fracture on 11/29/2023 by Dr. Henry Perez.   Patient reports minimal pain in right hip.   Patient to continue PT/OT for gait training and strengthening and restoration of ADL's. Patient is weight bear as tolerated to right lower extremity as per Orthopedic recommendations. Patient with her advanced age and hip fracture and being from assisted living will likely need skilled nursing facility placement on discharge for continue inpatient therapy. Patient has been accepted to [facility] for 11/22 if medically ready.   Patient resumed home Apixiban 5 mg po BID post-op for long term anticoagulation for her persistent atrial fibrillation so no other chemical DVT prophylaxis needed post-op.   Orthopedics is following and managing hip fracture and surgical site.   Perineural pain catheter in place with continuous Ropivacaine infusion for pain control and being managed by Anesthesia Pain Service.   Pain controlled. Patient on multimodal pain management post-op with Tylenol 1000 mg po every 8 hours and Robaxin 500 mg po 3 times daily post-op and will continue.    Postprocedural hypotension  Much improved on 11/21. Continue hold hold Losartan and Atenolol. Encourage oral intake. Lactic acid normal at 1.9. Hgb down to 8.5 but no signs of active bleeding so will just monitor and no indication to do blood transfusion.  Patient remains asymptomatic on 11/21.   Patient having post-op hypotension on 11/20 with BP 84/50. Losartan held this am and giving IVF's. Patient bolused 1 liter of normal saline but remained hypotensive even after 1 liter of NS so in process of receiving another 1 liter of normal saline. Will recheck Hgb this afternoon and lactic acid level. Hgb this am 9.2 and drop since admit of 11.3 and expected blood loss related to hip fracture and surgery but would not account for hypotension. Patient with no obvious signs of active bleeding on exam. HR normal and afebrile and normal WBC so do not suspect sepsis.   Patient denies any lightheadedness or dizziness. Patient awake and alert and responsive and asymptomatic related to her hypotension.   Suspect related to dehydration as on Lasix at home and given Lasix IV in ED for concerns for hypervolemia on admit but suspect she was not hypervolemic and got extra Lasix that would adequate for volume depletion.       Acute blood loss as cause of postoperative anemia  Hgb 8.7 on recheck on 11/20 and Hgb 8.5 on 11/21. BP stable at this time on 11/21. No signs of active bleeding so will monitor for now. Patient asymptomatic.   Hgb decreased to 9.2 on 11/20 from 9.9 on 11/19 and 11.3 on admit. Blood loss expected and related to hip fracture and surgery. Patient asymptomatic from her anemia. Recheck Hgb on afternoon of 11/20 due to persistent post-op hypotension.   Patient's anemia is currently controlled. Has not received any PRBCs to date. Etiology likely d/t acute blood loss which was from hip fracture and hip fracture surgery and expected blood loss.  Current CBC reviewed-   Lab Results   Component Value Date    HGB 8.5 (L) 11/21/2023    HCT 25.9 (L) 11/21/2023     Monitor serial CBC and transfuse if patient becomes hemodynamically unstable, symptomatic or H/H drops below 7/21.    Persistent atrial fibrillation  Chronic anticoagulation   Patient with Long standing persistent  (>12 months) atrial fibrillation which is:controlled currently with home  Atenolol  and will continue in hospital. Patient is currently in atrial fibrillation.[id number] Score: 3. Anticoagulation indicated. Anticoagulation done with Apixiban 5 mg po BID at home and held for surgery but resumed post-op on 11/19.    Chronic heart failure with preserved ejection fraction  Patient is identified as having Diastolic (HFpEF) heart failure that is Chronic. CHF is currently controlled.   Continue to hold home Lasix and Atenolol and Losartan due to post-op hypotension and hypovolemia. Monitor clinical status closely. Monitor on telemetry. Patient is off CHF pathway.  Monitor strict Is&Os and daily weights. Continue on fluid restriction of 1.5 L. Cardiology has not been consulted. Continue to stress to patient importance of self efficacy and  on diet for CHF.     Prediabetes  Controlled. Patient noted to be hyperglycemic on admit with -210. HgA1C ordered and returned at 6.3% consistent with prediabetes which is a new diagnosis. With patient's advanced age recommendation would be conservative mangement with diet control management with diabetic diet. Discussed with patient on 11/20 and she is in agreement with plan.       Primary hypertension  Chronic, controlled. Latest blood pressure and vitals reviewed-     Temp:  [97.2 °F (36.2 °C)-98.3 °F (36.8 °C)]   Pulse:  [72-90]   Resp:  [17-20]   BP: ()/(52-55)   SpO2:  [91 %-93 %] .   Home meds for hypertension were reviewed and noted below.   Hypertension Medications               atenoloL (TENORMIN) 25 MG tablet TAKE 1 TABLET EVERY DAY    furosemide (LASIX) 20 MG tablet TAKE 2 TABLETS EVERY MORNING  AND TAKE 1 TABLET EVERY EVENING    losartan (COZAAR) 25 MG tablet TAKE 1 TABLET EVERY DAY            While in the hospital, will manage blood pressure as follows; Hold all home anti-hypertensives at this time due to post-op hypotension.     Will utilize p.r.n. blood  pressure medication only if patient's blood pressure greater than 180/110 and she develops symptoms such as worsening chest pain or shortness of breath.    ACP (advance care planning)  Advance Care Planning    Date: 11/19/2023    Code Status  In light of the patients advanced and life limiting illness,I engaged the the patient in a voluntary conversation about the patient's preferences for care at the very end of life. The patient wishes to have a natural, peaceful death.  Along those lines, the patient does not wish to have CPR or other invasive treatments performed when her heart and/or breathing stops. I communicated to the patient that a DNR order would be placed in her medical record to reflect this preference. I spent a total of 15 minutes engaging the patient in this advance care planning discussion.         Stage 3b chronic kidney disease  Patient with slight increase in creatinine to 1.5 on 11/21 from 1.3 on 11/20. Need to monitor.   Creatine stable for now. BMP reviewed- noted Estimated Creatinine Clearance: 23 mL/min (A) (based on SCr of 1.5 mg/dL (H)). according to latest data. Based on current GFR, CKD stage is stage 3 - GFR 30-59.  Monitor UOP and serial BMP and adjust therapy as needed. Renally dose meds. Avoid nephrotoxic medications and procedures.      VTE Risk Mitigation (From admission, onward)           Ordered     apixaban tablet 5 mg  2 times daily         11/19/23 1232     Place sequential compression device  Until discontinued         11/18/23 2344     IP VTE HIGH RISK PATIENT  Once         11/18/23 2344     Place sequential compression device  Until discontinued         11/18/23 2301                    Discharge Planning   WES: 11/22/2023     Code Status: DNR   Is the patient medically ready for discharge?: No    Reason for patient still in hospital (select all that apply): Patient new problem and Patient trending condition  Discharge Plan A: Skilled Nursing Facility ([facility])          Tierra Stapleton MD  Department of [facility] Medicine   [facility] - Surgery

## 2023-11-21 NOTE — ASSESSMENT & PLAN NOTE
Patient with slight increase in creatinine to 1.5 on 11/21 from 1.3 on 11/20. Need to monitor.   Creatine stable for now. BMP reviewed- noted Estimated Creatinine Clearance: 23 mL/min (A) (based on SCr of 1.5 mg/dL (H)). according to latest data. Based on current GFR, CKD stage is stage 3 - GFR 30-59.  Monitor UOP and serial BMP and adjust therapy as needed. Renally dose meds. Avoid nephrotoxic medications and procedures.

## 2023-11-21 NOTE — ANESTHESIA POST-OP PAIN MANAGEMENT
Acute Pain Service Progress Note    Ngoc Castellanos is a 89 y.o., female, [id number].    Surgery:  INSERTION, INTRAMEDULLARY RAMSES, FEMUR, RIGHT, HANA TABLE, MIRIAN, C- ARM DOOR SIDE     Post Op Day #: 2    Catheter type: perineural  R SIFI    Infusion type: Ropivacaine 0.2%  15q3    Problem List:    Active Hospital Problems    Diagnosis  POA    *Closed displaced intertrochanteric fracture of right femur s/p IM nail on 11/19/2023 [S72.141D]  Not Applicable    Postprocedural hypotension [I95.81]  No    ACP (advance care planning) [Z71.89]  Not Applicable    Acute blood loss as cause of postoperative anemia [D62]  No    Prediabetes [R73.03]  Yes    Chronic heart failure with preserved ejection fraction [I50.32]  Yes    Chronic anticoagulation [Z79.01]  Not Applicable    Stage 3b chronic kidney disease [N18.32]  Yes    Persistent atrial fibrillation [I48.19]  Yes    Primary hypertension [I10]  Yes      Resolved Hospital Problems   No resolved problems to display.       Subjective:     General appearance of alert, oriented, no complaints   Pain with rest: 2    Numbers   Pain with movement: 4    Numbers   Side Effects    1. Pruritis No    2. Nausea No    3. Motor Blockade No, 0=Ability to raise lower extremities off bed      Objective:   Catheter site clean, dry, intact         Vitals   Vitals:    11/21/23 0750   BP: (!) 108/54   Pulse: 90   Resp: 20   Temp: 36.2 °C (97.2 °F)        Labs    No results displayed because visit has over 200 results.           Meds   Current Facility-Administered Medications   Medication Dose Route Frequency Provider Last Rate Last Admin    acetaminophen tablet 1,000 mg  1,000 mg Oral Q8H Ana Pope MD   1,000 mg at 11/21/23 0532    apixaban tablet 5 mg  5 mg Oral BID Jesus Bush MD   5 mg at 11/21/23 0902    atenoloL tablet 25 mg  25 mg Oral Daily Salma Pereira PA-C   25 mg at 11/21/23 0902    bisacodyL suppository 10 mg  10 mg Rectal Daily PRN Car Hines MD   10 mg  at 11/20/23 0837    guaiFENesin 12 hr tablet 600 mg  600 mg Oral BID Tierra Stapleton MD   600 mg at 11/21/23 0902    melatonin tablet 6 mg  6 mg Oral Nightly PRN Car Hines MD   6 mg at 11/19/23 2024    methocarbamoL tablet 500 mg  500 mg Oral Q8H Sergei Avalos DO   500 mg at 11/21/23 0532    ondansetron injection 4 mg  4 mg Intravenous Q12H PRN Car Hines MD   4 mg at 11/19/23 0200    polyethylene glycol packet 17 g  17 g Oral Daily Car Hines MD   17 g at 11/21/23 0902    ROPIvacaine (PF) 2 mg/ml (0.2%) solution  0.1 mL/hr Perineural Continuous Ana Pope MD 0.1 mL/hr at 11/20/23 1714 0.1 mL/hr at 11/20/23 1714    senna-docusate 8.6-50 mg per tablet 1 tablet  1 tablet Oral BID Car Hines MD   1 tablet at 11/21/23 0902    sodium chloride 0.9% flush 10 mL  10 mL Intravenous PRN Car Hines MD        sodium chloride 0.9% flush 10 mL  10 mL Intravenous PRN Jesus Bush MD            Anticoagulant dose 5mg at BID    Assessment:   Pain control adequate    Plan:   Patient doing well, continue present treatment.  -SIFI 15q3  -Tylenol 1000q8  -Robaxin 750q6  -Lyrica 75qhs    Sergei Avalos DO, PGY4  Department of Anesthesiology

## 2023-11-21 NOTE — PLAN OF CARE
11/21/23 1501   Post-Acute Status   Post-Acute Authorization Placement   Post-Acute Placement Status Pending payor review/awaiting authorization (if required)   Discharge Plan   Discharge Plan A Skilled Nursing Facility     SW sent Authorization request for SNF placement to [facility] (pt's 1st choice) for 11/22 via Availity.    Betsy Negrete LMSW  Case Management   [facility]   Ext. [phone]

## 2023-11-21 NOTE — PT/OT/SLP PROGRESS
Physical Therapy Co-Treatment    OT present for cotreat due to pt's multiple medical comorbidities and functional/cognition deficits requiring two skilled therapists to appropriately progress pt's musculoskeletal strength, neuromuscular control, and endurance while taking into consideration medical acuity and pt safety.    Patient Name:  Ngoc Castellanos   MRN:  [id number]    Recommendations:     Discharge Recommendations: Moderate Intensity Therapy  Discharge Equipment Recommendations: to be determined by next level of care  Barriers to discharge: None    Assessment:     Ngoc Castellanos is a 89 y.o. female admitted with a medical diagnosis of Closed displaced intertrochanteric fracture of right femur with routine healing.  She presents with the following impairments/functional limitations: weakness, impaired endurance, impaired self care skills, impaired functional mobility, gait instability, impaired balance, decreased coordination, decreased lower extremity function, orthopedic precautions     Pt agreeable and participated well in PT co-treatment with OT. Pt able to perform sit <> stand from EOB for 2 Trials with assistance and RW but continue to have difficulty taking steps at EOB due to decreased foot clearance. Pt c/o dizziness after 1st sit <> stand trial with symptoms resolving before 2nd attempt. Patient continues to demonstrate the need for moderate intensity therapy on a daily basis post acute exhibited by decreased independence with self-care and functional mobility.    Rehab Prognosis: Good; patient would benefit from acute skilled PT services to address these deficits and reach maximum level of function.    Recent Surgery: Procedure(s) (LRB):  INSERTION, INTRAMEDULLARY RAMSES, FEMUR, RIGHT, HANA TABLE, MIRIAN, C- ARM DOOR SIDE (Right) 2 Days Post-Op    Plan:     During this hospitalization, patient to be seen 4 x/week to address the identified rehab impairments via gait training, therapeutic activities,  therapeutic exercises, neuromuscular re-education and progress toward the following goals:    Plan of Care Expires:  12/20/23    Subjective     Chief Complaint: dizziness  Patient/Family Comments/goals: To get better  Pain/Comfort:  Pain Rating 1: 0/10  Location - Side 1: Right  Location - Orientation 1: generalized  Location 1: hip  Pain Addressed 1: Reposition, Distraction  Pain Rating Post-Intervention 1: 0/10      Objective:     Communicated with RN prior to session.  Patient found HOB elevated with FCD, perineural catheter upon PT entry to room.     General Precautions: Standard, fall  Orthopedic Precautions: RLE weight bearing as tolerated  Braces: N/A  Respiratory Status: Room air     Functional Mobility:    Bed Mobility:   Supine > Sit: moderate assistance and of 2 persons  Sit > Supine: maximal assistance and of 2 persons    Transfers:   Sit <> Stand Transfer: moderate assistance and of 2 persons from EOB using rolling walker  on 1st Trial  Pt c/o dizziness after 1st trial with VSS  2nd Trial: maximal assistance and of 2 persons due to increased fatigue    Balance:   Sitting balance: FAIR+: Maintains balance through MINIMAL excursions of active trunk motion  Pt sat EOB with CGA to SBA to perform ADLs with OT  Standing balance:   0: Needs MAXIMAL assist to maintain   Verbal and manual cues for upright postur  0: N/A                 Gait:  Distance: 2 lateral steps to the L at EOB   Assistive Device: RW  Assistance Level: maximal assistance and of 2 persons  Gait Assessment: Pt able to take steps with LLE but had difficulty moving RLE due to decreased foot clearance and weakness demonstrating intermittent knee buckling.   Pt educated on gait training with verbal cues and demonstration to WB through BUE to off load R foot when stepping      AM-PAC 6 CLICK MOBILITY  Turning over in bed (including adjusting bedclothes, sheets and blankets)?: 3  Sitting down on and standing up from a chair with arms (e.g.,  wheelchair, bedside commode, etc.): 2  Moving from lying on back to sitting on the side of the bed?: 2  Moving to and from a bed to a chair (including a wheelchair)?: 2  Need to walk in hospital room?: 1  Climbing 3-5 steps with a railing?: 1  Basic Mobility Total Score: 11       Treatment & Education:  Pt able to work on sitting and standing tolerance this session. Pt able to sit with CGA to SBA while performing dynamic BUE movement. Pt stood with RW, needing max A of 2 persons and verbal cues for upright posture. Pt took 2 lateral steps at EOB with RW and max A of 2 persons due to difficulty with foot clearance. Pt educated on WB through BUEs to offload RLE when stepping.    Pt educated on tip to reduce fall risk and safety with mobility and using call button for assistance from nursing staff with OOB mobility.  Pt educated on sitting up in chair throughout most of day  All questions answered within the scope of PT.  White board updated accordingly.      Patient left HOB elevated with all lines intact, call button in reach, and RN notified..    GOALS:   Multidisciplinary Problems       Physical Therapy Goals          Problem: Physical Therapy    Goal Priority Disciplines Outcome Goal Variances Interventions   Physical Therapy Goal     PT, PT/OT Ongoing, Progressing     Description: Goals to be met by: 23     Patient will increase functional independence with mobility by performin. Supine to sit with Contact Guard Assistance  2. Sit to stand transfer with Contact Guard Assistance  3. Bed to chair transfer with Minimal Assistance using Rolling Walker  4. Gait  x 25 feet with Moderate Assistance using Rolling Walker.   5. Lower extremity exercise program x20 reps per handout, with supervision                         Time Tracking:     PT Received On: 23  PT Start Time: 1041     PT Stop Time: 1119  PT Total Time (min): 38 min     Billable Minutes: Gait Training 10 and Neuromuscular Re-education  28    Treatment Type: Treatment  PT/PTA: PT           11/21/2023

## 2023-11-21 NOTE — SUBJECTIVE & OBJECTIVE
Interval History: Patient having issues with post-op hypotension. Losartan held yesterday and given IVF's on 11/20. BP improved after a total of 2 liters of normal saline given yesterday. Lactic acid returned back as normal at 1.9 yesterday. Hgb dropped post-op to 8.5 on 11/21 from 8.7 on 11/20 but Hgb was 11.3 on admit but this is expected blood loss from hip fracture and surgery and would not account totally for reason for post-op hypotension. Home Losartan on hold for BP management post-op. BP doing better today and encouraging oral fluid intake. Patient progressing with PT/OT and requiring moderate intensity therapy. Patient requiring skilled nursing facility placement on discharge and accepted to [facility] and should have a bed on 11/22/2023 if medically ready. I reviewed other labs from patient today and she did have a mild increase in Creatinine from 1.3 yesterday to 1.5 today and likely related to hypovolemia. Need to closely monitor daily BMP to watch real function. Patient noted this am to have blistering along surgical dressings in upper incision area. Patient reports issues with tape allergy in past and concern her skin is having reaction to the surgical dressing. Bedside nurse aware and states she will let Ortho team know as may need to change to different type of dressing over incision sites to right leg. Patient reports 2/10 pain to right hip.     Review of Systems   Constitutional:  Negative for fever.   HENT:  Positive for hearing loss.    Respiratory:  Positive for cough. Negative for shortness of breath.    Cardiovascular:  Negative for chest pain.   Gastrointestinal:  Negative for abdominal pain and nausea.   Musculoskeletal:  Positive for arthralgias (Right hip).   Neurological:  Negative for dizziness and light-headedness.   Psychiatric/Behavioral:  Negative for agitation and confusion.      Objective:     Vital Signs (Most Recent):  Temp: 98.2 °F (36.8 °C) (11/21/23 1146)  Pulse: 83  (11/21/23 1146)  Resp: 18 (11/21/23 1146)  BP: 103/54 (11/21/23 1146)  SpO2: 91 % (11/21/23 1146) on room air Vital Signs (24h Range):  Temp:  [97.2 °F (36.2 °C)-98.2 °F (36.8 °C)] 98.2 °F (36.8 °C)  Pulse:  [72-90] 83  Resp:  [16-20] 18  SpO2:  [91 %-93 %] 91 %  BP: ()/(51-54) 93/54     Weight: 81.9 kg (180 lb 8.9 oz)  Body mass index is 28.28 kg/m².    Intake/Output Summary (Last 24 hours) at 11/21/2023 1448  Last data filed at 11/20/2023 1720  Gross per 24 hour   Intake 180 ml   Output --   Net 180 ml         Physical Exam  Vitals and nursing note reviewed.   Constitutional:       General: She is awake. She is not in acute distress.     Appearance: Normal appearance. She is well-developed and normal weight. She is not ill-appearing.   Cardiovascular:      Rate and Rhythm: Normal rate. Rhythm irregularly irregular.      Heart sounds: Normal heart sounds. No murmur heard.  Pulmonary:      Effort: Pulmonary effort is normal. No respiratory distress.      Breath sounds: Normal breath sounds. No wheezing.   Abdominal:      General: Abdomen is flat. Bowel sounds are normal. There is no distension.      Palpations: Abdomen is soft.      Tenderness: There is no abdominal tenderness.   Musculoskeletal:      Right lower leg: No edema.      Left lower leg: No edema.   Skin:     General: Skin is warm.      Findings: No erythema.      Comments: Surgical dressings noted on right hip. Dressings are clean and dry and intact.Blistering of skin noted along lines of surgical dressings to right hip with largest on upper incision.    Neurological:      Mental Status: She is alert and oriented to person, place, and time.   Psychiatric:         Mood and Affect: Mood normal.         Behavior: Behavior normal. Behavior is cooperative.         Thought Content: Thought content normal.         Judgment: Judgment normal.             Significant Labs: CBC:   Recent Labs   Lab 11/20/23  0414 11/20/23  1711 11/21/23  0307   WBC 11.10  13.04* 10.62   HGB 9.2* 8.7* 8.5*   HCT 28.4* 27.4* 25.9*    155 153     CMP:   Recent Labs   Lab 11/20/23  0414 11/21/23  0307    136   K 4.2 4.1    106   CO2 23 20*   * 158*   BUN 26 35*   CREATININE 1.3 1.5*   CALCIUM 8.5* 8.3*   ANIONGAP 11 10       Significant Imaging: I have reviewed all pertinent imaging results/findings within the past 24 hours.

## 2023-11-21 NOTE — ASSESSMENT & PLAN NOTE
Chronic anticoagulation   Patient with Long standing persistent (>12 months) atrial fibrillation which is:controlled currently with home  Atenolol  and will continue in hospital. Patient is currently in atrial fibrillation.[id number] Score: 3. Anticoagulation indicated. Anticoagulation done with Apixiban 5 mg po BID at home and held for surgery but resumed post-op on 11/19.

## 2023-11-21 NOTE — ADDENDUM NOTE
Addendum  created 11/21/23 1334 by Yann Caba MD    Charge Capture section accepted, Cosign clinical note with attestation

## 2023-11-21 NOTE — CONSULTS
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    left    Valvular regurgitation     moderate MR       Social History     Socioeconomic History    Marital status:    Tobacco Use    Smoking status: Never    Smokeless tobacco: Never   Substance and Sexual Activity    Alcohol use: No    Drug use: No     Social Determinants of Health     Financial Resource Strain: Low Risk  (11/20/2023)    Overall Financial Resource Strain (CARDIA)     Difficulty of Paying Living Expenses: Not hard at all   Food Insecurity: No Food Insecurity (11/20/2023)    Hunger Vital Sign     Worried About Running Out of Food in the Last Year: Never true     Ran Out of Food in the Last Year: Never true   Transportation Needs: No Transportation Needs (11/20/2023)    PRAPARE - Transportation     Lack of Transportation (Medical): No     Lack of Transportation (Non-Medical): No   Physical Activity: Inactive (11/20/2023)    Exercise Vital Sign     Days of Exercise per Week: 0 days     Minutes of Exercise per Session: 0 min   Stress: No Stress Concern Present (11/20/2023)    [place] [employer] - Occupational Stress Questionnaire     Feeling of Stress : Only a little   Social Connections: Moderately Isolated (11/20/2023)    Social Connection and Isolation Panel [NHANES]     Frequency of Communication with Friends and Family: Three times a week     Frequency of Social Gatherings with Friends and Family: Once a week     Attends Voodoo Services: 1 to 4  times per year     Active Member of Clubs or Organizations: No     Attends Club or Organization Meetings: Never     Marital Status:    Housing Stability: Low Risk  (11/20/2023)    Housing Stability Vital Sign     Unable to Pay for Housing in the Last Year: No     Number of Places Lived in the Last Year: 1     Unstable Housing in the Last Year: No       Precautions:     Allergies as of 11/18/2023 - Reviewed 11/18/2023   Allergen Reaction Noted    Clindamycin  02/15/2023    Levofloxacin Other (See Comments) 06/14/2022    Lidocaine Other (See Comments) 06/14/2022    Adhesive tape-silicones Rash 10/30/2017       [facility] Assessment Details/Treatment     Patient seen for wound care consultation for LLE.   Reviewed chart for this encounter.   See Flow Sheet for findings.    Pt found lying in bed, agreeable to care at this time. Foam dressing to L anterior calf removed - small partial thickness skin tear noted, gently cleansed w/ NS and applied foam dressing. At this time, pt states she has a wound to her R anterior thigh near her surgical incision. On assessment, pt w/ x1 large, intact serum filled blister - covered w/ foam dressing. Pt states she typically has skin irritation w/ adhesive dressing use, blister attributed to adhesive from surgical dressing.    RECOMMENDATIONS:  Q5 days - skin tears - cleanse gently w/ NS, pat dry. Apply foam dressing.    Discussed POC with patient and primary nurse.   See EMR for orders & patient education.      Nursing to continue care & monitoring.  Nursing to maintain pressure injury prevention interventions. Wound care will sign off.       11/21/23 1014   WOCN Assessment   WOCN Total Time (mins) 15   Visit Date 11/21/23   Visit Time 1014   Consult Type New   WOCN Speciality Wound   Intervention assessed;changed;applied;chart review;coordination of care;orders   Teaching on-going        Altered Skin Integrity 11/19/23 0200 Left anterior;lower Leg Other (comment) Partial thickness  tissue loss. Shallow open ulcer with a red or pink wound bed, without slough. Intact or Open/Ruptured Serum-filled blister.   Date First Assessed/Time First Assessed: 11/19/23 0200   Altered Skin Integrity Present on Admission - Did Patient arrive to the hospital with altered skin?: yes  Side: Left  Orientation: anterior;lower  Location: Leg  Is this injury device related?: ...   Wound Image    Description of Altered Skin Integrity Partial thickness tissue loss. Shallow open ulcer with a red or pink wound bed, without slough. Intact or Open/Ruptured Serum-filled blister.   Dressing Appearance Dry;Intact;Clean   Drainage Amount None   Drainage Characteristics/Odor No odor   Appearance Pink;Moist   Tissue loss description Partial thickness   Periwound Area Intact;Dry   Care Cleansed with:;Sterile normal saline   Dressing Applied;Foam        Altered Skin Integrity 11/21/23 1256 Right anterior Thigh Blister(s)   Date First Assessed/Time First Assessed: 11/21/23 1256   Altered Skin Integrity Present on Admission - Did Patient arrive to the hospital with altered skin?: suspected hospital acquired  Side: Right  Orientation: anterior  Location: Thigh  Primary Wou...   Wound Image    Description of Altered Skin Integrity Partial thickness tissue loss. Shallow open ulcer with a red or pink wound bed, without slough. Intact or Open/Ruptured Serum-filled blister.   Dressing Appearance Open to air   Drainage Amount None   Drainage Characteristics/Odor No odor   Appearance Intact;Blistered   Periwound Area Intact;Dry   Dressing Applied;Foam

## 2023-11-21 NOTE — ASSESSMENT & PLAN NOTE
Much improved on 11/21. Continue hold hold Losartan and Atenolol. Encourage oral intake. Lactic acid normal at 1.9. Hgb down to 8.5 but no signs of active bleeding so will just monitor and no indication to do blood transfusion. Patient remains asymptomatic on 11/21.   Patient having post-op hypotension on 11/20 with BP 84/50. Losartan held this am and giving IVF's. Patient bolused 1 liter of normal saline but remained hypotensive even after 1 liter of NS so in process of receiving another 1 liter of normal saline. Will recheck Hgb this afternoon and lactic acid level. Hgb this am 9.2 and drop since admit of 11.3 and expected blood loss related to hip fracture and surgery but would not account for hypotension. Patient with no obvious signs of active bleeding on exam. HR normal and afebrile and normal WBC so do not suspect sepsis.   Patient denies any lightheadedness or dizziness. Patient awake and alert and responsive and asymptomatic related to her hypotension.   Suspect related to dehydration as on Lasix at home and given Lasix IV in ED for concerns for hypervolemia on admit but suspect she was not hypervolemic and got extra Lasix that would adequate for volume depletion.

## 2023-11-21 NOTE — ASSESSMENT & PLAN NOTE
Patient is identified as having Diastolic (HFpEF) heart failure that is Chronic. CHF is currently controlled.   Continue to hold home Lasix and Atenolol and Losartan due to post-op hypotension and hypovolemia. Monitor clinical status closely. Monitor on telemetry. Patient is off CHF pathway.  Monitor strict Is&Os and daily weights. Continue on fluid restriction of 1.5 L. Cardiology has not been consulted. Continue to stress to patient importance of self efficacy and  on diet for CHF.

## 2023-11-22 ENCOUNTER — HOSPITAL ENCOUNTER (INPATIENT)
Facility: HOSPITAL | Age: 88
LOS: 21 days | Discharge: HOME HEALTH | DRG: 560 | End: 2023-12-13
Attending: HOSPITALIST | Admitting: HOSPITALIST
Payer: MEDICARE

## 2023-11-22 VITALS
HEIGHT: 67 IN | RESPIRATION RATE: 16 BRPM | HEART RATE: 80 BPM | WEIGHT: 180.56 LBS | SYSTOLIC BLOOD PRESSURE: 111 MMHG | OXYGEN SATURATION: 95 % | TEMPERATURE: 98 F | BODY MASS INDEX: 28.34 KG/M2 | DIASTOLIC BLOOD PRESSURE: 56 MMHG

## 2023-11-22 DIAGNOSIS — I48.19 PERSISTENT ATRIAL FIBRILLATION: ICD-10-CM

## 2023-11-22 DIAGNOSIS — I10 ESSENTIAL HYPERTENSION: ICD-10-CM

## 2023-11-22 DIAGNOSIS — I50.32 CHRONIC HEART FAILURE WITH PRESERVED EJECTION FRACTION: Primary | ICD-10-CM

## 2023-11-22 DIAGNOSIS — K21.9 GASTROESOPHAGEAL REFLUX DISEASE, UNSPECIFIED WHETHER ESOPHAGITIS PRESENT: ICD-10-CM

## 2023-11-22 DIAGNOSIS — R07.89 CHEST HEAVINESS: ICD-10-CM

## 2023-11-22 DIAGNOSIS — S72.141D CLOSED DISPLACED INTERTROCHANTERIC FRACTURE OF RIGHT FEMUR WITH ROUTINE HEALING: ICD-10-CM

## 2023-11-22 DIAGNOSIS — R42 VERTIGO: ICD-10-CM

## 2023-11-22 PROBLEM — E87.1 HYPONATREMIA: Status: ACTIVE | Noted: 2023-11-22

## 2023-11-22 PROBLEM — I95.81 POSTPROCEDURAL HYPOTENSION: Status: RESOLVED | Noted: 2023-11-20 | Resolved: 2023-11-22

## 2023-11-22 PROBLEM — E87.1 HYPONATREMIA: Status: RESOLVED | Noted: 2023-11-22 | Resolved: 2023-11-22

## 2023-11-22 LAB
ANION GAP SERPL CALC-SCNC: 8 MMOL/L (ref 8–16)
BASOPHILS # BLD AUTO: 0.03 K/UL (ref 0–0.2)
BASOPHILS NFR BLD: 0.3 % (ref 0–1.9)
BLD PROD TYP BPU: NORMAL
BLD PROD TYP BPU: NORMAL
BLOOD UNIT EXPIRATION DATE: NORMAL
BLOOD UNIT EXPIRATION DATE: NORMAL
BLOOD UNIT TYPE CODE: 9500
BLOOD UNIT TYPE CODE: 9500
BLOOD UNIT TYPE: NORMAL
BLOOD UNIT TYPE: NORMAL
BUN SERPL-MCNC: 36 MG/DL (ref 10–30)
CALCIUM SERPL-MCNC: 8.5 MG/DL (ref 8.7–10.5)
CHLORIDE SERPL-SCNC: 104 MMOL/L (ref 95–110)
CO2 SERPL-SCNC: 22 MMOL/L (ref 23–29)
CODING SYSTEM: NORMAL
CODING SYSTEM: NORMAL
CREAT SERPL-MCNC: 1.2 MG/DL (ref 0.5–1.4)
CROSSMATCH INTERPRETATION: NORMAL
CROSSMATCH INTERPRETATION: NORMAL
DIFFERENTIAL METHOD: ABNORMAL
DISPENSE STATUS: NORMAL
DISPENSE STATUS: NORMAL
EOSINOPHIL # BLD AUTO: 0.1 K/UL (ref 0–0.5)
EOSINOPHIL NFR BLD: 0.7 % (ref 0–8)
ERYTHROCYTE [DISTWIDTH] IN BLOOD BY AUTOMATED COUNT: 15.5 % (ref 11.5–14.5)
EST. GFR  (NO RACE VARIABLE): 40.9 ML/MIN/1.73 M^2
GLUCOSE SERPL-MCNC: 136 MG/DL (ref 70–110)
HCT VFR BLD AUTO: 27.3 % (ref 37–48.5)
HGB BLD-MCNC: 9 G/DL (ref 12–16)
IMM GRANULOCYTES # BLD AUTO: 0.08 K/UL (ref 0–0.04)
IMM GRANULOCYTES NFR BLD AUTO: 0.7 % (ref 0–0.5)
LYMPHOCYTES # BLD AUTO: 0.7 K/UL (ref 1–4.8)
LYMPHOCYTES NFR BLD: 6.4 % (ref 18–48)
MAGNESIUM SERPL-MCNC: 2.2 MG/DL (ref 1.6–2.6)
MCH RBC QN AUTO: 29.2 PG (ref 27–31)
MCHC RBC AUTO-ENTMCNC: 33 G/DL (ref 32–36)
MCV RBC AUTO: 89 FL (ref 82–98)
MONOCYTES # BLD AUTO: 1 K/UL (ref 0.3–1)
MONOCYTES NFR BLD: 8.4 % (ref 4–15)
NEUTROPHILS # BLD AUTO: 9.7 K/UL (ref 1.8–7.7)
NEUTROPHILS NFR BLD: 83.5 % (ref 38–73)
NRBC BLD-RTO: 0 /100 WBC
PLATELET # BLD AUTO: 192 K/UL (ref 150–450)
PMV BLD AUTO: 10.5 FL (ref 9.2–12.9)
POTASSIUM SERPL-SCNC: 4.1 MMOL/L (ref 3.5–5.1)
RBC # BLD AUTO: 3.08 M/UL (ref 4–5.4)
SARS-COV-2 RNA RESP QL NAA+PROBE: NOT DETECTED
SODIUM SERPL-SCNC: 134 MMOL/L (ref 136–145)
TRANS ERYTHROCYTES VOL PATIENT: NORMAL ML
TRANS ERYTHROCYTES VOL PATIENT: NORMAL ML
WBC # BLD AUTO: 11.65 K/UL (ref 3.9–12.7)

## 2023-11-22 PROCEDURE — 99239 PR HOSPITAL DISCHARGE DAY,>30 MIN: ICD-10-PCS | Mod: HCNC,,, | Performed by: INTERNAL MEDICINE

## 2023-11-22 PROCEDURE — 63600175 PHARM REV CODE 636 W HCPCS: Mod: HCNC | Performed by: ANESTHESIOLOGY

## 2023-11-22 PROCEDURE — 99239 HOSP IP/OBS DSCHRG MGMT >30: CPT | Mod: HCNC,,, | Performed by: INTERNAL MEDICINE

## 2023-11-22 PROCEDURE — 97110 THERAPEUTIC EXERCISES: CPT | Mod: HCNC,CQ

## 2023-11-22 PROCEDURE — 36415 COLL VENOUS BLD VENIPUNCTURE: CPT | Mod: HCNC | Performed by: FAMILY MEDICINE

## 2023-11-22 PROCEDURE — 25000003 PHARM REV CODE 250: Mod: HCNC | Performed by: STUDENT IN AN ORGANIZED HEALTH CARE EDUCATION/TRAINING PROGRAM

## 2023-11-22 PROCEDURE — 1111F DSCHRG MED/CURRENT MED MERGE: CPT | Mod: HCNC,CPTII,, | Performed by: INTERNAL MEDICINE

## 2023-11-22 PROCEDURE — 83735 ASSAY OF MAGNESIUM: CPT | Mod: HCNC | Performed by: FAMILY MEDICINE

## 2023-11-22 PROCEDURE — 25000003 PHARM REV CODE 250: Mod: HCNC | Performed by: ANESTHESIOLOGY

## 2023-11-22 PROCEDURE — 25000003 PHARM REV CODE 250: Mod: HCNC | Performed by: PHYSICIAN ASSISTANT

## 2023-11-22 PROCEDURE — 80048 BASIC METABOLIC PNL TOTAL CA: CPT | Mod: HCNC | Performed by: FAMILY MEDICINE

## 2023-11-22 PROCEDURE — 11000004 HC SNF PRIVATE: Mod: HCNC

## 2023-11-22 PROCEDURE — 85025 COMPLETE CBC W/AUTO DIFF WBC: CPT | Mod: HCNC | Performed by: FAMILY MEDICINE

## 2023-11-22 PROCEDURE — 1111F PR DISCHARGE MEDS RECONCILED W/ CURRENT OUTPATIENT MED LIST: ICD-10-PCS | Mod: HCNC,CPTII,, | Performed by: INTERNAL MEDICINE

## 2023-11-22 PROCEDURE — 25000003 PHARM REV CODE 250: Mod: HCNC

## 2023-11-22 PROCEDURE — 97530 THERAPEUTIC ACTIVITIES: CPT | Mod: HCNC

## 2023-11-22 PROCEDURE — 25000003 PHARM REV CODE 250: Mod: HCNC | Performed by: FAMILY MEDICINE

## 2023-11-22 PROCEDURE — 25000003 PHARM REV CODE 250: Mod: HCNC | Performed by: HOSPITALIST

## 2023-11-22 PROCEDURE — 25000003 PHARM REV CODE 250: Mod: HCNC | Performed by: INTERNAL MEDICINE

## 2023-11-22 PROCEDURE — 94761 N-INVAS EAR/PLS OXIMETRY MLT: CPT | Mod: HCNC

## 2023-11-22 PROCEDURE — 87635 SARS-COV-2 COVID-19 AMP PRB: CPT | Mod: HCNC | Performed by: INTERNAL MEDICINE

## 2023-11-22 RX ORDER — FUROSEMIDE 40 MG/1
40 TABLET ORAL DAILY
Status: DISCONTINUED | OUTPATIENT
Start: 2023-11-23 | End: 2023-11-26

## 2023-11-22 RX ORDER — TRAMADOL HYDROCHLORIDE 50 MG/1
50 TABLET ORAL EVERY 6 HOURS PRN
Status: DISCONTINUED | OUTPATIENT
Start: 2023-11-22 | End: 2023-11-22 | Stop reason: HOSPADM

## 2023-11-22 RX ORDER — CALCIUM CARBONATE 200(500)MG
500 TABLET,CHEWABLE ORAL 2 TIMES DAILY PRN
Status: DISCONTINUED | OUTPATIENT
Start: 2023-11-22 | End: 2023-12-13 | Stop reason: HOSPADM

## 2023-11-22 RX ORDER — FERROUS SULFATE, DRIED 160(50) MG
1 TABLET, EXTENDED RELEASE ORAL 2 TIMES DAILY WITH MEALS
Status: DISCONTINUED | OUTPATIENT
Start: 2023-11-22 | End: 2023-12-13 | Stop reason: HOSPADM

## 2023-11-22 RX ORDER — TRAMADOL HYDROCHLORIDE 50 MG/1
50 TABLET ORAL EVERY 6 HOURS PRN
Status: ON HOLD
Start: 2023-11-22 | End: 2023-12-11 | Stop reason: SDUPTHER

## 2023-11-22 RX ORDER — METHOCARBAMOL 500 MG/1
500 TABLET, FILM COATED ORAL EVERY 8 HOURS
Status: DISCONTINUED | OUTPATIENT
Start: 2023-11-22 | End: 2023-11-28

## 2023-11-22 RX ORDER — AMOXICILLIN 250 MG
1 CAPSULE ORAL 2 TIMES DAILY
Status: DISCONTINUED | OUTPATIENT
Start: 2023-11-22 | End: 2023-12-13 | Stop reason: HOSPADM

## 2023-11-22 RX ORDER — TRAMADOL HYDROCHLORIDE 50 MG/1
50 TABLET ORAL EVERY 6 HOURS PRN
Status: DISCONTINUED | OUTPATIENT
Start: 2023-11-22 | End: 2023-12-13 | Stop reason: HOSPADM

## 2023-11-22 RX ORDER — ATENOLOL 25 MG/1
25 TABLET ORAL DAILY
Status: DISCONTINUED | OUTPATIENT
Start: 2023-11-23 | End: 2023-11-30

## 2023-11-22 RX ORDER — ACETAMINOPHEN 500 MG
1000 TABLET ORAL EVERY 8 HOURS
Status: DISCONTINUED | OUTPATIENT
Start: 2023-11-22 | End: 2023-12-13 | Stop reason: HOSPADM

## 2023-11-22 RX ORDER — ACETAMINOPHEN 325 MG/1
650 TABLET ORAL EVERY 6 HOURS PRN
Status: DISCONTINUED | OUTPATIENT
Start: 2023-11-22 | End: 2023-12-13 | Stop reason: HOSPADM

## 2023-11-22 RX ORDER — LOSARTAN POTASSIUM 25 MG/1
25 TABLET ORAL DAILY
Status: DISCONTINUED | OUTPATIENT
Start: 2023-11-23 | End: 2023-11-26

## 2023-11-22 RX ORDER — MECLIZINE HCL 12.5 MG 12.5 MG/1
12.5 TABLET ORAL 3 TIMES DAILY PRN
Status: DISCONTINUED | OUTPATIENT
Start: 2023-11-22 | End: 2023-12-13 | Stop reason: HOSPADM

## 2023-11-22 RX ORDER — TALC
6 POWDER (GRAM) TOPICAL NIGHTLY PRN
Status: DISCONTINUED | OUTPATIENT
Start: 2023-11-22 | End: 2023-12-13 | Stop reason: HOSPADM

## 2023-11-22 RX ORDER — FUROSEMIDE 20 MG/1
20 TABLET ORAL NIGHTLY
Status: DISCONTINUED | OUTPATIENT
Start: 2023-11-22 | End: 2023-11-24

## 2023-11-22 RX ADMIN — SENNOSIDES AND DOCUSATE SODIUM 1 TABLET: 8.6; 5 TABLET ORAL at 08:11

## 2023-11-22 RX ADMIN — ACETAMINOPHEN 1000 MG: 325 TABLET ORAL at 09:11

## 2023-11-22 RX ADMIN — Medication 1 TABLET: at 04:11

## 2023-11-22 RX ADMIN — METHOCARBAMOL 500 MG: 500 TABLET ORAL at 09:11

## 2023-11-22 RX ADMIN — FUROSEMIDE 20 MG: 20 TABLET ORAL at 08:11

## 2023-11-22 RX ADMIN — GUAIFENESIN 600 MG: 600 TABLET, EXTENDED RELEASE ORAL at 09:11

## 2023-11-22 RX ADMIN — Medication 6 MG: at 08:11

## 2023-11-22 RX ADMIN — ACETAMINOPHEN 1000 MG: 500 TABLET ORAL at 01:11

## 2023-11-22 RX ADMIN — APIXABAN 5 MG: 2.5 TABLET, FILM COATED ORAL at 08:11

## 2023-11-22 RX ADMIN — ATENOLOL 25 MG: 25 TABLET ORAL at 09:11

## 2023-11-22 RX ADMIN — ROPIVACAINE HYDROCHLORIDE 0.1 ML/HR: 2 INJECTION, SOLUTION EPIDURAL; INFILTRATION at 06:11

## 2023-11-22 RX ADMIN — METHOCARBAMOL 500 MG: 500 TABLET ORAL at 05:11

## 2023-11-22 RX ADMIN — SENNOSIDES AND DOCUSATE SODIUM 1 TABLET: 8.6; 5 TABLET ORAL at 09:11

## 2023-11-22 RX ADMIN — POLYETHYLENE GLYCOL 3350 17 G: 17 POWDER, FOR SOLUTION ORAL at 09:11

## 2023-11-22 RX ADMIN — APIXABAN 5 MG: 5 TABLET, FILM COATED ORAL at 09:11

## 2023-11-22 RX ADMIN — TRAMADOL HYDROCHLORIDE 50 MG: 50 TABLET, COATED ORAL at 04:11

## 2023-11-22 RX ADMIN — ACETAMINOPHEN 1000 MG: 500 TABLET ORAL at 05:11

## 2023-11-22 RX ADMIN — METHOCARBAMOL 500 MG: 500 TABLET ORAL at 01:11

## 2023-11-22 RX ADMIN — TRAMADOL HYDROCHLORIDE 50 MG: 50 TABLET, COATED ORAL at 09:11

## 2023-11-22 NOTE — PLAN OF CARE
11/22/23 1333   Post-Acute Status   Post-Acute Authorization Placement   Post-Acute Placement Status Set-up Complete/Auth obtained   Discharge Plan   Discharge Plan A Skilled Nursing Facility     Patient's set-up has been completed.LAKIA scheduled d/c transportation to [facility] through [facility]. Patient is scheduled to be picked up at 5:00 pm. LAKIA provided patient's nurse with report number #[phone]; ask for the nurse for the pt. Requested  time does not guarantee arrival time.      1:35 PM  LAKIA informed pt's son Jeremy via voice message, and grandchild (Toi) via phone call.    Betsy Negrete, CHETNA  Case Management   [facility]   Ext. [phone]

## 2023-11-22 NOTE — ASSESSMENT & PLAN NOTE
Controlled. New diagnosis. Patient noted to be hyperglycemic on admit with -210. HgA1C ordered and returned at 6.3% consistent with prediabetes which is a new diagnosis. With patient's advanced age recommendation would be conservative mangement with diet control management with diabetic diet. Discussed with patient on 11/20 and she is in agreement with plan. Discharge on diabetic diet to [facility].

## 2023-11-22 NOTE — PLAN OF CARE
[facility]     Department of Hospital Medicine     [address] [place] [address]     [phone] after hours  [phone] fax       NURSING HOME ORDERS    11/22/2023    Admit to [facility]:  Skilled Bed                                            Diagnoses:  Active Hospital Problems    Diagnosis  POA    *Closed displaced intertrochanteric fracture of right femur s/p IM nail on 11/19/2023 [S72.141D]  Not Applicable     Priority: 1 - High    Postprocedural hypotension [I95.81]  No     Priority: 1 - High    Acute blood loss as cause of postoperative anemia [D62]  No     Priority: 2     Persistent atrial fibrillation [I48.19]  Yes     Priority: 2     Chronic heart failure with preserved ejection fraction [I50.32]  Yes     Priority: 3     Prediabetes [R73.03]  Yes     Priority: 4     Primary hypertension [I10]  Yes     Priority: 4     ACP (advance care planning) [Z71.89]  Not Applicable     Priority: 5     Stage 3b chronic kidney disease [N18.32]  Yes     Priority: 6     Chronic anticoagulation [Z79.01]  Not Applicable      Resolved Hospital Problems   No resolved problems to display.       Allergies:  Review of patient's allergies indicates:   Allergen Reactions    Clindamycin     Levofloxacin Other (See Comments)    Lidocaine Other (See Comments)    Adhesive tape-silicones Rash       Vitals: Every shift (Skilled Nursing patients)        Code Status: DNR     Diet: diabetic diet: 2000 calorie      Supplement: House supplement one can by mouth with meals      Activities:   - Up in a chair each morning as tolerated   - Ambulate with assistance to bathroom   - May use walker, cane, or self-propelled wheelchair   - Weight bearing: Weight bear as tolerated to right lower extremity    LABS: Per facility protocol       Nursing: Out of bed BID, Up with assistance    Nursing Precautions:            - Fall precautions per nursing home protocol      CONSULTS:        Physical Therapy to evaluate and treat 5 times a week     Occupational Therapy to evaluate and treat 5 times a week        MISCELLANEOUS CARE:  Routine Skin for Bedridden Patients: Instruct patient/caregiver to apply moisture barrier cream to all skin folds and wet areas in perineal area daily and after baths and all bowel movements.      WOUND CARE ORDERS  Keep surgical dressings in place that was applied post-op and leave on right hip and do not remove until Orthopedic clinic follow-up. Assess surgical dressing with each treatment. Call MD if any drainage reaches border to border of dressing horizontally, signs or symptoms of infection, temp >101 F, induration, swelling or redness.     Skin tears and blistering to right leg incision site  Every 5 days: Cleanse gently with normal saline and pat dry. Apply foam dressing.            Medications:        Medication List        START taking these medications      melatonin 3 mg tablet  Commonly known as: MELATIN  Take 2 tablets (6 mg total) by mouth nightly as needed for Insomnia.     methocarbamoL 500 MG Tab  Commonly known as: ROBAXIN  Take 1 tablet (500 mg total) by mouth every 8 (eight) hours.     senna-docusate 8.6-50 mg 8.6-50 mg per tablet  Commonly known as: PERICOLACE  Take 1 tablet by mouth 2 (two) times daily.            CHANGE how you take these medications      acetaminophen 500 MG tablet  Commonly known as: TYLENOL  Take 2 tablets (1,000 mg total) by mouth every 8 (eight) hours.              CONTINUE taking these medications      atenoloL 25 MG tablet  Commonly known as: TENORMIN  TAKE 1 TABLET BY MOUTH EVERY DAY     calcium carbonate 600 mg calcium (1,500 mg) Tab  Commonly known as: OS-CELIA  Take 600 mg by mouth 2 (two) times daily with meals.     tramadol 50 mg tablet  Common name: Ultram  1 tablet by mouth every 6 hours as needed for moderate to severe pain      ELIQUIS 5 mg Tab  Generic drug: apixaban  TAKE 1 TABLET BY MOUTH TWICE DAILY      furosemide 20 MG tablet  Commonly known as: LASIX  TAKE 2 TABLETS BY MOUTH EVERY MORNING  AND TAKE 1 TABLET BY MOUTH EVERY EVENING.     losartan 25 MG tablet  Commonly known as: COZAAR  TAKE 1 TABLET BY MOUTH EVERY DAY.     meclizine 12.5 mg tablet  Commonly known as: ANTIVERT  Take 1 tablet (12.5 mg total) by mouth 3 (three) times daily as needed for Dizziness.            STOP taking these medications      mupirocin 2 % ointment  Commonly known as: BACTROBAN     polyethylene glycol 17 gram/dose powder  Commonly known as: GLYCOLAX                Follow-up:   Future Appointments   Date Time Provider Department Center   12/4/2023  1:00 PM Sheree Isbell NP [facility] [address]THO [address]   12/6/2023  7:00 AM SPECIMEN [facility] SPECLAB [place]   12/6/2023  8:15 AM LAB, [facility] LAB [place]   12/14/2023 10:30 AM Jacobo Mcleod MD [facility]   1/4/2024 10:15 AM Henry Perez MD [facility] ORTHO [address]   2/6/2024 10:45 AM Henry Perez MD [facility] [place]       _________________________________  Tierra Stapleton MD  11/22/2023

## 2023-11-22 NOTE — ASSESSMENT & PLAN NOTE
Chronic anticoagulation   Patient with Long standing persistent (>12 months) atrial fibrillation which is:controlled currently with home  Atenolol  and will continue on discharge. Patient is currently in atrial fibrillation. [id number] Score: 3. Anticoagulation indicated. Anticoagulation done with Apixiban 5 mg po BID at home and held for surgery but resumed post-op on 11/1 9 and to continue on discharge to [facility].

## 2023-11-22 NOTE — CARE UPDATE
PNC paused this morning, if pain is well controlled with remove catheter later this morning.     Sergei Avalos DO, PGY4  Department of Anesthesiology

## 2023-11-22 NOTE — ASSESSMENT & PLAN NOTE
Resolved on 11/22. Pateint now normotensive so will resume home Lasix, Losartan and Atenolol on discharge to [facility].   Much improved on 11/21. Continue hold hold Losartan and Atenolol. Encourage oral intake. Lactic acid normal at 1.9. Hgb down to 8.5 but no signs of active bleeding so will just monitor and no indication to do blood transfusion. Patient remains asymptomatic on 11/21.   Patient having post-op hypotension on 11/20 with BP 84/50. Losartan held this am and giving IVF's. Patient bolused 1 liter of normal saline but remained hypotensive even after 1 liter of NS so in process of receiving another 1 liter of normal saline. Will recheck Hgb this afternoon and lactic acid level. Hgb this am 9.2 and drop since admit of 11.3 and expected blood loss related to hip fracture and surgery but would not account for hypotension. Patient with no obvious signs of active bleeding on exam. HR normal and afebrile and normal WBC so do not suspect sepsis.   Patient denies any lightheadedness or dizziness. Patient awake and alert and responsive and asymptomatic related to her hypotension.   Suspect related to dehydration as on Lasix at home and given Lasix IV in ED for concerns for hypervolemia on admit but suspect she was not hypervolemic and got extra Lasix that would adequate for volume depletion.

## 2023-11-22 NOTE — NURSING
Report called to patient's nurse Jaron at [facility]. PIV removed. Belongings sent with patient

## 2023-11-22 NOTE — NURSING
Nurses Note -- 4 Eyes      11/22/2023   6:09 AM      Skin assessed during: Daily Assessment      [x] No Altered Skin Integrity Present    []Prevention Measures Documented      [] Yes- Altered Skin Integrity Present or Discovered   [] LDA Added if Not in Epic (Describe Wound)   [] New Altered Skin Integrity was Present on Admit and Documented in LDA   [] Wound Image Taken    Wound Care Consulted? No    Attending Nurse:  Tony Davila RN/Staff Member:   Ella LAINEZ

## 2023-11-22 NOTE — DISCHARGE SUMMARY
[facility] Medicine  Discharge Summary      Patient Name: Ngoc Castellanos  MRN: [id number]  MELODY: [id number]  Patient Class: IP- Inpatient  Admission Date: 11/18/2023  Hospital Length of Stay: 4 days  Discharge Date and Time: 11/22/2023  3:57 PM  Attending Physician: Tierra Stapleton MD   Discharging Provider: Tierra Stapleton MD  Primary Care Provider: Jacobo Mcleod MD  [facility] Medicine Team: [facility]  Tierra Stapleton MD  Primary Care Team: [facility]    HPI:   This is a very pleasant 89yo female with a past medical history of Afib on Eliquis, HFpEF on lasix, HTN, and CKD stage III who presents to the ED with chief complaint of fall and leg pain. Patient reports that she regularly uses either cane or walker to ambulate. She reports that she is often unsteady feeling on her feet and when at her home sometimes uses objects nearby for balance instead of cane or walker. States that today she was ambulating at home when she had trip and fall without LOC or hitting her head. Immediate severe right hip pain and inability to ambulate. Crawled across floor and able to call for help. EMS arrived and noted shortening and rotation of right leg, placed in pelvic binder and gave pain meds and brought to ED.    In the ED patient afebrile and hemodynamically stable saturating well on room air. 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 Displacement of the lesser trochanter fracture fragment medially. CXR with stable pleural effusion compared to prior study. Orthopedic surgery consulted and patient admitted to the [facility] for further evaluation and management.       11/19/2023  Procedure(s) (LRB):  INSERTION, INTRAMEDULLARY RAMSES, FEMUR, RIGHT, HANA TABLE, MIRIAN, C- ARM DOOR SIDE (Right)    Surgeon(s):  Henry Perez MD Bigham, W. Reed,  Adilia Babin MD Crowley, Alexander, MD Callier, Matthew, MD      Hospital Course:   Patient [facility] to [facility] Team H: Hip Fracture team and started on Hip Fracture Pathway with Orthopedic surgery consult for hip fracture. Patient was seen and evaluated by Orthopedic surgery who recommended operative repair of hip fracture. Patient was medically optimized prior to surgery and was taken to OR after optimization on 11/19/2023. Patient underwent right hip cephalomedullary nail fixation by Dr. Henry Perez. Post-op patient weight bear as tolerated to the right lower extremity as per Orthopedics recommendation. Patient restarted on her home Apixiban 5 mg po BID post-op for her known persistent atrial fibrillation so no other chemical DVT prophylaxis needed post-op. Perineural pain catheter placed by Anesthesia Pain Service with continuous infusion of Ropivacaine to help with pain control post-op and Anesthesia Pain Service managing while patient in the hospital. Patient placed on multimodal pain management post-op with Tylenol 1000 mg po every 8 hours and Robaxin 500 mg po 3 times daily post-op and will continue. PT/OT consulted post-op. Patient noted to be hyperglycemic on admit with -210. HgA1C ordered and returned at 6.3% consistent with prediabetes which is a new diagnosis. With patient's advanced age recommendation would be conservative mangement with diet control management with diabetic diet. I discussed with patient and she is in agreement with diabetic diet and no medications to treat her prediabetes. Patient will need skilled nursing facility placement on discharge based on performance with PT/OT in hospital and CM/SW aware an sent referrals. Patient having post-op hypotension. Losartan held and giving IVF's on 11/20. BP improved after a total of 2 liters of normal saline given on 11/20. Lactic acid returned back as normal at 1.9. Hgb with drop post-op to 8.5 on 11/21 but that is  expected blood loss from hip fracture and surgery and would not account totally for reason for post-op hypotension. Home Losartan on hold for BP management post-op. Patient progressing with PT/OT and requiring moderate intensity therapy. Patient requiring skilled nursing facility placement on discharge and accepted to [facility] and discharged in good condition to [facility] on 11/22/2023. Patient having some edema to her right surgical leg on discharge and to resume her home Lasix on discharge to [facility] and should help. BP much improved and plan to resume home Losartan and Atenolol on discharge to [facility]. Patient to continue her home Apixiban on discharge to [facility] for long term anticoagulation for atrial fibrillation. Patient discharged on diabetic diet to treat her prediabetes. Perineural catheter removed by Anesthesia prior to hospital discharge.        Goals of Care Treatment Preferences:  Code Status: DNR    Living Will: Yes              Consults:   Consults (From admission, onward)          Status Ordering Provider     Inpatient consult to Social Work/Case Management  Once        Provider:  (Not yet assigned)    Acknowledged TAYLOR SIDDIQUI     Inpatient consult to Orthopedic Surgery  Once        Provider:  (Not yet assigned)    Completed WON ORTA            Cardiac/Vascular  Primary hypertension  Chronic, controlled. Latest blood pressure and vitals reviewed-     Temp:  [96.6 °F (35.9 °C)-98.3 °F (36.8 °C)]   Pulse:  [63-98]   Resp:  [14-20]   BP: (106-137)/(55-83)   SpO2:  [92 %-96 %] .   Home meds for hypertension were reviewed and noted below.   Hypertension Medications               atenoloL (TENORMIN) 25 MG tablet TAKE 1 TABLET EVERY DAY    furosemide (LASIX) 20 MG tablet TAKE 2 TABLETS EVERY MORNING  AND TAKE 1 TABLET EVERY EVENING    losartan (COZAAR) 25 MG tablet TAKE 1 TABLET EVERY DAY            While in the hospital, will manage blood pressure as follows; Patient now  normotensive on 11/22 so plan to resume Losartan, Lasix and Atenolol on discharge to [facility].       Chronic heart failure with preserved ejection fraction  Patient is identified as having Diastolic (HFpEF) heart failure that is Chronic. CHF is currently controlled.   Resume home Losartan, Atenolol and Lasix on hospital discharge now that ROB and hypotension resolved. Continue on fluid restriction of 1.5 L. Cardiology has not been consulted. Continue to stress to patient importance of self efficacy and  on diet for CHF.     Persistent atrial fibrillation  Chronic anticoagulation   Patient with Long standing persistent (>12 months) atrial fibrillation which is:controlled currently with home  Atenolol  and will continue on discharge. Patient is currently in atrial fibrillation. [id number] Score: 3. Anticoagulation indicated. Anticoagulation done with Apixiban 5 mg po BID at home and held for surgery but resumed post-op on 11/1 9 and to continue on discharge to [facility].    Renal/  Stage 3b chronic kidney disease  Creatinine back down to 1.2 on 11/22 which is her baseline level.   Patient with slight increase in creatinine to 1.5 on 11/21 from 1.3 on 11/20. Need to monitor.   Creatine stable for now. BMP reviewed- noted Estimated Creatinine Clearance: 28.8 mL/min (based on SCr of 1.2 mg/dL). according to latest data. Based on current GFR, CKD stage is stage 3 - GFR 30-59.  Monitor UOP and serial BMP and adjust therapy as needed. Renally dose meds. Avoid nephrotoxic medications and procedures.    Oncology  Acute blood loss as cause of postoperative anemia  Hgb improved to 9.0 on 11/22. No signs of active bleeding. BP stable.   Hgb 8.7 on recheck on 11/20 and Hgb 8.5 on 11/21. BP stable at this time on 11/21. No signs of active bleeding so will monitor for now. Patient asymptomatic.   Hgb decreased to 9.2 on 11/20 from 9.9 on 11/19 and 11.3 on admit. Blood loss expected and related to hip fracture and  surgery. Patient asymptomatic from her anemia. Recheck Hgb on afternoon of 11/20 due to persistent post-op hypotension.   Patient's anemia is currently controlled. Has not received any PRBCs to date. Etiology likely d/t acute blood loss which was from hip fracture and hip fracture surgery and expected blood loss.  Current CBC reviewed-   Lab Results   Component Value Date    HGB 9.0 (L) 11/22/2023    HCT 27.3 (L) 11/22/2023     Monitor serial CBC and transfuse if patient becomes hemodynamically unstable, symptomatic or H/H drops below 7/21.    Endocrine  Prediabetes  Controlled. New diagnosis. Patient noted to be hyperglycemic on admit with -210. HgA1C ordered and returned at 6.3% consistent with prediabetes which is a new diagnosis. With patient's advanced age recommendation would be conservative mangement with diet control management with diabetic diet. Discussed with patient on 11/20 and she is in agreement with plan. Discharge on diabetic diet to [facility].       Orthopedic  * Closed displaced intertrochanteric fracture of right femur s/p IM nail on 11/19/2023  Patient underwent right femur IM nail to repair closed displaced intertrochanteric fracture on 11/29/2023 by Dr. Henry Perez.   Patient reports minimal pain in right hip on discharge.   Patient to continue PT/OT for gait training and strengthening and restoration of ADL's on discharge to [facility]. Patient is weight bear as tolerated to right lower extremity as per Orthopedic recommendations on discharge. Patient with her advanced age and hip fracture and being from assisted living will likely need skilled nursing facility placement on discharge for continue inpatient therapy. Patient has been accepted to [facility] ad discharged in good condition to [facility].  Patient resumed home Apixiban 5 mg po BID post-op for long term anticoagulation for her persistent atrial fibrillation so no other chemical DVT prophylaxis needed post-op. Patient  to continue home Apixiban on discharge to [facility].    Perineural pain catheter removed by Anesthesia on 11/22.   Pain controlled. Patient on multimodal pain management post-op with Tylenol 1000 mg po every 8 hours and Robaxin 500 mg po 3 times daily post-op with Tramadol 50 mg po every 6 hours prn for breakthrough pain and will continue on discharge to [facility].  Surgical bandages to remain in place to right leg and hip area until Orthopedic clinic follow-up. Local wound care every 5 days to right hip skin tears and blistering near incisions as per wound care recs on discharge.     Palliative Care  ACP (advance care planning)  Advance Care Planning    Date: 11/19/2023    Code Status  In light of the patients advanced and life limiting illness,I engaged the the patient in a voluntary conversation about the patient's preferences for care at the very end of life. The patient wishes to have a natural, peaceful death.  Along those lines, the patient does not wish to have CPR or other invasive treatments performed when her heart and/or breathing stops. I communicated to the patient that a DNR order would be placed in her medical record to reflect this preference. I spent a total of 15 minutes engaging the patient in this advance care planning discussion.         Other  Postprocedural hypotension-resolved as of 11/22/2023  Resolved on 11/22. Pateint now normotensive so will resume home Lasix, Losartan and Atenolol on discharge to [facility].   Much improved on 11/21. Continue hold hold Losartan and Atenolol. Encourage oral intake. Lactic acid normal at 1.9. Hgb down to 8.5 but no signs of active bleeding so will just monitor and no indication to do blood transfusion. Patient remains asymptomatic on 11/21.   Patient having post-op hypotension on 11/20 with BP 84/50. Losartan held this am and giving IVF's. Patient bolused 1 liter of normal saline but remained hypotensive even after 1 liter of NS so in process of receiving  another 1 liter of normal saline. Will recheck Hgb this afternoon and lactic acid level. Hgb this am 9.2 and drop since admit of 11.3 and expected blood loss related to hip fracture and surgery but would not account for hypotension. Patient with no obvious signs of active bleeding on exam. HR normal and afebrile and normal WBC so do not suspect sepsis.   Patient denies any lightheadedness or dizziness. Patient awake and alert and responsive and asymptomatic related to her hypotension.   Suspect related to dehydration as on Lasix at home and given Lasix IV in ED for concerns for hypervolemia on admit but suspect she was not hypervolemic and got extra Lasix that would adequate for volume depletion.         Final Active Diagnoses:    Diagnosis Date Noted POA    PRINCIPAL PROBLEM:  Closed displaced intertrochanteric fracture of right femur s/p IM nail on 11/19/2023 [S72.141D] 11/18/2023 Not Applicable    Acute blood loss as cause of postoperative anemia [D62] 11/19/2023 No    Persistent atrial fibrillation [I48.19] 09/25/2013 Yes    Chronic heart failure with preserved ejection fraction [I50.32] 05/18/2021 Yes    Prediabetes [R73.03] 11/19/2023 Yes    Primary hypertension [I10] 09/06/2012 Yes    ACP (advance care planning) [Z71.89] 11/19/2023 Not Applicable    Stage 3b chronic kidney disease [N18.32]  Yes    Chronic anticoagulation [Z79.01] 10/30/2017 Not Applicable      Problems Resolved During this Admission:    Diagnosis Date Noted Date Resolved POA    Postprocedural hypotension [I95.81] 11/20/2023 11/22/2023 No    Hyponatremia [E87.1] 11/22/2023 11/22/2023 No       Discharged Condition: good    Disposition: Skilled Nursing Facility ([facility])    Follow Up:    Future Appointments   Date Time Provider Department Center   12/4/2023  1:00 PM [place], Sheree, NP [facility] ORTHO [address] [place]   12/6/2023  7:00 AM [facility] [place]   12/6/2023  8:15 AM LAB, SENAIT KEN[facility] LAB [place]   12/14/2023 10:30  Jacobo Tan MD [facility] MED [place]   1/4/2024 10:15 AM Henry Perez MD [facility] ORTHO [address] [place]   2/6/2024 10:45 AM Henry Perez MD [facility] [place]       Patient Instructions:      Ambulatory referral/consult to Orthopedics   Standing Status: Future   Referral Priority: Routine Referral Type: Consultation   Requested Specialty: Orthopedic Surgery   Number of Visits Requested: 1     Ambulatory referral/consult to Orthopedics Fracture Care   Standing Status: Future   Referral Priority: Routine Referral Type: Consultation   Requested Specialty: Orthopedic Surgery   Number of Visits Requested: 1     Diet Cardiac   Order Comments: Diabetic diet     Leave dressing on - Keep it clean, dry, and intact until clinic visit     Notify your health care provider if you experience any of the following:  temperature >100.4     Notify your health care provider if you experience any of the following:  persistent nausea and vomiting or diarrhea     Notify your health care provider if you experience any of the following:  severe uncontrolled pain     Notify your health care provider if you experience any of the following:  redness, tenderness, or signs of infection (pain, swelling, redness, odor or green/yellow discharge around incision site)     Notify your health care provider if you experience any of the following:  difficulty breathing or increased cough     Notify your health care provider if you experience any of the following:  severe persistent headache     Notify your health care provider if you experience any of the following:  worsening rash     Notify your health care provider if you experience any of the following:  persistent dizziness, light-headedness, or visual disturbances     Notify your health care provider if you experience any of the following:  increased confusion or weakness     Activity as tolerated     Weight bearing restrictions (specify):   Order Comments: Weight bear as  tolerated to right lower extremity       Significant Diagnostic Studies: Labs: CMP   Recent Labs   Lab 11/21/23  0307 11/22/23  0449    134*   K 4.1 4.1    104   CO2 20* 22*   * 136*   BUN 35* 36*   CREATININE 1.5* 1.2   CALCIUM 8.3* 8.5*   ANIONGAP 10 8    and CBC   Recent Labs   Lab 11/20/23  1711 11/21/23  0307 11/22/23  0449   WBC 13.04* 10.62 11.65   HGB 8.7* 8.5* 9.0*   HCT 27.4* 25.9* 27.3*    153 192       Pending Diagnostic Studies:       None           Medications:  Reconciled Home Medications:      Medication List        START taking these medications      melatonin 3 mg tablet  Commonly known as: MELATIN  Take 2 tablets (6 mg total) by mouth nightly as needed for Insomnia.     methocarbamoL 500 MG Tab  Commonly known as: ROBAXIN  Take 1 tablet (500 mg total) by mouth every 8 (eight) hours.     senna-docusate 8.6-50 mg 8.6-50 mg per tablet  Commonly known as: PERICOLACE  Take 1 tablet by mouth 2 (two) times daily.     traMADoL 50 mg tablet  Commonly known as: ULTRAM  Take 1 tablet (50 mg total) by mouth every 6 (six) hours as needed for Pain.            CHANGE how you take these medications      acetaminophen 500 MG tablet  Commonly known as: TYLENOL  Take 2 tablets (1,000 mg total) by mouth every 8 (eight) hours.  What changed:   when to take this  reasons to take this            CONTINUE taking these medications      atenoloL 25 MG tablet  Commonly known as: TENORMIN  TAKE 1 TABLET EVERY DAY     calcium carbonate 600 mg calcium (1,500 mg) Tab  Commonly known as: OS-CELIA  Take 600 mg by mouth 2 (two) times daily with meals.     ELIQUIS 5 mg Tab  Generic drug: apixaban  TAKE 1 TABLET TWICE DAILY     fluticasone propionate 50 mcg/actuation nasal spray  Commonly known as: FLONASE  SHAKE LIQUID AND USE 1 SPRAY(50 MCG) IN EACH NOSTRIL EVERY DAY FOR 7 DAYS     furosemide 20 MG tablet  Commonly known as: LASIX  TAKE 2 TABLETS EVERY MORNING  AND TAKE 1 TABLET EVERY EVENING     losartan  25 MG tablet  Commonly known as: COZAAR  TAKE 1 TABLET EVERY DAY     meclizine 12.5 mg tablet  Commonly known as: ANTIVERT  Take 1 tablet (12.5 mg total) by mouth 3 (three) times daily as needed for Dizziness.     OCUVITE ORAL  Take 1 tablet by mouth once daily.     sodium chloride 0.65 % nasal spray  Commonly known as: OCEAN  1 spray by Nasal route as needed for Congestion.            STOP taking these medications      mupirocin 2 % ointment  Commonly known as: BACTROBAN     polyethylene glycol 17 gram/dose powder  Commonly known as: GLYCOLAX              Indwelling Lines/Drains at time of discharge: None      Time spent on the discharge of patient: 32 minutes         Tierra Stapleton MD  Department of Hospital Medicine  [facility] Surgery

## 2023-11-22 NOTE — PT/OT/SLP PROGRESS
Physical Therapy Treatment    Patient Name:  Ngoc Castellanos   MRN:  [id number]    Recommendations:     Discharge Recommendations: Moderate Intensity Therapy  Discharge Equipment Recommendations: to be determined by next level of care  Barriers to discharge: None    Assessment:     Ngoc Castellanos is a 89 y.o. female admitted with a medical diagnosis of Closed displaced intertrochanteric fracture of right femur with routine healing.  She presents with the following impairments/functional limitations: weakness, impaired endurance, impaired self care skills, impaired functional mobility, gait instability, impaired balance, decreased lower extremity function, decreased upper extremity function, decreased safety awareness, pain, orthopedic precautions. Pt continues to require the assistance of 2 skilled therapists to achieve transfer to chair.     Rehab Prognosis: Good; patient would benefit from acute skilled PT services to address these deficits and reach maximum level of function.    Recent Surgery: Procedure(s) (LRB):  INSERTION, INTRAMEDULLARY RAMSES, FEMUR, RIGHT, HANA TABLE, MIRIAN, C- ARM DOOR SIDE (Right) 3 Days Post-Op    Plan:     During this hospitalization, patient to be seen 4 x/week to address the identified rehab impairments via gait training, therapeutic activities, therapeutic exercises, neuromuscular re-education and progress toward the following goals:    Plan of Care Expires:  12/20/23    Subjective     Chief Complaint: Pt agreeable to PT  Patient/Family Comments/goals: get better  Pain/Comfort:  Pain Rating 1: 3/10  Location - Side 1: Right  Location - Orientation 1: upper  Location 1: hip  Pain Addressed 1: Pre-medicate for activity, Reposition, Distraction      Objective:     Communicated with RN prior to session.  Patient found HOB elevated with FCD, PureWick, perineural catheter upon PT entry to room.     General Precautions: Standard, fall  Orthopedic Precautions: RLE weight bearing as  tolerated  Braces: N/A  Respiratory Status: Room air     Functional Mobility:  Bed Mobility:     Supine to Sit: maximal assistance  Transfers:     Sit to Stand:  x3 -- see below with rolling walker  Gait: attempted steps to chair, unable to achieve more than 1      AM-PAC 6 CLICK MOBILITY  Turning over in bed (including adjusting bedclothes, sheets and blankets)?: 2  Sitting down on and standing up from a chair with arms (e.g., wheelchair, bedside commode, etc.): 2  Moving from lying on back to sitting on the side of the bed?: 2  Moving to and from a bed to a chair (including a wheelchair)?: 2  Need to walk in hospital room?: 1  Climbing 3-5 steps with a railing?: 1  Basic Mobility Total Score: 10       Treatment & Education:  Sit to stand x3 trials -- 1st trial mod A of 2 persons, 2nd and 3rd trials max A of 2 persons with difficulty extending hips and trunk. When attempting steps, R knee continuously buckling with little BUE support on AD to assist. Pt transferred from bed to chair with total A of 2 after failed steps. Max vc's and tc's throughout session.   Education provided on posture, use of AD and safety.   Bedside table in front of patient and area set up for function, convenience, and safety. RN aware of patient's mobility needs and status. Questions/concerns addressed within PTA scope of practice; patient  with no further questions. Time was provided for active listening, discussion of health disposition, and discussion of safe discharge.    OT present for cotreat due to pt's multiple medical comorbidities and functional/cognition deficits requiring two skilled therapists to appropriately progress pt's musculoskeletal strength, neuromuscular control, and endurance while taking into consideration medical acuity and pt safety.    Patient left up in chair with all lines intact and call button in reach..    GOALS:   Multidisciplinary Problems       Physical Therapy Goals          Problem: Physical Therapy     Goal Priority Disciplines Outcome Goal Variances Interventions   Physical Therapy Goal     PT, PT/OT Ongoing, Progressing     Description: Goals to be met by: 23     Patient will increase functional independence with mobility by performin. Supine to sit with Contact Guard Assistance  2. Sit to stand transfer with Contact Guard Assistance  3. Bed to chair transfer with Minimal Assistance using Rolling Walker  4. Gait  x 25 feet with Moderate Assistance using Rolling Walker.   5. Lower extremity exercise program x20 reps per handout, with supervision                         Time Tracking:     PT Received On: 23  PT Start Time: 1055     PT Stop Time: 1119  PT Total Time (min): 24 min     Billable Minutes: Therapeutic Exercise 24    Treatment Type: Treatment  PT/PTA: PTA     Number of PTA visits since last PT visit: 2023

## 2023-11-22 NOTE — PLAN OF CARE
Problem: Adult Inpatient Plan of Care  Goal: Plan of Care Review  Outcome: Ongoing, Progressing  Goal: Patient-Specific Goal (Individualized)  Outcome: Ongoing, Progressing     Problem: Impaired Wound Healing  Goal: Optimal Wound Healing  Outcome: Ongoing, Progressing     Problem: Fall Injury Risk  Goal: Absence of Fall and Fall-Related Injury  Outcome: Ongoing, Progressing

## 2023-11-22 NOTE — ADDENDUM NOTE
Addendum  created 11/22/23 1410 by Sergei Avalos DO    Clinical Note Signed, Intraprocedure Event edited

## 2023-11-22 NOTE — ANESTHESIA POST-OP PAIN MANAGEMENT
Acute Pain Service Progress Note    Ngoc Castellanos is a 89 y.o., female, [id number].    Surgery:  INSERTION, INTRAMEDULLARY RAMSES, FEMUR, RIGHT, HANA TABLE, MIRIAN, C- ARM DOOR SIDE     Post Op Day #: 3    Catheter type: perineural  R SIFI    Infusion type: Ropivacaine 0.2%  15q3    Problem List:    Active Hospital Problems    Diagnosis  POA    *Closed displaced intertrochanteric fracture of right femur s/p IM nail on 11/19/2023 [S72.141D]  Not Applicable    Hyponatremia [E87.1]  No     Not clinically significant       Postprocedural hypotension [I95.81]  No    ACP (advance care planning) [Z71.89]  Not Applicable    Acute blood loss as cause of postoperative anemia [D62]  No    Prediabetes [R73.03]  Yes    Chronic heart failure with preserved ejection fraction [I50.32]  Yes    Chronic anticoagulation [Z79.01]  Not Applicable    Stage 3b chronic kidney disease [N18.32]  Yes    Persistent atrial fibrillation [I48.19]  Yes    Primary hypertension [I10]  Yes      Resolved Hospital Problems   No resolved problems to display.       Subjective:     General appearance of alert, oriented, no complaints   Pain with rest: 2    Numbers   Pain with movement: 4    Numbers   Side Effects    1. Pruritis No    2. Nausea No    3. Motor Blockade No, 0=Ability to raise lower extremities off bed      Objective:   Catheter site clean, dry, intact         Vitals   Vitals:    11/22/23 0929   BP:    Pulse:    Resp: 17   Temp:         Labs    No results displayed because visit has over 200 results.           Meds   Current Facility-Administered Medications   Medication Dose Route Frequency Provider Last Rate Last Admin    acetaminophen tablet 1,000 mg  1,000 mg Oral Q8H Ana Pope MD   1,000 mg at 11/22/23 0548    apixaban tablet 5 mg  5 mg Oral BID Jesus Bush MD   5 mg at 11/22/23 0912    atenoloL tablet 25 mg  25 mg Oral Daily Salma Pereira PA-C   25 mg at 11/22/23 0912    bisacodyL suppository 10 mg  10 mg Rectal Daily PRN  Car Hines MD   10 mg at 11/20/23 0837    guaiFENesin 12 hr tablet 600 mg  600 mg Oral BID Tierra Stapleton MD   600 mg at 11/22/23 0912    melatonin tablet 6 mg  6 mg Oral Nightly PRN Car Hines MD   6 mg at 11/19/23 2024    methocarbamoL tablet 500 mg  500 mg Oral Q8H Sergei Avalos DO   500 mg at 11/22/23 0548    ondansetron injection 4 mg  4 mg Intravenous Q12H PRN Car Hines MD   4 mg at 11/19/23 0200    polyethylene glycol packet 17 g  17 g Oral Daily Car Hines MD   17 g at 11/22/23 0912    senna-docusate 8.6-50 mg per tablet 1 tablet  1 tablet Oral BID Car Hines MD   1 tablet at 11/22/23 0912    sodium chloride 0.9% flush 10 mL  10 mL Intravenous PRN Car Hines MD        sodium chloride 0.9% flush 10 mL  10 mL Intravenous PRN Jesus Bush MD        traMADoL tablet 50 mg  50 mg Oral Q6H PRN Tierra Stapleton MD   50 mg at 11/22/23 0929        Anticoagulant dose 5mg at BID    Assessment:   Pain control adequate. Plan to pause and pull PNC today.     Plan:  -SIFI 15q3 pause and pull  -Tylenol 1000q8  -Robaxin 750q6  -Lyrica 75qhs    Sergei Avalos DO, PGY4  Department of Anesthesiology

## 2023-11-22 NOTE — ASSESSMENT & PLAN NOTE
Creatinine back down to 1.2 on 11/22 which is her baseline level.   Patient with slight increase in creatinine to 1.5 on 11/21 from 1.3 on 11/20. Need to monitor.   Creatine stable for now. BMP reviewed- noted Estimated Creatinine Clearance: 28.8 mL/min (based on SCr of 1.2 mg/dL). according to latest data. Based on current GFR, CKD stage is stage 3 - GFR 30-59.  Monitor UOP and serial BMP and adjust therapy as needed. Renally dose meds. Avoid nephrotoxic medications and procedures.

## 2023-11-22 NOTE — PT/OT/SLP PROGRESS
Occupational Therapy   Co-Treatment  Co-treatment performed due to patient's multiple deficits requiring two skilled therapists to appropriately and safely assess patient's strength and endurance while facilitating functional tasks in addition to accommodating for patient's activity tolerance.        Name: Ngoc Castellanos  MRN: [id number]  Admitting Diagnosis:  Closed displaced intertrochanteric fracture of right femur with routine healing  3 Days Post-Op    Recommendations:     Discharge Recommendations: Moderate Intensity Therapy  Discharge Equipment Recommendations:  bedside commode  Barriers to discharge:  Decreased caregiver support    Assessment:     Ngoc Castellanos is a 89 y.o. female with a medical diagnosis of Closed displaced intertrochanteric fracture of right femur with routine healing.  She presents with the following performance deficits affecting function are weakness, impaired endurance, impaired self care skills, impaired functional mobility, impaired balance, gait instability, decreased lower extremity function, decreased upper extremity function, pain, impaired cardiopulmonary response to activity, orthopedic precautions. Pt agreeable to therapy and tolerated fairly. Pt continues to demonstrate difficulty with functional mobility/transfers. Pt is also demonstrating SOB during transfer training and requires frequent rest breaks.    Pt remains limited in ADLs, functional mobility, and functional transfers and is currently not performing tasks at [facility]. Pt would continue to benefit from skilled OT services to maximize functional independence with ADLs and functional mobility, reduce caregiver burden, and facilitate safe discharge in the least restrictive environment.       Rehab Prognosis:  Good; patient would benefit from acute skilled OT services to address these deficits and reach maximum level of function.       Plan:     Patient to be seen daily to address the above listed problems via therapeutic  activities, therapeutic exercises  Plan of Care Expires: 12/20/23  Plan of Care Reviewed with: patient    Subjective     Chief Complaint: I need to sit down  Patient/Family Comments/goals: return to PLOF  Pain/Comfort:  Pain Rating 1: 0/10  Pain Rating Post-Intervention 1: 3/10  Location - Side 2: Right  Location 2: hip    Objective:     Communicated with: RN prior to session.  Patient found HOB elevated with FCD, PureWick, perineural catheter upon OT entry to room.    General Precautions: Standard, fall    Orthopedic Precautions:RLE weight bearing as tolerated  Braces: N/A  Respiratory Status: Room air     Occupational Performance:     Bed Mobility:    Patient completed Scooting to EOB with stand by assistance  Patient completed Supine to Sit with maximal assistance and 2 persons     Functional Mobility/Transfers:  Patient completed 3 Sit <> Stand Transfers (from EOB)  1st STS with moderate assistance of 2 persons  with  rolling walker   2nd & 3rd STS with Maximum assistance of 2 persons with rolling walker  Patient completed Bed <> Chair Transfer using Stand Pivot technique with maximal assistance and of 2 persons with rolling walker  Despite maximal verbal/tactile cues, pt demonstrates poor LE sequencing, RW management and limited WB through B UE & R LE  Functional Mobility: Pt unable to ambulate at this moment    Activities of Daily Living:  Grooming: set-up assistance to complete oral hygiene while seated in chair      [facility] 6 Click ADL: 17    Treatment & Education:  -Education on energy conservation and task modification to maximize safety and (I) during ADLs and mobility  -Education on importance of OOB activity to improve overall activity tolerance and promote recovery  -Pt educated on hand placement for transfers  -Pt educated on RW placement for ADLs  -Pt educated on positioning of sx LE during performance of functional activities vs rest/sleep  -Pt educated on weightbearing and surgical precautions with  pt verbalizing understanding (needs reinforcement to encourage WB)  -Pt educated to call for assistance and to transfer with hospital staff only  Pt had no further questions & when asked whether there were any concerns pt reported none.     Patient left up in chair with all lines intact, call button in reach, and RN notified    GOALS:   Multidisciplinary Problems       Occupational Therapy Goals          Problem: Occupational Therapy    Goal Priority Disciplines Outcome Interventions   Occupational Therapy Goal     OT, PT/OT Ongoing, Progressing    Description: Goals to be met by: 12/20/2023     Patient will increase functional independence with ADLs by performing:    UE Dressing with Modified [place].  LE Dressing with Modified [place].  Grooming while EOB with Set-up Assistance.  Toileting from bedside commode with Contact Guard Assistance for hygiene and clothing management.   Stand pivot transfers with Contact Guard Assistance.  Step transfer with Contact Guard Assistance  Toilet transfer to toilet with Minimal Assistance.                         Time Tracking:     OT Date of Treatment: 11/22/23  OT Start Time: 1055  OT Stop Time: 1119  OT Total Time (min): 24 min    Billable Minutes:Therapeutic Activity 24    OT/LOUIE: OT          11/22/2023

## 2023-11-22 NOTE — ASSESSMENT & PLAN NOTE
Chronic, controlled. Latest blood pressure and vitals reviewed-     Temp:  [96.6 °F (35.9 °C)-98.3 °F (36.8 °C)]   Pulse:  [63-98]   Resp:  [14-20]   BP: (106-137)/(55-83)   SpO2:  [92 %-96 %] .   Home meds for hypertension were reviewed and noted below.   Hypertension Medications               atenoloL (TENORMIN) 25 MG tablet TAKE 1 TABLET EVERY DAY    furosemide (LASIX) 20 MG tablet TAKE 2 TABLETS EVERY MORNING  AND TAKE 1 TABLET EVERY EVENING    losartan (COZAAR) 25 MG tablet TAKE 1 TABLET EVERY DAY            While in the hospital, will manage blood pressure as follows; Patient now normotensive on 11/22 so plan to resume Losartan, Lasix and Atenolol on discharge to [facility].

## 2023-11-22 NOTE — ANESTHESIA POST-OP PAIN MANAGEMENT
PNC paused this morning. Patient continues with adequate pain control. PNC removed and discontinued. Catheter tip confirmed intact. Patient tolerated well. Educated regarding continued pain management. Understanding verbalized.    APS will sign off at this time. Please call with any questions or concerns.    Sergei Avalos DO, PGY4  Department of Anesthesiology

## 2023-11-22 NOTE — ASSESSMENT & PLAN NOTE
Patient is identified as having Diastolic (HFpEF) heart failure that is Chronic. CHF is currently controlled.   Resume home Losartan, Atenolol and Lasix on hospital discharge now that ROB and hypotension resolved. Continue on fluid restriction of 1.5 L. Cardiology has not been consulted. Continue to stress to patient importance of self efficacy and  on diet for CHF.

## 2023-11-22 NOTE — ASSESSMENT & PLAN NOTE
Patient underwent right femur IM nail to repair closed displaced intertrochanteric fracture on 11/29/2023 by Dr. Henry Perez.   Patient reports minimal pain in right hip on discharge.   Patient to continue PT/OT for gait training and strengthening and restoration of ADL's on discharge to [facility]. Patient is weight bear as tolerated to right lower extremity as per Orthopedic recommendations on discharge. Patient with her advanced age and hip fracture and being from assisted living will likely need skilled nursing facility placement on discharge for continue inpatient therapy. Patient has been accepted to [facility] ad discharged in good condition to [facility].  Patient resumed home Apixiban 5 mg po BID post-op for long term anticoagulation for her persistent atrial fibrillation so no other chemical DVT prophylaxis needed post-op. Patient to continue home Apixiban on discharge to [facility].    Perineural pain catheter removed by Anesthesia on 11/22.   Pain controlled. Patient on multimodal pain management post-op with Tylenol 1000 mg po every 8 hours and Robaxin 500 mg po 3 times daily post-op with Tramadol 50 mg po every 6 hours prn for breakthrough pain and will continue on discharge to [facility].  Surgical bandages to remain in place to right leg and hip area until Orthopedic clinic follow-up. Local wound care every 5 days to right hip skin tears and blistering near incisions as per wound care recs on discharge.

## 2023-11-22 NOTE — NURSING
Patient with uneventful night. Pain was managed with the scheduled pain meds and the nerve block infusion. No issues with right hip at this time. To continue to monitor status.

## 2023-11-22 NOTE — PLAN OF CARE
CHW met with patient/family at bedside. Patient experience rounding completed and reviewed the following.     Do you know your discharge plan? Yes        If yes, what is the plan? , SNF    Have you discussed your needs and preferences with your SW/CM? Yes    Do you currently have difficulty keeping up with bills, affording medicine or buying food? YNo    Assigned SW/CM notified of any patient/family needs or concerns. Appropriate resources provided to address patient's needs.

## 2023-11-22 NOTE — PLAN OF CARE
Problem: Infection  Goal: Absence of Infection Signs and Symptoms  Outcome: Ongoing, Progressing     Problem: Adult Inpatient Plan of Care  Goal: Plan of Care Review  Outcome: Ongoing, Progressing  Goal: Patient-Specific Goal (Individualized)  Outcome: Ongoing, Progressing   Patient aox4, able to make needs known. Denies pain. States tylenol was effective for her today. Possible discharge tomorrow, plan of care discussed with patient at the bedside.

## 2023-11-23 LAB
ANION GAP SERPL CALC-SCNC: 9 MMOL/L (ref 8–16)
BASOPHILS # BLD AUTO: 0.03 K/UL (ref 0–0.2)
BASOPHILS NFR BLD: 0.3 % (ref 0–1.9)
BNP SERPL-MCNC: 481 PG/ML (ref 0–99)
BUN SERPL-MCNC: 40 MG/DL (ref 10–30)
CALCIUM SERPL-MCNC: 9.9 MG/DL (ref 8.7–10.5)
CHLORIDE SERPL-SCNC: 105 MMOL/L (ref 95–110)
CO2 SERPL-SCNC: 24 MMOL/L (ref 23–29)
CREAT SERPL-MCNC: 1.1 MG/DL (ref 0.5–1.4)
DIFFERENTIAL METHOD: ABNORMAL
EOSINOPHIL # BLD AUTO: 0.3 K/UL (ref 0–0.5)
EOSINOPHIL NFR BLD: 2.9 % (ref 0–8)
ERYTHROCYTE [DISTWIDTH] IN BLOOD BY AUTOMATED COUNT: 15.2 % (ref 11.5–14.5)
EST. GFR  (NO RACE VARIABLE): 45.4 ML/MIN/1.73 M^2
GLUCOSE SERPL-MCNC: 131 MG/DL (ref 70–110)
HCT VFR BLD AUTO: 32.1 % (ref 37–48.5)
HGB BLD-MCNC: 10.3 G/DL (ref 12–16)
IMM GRANULOCYTES # BLD AUTO: 0.08 K/UL (ref 0–0.04)
IMM GRANULOCYTES NFR BLD AUTO: 0.9 % (ref 0–0.5)
LYMPHOCYTES # BLD AUTO: 0.6 K/UL (ref 1–4.8)
LYMPHOCYTES NFR BLD: 7.2 % (ref 18–48)
MAGNESIUM SERPL-MCNC: 2.1 MG/DL (ref 1.6–2.6)
MCH RBC QN AUTO: 28.2 PG (ref 27–31)
MCHC RBC AUTO-ENTMCNC: 32.1 G/DL (ref 32–36)
MCV RBC AUTO: 88 FL (ref 82–98)
MONOCYTES # BLD AUTO: 0.6 K/UL (ref 0.3–1)
MONOCYTES NFR BLD: 6.7 % (ref 4–15)
NEUTROPHILS # BLD AUTO: 7.3 K/UL (ref 1.8–7.7)
NEUTROPHILS NFR BLD: 82 % (ref 38–73)
NRBC BLD-RTO: 1 /100 WBC
PHOSPHATE SERPL-MCNC: 2.9 MG/DL (ref 2.7–4.5)
PLATELET # BLD AUTO: 272 K/UL (ref 150–450)
PMV BLD AUTO: 10.2 FL (ref 9.2–12.9)
POTASSIUM SERPL-SCNC: 4.2 MMOL/L (ref 3.5–5.1)
RBC # BLD AUTO: 3.65 M/UL (ref 4–5.4)
SODIUM SERPL-SCNC: 138 MMOL/L (ref 136–145)
WBC # BLD AUTO: 8.85 K/UL (ref 3.9–12.7)

## 2023-11-23 PROCEDURE — 94761 N-INVAS EAR/PLS OXIMETRY MLT: CPT | Mod: HCNC

## 2023-11-23 PROCEDURE — 93005 ELECTROCARDIOGRAM TRACING: CPT | Mod: HCNC

## 2023-11-23 PROCEDURE — 99900035 HC TECH TIME PER 15 MIN (STAT): Mod: HCNC

## 2023-11-23 PROCEDURE — 80048 BASIC METABOLIC PNL TOTAL CA: CPT | Mod: HCNC | Performed by: HOSPITALIST

## 2023-11-23 PROCEDURE — 84100 ASSAY OF PHOSPHORUS: CPT | Mod: HCNC | Performed by: HOSPITALIST

## 2023-11-23 PROCEDURE — 27000221 HC OXYGEN, UP TO 24 HOURS: Mod: HCNC

## 2023-11-23 PROCEDURE — 11000004 HC SNF PRIVATE: Mod: HCNC

## 2023-11-23 PROCEDURE — 93010 ELECTROCARDIOGRAM REPORT: CPT | Mod: HCNC,,, | Performed by: INTERNAL MEDICINE

## 2023-11-23 PROCEDURE — 94640 AIRWAY INHALATION TREATMENT: CPT | Mod: HCNC

## 2023-11-23 PROCEDURE — 25000242 PHARM REV CODE 250 ALT 637 W/ HCPCS: Mod: HCNC | Performed by: HOSPITALIST

## 2023-11-23 PROCEDURE — 25000003 PHARM REV CODE 250: Mod: HCNC | Performed by: HOSPITALIST

## 2023-11-23 PROCEDURE — 93010 EKG 12-LEAD: ICD-10-PCS | Mod: HCNC,,, | Performed by: INTERNAL MEDICINE

## 2023-11-23 PROCEDURE — 83880 ASSAY OF NATRIURETIC PEPTIDE: CPT | Mod: HCNC | Performed by: HOSPITALIST

## 2023-11-23 PROCEDURE — 85025 COMPLETE CBC W/AUTO DIFF WBC: CPT | Mod: HCNC | Performed by: HOSPITALIST

## 2023-11-23 PROCEDURE — 83735 ASSAY OF MAGNESIUM: CPT | Mod: HCNC | Performed by: HOSPITALIST

## 2023-11-23 RX ORDER — IPRATROPIUM BROMIDE AND ALBUTEROL SULFATE 2.5; .5 MG/3ML; MG/3ML
3 SOLUTION RESPIRATORY (INHALATION) ONCE
Status: COMPLETED | OUTPATIENT
Start: 2023-11-23 | End: 2023-11-23

## 2023-11-23 RX ADMIN — FUROSEMIDE 40 MG: 40 TABLET ORAL at 09:11

## 2023-11-23 RX ADMIN — LOSARTAN POTASSIUM 25 MG: 25 TABLET, FILM COATED ORAL at 09:11

## 2023-11-23 RX ADMIN — ATENOLOL 25 MG: 25 TABLET ORAL at 09:11

## 2023-11-23 RX ADMIN — Medication 1 TABLET: at 04:11

## 2023-11-23 RX ADMIN — SENNOSIDES AND DOCUSATE SODIUM 1 TABLET: 8.6; 5 TABLET ORAL at 09:11

## 2023-11-23 RX ADMIN — METHOCARBAMOL 500 MG: 500 TABLET ORAL at 10:11

## 2023-11-23 RX ADMIN — FUROSEMIDE 20 MG: 20 TABLET ORAL at 10:11

## 2023-11-23 RX ADMIN — METHOCARBAMOL 500 MG: 500 TABLET ORAL at 01:11

## 2023-11-23 RX ADMIN — IPRATROPIUM BROMIDE AND ALBUTEROL SULFATE 3 ML: .5; 3 SOLUTION RESPIRATORY (INHALATION) at 08:11

## 2023-11-23 RX ADMIN — APIXABAN 5 MG: 2.5 TABLET, FILM COATED ORAL at 09:11

## 2023-11-23 RX ADMIN — ACETAMINOPHEN 1000 MG: 325 TABLET ORAL at 10:11

## 2023-11-23 RX ADMIN — APIXABAN 5 MG: 2.5 TABLET, FILM COATED ORAL at 08:11

## 2023-11-23 RX ADMIN — Medication 1 TABLET: at 09:11

## 2023-11-23 RX ADMIN — ACETAMINOPHEN 1000 MG: 325 TABLET ORAL at 06:11

## 2023-11-23 RX ADMIN — SENNOSIDES AND DOCUSATE SODIUM 1 TABLET: 8.6; 5 TABLET ORAL at 08:11

## 2023-11-23 RX ADMIN — METHOCARBAMOL 500 MG: 500 TABLET ORAL at 06:11

## 2023-11-23 RX ADMIN — ACETAMINOPHEN 1000 MG: 325 TABLET ORAL at 01:11

## 2023-11-23 RX ADMIN — TRAMADOL HYDROCHLORIDE 50 MG: 50 TABLET, COATED ORAL at 08:11

## 2023-11-23 NOTE — NURSING
Nurses Note -- 4 Eyes      11/22/2023   9:03 PM      Skin assessed during: Shift Change      [] No Altered Skin Integrity Present    []Prevention Measures Documented      [x] Yes- Altered Skin Integrity Present or Discovered   [] LDA Added if Not in Epic (Describe Wound)   [x] New Altered Skin Integrity was Present on Admit and Documented in LDA   [] Wound Image Taken    Wound Care Consulted? Yes    Attending Nurse:  Monserrat Davila RN/Staff Member:   Melany    Surgical foam dressing to R hip x 3 places with weeping blisters, sacrum red, purpura bruising noted to bilateral upper extremities, bilateral heels red with application of protective dressing to prevent alteration in skin.

## 2023-11-23 NOTE — PLAN OF CARE
Problem: Adult Inpatient Plan of Care  Goal: Plan of Care Review  Outcome: Ongoing, Progressing  Goal: Patient-Specific Goal (Individualized)  Outcome: Ongoing, Progressing  Goal: Absence of Hospital-Acquired Illness or Injury  Outcome: Ongoing, Progressing  Goal: Optimal Comfort and Wellbeing  Outcome: Ongoing, Progressing  Goal: Readiness for Transition of Care  Outcome: Ongoing, Progressing     Problem: Impaired Wound Healing  Goal: Optimal Wound Healing  Outcome: Ongoing, Progressing  Intervention: Promote Wound Healing  Flowsheets (Taken 11/23/2023 0028)  Oral Nutrition Promotion: rest periods promoted  Sleep/Rest Enhancement:   awakenings minimized   regular sleep/rest pattern promoted  Activity Management: Rolling - L1  Pain Management Interventions:   care clustered   medication offered   quiet environment facilitated     Problem: Fall Injury Risk  Goal: Absence of Fall and Fall-Related Injury  Outcome: Ongoing, Progressing  Intervention: Identify and Manage Contributors  Flowsheets (Taken 11/23/2023 0028)  Self-Care Promotion:   BADL personal objects within reach   BADL personal routines maintained  Medication Review/Management: medications reviewed  Intervention: Promote Injury-Free Environment  Flowsheets (Taken 11/23/2023 0028)  Safety Promotion/Fall Prevention:   assistive device/personal item within reach   side rails raised x 2   instructed to call staff for mobility

## 2023-11-23 NOTE — NURSING
Nurses Note -- 4 Eyes      11/22/2023   6:53 PM      Skin assessed during: Admit      [] No Altered Skin Integrity Present    []Prevention Measures Documented      [x] Yes- Altered Skin Integrity Present or Discovered   [x] LDA Added if Not in Epic (Describe Wound)   [] New Altered Skin Integrity was Present on Admit and Documented in LDA   [] Wound Image Taken    Wound Care Consulted? Yes    Attending Nurse:  Monserrat Davila RN/Staff Member:  MATI     Patient arrive on the unit, awake and alert via wheelchair accompanied by transportation personnel. 3 people assist from wheelchair to bed. Surgical foam dressing to R hip x 3 places.No distress noted, patient able to communicate needs and to be understood, she is "[employer] use eye glass to read. Oriented to surrounding, call light, televison, instruct to use the call light to call for assistance. Khalif notified about patient arrival to SNF. Bed is low and lock, HOB up at 45 degree,will continue to monitor patient.

## 2023-11-23 NOTE — PLAN OF CARE
Problem: Adult Inpatient Plan of Care  Goal: Plan of Care Review  Outcome: Ongoing, Progressing  Flowsheets (Taken 11/23/2023 1219)  Plan of Care Reviewed With: patient  Goal: Patient-Specific Goal (Individualized)  Outcome: Ongoing, Progressing  Goal: Absence of Hospital-Acquired Illness or Injury  Outcome: Ongoing, Progressing  Goal: Optimal Comfort and Wellbeing  Outcome: Ongoing, Progressing  Goal: Readiness for Transition of Care  Outcome: Ongoing, Progressing     Problem: Adult Inpatient Plan of Care  Goal: Patient-Specific Goal (Individualized)  Outcome: Ongoing, Progressing     Problem: Adult Inpatient Plan of Care  Goal: Absence of Hospital-Acquired Illness or Injury  Outcome: Ongoing, Progressing     Problem: Adult Inpatient Plan of Care  Goal: Optimal Comfort and Wellbeing  Outcome: Ongoing, Progressing     Problem: Adult Inpatient Plan of Care  Goal: Readiness for Transition of Care  Outcome: Ongoing, Progressing     Problem: Impaired Wound Healing  Goal: Optimal Wound Healing  Outcome: Ongoing, Progressing     Problem: Fall Injury Risk  Goal: Absence of Fall and Fall-Related Injury  Outcome: Ongoing, Progressing     Problem: Skin Injury Risk Increased  Goal: Skin Health and Integrity  Outcome: Ongoing, Progressing

## 2023-11-23 NOTE — NURSING
POC reviewed with pt, pt verbalized understanding. Safety maintained throughout shift, bed locked and in lowest position, call light in reach, Side rails up X 2. Non skid sock on when OOB. Pt remained free of fall/trauma. Pt self reports of pain treated with PO pain medication as ordered by MD. VS stable.  no reports of nausea and vomiting.   Dressing to LLE reinforced and remains intact. No signs of distress, rise and fall of chest noted, respirations  even and unlabored at rest.    Plan of care on going. No future concerns as of present.

## 2023-11-23 NOTE — NURSING
Ms Pack is lying in bed with HOB elevated with eyes closed. No distress noted. Call light is within reach. Will continue to monitor.

## 2023-11-23 NOTE — PROGRESS NOTES
[facility]                                  Skilled Nursing Facility                   Progress Note     Admit Date: 11/22/2023  WES   Principal Problem:  Closed displaced intertrochanteric fracture of right femur with routine healing   Code Status: DNR    HPI obtained from patient interview and chart review     Chief Complaint: hypotension, post op pain management    HPI:   Ngoc Castellanos is an 89 y.o. female with PMH Afib on Eliquis, HFpEF on lasix, HTN, and CKD stage III, new prediabetes dx (A1C 6.3 Nov 2023). [place] [facility] [place] after fall at home. Now s/p right hip cephalomedullary nail fixation on 11/19/23. WBAT per Ortho. Continue home eliquis for a-fib also to cover post op dvt prophy. Multimodal pain management post-op with Tylenol 1000 mg po every 8 hours and Robaxin 500 mg po 3 times daily. Post-op hypotension. BP improved. Home Losartan Lasix and Atenolol resumed on discharge to [facility].     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    Past Surgical History: Patient has a past surgical history that includes Brain surgery (2002); Hysterectomy; Tonsillectomy; Hemorrhoid surgery; Appendectomy; Breast biopsy (2/2014); Breast surgery (03/17/14); Breast cyst excision (1960); Mastectomy (Right); and Intramedullary rodding of femur (Right, 11/19/2023).    Social History: Patient reports that she has never smoked. She has never used smokeless tobacco. She reports that she  "does not drink alcohol and does not use drugs.    Allergies: Patient is allergic to clindamycin, levofloxacin, lidocaine, and adhesive tape-silicones.    ROS  Constitutional: Negative for fever   Eyes: Negative for blurred vision, double vision   Respiratory: Negative for cough, shortness of breath   Cardiovascular: Negative for chest pain, palpitations, and leg swelling.   Gastrointestinal: Negative for abdominal pain, constipation, diarrhea, nausea, vomiting.   Genitourinary: Negative for dysuria, frequency   Musculoskeletal:  + generalized weakness. Negative for back pain and myalgias.   Skin: Negative for itching and rash.   Neurological: Negative for dizziness, headaches.   Psychiatric/Behavioral: Negative for depression. The patient is not nervous/anxious.      24 hour Vital Sign Range   Temp:  [97.5 °F (36.4 °C)-98.1 °F (36.7 °C)]   Pulse:  [70-81]   Resp:  [18]   BP: (124-135)/(59-65)   SpO2:  [94 %-95 %]     PEx  Constitutional: Patient appears debilitated.  No distress noted  HENT:   Head: Normocephalic and atraumatic.   Eyes: Pupils are equal, round  Neck: Normal range of motion. Neck supple.   Cardiovascular: Normal rate, regular rhythm and normal heart sounds.    Pulmonary/Chest: Effort normal and breath sounds are clear  Abdominal: Soft. Bowel sounds are normal.   Musculoskeletal: Normal range of motion.   Neurological: Alert and oriented to person, place, and time.   Psychiatric: Normal mood and affect. Behavior is normal.   Skin: Skin is pale, warm and dry. RLE incisional wound.    No results for input(s): "GLUCOSE", "NA", "K", "CL", "CO2", "BUN", "CREATININE", "CALCIUM", "MG" in the last 24 hours.    No results for input(s): "WBC", "RBC", "HGB", "HCT", "PLT", "MCV", "MCH", "MCHC" in the last 24 hours.      Assessment and Plan:    Closed displaced intertrochanteric fracture of right femur s/p IM nail on 11/19/2023   PT/OT consultations to eval and tx  WBAT  Multimodal pain management  Continue home " "Apixiban 5 mg po BID   Surgical bandages to remain in place to right leg and hip area until Orthopedic clinic follow-up.   Local wound care every 5 days to right hip skin tears and blistering near incisions    Persistent atrial fibrillation  Chronic anticoagulation   - Continue eliquis 5 mg po bid  - Continue atenolol 25 mg daily    Chronic diastolic heart failure with preserved ejection fraction   Stable. No SOB, LE or abdominal edema. Clinically dry.    - Monitor fluid balance with daily weight & strict I/O  - See "Hypertension"  - Continue Lasix 40 mg daily and 20 mg nightly po as BP tolerates      - CHF education to include diet, medication, and CHF flags for MD notification    Stage 3b chronic kidney disease   - Chronic, stable  - Baseline Creatinine 1.2   - Avoid any nephrotoxic agents including NSAIDs, aminoglycosides, IV contrast (unless absolutely necessary), gadolinium, fleets and other phosphorous-based laxatives. Caution with antibiotics.  - Renally adjust medications based on patient's creatinine clearance  - Avoid Hypotension  - Routinely monitor renal function with scheduled metabolic profile    Essential Hypertension  Hypotensive in hospital post op  - Chronic, stable  - Resume losartan 25 mg daily as BP tolerates  - Continue lasix and atenolol as BP tolerates  - Monitor BP  - Adjust therapy to BP goal < 150/90  - Avoid hypotension     Prediabetes   - Last A1c 6.3 Nov 2023  - Home med(s): none - new dx, begin with diet modification  - Diabetic Diet  - Monitor glucose with routine metabolic profile    Physical Debility  Weakness   -Patient with decreased bed mobility therefore patient is at high risk for developing wounds and deterioration of any current wounds.   - Continue with PT/OT for gait training and strengthening and restoration of ADL's   - Encourage mobility, OOB in chair, and early ambulation as appropriate  - Fall precautions   - Monitor for bowel and bladder dysfunction  - Monitor for and " prevent skin breakdown and pressure ulcers  - Continue DVT prophylaxis with Eliquis    Extended Visit  Total time spent: 48 minutes  Description of Time: counseling patient on clinical condition, therapies provided, plan of care, emotional support, coordinating patient care with other care team members, reviewing and interpreting labs and imaging, collaboration with physician, initiating new orders, chart review, and documentation. See interval hx.       Kerry Russell NP  Department of Hospital Medicine   [facility]     DOS: 11/23/2023       Patient note was created using MModal Dictation.  Any errors in syntax or even information may not have been identified and edited on initial review prior to signing this note.

## 2023-11-24 PROCEDURE — 97530 THERAPEUTIC ACTIVITIES: CPT | Mod: HCNC

## 2023-11-24 PROCEDURE — 97165 OT EVAL LOW COMPLEX 30 MIN: CPT | Mod: HCNC

## 2023-11-24 PROCEDURE — 99306 PR NURSING FACILITY CARE, INIT, HIGH SEVERITY: ICD-10-PCS | Mod: AI,HCNC,, | Performed by: HOSPITALIST

## 2023-11-24 PROCEDURE — 25000003 PHARM REV CODE 250: Mod: HCNC | Performed by: HOSPITALIST

## 2023-11-24 PROCEDURE — 97535 SELF CARE MNGMENT TRAINING: CPT | Mod: HCNC

## 2023-11-24 PROCEDURE — 99306 1ST NF CARE HIGH MDM 50: CPT | Mod: AI,HCNC,, | Performed by: HOSPITALIST

## 2023-11-24 PROCEDURE — 97162 PT EVAL MOD COMPLEX 30 MIN: CPT | Mod: HCNC

## 2023-11-24 PROCEDURE — 11000004 HC SNF PRIVATE: Mod: HCNC

## 2023-11-24 RX ORDER — LACTULOSE 10 G/15ML
20 SOLUTION ORAL 2 TIMES DAILY
Status: COMPLETED | OUTPATIENT
Start: 2023-11-24 | End: 2023-11-25

## 2023-11-24 RX ORDER — POLYETHYLENE GLYCOL 3350 17 G/17G
17 POWDER, FOR SOLUTION ORAL 3 TIMES DAILY PRN
Status: DISCONTINUED | OUTPATIENT
Start: 2023-11-24 | End: 2023-11-25

## 2023-11-24 RX ORDER — ALUMINUM HYDROXIDE, MAGNESIUM HYDROXIDE, AND SIMETHICONE 2400; 240; 2400 MG/30ML; MG/30ML; MG/30ML
30 SUSPENSION ORAL 3 TIMES DAILY PRN
Status: DISCONTINUED | OUTPATIENT
Start: 2023-11-24 | End: 2023-12-13 | Stop reason: HOSPADM

## 2023-11-24 RX ORDER — FUROSEMIDE 20 MG/1
20 TABLET ORAL
Status: DISCONTINUED | OUTPATIENT
Start: 2023-11-24 | End: 2023-12-04

## 2023-11-24 RX ADMIN — ALUMINUM HYDROXIDE, MAGNESIUM HYDROXIDE, AND DIMETHICONE 30 ML: 400; 400; 40 SUSPENSION ORAL at 01:11

## 2023-11-24 RX ADMIN — TRAMADOL HYDROCHLORIDE 50 MG: 50 TABLET, COATED ORAL at 09:11

## 2023-11-24 RX ADMIN — TRAMADOL HYDROCHLORIDE 50 MG: 50 TABLET, COATED ORAL at 07:11

## 2023-11-24 RX ADMIN — FUROSEMIDE 20 MG: 20 TABLET ORAL at 07:11

## 2023-11-24 RX ADMIN — METHOCARBAMOL 500 MG: 500 TABLET ORAL at 05:11

## 2023-11-24 RX ADMIN — LOSARTAN POTASSIUM 25 MG: 25 TABLET, FILM COATED ORAL at 09:11

## 2023-11-24 RX ADMIN — ALUMINUM HYDROXIDE, MAGNESIUM HYDROXIDE, AND DIMETHICONE 30 ML: 400; 400; 40 SUSPENSION ORAL at 04:11

## 2023-11-24 RX ADMIN — ATENOLOL 25 MG: 25 TABLET ORAL at 09:11

## 2023-11-24 RX ADMIN — SENNOSIDES AND DOCUSATE SODIUM 1 TABLET: 8.6; 5 TABLET ORAL at 09:11

## 2023-11-24 RX ADMIN — Medication 1 TABLET: at 09:11

## 2023-11-24 RX ADMIN — Medication 6 MG: at 09:11

## 2023-11-24 RX ADMIN — LACTULOSE 20 G: 20 SOLUTION ORAL at 04:11

## 2023-11-24 RX ADMIN — METHOCARBAMOL 500 MG: 500 TABLET ORAL at 01:11

## 2023-11-24 RX ADMIN — ACETAMINOPHEN 1000 MG: 325 TABLET ORAL at 01:11

## 2023-11-24 RX ADMIN — FUROSEMIDE 40 MG: 40 TABLET ORAL at 09:11

## 2023-11-24 RX ADMIN — ACETAMINOPHEN 1000 MG: 325 TABLET ORAL at 05:11

## 2023-11-24 RX ADMIN — APIXABAN 5 MG: 2.5 TABLET, FILM COATED ORAL at 09:11

## 2023-11-24 RX ADMIN — Medication 1 TABLET: at 04:11

## 2023-11-24 RX ADMIN — METHOCARBAMOL 500 MG: 500 TABLET ORAL at 09:11

## 2023-11-24 RX ADMIN — ACETAMINOPHEN 1000 MG: 325 TABLET ORAL at 10:11

## 2023-11-24 NOTE — NURSING
Dr. Cross notified earlier c/o shortness of breath, was 91% RA, 1-2 liters o2 PRN ordered, Post op Right Femur.   CXR pending, , EKG A. Fib rate controlled, gas pain PRN simethicone, last BM 11/21.

## 2023-11-24 NOTE — PLAN OF CARE
Problem: Occupational Therapy  Goal: Occupational Therapy Goal  Description: Goals to be met by: 12/23/2023     Patient will increase functional independence with ADLs by performing:    Feeding with Set-up Assistance.  UE Dressing with Set-up assistance  LE Dressing with Maximum Assistance /c AE/LRAD as needed  Personal Hygiene while seated at sink with Modified [place].  Oral Hygiene while seated at sink with Modified [place].  Toileting from bedside commode with Maximum Assistance for hygiene and clothing management.   Bathing from  sitting at sink/on commode with Minimal Assistance.  Rolling to Right with Moderate Assistance.   Rolling to Left with Contact Guard Assistance.   Toilet transfer to bedside commode with Maximum Assistance.  Tolerate standing functional tasks for ~2 minutes /c Mod A and LRAD as needed  Footwear /c Mod A and AE as needed  SC t/f /c Max A and LRAD as needed.    Outcome: Ongoing, Progressing    Pt evaluated and goals set based on performance at admission.

## 2023-11-24 NOTE — PLAN OF CARE
Evaluation completed. POC established.     Problem: Physical Therapy  Goal: Physical Therapy Goal  Description: Goals to be met by: 23       Patient will increase functional independence with mobility by performin. Supine to sit with Contact Guard Assistance  2. Sit to supine with Contact Guard Assistance  3. Rolling to Left and Right with Standby Assistance  4. Sit to stand transfer with Contact Guard Assistance  5. Bed to chair transfer with Contact Guard Assistance using Rolling Walker/LRAD  6. Gait  x 50 feet with Contact Guard Assistance using Rolling Walker/LRAD  7. Wheelchair propulsion x 150 feet with Modified [place] using bilateral upper extremities    Outcome: Ongoing, Progressing   2023

## 2023-11-24 NOTE — PLAN OF CARE
Problem: Adult Inpatient Plan of Care  Goal: Plan of Care Review  Outcome: Ongoing, Progressing  Goal: Patient-Specific Goal (Individualized)  Outcome: Ongoing, Progressing  Goal: Absence of Hospital-Acquired Illness or Injury  Outcome: Ongoing, Progressing  Goal: Optimal Comfort and Wellbeing  Outcome: Ongoing, Progressing  Goal: Readiness for Transition of Care  Outcome: Ongoing, Progressing     Problem: Impaired Wound Healing  Goal: Optimal Wound Healing  Outcome: Ongoing, Progressing  Intervention: Promote Wound Healing  Flowsheets (Taken 11/24/2023 0242)  Oral Nutrition Promotion: rest periods promoted  Sleep/Rest Enhancement: awakenings minimized  Activity Management: Rolling - L1  Pain Management Interventions:   care clustered   medication offered   pillow support provided   position adjusted     Problem: Fall Injury Risk  Goal: Absence of Fall and Fall-Related Injury  Outcome: Ongoing, Progressing     Problem: Skin Injury Risk Increased  Goal: Skin Health and Integrity  Outcome: Ongoing, Progressing  Intervention: Optimize Skin Protection  Flowsheets (Taken 11/24/2023 0242)  Head of Bed (HOB) Positioning: HOB at 60 degrees

## 2023-11-24 NOTE — NURSING
"Upon checking pt vital signs pt complained of  SOB and a productive cough. O2 94%. Went to notify charge of findings and pt current concerns. Upon return pt 02 dropped to 91%   EKG,CXR, labs (BNP) ,PRN 02,  and breathing tx ordered.    Added 1L of O2 NC.  Pt also complained of gas in her stomach. Last BM 11/21    EKG completed showing Afib controlled.  Breathing tx completed pt stated,  ''I  feel much better after."    CXR in AM.    PRN Aydenta ordered.     Vital signs currently stable,pt 02 96% post breathing Tx on room air.  Pt declined any SOB or any current distress this time.   "

## 2023-11-24 NOTE — PT/OT/SLP EVAL
Physical Therapy Evaluation/Treatment Note    Patient Name:  Ngoc Castellanos   MRN:  [id number]  Admit Date: 11/22/2023  Admitting Diagnosis: Closed displaced intertrochanteric fracture of right femur with routine healing  Recent Surgeries: INSERTION, INTRAMEDULLARY RAMSES, FEMUR, RIGHT, HANA TABLE, MIRIAN, C- ARM DOOR SIDE (Right)      General Precautions: Standard, fall   Orthopedic Precautions: RLE weight bearing as tolerated   Braces: N/A    Recommendations:     Discharge Recommendations: Low Intensity Therapy   Level of Assistance Recommended: 24 hours significant assistance  Discharge Equipment Recommendations: wheelchair, walker, rolling, shower chair, hip kit, bedside commode (Continue to assess with progression of mobility)   Barriers to discharge: Decreased caregiver support, Other (Comment) (Increased assistance for mobility)    Assessment:     Ngoc Castellanos is a 89 y.o. female admitted with a medical diagnosis of Closed displaced intertrochanteric fracture of right femur with routine healing. Performance deficits affecting function  weakness, impaired endurance, impaired self care skills, impaired functional mobility, gait instability, impaired balance, decreased safety awareness, decreased lower extremity function, pain, decreased ROM, impaired cardiopulmonary response to activity, orthopedic precautions. Patient agreeable to PT evaluation and treatment this PM. Patient reporting increased lower abdominal pain and discomfort with prolonged sitting OOB in wheelchair. Nurse aware and medication administered prior to start of session. Patient reports being Mod I using rollator for mobility and ADLs at [facility] prior to admission. Patient currently functioning below baseline level of mobility requiring MaxA x 2 persons for functional transfers, bed mobility and short distance ambulation. Patient pleasant and motivated to engage in therapy. Patient will benefit from continued SNF rehabilitation services to address  "above mentioned deficits as well as progress mobility towards PLOF and increased functional independence for safe discharge to home environment.       Rehab Potential is good     Activity Tolerance: Fair    Plan:     Patient to be seen 5 x/week to address the above listed problems via gait training, therapeutic activities, therapeutic exercises, neuromuscular re-education, wheelchair management/training     Plan of Care Expires: 12/24/23  Plan of Care Reviewed with: patient    Subjective     Chief Complaint: "I hurt right here in my lower abdomen. I think it it gas. The nurse gave me something."  Patient/Family Comments/goals: Return home at maximal functional potential  Pain/Comfort:  Pain Rating 1: 3/10  Location - Side 1: Left  Location - Orientation 1: lower  Location 1: abdomen  Pain Addressed 1: Pre-medicate for activity, Reposition, Distraction, Cessation of Activity, Nurse notified  Pain Rating Post-Intervention 1: 3/10    Living Environment:   Patient resides in an ILF on the 1st floor with elevator access to the 3rd floor where cafeteria is located. Patient has a WIS in restroom.     Prior to admission, patients level of function was Mod I using rollator for mobility and ADLs.  Equipment used at home: rollator, SPC  .  DME owned (not currently used): none.  Upon discharge, patient will have assistance from staff at [facility]; continue to assess with progression of mobility.    Patients cultural, spiritual, Restorationist conflicts given the current situation: no    Objective:     Communicated with nursing staff prior to session.  Patient found up in chair with  (no active lines)  upon PT entry to room.    Exams:  Cognitive Exam:  Patient is oriented to Person, Place, Time, and Situation  RLE ROM: Decreased AROM; pain limiting mobility with edema noted  RLE Strength: Unable to formally assess secondary to pain; limited AROM  LLE ROM: Grossly WFL  LLE Strength: Grossly WFL    Functional Mobility:   11/24/23 1405 "   Prior Functioning: Everyday Activities   Self Care Independent   Indoor Mobility (Ambulation) Independent   Stairs Not applicable   Functional Cognition Independent   Prior Device Use Walker  (rollator)   Functional Limitation in Range of Motion   Upper Extremity No impairment   Lower Extremity Impairment on one side   Mobility Devices Walker;Wheelchair (manual or electric)   Roll Left and Right   Did they attempt the activity? Yes   Was the activity done independently? No   Assistance Needed Physical assistance  (Katelynn to roll R/MaxA to roll L with HOB flat and use of bed rails)   Physical Assistance Level More than half   Was adaptive equipment used? Yes   CARE Score - Roll Left and Right 2   Sit to Lying   Did they attempt the activity? Yes   Was the activity done independently? No   Assistance Needed Physical assistance  (MaxA x 2 with HOB flat and use of bed rails)   Physical Assistance Level 2 or more helpers   Was adaptive equipment used? Yes   CARE Score - Sit to Lying 1   Lying to Sitting on Side of Bed   Did they attempt the activity? Yes   Was the activity done independently? No   Assistance Needed Physical assistance  (MaxA x 2 with HOB flat and use of bed rails)   Physical Assistance Level 2 or more helpers   Was adaptive equipment used? Yes   CARE Score - Lying to Sitting on Side of Bed 1   Sit to Stand   Did they attempt the activity? Yes   Was the activity done independently? No   Assistance Needed Physical assistance  (MaxA x 2 using RW)   Physical Assistance Level 2 or more helpers   Was adaptive equipment used? Yes   CARE Score - Sit to Stand 1   Sit to Stand Discharge Goal   Discharge Goal 4   Chair/Bed-to-Chair Transfer   Did they attempt the activity? Yes   Was the activity done independently? No   Assistance Needed Physical assistance  (MaxA x 2 using RW with stand step; B knee flexion posture with posterior weightshift)   Physical Assistance Level 2 or more helpers   Was adaptive equipment  used? Yes   CARE Score - Chair/Bed-to-Chair Transfer 1   Car Transfer   Did they attempt the activity? No   Reason if not Attempted Environmental limitations   CARE Score - Car Transfer 10   Walk 10 Feet   Did they attempt the activity? No   Reason if not Attempted Safety concerns  (Patient ambulated 10 steps using RW with MaxA x 2; W/C close in tow. Crouched flexion posture with cues to stand erect. Antalgic gait pattern. Fatigue reported with SOB.)   CARE Score - Walk 10 Feet 88   Walk 50 Feet with Two Turns   Did they attempt the activity? No   Reason if not Attempted Safety concerns   CARE Score - Walk 50 Feet with Two Turns 88   Walk 150 Feet   Did they attempt the activity? No   Reason if not Attempted Safety concerns   CARE Score - Walk 150 Feet 88   Walking 10 Feet on Uneven Surfaces   Did they attempt the activity? No   Reason if not Attempted Safety concerns   CARE Score - Walking 10 Feet on Uneven Surfaces 88   1 Step (Curb)   Did they attempt the activity? No   Reason if not Attempted Safety concerns   CARE Score - 1 Step (Curb) 88   4 Steps   Did they attempt the activity? No   Reason if not Attempted Safety concerns   CARE Score - 4 Steps 88   12 Steps   Did they attempt the activity? No   Reason if not Attempted Safety concerns   CARE Score - 12 Steps 88   Picking Up Object   Did they attempt the activity? No   Reason if not Attempted Safety concerns   CARE Score - Picking Up Object 88   OTHER   Uses a Wheelchair/Scooter? Yes   Wheel 50 Feet with Two Turns   Did they attempt the activity? Yes   Was the activity done independently? No   Assistance Needed Supervision  (Patient propelled W/C 75 feet using BUE with SBA)   Type of Wheelchair/Scooter Manual   CARE Score - Wheel 50 Feet with Two Turns 4   Wheel 150 Feet   Did they attempt the activity? No   Reason if not Attempted Safety concerns   CARE Score - Wheel 150 Feet 88     Education:  Patient provided with daily orientation and goals of this PT  session.  Patient educated to transfer to bedside chair/bedside commode/bathroom with RN/PCT present.   Patient educated on assistive device use, bed mobility training, Fall risk, gait training, home safety, plan of care, and transfer training by explanation and demonstration.   Patient Verbalized Understanding.  Time provided for therapeutic counseling and discussion of current health disposition. All questions answered to satisfaction, within scope of PT practice; voiced no other concerns. White board updated in patient's room,nursing staff notified of session.      Therapeutic Activities and Exercises:   Increased time to complete all mobility tasks secondary to presence of pain. Repeated sit to stand transfer performed during session. See above chart for details.     AM-PAC 6 CLICK MOBILITY  Total Score:11     Patient left supine with call button in reach and PCT notified.    GOALS:   Multidisciplinary Problems       Physical Therapy Goals          Problem: Physical Therapy    Goal Priority Disciplines Outcome Goal Variances Interventions   Physical Therapy Goal     PT, PT/OT Ongoing, Progressing     Description: Goals to be met by: 23       Patient will increase functional independence with mobility by performin. Supine to sit with Contact Guard Assistance  2. Sit to supine with Contact Guard Assistance  3. Rolling to Left and Right with Standby Assistance  4. Sit to stand transfer with Contact Guard Assistance  5. Bed to chair transfer with Contact Guard Assistance using Rolling Walker/LRAD  6. Gait  x 50 feet with Contact Guard Assistance using Rolling Walker/LRAD  7. Wheelchair propulsion x 150 feet with Modified [place] using bilateral upper extremities                         History:     Past Medical History:   Diagnosis Date    *Atrial fibrillation     Atrial fibrillation     Breast cancer 2014    Right breast infiltrating ductal CA, ER/[place] positive, Her2 equivocal    Closed displaced  intertrochanteric fracture of right femur s/p IM nail on 11/19/2023 11/18/2023    H/O total mastectomy of right breast 03/17/2014    Hypertension     Persistent atrial fibrillation 09/25/2013    Prediabetes 11/19/2023    Primary hypertension 09/06/2012    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    left    Stage 3b chronic kidney disease     Valvular regurgitation     moderate MR       Past Surgical History:   Procedure Laterality Date    APPENDECTOMY      BRAIN SURGERY  2002    meningioma    BREAST BIOPSY  2/2014    Right breast- [facility]    BREAST CYST EXCISION  1960    left breast - benign    BREAST SURGERY  03/17/14    right mastectomy    HEMORRHOID SURGERY      HYSTERECTOMY      INTRAMEDULLARY RODDING OF FEMUR Right 11/19/2023    Procedure: INSERTION, INTRAMEDULLARY RAMSES, FEMUR, RIGHT, HANA TABLE, MIRIAN, C- ARM DOOR SIDE;  Surgeon: Henry Perez MD;  Location: [facility] OR [address] [place] [address];  Service: Orthopedics;  Laterality: Right;    MASTECTOMY Right     TONSILLECTOMY         Time Tracking:     PT Received On: 11/24/23  PT Start Time: 1405     PT Stop Time: 1435  PT Total Time (min): 30 min     Billable Minutes: Evaluation 20 and Therapeutic Activity 10      11/24/2023

## 2023-11-24 NOTE — H&P
"[facility] Medicine  Skilled Nursing Facility   History and Physical Exam      Date of Service: 11/24/2023      Patient Name: Ngoc Castellanos  MRN: [id number]  Admission Date: 11/22/2023  Attending Physician: Jose A Ragsdale MD  Primary Care Provider: Jacobo Mcleod MD  Code Status: DNR (Do Not Resuscitate)      Principal problem:  Closed displaced intertrochanteric fracture of right femur with routine healing      Chief Complaint:   Chief Complaint   Patient presents with    Hip Injury     Admitted to [facility] for rehabilitation following hospital stay for right intertrochanteric femur fracture s/p CMN fixation.           HPI:   "Ngoc Castellanos is an 89 y.o. female with PMH Afib on Eliquis, HFpEF on lasix, HTN, and CKD stage III, new prediabetes dx (A1C 6.3 Nov 2023). Admitted to [facility] 11/18/2023 after fall at home. Now s/p right hip cephalomedullary nail fixation on 11/19/23. WBAT per Ortho. Continue home eliquis for a-fib also to cover post op dvt prophy. Multimodal pain management post-op with Tylenol 1000 mg po every 8 hours and Robaxin 500 mg po 3 times daily. Post-op hypotension. BP improved. Home Losartan Lasix and Atenolol resumed on discharge to [facility]." Per Kerry Russell NP on 11/23/23.     She is doing okay today. She denies any significant pain. She is eating okay and denies any nausea. She reports some constipation and reports sharp, lower abdominal pain. She denies shortness of breath but is having some mild dyspnea of exertion. She denies any cough. She reports feeling that food gets stuck at times which has been ongoing for some time. She has not lost weight and doesn't have any vomiting.     Patient admitted with skilled services with PT and OT to improve functional status and ability to perform ADLs.       Past Medical History:   Past Medical History:   Diagnosis Date    *Atrial fibrillation     Atrial fibrillation     Breast cancer 02/2014    Right breast infiltrating ductal CA, ER/ME " positive, Her2 equivocal    Closed displaced intertrochanteric fracture of right femur s/p IM nail on 11/19/2023 11/18/2023    H/O total mastectomy of right breast 03/17/2014    Hypertension     Persistent atrial fibrillation 09/25/2013    Prediabetes 11/19/2023    Primary hypertension 09/06/2012    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    left    Stage 3b chronic kidney disease     Valvular regurgitation     moderate MR       Past Surgical History:   Past Surgical History:   Procedure Laterality Date    APPENDECTOMY      BRAIN SURGERY  2002    meningioma    BREAST BIOPSY  2/2014    Right breast- [facility]    BREAST CYST EXCISION  1960    left breast - benign    BREAST SURGERY  03/17/14    right mastectomy    HEMORRHOID SURGERY      HYSTERECTOMY      INTRAMEDULLARY RODDING OF FEMUR Right 11/19/2023    Procedure: INSERTION, INTRAMEDULLARY RAMSES, FEMUR, RIGHT, HANA TABLE, MIRIAN, C- ARM DOOR SIDE;  Surgeon: Henry Perez MD;  Location: [facility] OR [address] [place] [address];  Service: Orthopedics;  Laterality: Right;    MASTECTOMY Right     TONSILLECTOMY         Social History:   Tobacco Use    Smoking status: Never    Smokeless tobacco: Never   Substance and Sexual Activity    Alcohol use: No    Drug use: No    Sexual activity: Not on file       Family History:   Family History    None         No current facility-administered medications on file prior to encounter.     Current Outpatient Medications on File Prior to Encounter   Medication Sig    acetaminophen (TYLENOL) 500 MG tablet Take 2 tablets (1,000 mg total) by mouth every 8 (eight) hours.    atenoloL (TENORMIN) 25 MG tablet TAKE 1 TABLET EVERY DAY    calcium carbonate (OS-CELIA) 600 mg calcium (1,500 mg) Tab Take 600 mg by mouth 2 (two) times daily with meals.     ELIQUIS 5 mg Tab TAKE 1 TABLET TWICE DAILY    fluticasone propionate (FLONASE) 50 mcg/actuation nasal spray SHAKE LIQUID AND USE 1 SPRAY(50 MCG) IN EACH NOSTRIL EVERY DAY FOR 7 DAYS    furosemide (LASIX) 20 MG tablet  TAKE 2 TABLETS EVERY MORNING  AND TAKE 1 TABLET EVERY EVENING    losartan (COZAAR) 25 MG tablet TAKE 1 TABLET EVERY DAY    meclizine (ANTIVERT) 12.5 mg tablet Take 1 tablet (12.5 mg total) by mouth 3 (three) times daily as needed for Dizziness.    melatonin (MELATIN) 3 mg tablet Take 2 tablets (6 mg total) by mouth nightly as needed for Insomnia.    methocarbamoL (ROBAXIN) 500 MG Tab Take 1 tablet (500 mg total) by mouth every 8 (eight) hours.    senna-docusate 8.6-50 mg (PERICOLACE) 8.6-50 mg per tablet Take 1 tablet by mouth 2 (two) times daily.    sodium chloride (OCEAN) 0.65 % nasal spray 1 spray by Nasal route as needed for Congestion.    traMADoL (ULTRAM) 50 mg tablet Take 1 tablet (50 mg total) by mouth every 6 (six) hours as needed for Pain.    VIT C/VIT E/LUTEIN/MIN/OMEGA-3 (OCUVITE ORAL) Take 1 tablet by mouth once daily.       Allergies:   Review of patient's allergies indicates:   Allergen Reactions    Clindamycin     Levofloxacin Other (See Comments)    Lidocaine Other (See Comments)    Adhesive tape-silicones Rash       ROS:  Review of Systems   Constitutional:  Negative for appetite change and fever.   HENT:  Positive for trouble swallowing. Negative for sore throat.    Respiratory:  Positive for shortness of breath (with exertion). Negative for cough.    Cardiovascular:  Positive for leg swelling. Negative for chest pain and palpitations.   Gastrointestinal:  Positive for abdominal pain and constipation. Negative for nausea and vomiting.   Genitourinary:  Negative for difficulty urinating and dysuria.   Musculoskeletal:  Negative for arthralgias and back pain.   Neurological:  Positive for weakness. Negative for dizziness and light-headedness.   Psychiatric/Behavioral:  Negative for sleep disturbance. The patient is not nervous/anxious.      Objective:  Temp:  [97.2 °F (36.2 °C)-97.3 °F (36.3 °C)]   Pulse:  [77-89]   Resp:  [16-18]   BP: (106-134)/(53-60)   SpO2:  [95 %-97 %]     Body mass index is  27.93 kg/m².      Physical Exam  Vitals and nursing note reviewed.   Constitutional:       General: She is not in acute distress.     Appearance: She is well-developed.   Cardiovascular:      Rate and Rhythm: Normal rate and regular rhythm.      Heart sounds: S1 normal and S2 normal. Murmur heard.      Systolic murmur is present.   Pulmonary:      Effort: Pulmonary effort is normal. No respiratory distress.      Breath sounds: Normal breath sounds. No wheezing or rales.   Abdominal:      General: Bowel sounds are normal. There is no distension.      Palpations: Abdomen is soft.      Tenderness: There is no abdominal tenderness.   Musculoskeletal:      Right hip: Tenderness present.      Right upper leg: Swelling present.      Right lower leg: Edema present.      Left lower leg: Edema present.   Skin:     General: Skin is warm and dry.      Findings: Bruising present.   Neurological:      Mental Status: She is alert and oriented to person, place, and time.   Psychiatric:         Mood and Affect: Mood normal.         Behavior: Behavior normal. Behavior is cooperative.         Thought Content: Thought content normal.       Significant Labs:   A1C:   Recent Labs   Lab 11/18/23  2140   HGBA1C 6.3*     TSH:   Recent Labs   Lab 06/12/23  0934   TSH 2.293     CBC:  Recent Labs   Lab 11/22/23  0449 11/23/23  2200   WBC 11.65 8.85   HGB 9.0* 10.3*   HCT 27.3* 32.1*    272   MCV 89 88     BMP:  Recent Labs   Lab 11/22/23  0449 11/23/23  2200   * 131*   * 138   K 4.1 4.2    105   CO2 22* 24   BUN 36* 40*   CREATININE 1.2 1.1   CALCIUM 8.5* 9.9   MG 2.2 2.1   PHOS  --  2.9       Significant Imaging: I have reviewed all pertinent imaging results/findings completed during prior hospitalization.      Assessment and Plan:  Active Diagnoses:    Diagnosis Date Noted POA    PRINCIPAL PROBLEM:  Closed displaced intertrochanteric fracture of right femur s/p IM nail on 11/19/2023 [S72.141D] 11/18/2023 Not  Applicable    Hyponatremia [E87.1] 11/22/2023 Yes    Acute blood loss as cause of postoperative anemia [D62] 11/19/2023 Yes    Primary osteoarthritis of left knee [M17.12] 06/21/2022 Yes    Chronic heart failure with preserved ejection fraction [I50.32] 05/18/2021 Yes    Chronic anticoagulation [Z79.01] 10/30/2017 Not Applicable    Stage 3b chronic kidney disease [N18.32]  Yes    Osteopenia [M85.80] 05/06/2014 Yes    Persistent atrial fibrillation [I48.19] 09/25/2013 Yes    Peripheral neuropathy [G62.9] 07/29/2013 Yes    Primary hypertension [I10] 09/06/2012 Yes      Problems Resolved During this Admission:       Closed displaced intertrochanteric fracture of right femur s/p IM nail on 11/19/2023   PT/OT   WBAT  Multimodal pain management with acetaminophen 1000 mg TID, methocarbamol 500 mg q6 hrs, and tramadol 50 mg q6 hrs PRN.   Continue home Apixiban 5 mg po BID   Surgical bandages to remain in place to right leg and hip area until Orthopedic clinic follow-up.   Local wound care every 5 days to right hip skin tears and blistering near incisions.   Ortho NALLELY to see weekly while at [facility].   Follow up with Ortho Clinic after discharge.    Lower abdominal pain  Constipation  Continue senna/pericolace BID.   Give lactulose 20 grams x 2 doses.     Persistent atrial fibrillation  Chronic anticoagulation   - Continue apixaban 5 mg BID and atenolol 25 mg daily.      Chronic diastolic heart failure with preserved ejection fraction   Essential Hypertension  - Monitor fluid balance with daily weight & strict I/O  - Continue furosemide 40 mg daily and 20 mg every evening as BP tolerates  - Continue losartan 25 mg daily and atenolol 25 mg daily.   - CHF education to include diet, medication, and CHF flags for MD notification  - Adjust therapy to BP goal < 150/90     Stage 3b chronic kidney disease   - Baseline Creatinine 1.2   - Avoid any nephrotoxic agents including NSAIDs, aminoglycosides, IV contrast (unless absolutely  necessary), gadolinium, fleets and other phosphorous-based laxatives. Caution with antibiotics.  - Renally adjust medications based on patient's creatinine clearance  - Avoid Hypotension  - Routinely monitor renal function with scheduled metabolic profile     Prediabetes   Lab Results   Component Value Date    HGBA1C 6.3 (H) 11/18/2023   - Diabetic Diet  - Monitor glucose with routine metabolic profile     Physical Debility  Weakness   -Patient with decreased bed mobility therefore patient is at high risk for developing wounds and deterioration of any current wounds.   - Continue with PT/OT for gait training and strengthening and restoration of ADL's   - Encourage mobility, OOB in chair, and early ambulation as appropriate  - Fall precautions   - Monitor for bowel and bladder dysfunction  - Monitor for and prevent skin breakdown and pressure ulcers  - Continue DVT prophylaxis with apixaban      IP O[facility] RISK OF UNPLANNED READMISSION Model: Moderate      Anticipated Disposition:  Home with home health      Future Appointments   Date Time Provider Department Center   12/4/2023  1:00 PM Sheree Isbell, NP [facility] ORTHO [address]   12/6/2023  7:00 AM [facility] [place]   12/6/2023  8:15 AM LAB, [facility] LAB [place]   12/14/2023 10:30 AM Jacobo Mcleod MD [facility]   1/4/2024 10:15 AM Henry Perez MD [facility] ORTHO [address]   2/6/2024 10:45 AM Henry Perez MD [facility] [address] [place]       I certify that SNF services are required to be given on an inpatient basis because Ngoc Castellanos needs for skilled nursing care and/or skilled rehabilitation are required on a daily basis and such services can only practically be provided in a skilled nursing facility setting and are for an ongoing condition for which she received inpatient care in the hospital.       Jose A Ragsdale MD  Department of Hospital Medicine   [facility] - Skilled Nursing

## 2023-11-24 NOTE — PT/OT/SLP EVAL
Occupational Therapy   Evaluation    Name: Ngoc Castellanos  MRN: [id number]  Admit Date: 11/22/2023  Recent Surgeries: INSERTION, INTRAMEDULLARY RAMSES, FEMUR, RIGHT, HANA TABLE, MIRIAN, C- ARM DOOR SIDE      General Precautions: Standard, fall, hearing impaired  Orthopedic Precautions:RLE weight bearing as tolerated   Braces: N/A    Recommendations:     Discharge Recommendations: Low Intensity Therapy  Level of Assistance Recommended: 24 hours significant assistance  Discharge Equipment Recommendations:  hip kit, walker, rolling, shower chair  Barriers to discharge:  Inaccessible home environment, Decreased caregiver support    Assessment:     Ngoc Castellanos is a 89 y.o. female with a medical diagnosis of Closed displaced intertrochanteric fracture of right femur with routine healing. Pt tolerated session well and without incident and shows excellent motivation and potential to improve, but the pt continues to require assistance to perform self-care tasks and mobility. Pt strengths include Functional cognition, Following multi-step tasks, and Attention to task and PLOF, Motivation, and Willingness to participate. However, pt would continue to benefit from cont'd OT services in the SNF setting to improve safety and independence /c functional tasks and ADLs upon discharge. Performance deficits affecting function: weakness, impaired endurance, impaired self care skills, impaired functional mobility, gait instability, impaired balance, decreased upper extremity function, decreased lower extremity function, impaired cardiopulmonary response to activity, edema, orthopedic precautions.      Rehab Potential is good    Activity Tolerance: Fair    Plan:     Patient to be seen 5 x/week to address the above listed problems via self-care/home management, community/work re-entry, therapeutic activities, therapeutic exercises, neuromuscular re-education  Plan of Care Expires: 12/23/23  Plan of Care Reviewed with: patient    Subjective  "    Chief Complaint: Pain in lower abdomen that pt labeled "gas pains"  Patient/Family Comments/goals: "to take care of myself again"    Occupational Profile:  Living Environment: [facility] grab bar.   Previous level of function: Mod I /c ADLs/IADLs/FM using rollator  Roles and Routines: Pt enjoys reading and watching TV  Equipment Used at Home: grab bar, cane, straight, rollator  Assistance upon Discharge: Pt stated that she is able to be helped by assistants at [facility] or will look into housing with higher level assistance if needed.    Pain/Comfort:  Pain Rating 1: other (see comments) (did not rate "It's very bad")  Location - Side 1: Bilateral  Location - Orientation 1: lower  Location 1: abdomen  Pain Addressed 1: Distraction (per pt, NSG notified at earlier time)  Pain Rating Post-Intervention 1: other (see comments) (did not rate "It's still bad. It feels like gas pains")    Patients cultural, spiritual, Congregational conflicts given the current situation: no    Objective:     Communicated with: NSG prior to session.  Patient found supine with Other (comments) (no lines) upon OT entry to room. Pt alert and agreeable to therapy.       Occupational Performance:   Eating   Did they attempt the activity? Yes   Was the activity done independently? No   Assistance Needed Supervision;Setup / clean-up  (SPV 2/2 increased difficulty /c swallowing; Set-up for retrieval)   Was adaptive equipment used? No   CARE Score - Eating 4   Oral Hygiene   Did they attempt the activity? Yes   Was the activity done independently? No   Assistance Needed Supervision  (SPV seated at sink in )   Was adaptive equipment used? Yes   CARE Score - Oral Hygiene 4   Toileting Hygiene   Did they attempt the activity? Yes   Was the activity done independently? No   Assistance Needed Physical assistance   Physical Assistance Level 2 or more helpers  (to preform in supine at bedlevel 2/2 unsafe transfers/impaired standing " tolerance/balance. Pt able to assist /c pericare, but req assist for rectal hygiene, clothing mgmt, and bed mobility req for task.)   Was adaptive equipment used? Yes   CARE Score - Toileting Hygiene 1   Toileting Hygiene Discharge Goal   Discharge Goal 2   Shower/Bathe Self   Did they attempt the activity? Yes   Was the activity done independently? No   Assistance Needed Physical assistance   Physical Assistance Level More than half  (Mod A in supine at bedlevel/seated at EOB. (A) req for BLE and buttocks in supine. Pt able to clean perineal region, torso, and BUE seated at EOB)   Was adaptive equipment used? Yes   CARE Score - Shower/Bathe Self 2   Upper Body Dressing   Did they attempt the activity? Yes   Was the activity done independently? No   Assistance Needed Supervision  (SBA seated at EOB to dof shirt/don house coar)   Was adaptive equipment used? No   CARE Score - Upper Body Dressing 4   Lower Body Dressing   Did they attempt the activity? Yes   Was the activity done independently? No   Assistance Needed Physical assistance   Physical Assistance Level 2 or more helpers  (done in supine at bedlevel 2/2 impaired balance and standing tolerance)   Was adaptive equipment used? Yes   CARE Score - Lower Body Dressing 1   Putting On/Taking Off Footwear   Did they attempt the activity? Yes   Was the activity done independently? No   Assistance Needed Physical assistance   Physical Assistance Level Total assistance   Was adaptive equipment used? No   CARE Score - Putting On/Taking Off Footwear 1   Personal Hygiene   Did they attempt the activity? Yes   Was the activity done independently? No   Assistance Needed Supervision  (seated in WC at sink to comb hair and wash face)   Was adaptive equipment used? Yes   CARE Score - Personal Hygiene 4   Roll Left and Right   Did they attempt the activity? Yes   Was the activity done independently? No   Assistance Needed Physical assistance   Physical Assistance Level More  than half  (Min A to roll toward R; Max A to roll toward L /c HOB elevated and bed rails)   Was adaptive equipment used? Yes   CARE Score - Roll Left and Right 2   Lying to Sitting on Side of Bed   Did they attempt the activity? Yes   Was the activity done independently? No   Assistance Needed Physical assistance   Physical Assistance Level 2 or more helpers  (Max A x2 /c use of handrails and HOB elevated)   Was adaptive equipment used? Yes   CARE Score - Lying to Sitting on Side of Bed 1   Sit to Stand   Did they attempt the activity? Yes   Was the activity done independently? No   Assistance Needed Physical assistance  (Max A x2 /c RW from EOB)   Physical Assistance Level 2 or more helpers   Was adaptive equipment used? Yes   CARE Score - Sit to Stand 1   Chair/Bed-to-Chair Transfer   Did they attempt the activity? Yes   Was the activity done independently? No   Assistance Needed Physical assistance  (Max A x2 /c RW)   Physical Assistance Level 2 or more helpers   Was adaptive equipment used? Yes   CARE Score - Chair/Bed-to-Chair Transfer 1   Toilet Transfer   Did they attempt the activity? No   Reason if not Attempted Safety concerns   CARE Score - Toilet Transfer 88   Tub/Shower Transfer   Did they attempt the activity? No   Reason if not Attempted Safety concerns   CARE Score - Tub/Shower Transfer 88     Cognitive/Visual Perceptual:  Cognitive/Psychosocial Skills:  -       Oriented to: Person, Place, Time, and Situation   -       Follows Commands/attention:Follows multistep  commands  -       Communication: clear/fluent  -       Memory: No Deficits noted  -       Safety awareness/insight to disability: intact   -       Mood/Affect/Coping skills/emotional control: Appropriate to situation  Visual/Perceptual:  -Intact      Physical Exam:  Balance: -       Static sitting- Fair; Dynamic sitting- Fair+; Dynamic and static standing balance- Poor  Postural examination/scapula alignment:    -       No postural  abnormalities identified  Skin integrity: Bruising of BLE and Wound RLE 2/2 surgery  Edema:  Moderate BLE  Sensation: -       Impaired  -pt reporting numbess/tingling in BLE  Motor Planning: -       WFL  Dominant hand: -       R  Upper Extremity Range of Motion:  -       Right Upper Extremity: WFL  -       Left Upper Extremity: WFL  Upper Extremity Strength: -       Right Upper Extremity: WFL  -       Left Upper Extremity: WFL   Strength: -       Right Upper Extremity: WFL  -       Left Upper Extremity: WFL  Fine Motor Coordination: -       Intact  Gross motor coordination: WFL  Functional endurance- Fair-    AMPAC 6 Click ADL:  AMPAC Total Score: 14    Treatment & Education:  Pt educated on POC, role of OT, and safety. Pt performed tasks to improve safety and independence in functional tasks and ADLs as mentioned above.       Patient left up in chair with call button in reach and all needs met    GOALS:   Multidisciplinary Problems       Occupational Therapy Goals          Problem: Occupational Therapy    Goal Priority Disciplines Outcome Interventions   Occupational Therapy Goal     OT, PT/OT Ongoing, Progressing    Description: Goals to be met by: 12/23/2023     Patient will increase functional independence with ADLs by performing:    Feeding with Set-up Assistance.  UE Dressing with Set-up assistance  LE Dressing with Maximum Assistance /c AE/LRAD as needed  Personal Hygiene while seated at sink with Modified [place].  Oral Hygiene while seated at sink with Modified [place].  Toileting from bedside commode with Maximum Assistance for hygiene and clothing management.   Bathing from  sitting at sink/on commode with Minimal Assistance.  Rolling to Right with Moderate Assistance.   Rolling to Left with Contact Guard Assistance.   Toilet transfer to bedside commode with Maximum Assistance.  Tolerate standing functional tasks for ~2 minutes /c Mod A and LRAD as needed  Footwear /c Mod A and AE as  needed  SC t/f /c Max A and LRAD as needed.                         History:     Past Medical History:   Diagnosis Date    *Atrial fibrillation     Atrial fibrillation     Breast cancer 02/2014    Right breast infiltrating ductal CA, ER/SC positive, Her2 equivocal    Closed displaced intertrochanteric fracture of right femur s/p IM nail on 11/19/2023 11/18/2023    H/O total mastectomy of right breast 03/17/2014    Hypertension     Persistent atrial fibrillation 09/25/2013    Prediabetes 11/19/2023    Primary hypertension 09/06/2012    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    left    Stage 3b chronic kidney disease     Valvular regurgitation     moderate MR         Past Surgical History:   Procedure Laterality Date    APPENDECTOMY      BRAIN SURGERY  2002    meningioma    BREAST BIOPSY  2/2014    Right breast- [facility]    BREAST CYST EXCISION  1960    left breast - benign    BREAST SURGERY  03/17/14    right mastectomy    HEMORRHOID SURGERY      HYSTERECTOMY      INTRAMEDULLARY RODDING OF FEMUR Right 11/19/2023    Procedure: INSERTION, INTRAMEDULLARY RAMSES, FEMUR, RIGHT, HANA TABLE, MIRIAN, C- ARM DOOR SIDE;  Surgeon: Henry Perez MD;  Location: [facility] OR [address] [place] [address];  Service: Orthopedics;  Laterality: Right;    MASTECTOMY Right     TONSILLECTOMY         Time Tracking:     OT Date of Treatment: 11/24/23  OT Start Time: 0840  OT Stop Time: 0950  OT Total Time (min): 70 min    Billable Minutes:Evaluation 15  Self Care/Home Management 45  Therapeutic Activity 10    11/24/2023

## 2023-11-25 PROCEDURE — 25000003 PHARM REV CODE 250: Mod: HCNC | Performed by: HOSPITALIST

## 2023-11-25 PROCEDURE — 11000004 HC SNF PRIVATE: Mod: HCNC

## 2023-11-25 RX ORDER — BISACODYL 10 MG
10 SUPPOSITORY, RECTAL RECTAL DAILY PRN
Status: DISCONTINUED | OUTPATIENT
Start: 2023-11-25 | End: 2023-12-13 | Stop reason: HOSPADM

## 2023-11-25 RX ORDER — BISACODYL 10 MG
10 SUPPOSITORY, RECTAL RECTAL ONCE
Status: DISCONTINUED | OUTPATIENT
Start: 2023-11-25 | End: 2023-11-28

## 2023-11-25 RX ORDER — POLYETHYLENE GLYCOL 3350 17 G/17G
17 POWDER, FOR SOLUTION ORAL DAILY
Status: DISCONTINUED | OUTPATIENT
Start: 2023-11-26 | End: 2023-12-13 | Stop reason: HOSPADM

## 2023-11-25 RX ADMIN — ACETAMINOPHEN 1000 MG: 325 TABLET ORAL at 06:11

## 2023-11-25 RX ADMIN — ALUMINUM HYDROXIDE, MAGNESIUM HYDROXIDE, AND DIMETHICONE 30 ML: 400; 400; 40 SUSPENSION ORAL at 11:11

## 2023-11-25 RX ADMIN — Medication 1 TABLET: at 08:11

## 2023-11-25 RX ADMIN — SENNOSIDES AND DOCUSATE SODIUM 1 TABLET: 8.6; 5 TABLET ORAL at 08:11

## 2023-11-25 RX ADMIN — ATENOLOL 25 MG: 25 TABLET ORAL at 08:11

## 2023-11-25 RX ADMIN — ACETAMINOPHEN 1000 MG: 325 TABLET ORAL at 09:11

## 2023-11-25 RX ADMIN — LACTULOSE 20 G: 20 SOLUTION ORAL at 08:11

## 2023-11-25 RX ADMIN — APIXABAN 5 MG: 2.5 TABLET, FILM COATED ORAL at 08:11

## 2023-11-25 RX ADMIN — ANTACID TABLETS 500 MG: 500 TABLET, CHEWABLE ORAL at 12:11

## 2023-11-25 RX ADMIN — METHOCARBAMOL 500 MG: 500 TABLET ORAL at 06:11

## 2023-11-25 RX ADMIN — METHOCARBAMOL 500 MG: 500 TABLET ORAL at 01:11

## 2023-11-25 RX ADMIN — TRAMADOL HYDROCHLORIDE 50 MG: 50 TABLET, COATED ORAL at 12:11

## 2023-11-25 RX ADMIN — ACETAMINOPHEN 1000 MG: 325 TABLET ORAL at 01:11

## 2023-11-25 RX ADMIN — FUROSEMIDE 20 MG: 20 TABLET ORAL at 04:11

## 2023-11-25 RX ADMIN — Medication 1 TABLET: at 04:11

## 2023-11-25 RX ADMIN — LOSARTAN POTASSIUM 25 MG: 25 TABLET, FILM COATED ORAL at 08:11

## 2023-11-25 RX ADMIN — FUROSEMIDE 40 MG: 40 TABLET ORAL at 08:11

## 2023-11-25 RX ADMIN — METHOCARBAMOL 500 MG: 500 TABLET ORAL at 09:11

## 2023-11-25 NOTE — NURSING
Ms Castellanos is lying in bed at this time. Pt stated that her gas pain  and hip pain have subsided. Safety measures maintained. Call light is within reach. Will conitnue to monitor.

## 2023-11-25 NOTE — NURSING
This nurse entered room 304 to provide assistance with sitting up in bed to eat supper and to open containers on tray. Ms Castellanos was easily aroused by touch. Pt stated that she was not hungry and she did not want to eat supper. Ms Castellanos was given verbal teaching on the importance of getting proper nutrition for strength, and wound healing. Pt verbalized understanding of information.she still declined meal. This nurse informed Ms Castellanos to notify staff whn she wanted to eat so that food could be reheated. Call light is within reach. Will continue to monitor.

## 2023-11-25 NOTE — NURSING
This nurse attempted to administer bidacodyl suppository to pt, but pt was incontinent of stool. Pt had a lg, soft, and brown BM. She was cleaned, Triad barrier cream applied, and new adult brief applied. Call light placed within reach.

## 2023-11-25 NOTE — PROGRESS NOTES
[facility]                                  Skilled Nursing Facility                   Progress Note     Admit Date: 11/22/2023  W[facility]   Principal Problem:  Closed displaced intertrochanteric fracture of right femur with routine healing   Code Status: DNR    HPI obtained from patient interview and chart review     Chief Complaint: Re-evaluation of medical treatment and therapy status, constipation, hypotension f/up    HPI:   Ngoc Castellanos is an 89 y.o. female with PMH Afib on Eliquis, HFpEF on lasix, HTN, and CKD stage III, new prediabetes dx (A1C 6.3 Nov 2023). Admitted to [facility] 11/18/2023 after fall at home. Now s/p right hip cephalomedullary nail fixation on 11/19/23. WBAT per Ortho. Continue home eliquis for a-fib also to cover post op dvt prophy. Multimodal pain management post-op with Tylenol 1000 mg po every 8 hours and Robaxin 500 mg po 3 times daily. Post-op hypotension. BP improved. Home Losartan Lasix and Atenolol resumed on discharge to [facility].     Interval History  24 hour chart review completed. Pertinent lab, micro, and/or radiology results addressed below. NAEON. NAD.   Afebrile, hemodynamically stable. Normotensive for past 24 hours.  Patient c/o constipation. LBM 11/21. Give single dose dulcolax suppository today then daily prn constipation. Add miralax daily.     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    Past Surgical History: Patient has a past surgical history  "that includes Brain surgery (2002); Hysterectomy; Tonsillectomy; Hemorrhoid surgery; Appendectomy; Breast biopsy (2/2014); Breast surgery (03/17/14); Breast cyst excision (1960); Mastectomy (Right); and Intramedullary rodding of femur (Right, 11/19/2023).    Social History: Patient reports that she has never smoked. She has never used smokeless tobacco. She reports that she does not drink alcohol and does not use drugs.    Allergies: Patient is allergic to clindamycin, levofloxacin, lidocaine, and adhesive tape-silicones.    ROS  Constitutional: Negative for fever   Eyes: Negative for blurred vision, double vision   Respiratory: Negative for cough, shortness of breath   Cardiovascular: Negative for chest pain, palpitations, and leg swelling.   Gastrointestinal: Negative for abdominal pain, diarrhea, nausea, vomiting. + constipation  Genitourinary: Negative for dysuria, frequency   Musculoskeletal:  + generalized weakness. Negative for back pain and myalgias.   Skin: Negative for itching and rash.   Neurological: Negative for dizziness, headaches.   Psychiatric/Behavioral: Negative for depression. The patient is not nervous/anxious.      24 hour Vital Sign Range   Temp:  [97.9 °F (36.6 °C)-98.3 °F (36.8 °C)]   Pulse:  [71-79]   Resp:  [18-22]   BP: (125)/(60-61)   SpO2:  [94 %-96 %]     PEx  Constitutional: Patient appears debilitated.  No distress noted  HENT:   Head: Normocephalic and atraumatic.   Eyes: Pupils are equal, round  Neck: Normal range of motion. Neck supple.   Cardiovascular: Normal rate, regular rhythm and normal heart sounds.    Pulmonary/Chest: Effort normal and breath sounds are clear  Abdominal: Soft. Bowel sounds are normal.   Musculoskeletal: Normal range of motion.   Neurological: Alert and oriented to person, place, and time.   Psychiatric: Normal mood and affect. Behavior is normal.   Skin: Skin is pale, warm and dry. RLE incisional wound.    No results for input(s): "GLUCOSE", "NA", "K", " ""CL", "CO2", "BUN", "CREATININE", "CALCIUM", "MG" in the last 24 hours.    No results for input(s): "WBC", "RBC", "HGB", "HCT", "PLT", "MCV", "MCH", "MCHC" in the last 24 hours.      Assessment and Plan:    Closed displaced intertrochanteric fracture of right femur s/p IM nail on 11/19/2023   PT/OT consultations to eval and tx  WBAT  Multimodal pain management  Continue home Apixiban 5 mg po BID   Surgical bandages to remain in place to right leg and hip area until Orthopedic clinic follow-up.   Local wound care every 5 days to right hip skin tears and blistering near incisions    Constipation  Acute, unstable  - Continue scheduled senokot and miralax  - Suppository daily PRN  - Adjust bowel regimen prn to avoid diarrhea  - Encourage fluid intake  - Encourage safe physical activity as tolerated  - Monitor bowel movement regularity and consistency    Essential Hypertension  Hypotensive in hospital post op  - Chronic, stable  - Resume losartan 25 mg daily as BP tolerates  - Continue lasix and atenolol as BP tolerates  - Monitor BP  - Adjust therapy to BP goal < 150/90  - Avoid hypotension     Persistent atrial fibrillation  Chronic anticoagulation   - Continue eliquis 5 mg po bid  - Continue atenolol 25 mg daily    Chronic diastolic heart failure with preserved ejection fraction   Stable. No SOB, LE or abdominal edema. Clinically dry.    - Monitor fluid balance with daily weight & strict I/O  - See "Hypertension"  - Continue Lasix 40 mg daily and 20 mg nightly po as BP tolerates      - CHF education to include diet, medication, and CHF flags for MD notification    Stage 3b chronic kidney disease   - Chronic, stable  - Baseline Creatinine 1.2   - Avoid any nephrotoxic agents including NSAIDs, aminoglycosides, IV contrast (unless absolutely necessary), gadolinium, fleets and other phosphorous-based laxatives. Caution with antibiotics.  - Renally adjust medications based on patient's creatinine clearance  - Avoid " Hypotension  - Routinely monitor renal function with scheduled metabolic profile    Prediabetes   - Last A1c 6.3 Nov 2023  - Home med(s): none - new dx, begin with diet modification  - Diabetic Diet  - Monitor glucose with routine metabolic profile    Physical Debility  Weakness   -Patient with decreased bed mobility therefore patient is at high risk for developing wounds and deterioration of any current wounds.   - Continue with PT/OT for gait training and strengthening and restoration of ADL's   - Encourage mobility, OOB in chair, and early ambulation as appropriate  - Fall precautions   - Monitor for bowel and bladder dysfunction  - Monitor for and prevent skin breakdown and pressure ulcers  - Continue DVT prophylaxis with Eliquis    Extended Visit  Total time spent: 31 minutes  Description of Time: counseling patient on clinical condition, therapies provided, plan of care, emotional support, coordinating patient care with other care team members, reviewing and interpreting labs and imaging, collaboration with physician, initiating new orders, chart review, and documentation. See interval hx.       Kerry Russell NP  Department of Hospital Medicine   [facility]     DOS: 11/25/2023       Patient note was created using MModal Dictation.  Any errors in syntax or even information may not have been identified and edited on initial review prior to signing this note.

## 2023-11-25 NOTE — NURSING
Ms Pack c/o gas and stomach cramping. Pt was given Mylanta. Pt received verbal teaching on the side effect of Lactulose. Ms Pack verbalized understanding of information. Pt is passing gas. Will continue to monitor.

## 2023-11-25 NOTE — PLAN OF CARE
Problem: Adult Inpatient Plan of Care  Goal: Plan of Care Review  Outcome: Ongoing, Progressing  Flowsheets (Taken 11/24/2023 2100)  Plan of Care Reviewed With: patient  Goal: Patient-Specific Goal (Individualized)  Outcome: Ongoing, Progressing  Goal: Absence of Hospital-Acquired Illness or Injury  Outcome: Ongoing, Progressing  Goal: Optimal Comfort and Wellbeing  Outcome: Ongoing, Progressing  Goal: Readiness for Transition of Care  Outcome: Ongoing, Progressing     Problem: Impaired Wound Healing  Goal: Optimal Wound Healing  Outcome: Ongoing, Progressing  Intervention: Promote Wound Healing  Flowsheets (Taken 11/24/2023 2100)  Oral Nutrition Promotion: rest periods promoted  Sleep/Rest Enhancement:   awakenings minimized   regular sleep/rest pattern promoted   relaxation techniques promoted   room darkened  Activity Management: Rolling - L1  Pain Management Interventions:   pillow support provided   relaxation techniques promoted   quiet environment facilitated   position adjusted   pain management plan reviewed with patient/caregiver   medication offered   care clustered     Problem: Fall Injury Risk  Goal: Absence of Fall and Fall-Related Injury  Outcome: Ongoing, Progressing  Intervention: Identify and Manage Contributors  Flowsheets (Taken 11/24/2023 2100)  Self-Care Promotion:   BADL personal objects within reach   BADL personal routines maintained   safe use of adaptive equipment encouraged  Medication Review/Management: medications reviewed  Intervention: Promote Injury-Free Environment  Flowsheets (Taken 11/24/2023 2100)  Safety Promotion/Fall Prevention:   assistive device/personal item within reach   Fall Risk reviewed with patient/family   nonskid shoes/socks when out of bed   side rails raised x 2   instructed to call staff for mobility     Problem: Skin Injury Risk Increased  Goal: Skin Health and Integrity  Outcome: Ongoing, Progressing  Intervention: Optimize Skin Protection  Flowsheets (Taken  11/24/2023 2100)  Pressure Reduction Techniques:   frequent weight shift encouraged   weight shift assistance provided  Pressure Reduction Devices: positioning supports utilized  Skin Protection:   skin sealant/moisture barrier applied   protective footwear used  Head of Bed (HOB) Positioning: HOB at 45 degrees

## 2023-11-25 NOTE — NURSING
Ms Pack was repositioned in bed for comfort. Ice pack applied to rt thigh. Safety measures maintained. Call light is within reach.

## 2023-11-25 NOTE — PLAN OF CARE
Problem: Adult Inpatient Plan of Care  Goal: Plan of Care Review  Outcome: Ongoing, Progressing  Flowsheets (Taken 11/25/2023 1131)  Plan of Care Reviewed With: patient     Problem: Impaired Wound Healing  Goal: Optimal Wound Healing  Outcome: Ongoing, Progressing     Problem: Fall Injury Risk  Goal: Absence of Fall and Fall-Related Injury  Outcome: Ongoing, Progressing     Problem: Skin Injury Risk Increased  Goal: Skin Health and Integrity  Outcome: Ongoing, Progressing     Ms Pack remains AAO; pt is sitting up in w/c in room 304. Pt is talking with her guest at the bedside. No c/o pain or discomfort voiced. Safety measures maintained. Call light is within reach. Will continue with plan of care.

## 2023-11-26 PROCEDURE — 11000004 HC SNF PRIVATE: Mod: HCNC

## 2023-11-26 PROCEDURE — 25000003 PHARM REV CODE 250: Mod: HCNC | Performed by: HOSPITALIST

## 2023-11-26 PROCEDURE — 97110 THERAPEUTIC EXERCISES: CPT | Mod: HCNC,CO

## 2023-11-26 PROCEDURE — 25000003 PHARM REV CODE 250: Mod: HCNC | Performed by: FAMILY MEDICINE

## 2023-11-26 PROCEDURE — 97116 GAIT TRAINING THERAPY: CPT | Mod: HCNC,CQ

## 2023-11-26 PROCEDURE — 94761 N-INVAS EAR/PLS OXIMETRY MLT: CPT | Mod: HCNC

## 2023-11-26 PROCEDURE — 97535 SELF CARE MNGMENT TRAINING: CPT | Mod: HCNC,CO

## 2023-11-26 PROCEDURE — 97530 THERAPEUTIC ACTIVITIES: CPT | Mod: HCNC,CQ

## 2023-11-26 RX ORDER — FUROSEMIDE 20 MG/1
20 TABLET ORAL EVERY MORNING
Status: DISCONTINUED | OUTPATIENT
Start: 2023-11-27 | End: 2023-12-04

## 2023-11-26 RX ORDER — FUROSEMIDE 20 MG/1
20 TABLET ORAL
Status: DISCONTINUED | OUTPATIENT
Start: 2023-11-26 | End: 2023-12-04

## 2023-11-26 RX ADMIN — METHOCARBAMOL 500 MG: 500 TABLET ORAL at 01:11

## 2023-11-26 RX ADMIN — TRAMADOL HYDROCHLORIDE 50 MG: 50 TABLET, COATED ORAL at 04:11

## 2023-11-26 RX ADMIN — ACETAMINOPHEN 1000 MG: 325 TABLET ORAL at 09:11

## 2023-11-26 RX ADMIN — Medication 1 TABLET: at 08:11

## 2023-11-26 RX ADMIN — FUROSEMIDE 20 MG: 20 TABLET ORAL at 04:11

## 2023-11-26 RX ADMIN — METHOCARBAMOL 500 MG: 500 TABLET ORAL at 05:11

## 2023-11-26 RX ADMIN — METHOCARBAMOL 500 MG: 500 TABLET ORAL at 09:11

## 2023-11-26 RX ADMIN — ALUMINUM HYDROXIDE, MAGNESIUM HYDROXIDE, AND DIMETHICONE 30 ML: 400; 400; 40 SUSPENSION ORAL at 09:11

## 2023-11-26 RX ADMIN — Medication 1 TABLET: at 04:11

## 2023-11-26 RX ADMIN — ACETAMINOPHEN 1000 MG: 325 TABLET ORAL at 01:11

## 2023-11-26 RX ADMIN — SENNOSIDES AND DOCUSATE SODIUM 1 TABLET: 8.6; 5 TABLET ORAL at 08:11

## 2023-11-26 RX ADMIN — ACETAMINOPHEN 1000 MG: 325 TABLET ORAL at 05:11

## 2023-11-26 RX ADMIN — ATENOLOL 25 MG: 25 TABLET ORAL at 09:11

## 2023-11-26 RX ADMIN — SENNOSIDES AND DOCUSATE SODIUM 1 TABLET: 8.6; 5 TABLET ORAL at 09:11

## 2023-11-26 RX ADMIN — FUROSEMIDE 20 MG: 20 TABLET ORAL at 09:11

## 2023-11-26 RX ADMIN — APIXABAN 5 MG: 2.5 TABLET, FILM COATED ORAL at 09:11

## 2023-11-26 RX ADMIN — APIXABAN 5 MG: 2.5 TABLET, FILM COATED ORAL at 08:11

## 2023-11-26 NOTE — NURSING
Kerry Russell NP informed while making rounds that Ms Miller current GW=764/56 and HR=90. Received verbal order to give the scheduled 25 mg of atenolol and give 20 mg of lasix instead of 40 mg. NP also stated to hold the 25 mg of Losartan this am. VORB.

## 2023-11-26 NOTE — PLAN OF CARE
Problem: Occupational Therapy  Goal: Occupational Therapy Goal  Description: Goals to be met by: 12/23/2023     Patient will increase functional independence with ADLs by performing:    Feeding with Set-up Assistance.  UE Dressing with Set-up assistance  LE Dressing with Maximum Assistance /c AE/LRAD as needed  Personal Hygiene while seated at sink with Modified [place].  Oral Hygiene while seated at sink with Modified [place].  Toileting from bedside commode with Maximum Assistance for hygiene and clothing management.   Bathing from  sitting at sink/on commode with Minimal Assistance.  Rolling to Right with Moderate Assistance.   Rolling to Left with Contact Guard Assistance.   Toilet transfer to bedside commode with Maximum Assistance.  Tolerate standing functional tasks for ~2 minutes /c Mod A and LRAD as needed  Footwear /c Mod A and AE as needed  SC t/f /c Max A and LRAD as needed.    11/26/2023 1455 by Sun Reveles, WATSON/L  Outcome: Ongoing, Progressing  11/26/2023 1455 by Sun Reveles, WATSON/L  Outcome: Ongoing, Progressing

## 2023-11-26 NOTE — PLAN OF CARE
Problem: Adult Inpatient Plan of Care  Goal: Plan of Care Review  Outcome: Ongoing, Progressing  Flowsheets (Taken 11/26/2023 1349)  Plan of Care Reviewed With: patient     Problem: Impaired Wound Healing  Goal: Optimal Wound Healing  Outcome: Ongoing, Progressing     Problem: Fall Injury Risk  Goal: Absence of Fall and Fall-Related Injury  Outcome: Ongoing, Progressing     Problem: Skin Injury Risk Increased  Goal: Skin Health and Integrity  Outcome: Ongoing, Progressing

## 2023-11-26 NOTE — CONSULTS
"  [facility] - Skilled Nursing  Adult Nutrition  Consult Note    SUMMARY   Recommendations  Recommend 1500 calorie diet, change boost plus to boost glucose BID, follow glucose, diabetic education , HF education prior to dc., RD following  Goals: POP to meet 85%of EPN to aid in healing by next RD follow up  Nutrition Goal Status: new  Communication of RD Recs: other (comment) (POC)    Assessment and Plan  Knowledge deficit regarding diabetic diet as evidenced by new diagnosis 11/2023.    Increased nutrient needs related to wound healing as evidenced by femur Fx repair.      Plan  Carbohydrate modified diet  Collaboration with other providers  Mineral supplement therapy- Calcium   Nutrition education- diabetic diet, HF, protein needs/sources  Vitamin supplement therapy- Vit D    Malnutrition Assessment pending                                       Reason for Assessment    Reason For Assessment: consult  Diagnosis:  (R femur Fx s/p IM Nail)  Relevant Medical History: Pre-diabetes, HTN,HF, CKD3, CANCER, CVA, AFIB  Interdisciplinary Rounds: did not attend  General Information Comments: Cane or walker at home, s/p fall at home, new diagnosis of diabetes 11/23. Assessment being completed remotely. NFPE pending followup.  Nutrition Discharge Planning: DC heat healty diabetic diet, daily weights    Nutrition Risk Screen    Nutrition Risk Screen: no indicators present    Nutrition/Diet History    Patient Reported Diet/Restrictions/Preferences: general  Spiritual, Cultural Beliefs, Confucianist Practices, Values that Affect Care: no  Food Allergies: NKFA  Factors Affecting Nutritional Intake: None identified at this time    Anthropometrics    Temp: 97.9 °F (36.6 °C)  Height Method: Stated  Height: 5' 7" (170.2 cm)  Height (inches): 67 in  Weight Method: Bed Scale  Weight: 80.9 kg (178 lb 5.6 oz)  Weight (lb): 178.35 lb  Ideal Body Weight (IBW), Female: 135 lb  % Ideal Body Weight, Female (lb): 132.11 %  BMI (Calculated): " 27.9  BMI Grade: 25 - 29.9 - overweight  Usual Body Weight (UBW), k.8 kg  % Usual Body Weight: 100.33  % Weight Change From Usual Weight: 0.12 %       Lab/Procedures/Meds    Pertinent Labs Reviewed: reviewed  Pertinent Labs Comments: HgA1c 6.3, Hg 10.3, Hct 32.1, glucose 131, GFR 45.4, BUN 40  Pertinent Medications Reviewed: reviewed  Pertinent Medications Comments: Apixaban, Ca/Vit D3, CK3, senna-docusate, polyethylene glycol       Estimated/Assessed Needs    Weight Used For Calorie Calculations: 80.9 kg (178 lb 5.6 oz)  Energy Calorie Requirements (kcal): 1465  Energy Need Method: [place]-St Jeor (x 1.2(PAL) 2/2 obesity)  Protein Requirements: 65g  Weight Used For Protein Calculations: 80.9 kg (178 lb 5.6 oz) (x .8g 2/2 CKD)  Fluid Requirements (mL): 1465 or per MD     RDA Method (mL): 1465  CHO Requirement: 183      Nutrition Prescription Ordered    Current Diet Order: diabetic  Nutrition Order Comments: PO %  Oral Nutrition Supplement: boost plus TID    Evaluation of Received Nutrient/Fluid Intake    I/O: no data  Energy Calories Required: meeting needs  Protein Required: meeting needs  Fluid Required: meeting needs  Comments: LBM 11/15  Tolerance: tolerating  % Intake of Estimated Energy Needs: 75 - 100 %  % Meal Intake: 75 - 100 %    Nutrition Risk    Level of Risk/Frequency of Follow-up: low (one time per week)       Monitor and Evaluation    Food and Nutrient Adminstration: diet order  Knowledge/Beliefs/Attitudes: food and nutrition knowledge/skill  Physical Activity and Function: nutrition-related ADLs and IADLs  Anthropometric Measurements: weight change  Biochemical Data, Medical Tests and Procedures: glucose/endocrine profile, gastrointestinal profile, electrolyte and renal panel  Nutrition-Focused Physical Findings: overall appearance, skin       Nutrition Follow-Up    RD Follow-up?: Yes

## 2023-11-26 NOTE — PT/OT/SLP PROGRESS
"Physical Therapy Treatment    Patient Name:  Ngoc Castellanos   MRN:  [id number]  Admit Date: 11/22/2023  Admitting Diagnosis: Closed displaced intertrochanteric fracture of right femur with routine healing  Recent Surgeries:     General Precautions: Standard, fall  Orthopedic Precautions: RLE weight bearing as tolerated  Braces: N/A    Recommendations:     Discharge Recommendations: Low Intensity Therapy  Level of Assistance Recommended at Discharge: 24 hours significant assistance  Discharge Equipment Recommendations: shower chair, walker, rolling, wheelchair  Barriers to discharge: Inaccessible home environment    Assessment:     Ngoc Castellanos is a 89 y.o. female admitted with a medical diagnosis of Closed displaced intertrochanteric fracture of right femur with routine healing . Patient showed good participation, but endurance and R LE strength is limited. Shortness of breath noted with activities in standing, which limited tolerance to gait training.      Performance deficits affecting function: weakness, impaired endurance, impaired self care skills, impaired functional mobility, gait instability, impaired balance, decreased coordination, decreased lower extremity function, pain, decreased ROM, edema, impaired skin, impaired cardiopulmonary response to activity, orthopedic precautions.    Rehab Potential is fair    Activity Tolerance: Fair    Plan:     Patient to be seen 5 x/week to address the above listed problems via gait training, therapeutic activities, therapeutic exercises, neuromuscular re-education, wheelchair management/training    Plan of Care Expires: 12/24/23  Plan of Care Reviewed with: patient    Subjective     Patient states " I will try my best, thanks for helping me".     Pain/Comfort:  Pain Rating 1: 3/10  Location - Side 1: Right  Location - Orientation 1: generalized  Location 1: thigh  Pain Addressed 1: Reposition, Distraction  Pain Rating Post-Intervention 1: 3/10    Patient's cultural, " spiritual, Faith conflicts given the current situation:  no    Objective:     Communicated with NSG prior to session.  Patient found up in chair with Other (comments) upon PT entry to room.     Therapeutic Activities and Exercises: Attempted LE Ergometer, but patient was unable to perform due to R LE weakness and swelling. There ex : LAQ, HIP FLEX AND AP 2X20 REPS each with RLE AAROM.    Functional Mobility:  Transfers:     Sit to Stand:  moderate assistance and maximal assistance with rolling walker  Gait: 6 ft and 4 ft with RW and max assistance, with R LE WBAT and chair follow by tech.  Wheelchair Propulsion:  Pt propelled Standard wheelchair x 21 feet on Level tile with  Right upper extremity and Left upper extremity with Minimal Assistance.     AM-PAC 6 CLICK MOBILITY  11    Patient left up in chair with call button in reach.    GOALS:   Multidisciplinary Problems       Physical Therapy Goals          Problem: Physical Therapy    Goal Priority Disciplines Outcome Goal Variances Interventions   Physical Therapy Goal     PT, PT/OT Ongoing, Progressing     Description: Goals to be met by: 23       Patient will increase functional independence with mobility by performin. Supine to sit with Contact Guard Assistance  2. Sit to supine with Contact Guard Assistance  3. Rolling to Left and Right with Standby Assistance  4. Sit to stand transfer with Contact Guard Assistance  5. Bed to chair transfer with Contact Guard Assistance using Rolling Walker/LRAD  6. Gait  x 50 feet with Contact Guard Assistance using Rolling Walker/LRAD  7. Wheelchair propulsion x 150 feet with Modified [place] using bilateral upper extremities                         Time Tracking:     PT Received On: 23  PT Start Time: 1031  PT Stop Time: 1110  PT Total Time (min): 39 min    Billable Minutes: Gait Training 9 and Therapeutic Activity 30    Treatment Type: Treatment  PT/PTA: PTA     Number of PTA visits since last PT  visit: 1     11/26/2023

## 2023-11-26 NOTE — NURSING
Secure chat message sent to Kerry Russell NP in regards to Ms Castellanos's current uc=443/56. Pt is d/t receive 20 mg of lasix; please advise.    Secure chat message received from Kerry Russell NP to hold this dose of 20 mg of Lasix.  Will continue with plan of care.

## 2023-11-26 NOTE — PLAN OF CARE
Problem: Occupational Therapy  Goal: Occupational Therapy Goal  Description: Goals to be met by: 12/23/2023     Patient will increase functional independence with ADLs by performing:    Feeding with Set-up Assistance.  UE Dressing with Set-up assistance  LE Dressing with Maximum Assistance /c AE/LRAD as needed  Personal Hygiene while seated at sink with Modified [place].  Oral Hygiene while seated at sink with Modified [place].  Toileting from bedside commode with Maximum Assistance for hygiene and clothing management.   Bathing from  sitting at sink/on commode with Minimal Assistance.  Rolling to Right with Moderate Assistance.   Rolling to Left with Contact Guard Assistance.   Toilet transfer to bedside commode with Maximum Assistance.  Tolerate standing functional tasks for ~2 minutes /c Mod A and LRAD as needed  Footwear /c Mod A and AE as needed  SC t/f /c Max A and LRAD as needed.    Outcome: Ongoing, Progressing

## 2023-11-26 NOTE — PLAN OF CARE
Recommend 1500 calorie diet, change boost plus to boost glucose BID, follow glucose, diabetic education , HF education prior to dc., RD following  Goals: POP to meet 85%of EPN to aid in healing by next RD follow up  Nutrition Goal Status: new  Communication of RD Recs: other (comment) (POC)     Assessment and Plan  Knowledge deficit regarding diabetic diet as evidenced by new diagnosis 11/2023.     Increased nutrient needs related to wound healing as evidenced by femur Fx repair.       Plan  Carbohydrate modified diet  Collaboration with other providers  Mineral supplement therapy- Calcium   Nutrition education- diabetic diet, HF, protein needs/sources  Vitamin supplement therapy- Vit D

## 2023-11-26 NOTE — NURSING
Ms Pack is lying in bed. Pt stated that she was not having any pain and she was comfortable. Safety measures maintained. Call light is within reach.

## 2023-11-26 NOTE — PT/OT/SLP PROGRESS
Occupational Therapy   Treatment    Name: Ngoc Castellanos  MRN: [id number]  Admit Date: 11/22/2023  Admitting Diagnosis:  Closed displaced intertrochanteric fracture of right femur with routine healing    General Precautions: Standard, fall, hearing impaired   Orthopedic Precautions: RLE weight bearing as tolerated   Braces: N/A    Recommendations:     Discharge Recommendations:  Low Intensity Therapy  Level of Assistance Recommended at Discharge: 24 hours physical assistance for all ADL's and home management tasks  Discharge Equipment Recommendations: hip kit, walker, rolling, shower chair  Barriers to discharge:  Inaccessible home environment, Decreased caregiver support    Assessment:     Ngoc Castellanos is a 89 y.o. female with a medical diagnosis of Closed displaced intertrochanteric fracture of right femur with routine healing.  She presents with  Performance deficits affecting function are weakness, impaired endurance, impaired self care skills, impaired functional mobility, gait instability, impaired balance, decreased upper extremity function, decreased lower extremity function, impaired cardiopulmonary response to activity, edema, orthopedic precautions.     Pt. Was cooperative and participated well with session on this day. Pt  continues to demonstrate levels of physical deficits with  functional indep with daily management activities tasks, selfcare skills with balance,  functional mobility, UB strength and endurance. Pt. Will continue to benefit from continued OT to progress towards goals      Rehab Potential is fair    Activity tolerance:  Fair    Plan:     Patient to be seen 5 x/week to address the above listed problems via self-care/home management, community/work re-entry, therapeutic activities, therapeutic exercises, neuromuscular re-education    Plan of Care Expires: 12/23/23  Plan of Care Reviewed with: patient    Subjective     Communicated with: nsg and Pt. prior to session.  I want to do what ever  I can    Pain/Comfort:  Pain Rating 1: 4/10  Location - Side 1: Right  Location - Orientation 1: generalized  Location 1: thigh  Pain Addressed 1: Pre-medicate for activity, Reposition, Distraction    Patient's cultural, spiritual, Lutheran conflicts given the current situation:  no    Objective:     Patient found supine    upon OT entry to room.    Bed Mobility:    Patient completed Scooting/Bridging with moderate assistance, maximal assistance, and with side rail  Patient completed Supine to Sit with moderate assistance, maximal assistance, and with side rail     Functional Mobility/Transfers:  Patient completed Sit <> Stand Transfer with moderate assistance  with  rolling walker   Patient completed Bed <> Chair Transfer using Stand Pivot technique with moderate assistance with rolling walker      Activities of Daily Living:  Grooming: minimum assistance with grooming needs at sink level  Lower Body Dressing: total assistance to delilah pants EOB level and to mange over hips instance with RW for bal  Toileting: total assistance with katie colette and fresh diaper management    Pt. Noted with mild drainage on R hip Pt. Nurse notified     [facility] 6 Click ADL: 14    OT Exercises: UE Ergometer 10 min    Treatment & Education:  Pt. With therex performed to increase ROM, endurance selfcare task and fxl mobility for independence     Pt edu on role of OT, POC, safety when performing self care tasks , benefit of performing OOB activity, and safety when performing functional transfers and mobility management for preparation with goals to progress towards next level of care     Patient left up in chair with  in gym with PTA  present    GOALS:   Multidisciplinary Problems       Occupational Therapy Goals          Problem: Occupational Therapy    Goal Priority Disciplines Outcome Interventions   Occupational Therapy Goal     OT, PT/OT Ongoing, Progressing    Description: Goals to be met by: 12/23/2023     Patient will increase  functional independence with ADLs by performing:    Feeding with Set-up Assistance.  UE Dressing with Set-up assistance  LE Dressing with Maximum Assistance /c AE/LRAD as needed  Personal Hygiene while seated at sink with Modified [place].  Oral Hygiene while seated at sink with Modified [place].  Toileting from bedside commode with Maximum Assistance for hygiene and clothing management.   Bathing from  sitting at sink/on commode with Minimal Assistance.  Rolling to Right with Moderate Assistance.   Rolling to Left with Contact Guard Assistance.   Toilet transfer to bedside commode with Maximum Assistance.  Tolerate standing functional tasks for ~2 minutes /c Mod A and LRAD as needed  Footwear /c Mod A and AE as needed  SC t/f /c Max A and LRAD as needed.                         Time Tracking:     OT Date of Treatment: 11/26/23  OT Start Time: 0920    OT Stop Time: 1004  OT Total Time (min): 44 min    Billable Minutes:Self Care/Home Management 34  Therapeutic Exercise 10    11/26/2023  A client care conference was performed between the KYLEIGHR and WATSON, prior to treatment to discuss the patient's status, treatment plan and established goals.

## 2023-11-27 LAB
ANION GAP SERPL CALC-SCNC: 9 MMOL/L (ref 8–16)
BASOPHILS # BLD AUTO: 0.02 K/UL (ref 0–0.2)
BASOPHILS NFR BLD: 0.2 % (ref 0–1.9)
BUN SERPL-MCNC: 30 MG/DL (ref 10–30)
CALCIUM SERPL-MCNC: 8.6 MG/DL (ref 8.7–10.5)
CHLORIDE SERPL-SCNC: 101 MMOL/L (ref 95–110)
CO2 SERPL-SCNC: 25 MMOL/L (ref 23–29)
CREAT SERPL-MCNC: 1 MG/DL (ref 0.5–1.4)
DIFFERENTIAL METHOD: ABNORMAL
EOSINOPHIL # BLD AUTO: 0.4 K/UL (ref 0–0.5)
EOSINOPHIL NFR BLD: 3.6 % (ref 0–8)
ERYTHROCYTE [DISTWIDTH] IN BLOOD BY AUTOMATED COUNT: 17.1 % (ref 11.5–14.5)
EST. GFR  (NO RACE VARIABLE): 50.9 ML/MIN/1.73 M^2
GLUCOSE SERPL-MCNC: 114 MG/DL (ref 70–110)
HCT VFR BLD AUTO: 29.1 % (ref 37–48.5)
HGB BLD-MCNC: 9.3 G/DL (ref 12–16)
IMM GRANULOCYTES # BLD AUTO: 0.14 K/UL (ref 0–0.04)
IMM GRANULOCYTES NFR BLD AUTO: 1.4 % (ref 0–0.5)
LYMPHOCYTES # BLD AUTO: 0.6 K/UL (ref 1–4.8)
LYMPHOCYTES NFR BLD: 6 % (ref 18–48)
MAGNESIUM SERPL-MCNC: 2.1 MG/DL (ref 1.6–2.6)
MCH RBC QN AUTO: 29.1 PG (ref 27–31)
MCHC RBC AUTO-ENTMCNC: 32 G/DL (ref 32–36)
MCV RBC AUTO: 91 FL (ref 82–98)
MONOCYTES # BLD AUTO: 0.8 K/UL (ref 0.3–1)
MONOCYTES NFR BLD: 7.5 % (ref 4–15)
NEUTROPHILS # BLD AUTO: 8.1 K/UL (ref 1.8–7.7)
NEUTROPHILS NFR BLD: 81.3 % (ref 38–73)
NRBC BLD-RTO: 0 /100 WBC
PHOSPHATE SERPL-MCNC: 2.8 MG/DL (ref 2.7–4.5)
PLATELET # BLD AUTO: 272 K/UL (ref 150–450)
PMV BLD AUTO: 9.7 FL (ref 9.2–12.9)
POTASSIUM SERPL-SCNC: 4.1 MMOL/L (ref 3.5–5.1)
RBC # BLD AUTO: 3.2 M/UL (ref 4–5.4)
SODIUM SERPL-SCNC: 135 MMOL/L (ref 136–145)
WBC # BLD AUTO: 9.96 K/UL (ref 3.9–12.7)

## 2023-11-27 PROCEDURE — 36415 COLL VENOUS BLD VENIPUNCTURE: CPT | Mod: HCNC | Performed by: HOSPITALIST

## 2023-11-27 PROCEDURE — 83735 ASSAY OF MAGNESIUM: CPT | Mod: HCNC | Performed by: HOSPITALIST

## 2023-11-27 PROCEDURE — 25000003 PHARM REV CODE 250: Mod: HCNC | Performed by: FAMILY MEDICINE

## 2023-11-27 PROCEDURE — 97535 SELF CARE MNGMENT TRAINING: CPT | Mod: HCNC

## 2023-11-27 PROCEDURE — 97110 THERAPEUTIC EXERCISES: CPT | Mod: HCNC

## 2023-11-27 PROCEDURE — 97116 GAIT TRAINING THERAPY: CPT | Mod: HCNC

## 2023-11-27 PROCEDURE — 80048 BASIC METABOLIC PNL TOTAL CA: CPT | Mod: HCNC | Performed by: HOSPITALIST

## 2023-11-27 PROCEDURE — 11000004 HC SNF PRIVATE: Mod: HCNC

## 2023-11-27 PROCEDURE — 92610 EVALUATE SWALLOWING FUNCTION: CPT | Mod: HCNC

## 2023-11-27 PROCEDURE — 25000003 PHARM REV CODE 250: Mod: HCNC | Performed by: HOSPITALIST

## 2023-11-27 PROCEDURE — 84100 ASSAY OF PHOSPHORUS: CPT | Mod: HCNC | Performed by: HOSPITALIST

## 2023-11-27 PROCEDURE — 85025 COMPLETE CBC W/AUTO DIFF WBC: CPT | Mod: HCNC | Performed by: HOSPITALIST

## 2023-11-27 RX ADMIN — ACETAMINOPHEN 1000 MG: 325 TABLET ORAL at 05:11

## 2023-11-27 RX ADMIN — METHOCARBAMOL 500 MG: 500 TABLET ORAL at 09:11

## 2023-11-27 RX ADMIN — FUROSEMIDE 20 MG: 20 TABLET ORAL at 04:11

## 2023-11-27 RX ADMIN — ACETAMINOPHEN 1000 MG: 325 TABLET ORAL at 09:11

## 2023-11-27 RX ADMIN — SENNOSIDES AND DOCUSATE SODIUM 1 TABLET: 8.6; 5 TABLET ORAL at 09:11

## 2023-11-27 RX ADMIN — ACETAMINOPHEN 1000 MG: 325 TABLET ORAL at 01:11

## 2023-11-27 RX ADMIN — APIXABAN 5 MG: 2.5 TABLET, FILM COATED ORAL at 09:11

## 2023-11-27 RX ADMIN — Medication 1 TABLET: at 09:11

## 2023-11-27 RX ADMIN — METHOCARBAMOL 500 MG: 500 TABLET ORAL at 01:11

## 2023-11-27 RX ADMIN — Medication 1 TABLET: at 04:11

## 2023-11-27 RX ADMIN — ATENOLOL 25 MG: 25 TABLET ORAL at 09:11

## 2023-11-27 RX ADMIN — METHOCARBAMOL 500 MG: 500 TABLET ORAL at 05:11

## 2023-11-27 NOTE — PROGRESS NOTES
[facility]                                  Skilled Nursing Facility                   Progress Note     Admit Date: 11/22/2023  [facility]   Principal Problem:  Closed displaced intertrochanteric fracture of right femur with routine healing   Code Status: DNR    HPI obtained from patient interview and chart review     Chief Complaint: Re-evaluation of medical treatment and therapy status, constipation f/up, hypotension with medication adjustment    HPI:   Ngoc Castellanos is an 89 y.o. female with PMH Afib on Eliquis, HFpEF on lasix, HTN, and CKD stage III, new prediabetes dx (A1C 6.3 Nov 2023). Admitted to [facility] 11/18/2023 after fall at home. Now s/p right hip cephalomedullary nail fixation on 11/19/23. WBAT per Ortho. Continue home eliquis for a-fib also to cover post op dvt prophy. Multimodal pain management post-op with Tylenol 1000 mg po every 8 hours and Robaxin 500 mg po 3 times daily. Post-op hypotension. BP improved. Home Losartan Lasix and Atenolol resumed on discharge to [facility].     Interval History  24 hour chart review completed. Pertinent lab, micro, and/or radiology results addressed below. NAEON. NAD.   Afebrile, hemodynamically stable.Hypotensive this morning. Losartan held. Lasix dose adjusted. Hold parameters added to Losartan, Lasix, and Toprol.   Constipation x 4 days resolved with suppository. LBM 11/25. Continue scheduled miralax and senokot.    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    Past Surgical History: Patient has a past surgical history that includes Brain surgery (2002); Hysterectomy; Tonsillectomy; Hemorrhoid surgery; Appendectomy; Breast biopsy (2/2014); Breast surgery (03/17/14); Breast cyst excision (1960); Mastectomy (Right); and Intramedullary rodding of femur (Right, 11/19/2023).    Social History: Patient reports that she has never smoked. She has never used smokeless tobacco. She reports that she does not drink alcohol and does not use drugs.    Allergies: Patient is allergic to clindamycin, levofloxacin, lidocaine, and adhesive tape-silicones.    ROS  Constitutional: Negative for fever   Eyes: Negative for blurred vision, double vision   Respiratory: Negative for cough, shortness of breath   Cardiovascular: Negative for chest pain, palpitations, and leg swelling.   Gastrointestinal: Negative for abdominal pain, diarrhea, constipation, nausea, vomiting.  Genitourinary: Negative for dysuria, frequency   Musculoskeletal:  + generalized weakness. Negative for back pain and myalgias.   Skin: Negative for itching and rash.   Neurological: Negative for dizziness, headaches.   Psychiatric/Behavioral: Negative for depression. The patient is not nervous/anxious.      24 hour Vital Sign Range   Temp:  [97.9 °F (36.6 °C)]   Pulse:  [71-90]   Resp:  [16-17]   BP: (109-119)/(55-56)   SpO2:  [96 %-97 %]     PEx  Constitutional: Patient appears debilitated.  No distress noted  HENT:   Head: Normocephalic and atraumatic.   Eyes: Pupils are equal, round  Neck: Normal range of motion. Neck supple.   Cardiovascular: Normal rate, regular rhythm and normal heart sounds.    Pulmonary/Chest: Effort normal and breath sounds are clear  Abdominal: Soft. Bowel sounds are normal.   Musculoskeletal: Normal range of motion.   Neurological: Alert and oriented to person, place, and time.   Psychiatric: Normal mood and affect. Behavior is normal.   Skin: Skin is pale, warm and dry. RLE  "incisional wound.    No results for input(s): "GLUCOSE", "NA", "K", "CL", "CO2", "BUN", "CREATININE", "CALCIUM", "MG" in the last 24 hours.    No results for input(s): "WBC", "RBC", "HGB", "HCT", "PLT", "MCV", "MCH", "MCHC" in the last 24 hours.      Assessment and Plan:    Closed displaced intertrochanteric fracture of right femur s/p IM nail on 11/19/2023   PT/OT consultations to eval and tx  WBAT  Multimodal pain management  Continue home Apixiban 5 mg po BID   Surgical bandages to remain in place to right leg and hip area until Orthopedic clinic follow-up.   Local wound care every 5 days to right hip skin tears and blistering near incisions    Essential Hypertension  Hypotensive in hospital post op and now in SNF  - Chronic, stable  - Losartan resumed in SNF per transfer orders  - 11/26: Decrease Losartan dose from 25 to 12.5 mg po daily: hold for SBP < 120  - Continue lasix and atenolol w hold parameters, see CHF and A fib  - Monitor BP  - Adjust therapy to BP goal < 150/90  - Remove and replace chucho hose daily.    Persistent atrial fibrillation  Chronic anticoagulation   - Continue eliquis 5 mg po bid  - Continue atenolol 25 mg daily: hold for HR < 60    Chronic diastolic heart failure with preserved ejection fraction   Stable. No SOB, LE or abdominal edema. Clinically dry.    - Monitor fluid balance with daily weight & strict I/O  - See "Hypertension"  - Home Lasix 40 mg daily and 20 mg nightly   - D/t hypotension: Lasix 20 mg TID: hold for SBP < 110 and notify provider  - Remove and replace chucho hose daily.      - CHF education to include diet, medication, and CHF flags for MD notification    Stage 3b chronic kidney disease   - Chronic, stable  - Baseline Creatinine 1.2   - Avoid any nephrotoxic agents including NSAIDs, aminoglycosides, IV contrast (unless absolutely necessary), gadolinium, fleets and other phosphorous-based laxatives. Caution with antibiotics.  - Renally adjust medications based on patient's " creatinine clearance  - Avoid Hypotension  - Routinely monitor renal function with scheduled metabolic profile    Constipation  Acute, stable  - remains at risk for constipation  - Continue scheduled senokot and miralax  - Suppository daily PRN  - Adjust bowel regimen prn to avoid diarrhea  - Encourage fluid intake  - Encourage safe physical activity as tolerated  - Monitor bowel movement regularity and consistency    Prediabetes   - Last A1c 6.3 Nov 2023  - Home med(s): none - new dx, begin with diet modification  - Diabetic Diet  - Monitor glucose with routine metabolic profile    Physical Debility  Weakness   -Patient with decreased bed mobility therefore patient is at high risk for developing wounds and deterioration of any current wounds.   - Continue with PT/OT for gait training and strengthening and restoration of ADL's   - Encourage mobility, OOB in chair, and early ambulation as appropriate  - Fall precautions   - Monitor for bowel and bladder dysfunction  - Monitor for and prevent skin breakdown and pressure ulcers  - Continue DVT prophylaxis with Eliquis    Extended Visit  Total time spent: 31 minutes  Description of Time: counseling patient on clinical condition, therapies provided, plan of care, emotional support, coordinating patient care with other care team members, reviewing and interpreting labs and imaging, collaboration with physician, initiating new orders, chart review, and documentation. See interval hx.       Kerry Russell NP  Department of Hospital Medicine   [facility]     DOS: 11/26/2023       Patient note was created using MModal Dictation.  Any errors in syntax or even information may not have been identified and edited on initial review prior to signing this note.

## 2023-11-27 NOTE — PROGRESS NOTES
[facility] - Skilled Nursing  Adult Nutrition  Progress Note    SUMMARY   Recommendation   Continue 1500 calorie diet, boost glucose BID, diabetic education , HF education initiated, RD following  Goals: PO to meet 85% of EEN/EPN by next RD follow up  Nutrition Goal Status: progressing towards goal  Communication of RD Recs: other (comment) (POC)    Assessment and Plan     Knowledge deficit regarding diabetic diet as evidenced by new diagnosis 11/2023.     Increased nutrient needs related to wound healing as evidenced by femur Fx repair.       Plan  Carbohydrate modified diet  Collaboration with other providers  Mineral supplement therapy- Calcium   Nutrition education- diabetic diet, HF, protein needs/sources  11/27  Vitamin supplement therapy- Vit D           Malnutrition Assessment 11/27     Skin (Micronutrient): dry, bruised  Eyes (Micronutrient): conjunctiva dull  Teeth (Micronutrient): broken dentition  Neck/Chest (Micronutrient): muscle wasting  Musculoskeletal/Lower Extremities: muscle wasting           Orbital Region (Subcutaneous Fat Loss): mild depletion  Upper Arm Region (Subcutaneous Fat Loss): moderate depletion  Thoracic and Lumbar Region: well nourished   Gregory Region (Muscle Loss): mild depletion  Clavicle Bone Region (Muscle Loss): mild depletion  Clavicle and Acromion Bone Region (Muscle Loss): moderate depletion  Dorsal Hand (Muscle Loss): moderate depletion  Patellar Region (Muscle Loss): well nourished  Anterior Thigh Region (Muscle Loss): mild depletion  Posterior Calf Region (Muscle Loss): well nourished                 Reason for Assessment    Reason For Assessment: RD follow-up  Diagnosis:  (R femur Fx s/p IM Nail)  Relevant Medical History: Pre-diabetes, HTN,HF, CKD3, CANCER, CVA, AFIB  Interdisciplinary Rounds: attended  General Information Comments: patient considering if she should move from independent living to assisted at [place]. materials for HF and new diabetes diet education left  "with patient for family, she is very hard of hearing, her diagnosis of diabetes is new and she has vending candy bars in her room  Nutrition Discharge Planning[facility], daily weights    Nutrition/Diet History  Patient Reported Diet/Restrictions/Preferences: general  Spiritual, Cultural Beliefs, Adventist Practices, Values that Affect Care: no  Food Allergies: NKFA  Factors Affecting Nutritional Intake: None identified at this time    Anthropometrics  Temp: 98.1 °F (36.7 °C)  Height Method: Stated  Height: 5' 7" (170.2 cm)  Height (inches): 67 in  Weight Method: Bed Scale  Weight: 85.5 kg (188 lb 7.9 oz) (Resident weigh on standing scale)  Weight (lb): 188.5 lb  Ideal Body Weight (IBW), Female: 135 lb  % Ideal Body Weight, Female (lb): 132.11 %  BMI (Calculated): 29.5  BMI Grade: 25 - 29.9 - overweight  Usual Body Weight (UBW), k.8 kg  % Usual Body Weight: 100.33  % Weight Change From Usual Weight: 0.12 %       Lab/Procedures/Meds  Pertinent Labs Reviewed: reviewed  Pertinent Labs Comments: Hg 9.3, Hct 29.1  Pertinent Medications Reviewed: reviewed  Pertinent Medications Comments: docusate, KCl, FE, Mg    Estimated/Assessed Needs  Weight Used For Calorie Calculations: 80.9 kg (178 lb 5.6 oz)  Energy Calorie Requirements (kcal): 1465  Energy Need Method: [place]-St Jeor (x 1.2(PAL) 2/2 obesity)  Protein Requirements: 65g  Weight Used For Protein Calculations: 80.9 kg (178 lb 5.6 oz) (x .8g 2/2 CKD)  Fluid Requirements (mL): 1465 or per MD     RDA Method (mL): 1465  CHO Requirement: 183      Nutrition Prescription Ordered  Current Diet Order: diabetic  Nutrition Order Comments: PO %  Oral Nutrition Supplement: boost glucose BID    Evaluation of Received Nutrient/Fluid Intake  I/O: no data  Energy Calories Required: meeting needs  Protein Required: meeting needs  Fluid Required: meeting needs  Comments: LBM   Tolerance: tolerating  % Intake of Estimated Energy Needs: 75 - 100 %  % " Meal Intake: 50 - 75 %    Nutrition Risk    Level of Risk/Frequency of Follow-up: low (one time per week)     Monitor and Evaluation    Food and Nutrient Adminstration: diet order  Knowledge/Beliefs/Attitudes: food and nutrition knowledge/skill  Physical Activity and Function: nutrition-related ADLs and IADLs  Anthropometric Measurements: weight change  Biochemical Data, Medical Tests and Procedures: glucose/endocrine profile, gastrointestinal profile, electrolyte and renal panel  Nutrition-Focused Physical Findings: overall appearance, skin     Nutrition Follow-Up    RD Follow-up?: Yes

## 2023-11-27 NOTE — CLINICAL REVIEW
Clinical Pharmacy Chart Review Note      Admit Date: 11/22/2023   LOS: 5 days       Ngoc Castellanos is a 89 y.o. female admitted to SNF for PT/OT after hospitalization for closed displaced intertrochanteric fracture of right femur s/p IM nail on 11/19/2023    Active Hospital Problems    Diagnosis  POA    *Closed displaced intertrochanteric fracture of right femur s/p IM nail on 11/19/2023 [S72.141D]  Not Applicable    Hyponatremia [E87.1]  Yes     Not clinically significant       Acute blood loss as cause of postoperative anemia [D62]  Yes    Primary osteoarthritis of left knee [M17.12]  Yes    Chronic heart failure with preserved ejection fraction [I50.32]  Yes    Chronic anticoagulation [Z79.01]  Not Applicable    Stage 3b chronic kidney disease [N18.32]  Yes    Osteopenia [M85.80]  Yes    Persistent atrial fibrillation [I48.19]  Yes    Peripheral neuropathy [G62.9]  Yes    Primary hypertension [I10]  Yes      Resolved Hospital Problems   No resolved problems to display.     Review of patient's allergies indicates:   Allergen Reactions    Clindamycin     Levofloxacin Other (See Comments)    Lidocaine Other (See Comments)    Adhesive tape-silicones Rash     Patient Active Problem List    Diagnosis Date Noted    Hyponatremia 11/22/2023    ACP (advance care planning) 11/19/2023    Acute blood loss as cause of postoperative anemia 11/19/2023    Prediabetes 11/19/2023    Closed displaced intertrochanteric fracture of right femur s/p IM nail on 11/19/2023 11/18/2023    Primary osteoarthritis of left knee 06/21/2022    Quadriceps weakness 06/21/2022    Chronic heart failure with preserved ejection fraction 05/18/2021    Nodule of upper lobe of left lung 11/04/2019    Lumbar spondylosis 01/29/2018    Chronic anticoagulation 10/30/2017    Gait disorder 01/20/2017    Aromatase inhibitor use 02/11/2015    Stage 3b chronic kidney disease     Osteopenia 05/06/2014    Persistent atrial fibrillation 09/25/2013    Tremor  07/29/2013    Peripheral neuropathy 07/29/2013    Primary hypertension 09/06/2012       Scheduled Meds:    acetaminophen  1,000 mg Oral Q8H    apixaban  5 mg Oral BID    atenoloL  25 mg Oral Daily    bisacodyL  10 mg Rectal Once    calcium-vitamin D3  1 tablet Oral BID WM    furosemide  20 mg Oral Daily with dinner    furosemide  20 mg Oral QAM    furosemide  20 mg Oral After lunch    losartan  12.5 mg Oral Daily    methocarbamoL  500 mg Oral Q8H    polyethylene glycol  17 g Oral Daily    senna-docusate 8.6-50 mg  1 tablet Oral BID     Continuous Infusions:   PRN Meds: acetaminophen, aluminum & magnesium hydroxide-simethicone, bisacodyL, calcium carbonate, meclizine, melatonin, traMADoL    OBJECTIVE:     Vital Signs (Last 24H)  Temp:  [97.5 °F (36.4 °C)]   Pulse:  [71-80]   Resp:  [16]   BP: (109-127)/(55-63)   SpO2:  [96 %]     Laboratory:  CBC:   Recent Labs   Lab 11/22/23 0449 11/23/23 2200 11/27/23  0504   WBC 11.65 8.85 9.96   RBC 3.08* 3.65* 3.20*   HGB 9.0* 10.3* 9.3*   HCT 27.3* 32.1* 29.1*    272 272   MCV 89 88 91   MCH 29.2 28.2 29.1   MCHC 33.0 32.1 32.0     BMP:   Recent Labs   Lab 11/22/23 0449 11/23/23 2200 11/27/23  0504   * 131* 114*   * 138 135*   K 4.1 4.2 4.1    105 101   CO2 22* 24 25   BUN 36* 40* 30   CREATININE 1.2 1.1 1.0   CALCIUM 8.5* 9.9 8.6*   MG 2.2 2.1 2.1     CMP:   Recent Labs   Lab 11/22/23 0449 11/23/23 2200 11/27/23  0504   * 131* 114*   CALCIUM 8.5* 9.9 8.6*   * 138 135*   K 4.1 4.2 4.1   CO2 22* 24 25    105 101   BUN 36* 40* 30   CREATININE 1.2 1.1 1.0     Lab Results   Component Value Date    ALT 10 11/19/2023    AST 20 11/19/2023    ALKPHOS 84 11/19/2023    BILITOT 1.0 11/19/2023     Lab Results   Component Value Date    HGBA1C 6.3 (H) 11/18/2023           ASSESSMENT/PLAN:     I have reviewed the medications in compliance with CMS Regulation F756 of the [place]. Based on information gathered, the following items may need to be  addressed:    **Meclizine is ordered as needed for dizziness.   This medication is a Beers drug and potentially inappropriate in older adult patients. Monitor use and consider discontinuation if patient is not using or adverse effects observed.  Beers Criteria: Avoid use in older adults due to strong anticholinergic effects and reduced clearance with advanced age, increasing the risk of anticholinergic effects and toxicity. Tolerance develops when used as a hypnotic. Avoid in patients with delirium or at high risk of delirium as it may induce or worsen delirium; in patients with a history of falls or fractures (unless safer alternatives are not available) as it may cause syncope, impaired psychomotor function or ataxia; in patients with dementia or cognitive impairment because of adverse CNS effects; and in men with lower urinary tract symptoms or benign prostatic hyperplasia as decreased urinary flow and urinary retention may occur     Medications reviewed by PharmD, please re-consult if needed.      Michelle Hutchins, Pharm. D.  Clinical Pharmacist  [facility]

## 2023-11-27 NOTE — PT/OT/SLP PROGRESS
Occupational Therapy   Treatment    Name: Ngoc Castellanos  MRN: [id number]  Admit Date: 11/22/2023  Admitting Diagnosis:  Closed displaced intertrochanteric fracture of right femur with routine healing    General Precautions: Standard, aspiration, fall, hearing impaired   Orthopedic Precautions: LLE weight bearing as tolerated   Braces: N/A    Recommendations:     Discharge Recommendations:  Low Intensity Therapy  Level of Assistance Recommended at Discharge: 24 hours physical assistance for all ADL's and home management tasks  Discharge Equipment Recommendations: hip kit, walker, rolling, shower chair  Barriers to discharge:  Inaccessible home environment    Assessment:     Ngoc Castellanos is a 89 y.o. female with a medical diagnosis of Closed displaced intertrochanteric fracture of right femur with routine healing.  She presents with the following performance deficits affecting function are weakness, impaired endurance, impaired self care skills, impaired functional mobility, gait instability, impaired balance, decreased lower extremity function, decreased safety awareness, pain, impaired coordination, impaired cardiopulmonary response to activity, orthopedic precautions. Pt agreeable to therapy and tolerated well. She accomplished goal to use the toilet today instead of have BM at bed level. Pt continues to require significant physical assistance in ADLs, functional mobility, and functional transfers and is currently not performing tasks at OF. Pt would continue to benefit from skilled OT services to maximize functional independence with ADLs and functional mobility, reduce caregiver burden, and facilitate safe discharge in the least restrictive environment. OT recommending low intensity once medically appropriate for discharge.     Rehab Potential is good    Activity tolerance:  Good    Plan:     Patient to be seen 5 x/week to address the above listed problems via self-care/home management, therapeutic activities,  "therapeutic exercises    Plan of Care Expires: 12/23/23  Plan of Care Reviewed with: patient    Subjective     Communicated with: Nurse prior to session. Pt stated, " I've been having a lot of gas and that's it, but they did give me something to help my bowels to move." .    Pain/Comfort:  Pain Rating 1: 3/10  Location - Side 1: Right  Location - Orientation 1: generalized  Location 1: leg  Pain Addressed 1: Pre-medicate for activity  Pain Rating Post-Intervention 1: 3/10    Patient's cultural, spiritual, Worship conflicts given the current situation:  no    Objective:     Patient found sitting in w/c with   upon OT entry to room.      Functional Mobility/Transfers:  Patient completed Sit <> Stand Transfer with moderate assistance  with  rolling walker   Patient completed Bed <> Chair Transfer using Step Transfer technique with moderate assistance with rolling walker  Patient completed Toilet Transfer Step Transfer technique with moderate assistance with  rolling walker and grab bars      Activities of Daily Living:  Toileting: maximal assistance with RW, pt able to assist with anterior perineal hygiene  Grooming - oral hygiene and applied dentures - set-up in armchair    [facility] 6 Click ADL: 14    OT Exercises: UE Ergometer 10 min seated to increase BUE strength and endurance for (I) with self care and mobility    Treatment & Education:  -Education on energy conservation and task modification to maximize safety and (I) during ADLs and mobility  -Education on importance of OOB activity to improve overall activity tolerance and promote recovery  -Pt educated to call for assistance and to transfer with hospital staff only  -Provided education regarding role of OT, POC, & discharge recommendations with pt  verbalizing understanding.  Pt had no further questions & when asked whether there were any concerns pt reported none.    Patient left up in chair with call button in reach    GOALS:   Multidisciplinary Problems  "      Occupational Therapy Goals          Problem: Occupational Therapy    Goal Priority Disciplines Outcome Interventions   Occupational Therapy Goal     OT, PT/OT Ongoing, Progressing    Description: Goals to be met by: 12/23/2023     Patient will increase functional independence with ADLs by performing:    Feeding with Set-up Assistance.  UE Dressing with Set-up assistance  LE Dressing with Maximum Assistance /c AE/LRAD as needed  Personal Hygiene while seated at sink with Modified [place].  Oral Hygiene while seated at sink with Modified [place].  Toileting from bedside commode with Maximum Assistance for hygiene and clothing management.   Bathing from  sitting at sink/on commode with Minimal Assistance.  Rolling to Right with Moderate Assistance.   Rolling to Left with Contact Guard Assistance.   Toilet transfer to bedside commode with Maximum Assistance.  Tolerate standing functional tasks for ~2 minutes /c Mod A and LRAD as needed  Footwear /c Mod A and AE as needed  SC t/f /c Max A and LRAD as needed.                         Time Tracking:     OT Date of Treatment: 11/27/23  OT Start Time: 0952    OT Stop Time: 1050  OT Total Time (min): 58 min    Billable Minutes:Self Care/Home Management 25  Therapeutic Exercise 33    11/27/2023

## 2023-11-27 NOTE — PLAN OF CARE
Continue 1500 calorie diet, boost glucose BID, diabetic education , HF education initiated, RD following  Goals: PO to meet 85% of EEN/EPN by next RD follow up  Nutrition Goal Status: progressing towards goal  Communication of RD Recs: other (comment) (POC)     Assessment and Plan     Knowledge deficit regarding diabetic diet as evidenced by new diagnosis 11/2023.     Increased nutrient needs related to wound healing as evidenced by femur Fx repair.       Plan  Carbohydrate modified diet  Collaboration with other providers  Mineral supplement therapy- Calcium   Nutrition education- diabetic diet, HF, protein needs/sources  11/27  Vitamin supplement therapy- Vit D

## 2023-11-27 NOTE — PT/OT/SLP PROGRESS
Physical Therapy Treatment    Patient Name:  Ngoc Castellanos   MRN:  [id number]  Admit Date: 11/22/2023  Admitting Diagnosis: Closed displaced intertrochanteric fracture of right femur with routine healing  Recent Surgeries: INSERTION, INTRAMEDULLARY RAMSES, FEMUR, RIGHT, HANA TABLE, MIRIAN, C- ARM DOOR SIDE (Right)       General Precautions: Standard, fall  Orthopedic Precautions: RLE weight bearing as tolerated  Braces: N/A    Recommendations:     Discharge Recommendations:  (Home Health PT)  Level of Assistance Recommended at Discharge: 24 hours significant assistance  Discharge Equipment Recommendations: shower chair, walker, rolling, wheelchair  Barriers to discharge: Inaccessible home environment    Assessment:     Ngoc Castellanos is a 89 y.o. female admitted with a medical diagnosis of Closed displaced intertrochanteric fracture of right femur with routine healing . Pt with R L/E weakness and SOB which limited her GT distance but pt did require ModA today for sit<>stand versus MaxA yesterday and also able to complete LBE with Katelynn completing full revolutions. Pt will therefore continue to benefit from skilled PT services during this hospital admit to continue to improve transfer ability and efficiency as well as continue to progress pt's ambulation distance and cardiopulmonary endurance to maximize pt's functional independence and return to PLOF.        Performance deficits affecting function: weakness, impaired endurance, impaired self care skills, impaired functional mobility, gait instability, visual deficits, decreased upper extremity function, decreased lower extremity function, decreased ROM, impaired cardiopulmonary response to activity, orthopedic precautions, edema, impaired skin.    Rehab Potential is good    Activity Tolerance: Good    Plan:     Patient to be seen 5 x/week to address the above listed problems via gait training, therapeutic activities, therapeutic exercises, neuromuscular re-education,  wheelchair management/training    Plan of Care Expires: 23  Plan of Care Reviewed with: patient    Subjective     Pt. Agreeable to work with PT.     Pain/Comfort:  Pain Rating 1: 3/10  Location - Side 1: Right  Location - Orientation 1: generalized  Location 1: leg  Pain Addressed 1: Pre-medicate for activity, Reposition, Distraction  Pain Rating Post-Intervention 1: 3/10    Patient's cultural, spiritual, Sikhism conflicts given the current situation:  no    Objective:     Communicated with pt's nurse prior to session.  Patient found up in chair with   upon PT entry to room.     Therapeutic Activities and Exercises: Seated therex x 20reps and active /assist RLE: AP, LAQs, Hip flex, GS  LBEx 15mins to help improve B L/e ROM, MMT and endurance.    Functional Mobility:  Transfers:     Sit to Stand:  moderate assistance with rolling walker and Vcs to place both hands on the w/c armrests before pushing into standing.  Gait: 6ft +4ft +3ft with RW and ModA d.t pt c/o of SOB, fatigue, and with flexed trunk, very short step length, decrease catherine and pt with multiple standing rest breaks.    AM-PAC 6 CLICK MOBILITY  10    Patient left up in chair with call button in reach.    GOALS:   Multidisciplinary Problems       Physical Therapy Goals          Problem: Physical Therapy    Goal Priority Disciplines Outcome Goal Variances Interventions   Physical Therapy Goal     PT, PT/OT Ongoing, Progressing     Description: Goals to be met by: 23       Patient will increase functional independence with mobility by performin. Supine to sit with Contact Guard Assistance  2. Sit to supine with Contact Guard Assistance  3. Rolling to Left and Right with Standby Assistance  4. Sit to stand transfer with Contact Guard Assistance  5. Bed to chair transfer with Contact Guard Assistance using Rolling Walker/LRAD  6. Gait  x 50 feet with Contact Guard Assistance using Rolling Walker/LRAD  7. Wheelchair propulsion x 150  feet with Modified [place] using bilateral upper extremities                         Time Tracking:     PT Received On: 11/27/23  PT Start Time: 0855  PT Stop Time: 0937  PT Total Time (min): 42 min    Billable Minutes: Gait Training 18 and Therapeutic Exercise 24    Treatment Type: Treatment  PT/PTA: PT     Number of PTA visits since last PT visit: 0     11/27/2023

## 2023-11-27 NOTE — PT/OT/SLP EVAL
Speech Language Pathology  Swallow Evaluation    Ngoc Castellanos   MRN: [id number]   Admitting Diagnosis: Closed displaced intertrochanteric fracture of right femur with routine healing    Diet recommendations: Solid Diet Level: Regular Diet - IDDSI Level 7  Liquid Diet Level: Thin liquids - IDDSI Level 0 1 bite/sip at a time, Alternating bites/sips, Avoid talking while eating, Double swallow with each bite/sip, HOB to 90 degrees, Meds whole 1 at a time, Monitor for s/s of aspiration, Remain upright 30 minutes post meal, Small bites/sips, and Strict aspiration precautions    SLP Treatment Date: 11/27/23  Speech Start Time: 0807     Speech Stop Time: 0826     Speech Total (min): 19 min       TREATMENT BILLABLE MINUTES:  Eval Swallow and Oral Function 11 and Total Time 8    Diagnosis: Closed displaced intertrochanteric fracture of right femur with routine healing      Past Medical History:   Diagnosis Date    *Atrial fibrillation     Atrial fibrillation     Breast cancer 02/2014    Right breast infiltrating ductal CA, ER/NE positive, Her2 equivocal    Closed displaced intertrochanteric fracture of right femur s/p IM nail on 11/19/2023 11/18/2023    H/O total mastectomy of right breast 03/17/2014    Hypertension     Persistent atrial fibrillation 09/25/2013    Prediabetes 11/19/2023    Primary hypertension 09/06/2012    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 OR [address] [place] [address];  Service: Orthopedics;  Laterality: Right;    MASTECTOMY Right     TONSILLECTOMY         Has the patient been evaluated by SLP for swallowing? : Yes  Keep patient NPO?: No General Precautions: Standard, aspiration, fall, hearing impaired        HPI: This is a very pleasant 89yo female with a past medical history of Afib on Eliquis, HFpEF on lasix, HTN, and CKD stage III who presents to the ED with chief complaint of fall and leg pain. Patient reports that she regularly uses either cane or walker to ambulate. She reports that she is often unsteady feeling on her feet and when at her home sometimes uses objects nearby for balance instead of cane or walker. States that today she was ambulating at home when she had trip and fall without LOC or hitting her head. Immediate severe right hip pain and inability to ambulate. Crawled across floor and able to call for help. EMS arrived and noted shortening and rotation of right leg, placed in pelvic binder and gave pain meds and brought to ED.     In the ED patient afebrile and hemodynamically stable saturating well on room air. 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 Displacement of the lesser trochanter fracture fragment medially. CXR with stable pleural effusion compared to prior study. Orthopedic surgery consulted and patient admitted to the care of medicine for further evaluation and management.       Social Hx: Patient lives with in a [facility].     Prior diet: diabetic diet/thin liquids    Subjective:  "Thank you for all of these wonderful pointersLuciano."         Objective:        Oral Musculature Evaluation  Oral Musculature: WFL  Dentition: present and adequate  Secretion Management: adequate  Mucosal Quality: adequate  Mandibular Strength and Mobility: WFL  Oral Labial Strength and " Mobility: WFL  Lingual Strength and Mobility: WFL  Velar Elevation: WFL  Buccal Strength and Mobility: WFL  Volitional Cough: strong  Volitional Swallow: elicited  Voice Prior to PO Intake: dry, clear     Bedside Swallow Eval:   Consistencies Assessed: Thin liquids cup sips x 2, straw sips x 2  Soft solids bite of a banana x 1  Solids 1/4 cracker in 3 small portions  Oral Phase: WFL  Pharyngeal Phase:  burping after straw sips of water, throat clear after solid bite x 1    Additional Treatment:  Pt endorsing occasional globus sensation during PO intake, which she has attributed to not being able to sit fully upright during PO intake while in the hospital.  Pt felt more comfortable with PO intake once more upright.  Pt also endorsing increased presence of gas.  Burping noted after straw sips of water.  SLP suggested avoiding straw when able to decrease intake of air while sucking from straw. Pt reports receiving medications to ease excess gas at this time. Education was provided to pt regarding role of SLP, purpose of swallowing assessment, diet recommendations, strategies for increasing safety and comfort during PO intake, and plan to follow up to ensure diet tolerance. Pt expressed understanding and appreciation.     Assessment:  Ngoc Castellanos is a 89 y.o. female with a medical diagnosis of Closed displaced intertrochanteric fracture of right femur with routine healing and presents with functional swallowing abilities for current diet while following aspiration and reflux precautions.         Discharge recommendations: Therapy Intensity Recommendations at Discharge:  (tbd)     Goals:   Multidisciplinary Problems       SLP Goals          Problem: SLP    Goal Priority Disciplines Outcome   SLP Goal     SLP    Description: Speech Language Pathology Goals  Goals expected to be met by 12/4:  1. Pt will tolerate least restrictive diet without s/s of aspiration.                                Plan:   Patient to be seen  Therapy Frequency: 2 x/week  Planned Interventions: Dysphagia Therapy  Plan of Care expires: 12/27/23  Plan of Care reviewed with: patient  SLP Follow-up?: Yes  SLP - Next Visit Date: 11/29/23 11/27/2023

## 2023-11-27 NOTE — CARE UPDATE
[facility] - Skilled Nursing  Initial Discharge Assessment     SW met with patient in room for Discharge Planning Assessment.     Preferred Name: Ngoc Jasmin  Primary Care Provider:  Jacobo Mcleod MD  Admission Diagnosis:  FX R femur, intertrochanteric  Admission Date: 11/22/2023  Expected Discharge Date: undetermined-TENTATIVE/ANTICIPATED: TBA      Discharge Barriers Identified:  Update: Discharge Plan is now LTC/NH placement, pt and family are in agreement (son and grand daughter) 12.1.2023-see other notes     Payor: [facility]  Type:      Extended Emergency Contact Information:   Primary Emergency Contact: Jeremy Caba  Mobile Phone: [phone]  Relation: son  Preferred language: English   needed? no     Discharge Plan A: Reanna Horvath  Discharge Plan B: unknown     Preferred Pharmacy:  [facility] in [place]      Initial Discharge Assessment        Assessment Type Discharge Planning Assessment       Source of Information Patient      Communicated WES with patient/caregiver No      [place] in [place] community      Do you expect to return to your current living situation?  yes      Do you have help at home or someone to help you manage your care at home?  yes      Prior to hospitalization cognitive status: AAOx4      Current cognitive status: AAOx4      Equipment Currently Used at Home Rollator, 1 prong cane      Readmission within 30 days? No, surg 11.19.2023      Patient currently being followed by outpatient case management? No       Do you currently have service(s) that help you manage your care at home? No, community staff      Do you take prescription medications?  yes      Do you have prescription coverage?  yes      Do you have any problems affording any of your prescribed medications? Yes, Eliquis      Who is going to help you get home at discharge? friends      How do you get to doctors appointments? friends      Are you on dialysis? no      Do you take coumadin?   Eliquis                   DME Needed Upon Discharge  May need some equipment upon dc      Discharge Plan discussed with:  pt      Discharge Barriers Identified Additional services, equipment, assistance with medication affordability        Pt is 89 yo , very [place] friendly female from [facility].  Pt lives at [facility] where she has social support around her.  Pt does have affordability issues with Eliquis; SS will print patient assistance forms for patient to fill out and request assistance from [employer].  Pt has no other discharge needs at this time.

## 2023-11-27 NOTE — PROGRESS NOTES
[facility]                                  Skilled Nursing Facility                   Progress Note     Admit Date: 11/22/2023  WES   Principal Problem:  Closed displaced intertrochanteric fracture of right femur with routine healing   Code Status: DNR    HPI obtained from patient interview and chart review     Chief Complaint: Re-evaluation of medical treatment and therapy status: hypotension, lab review    HPI:   Ngoc Castellanos is an 89 y.o. female with PMH Afib on Eliquis, HFpEF on lasix, HTN, and CKD stage III, new prediabetes dx (A1C 6.3 Nov 2023). Admitted to [facility] 11/18/2023 after fall at home. Now s/p right hip cephalomedullary nail fixation on 11/19/23. WBAT per Ortho. Continue home eliquis for a-fib also to cover post op dvt prophy. Multimodal pain management post-op with Tylenol 1000 mg po every 8 hours and Robaxin 500 mg po 3 times daily. Post-op hypotension. BP improved. Home Losartan Lasix and Atenolol resumed on discharge to [facility].     Interval History  Patient lying in bed. Denies sob, chest pain, abdominal pain.   Hypotensive over the weekend. 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    Past Surgical History: Patient has a past surgical history that includes Brain surgery (2002);  Hysterectomy; Tonsillectomy; Hemorrhoid surgery; Appendectomy; Breast biopsy (2/2014); Breast surgery (03/17/14); Breast cyst excision (1960); Mastectomy (Right); and Intramedullary rodding of femur (Right, 11/19/2023).    Social History: Patient reports that she has never smoked. She has never used smokeless tobacco. She reports that she does not drink alcohol and does not use drugs.    Allergies: Patient is allergic to clindamycin, levofloxacin, lidocaine, and adhesive tape-silicones.    ROS  Constitutional: Negative for fever   Respiratory: Negative for cough, shortness of breath   Cardiovascular: Negative for chest pain, palpitations, and leg swelling.   Gastrointestinal: Negative for abdominal pain, diarrhea, constipation, nausea, vomiting.  Musculoskeletal:  + generalized weakness. Negative for back pain and myalgias.   Skin: Negative for itching and rash.   Neurological: Negative for dizziness, headaches.   Psychiatric/Behavioral: Negative for depression. The patient is not nervous/anxious.      24 hour Vital Sign Range   Temp:  [97.5 °F (36.4 °C)-98.1 °F (36.7 °C)]   Pulse:  [71-80]   Resp:  [16]   BP: ()/(49-63)   SpO2:  [96 %-99 %]     PEx  Constitutional: Patient appears debilitated.  No distress noted  Cardiovascular: Normal rate, regular rhythm and normal heart sounds.    Pulmonary/Chest: Effort normal and breath sounds are clear  Abdominal: Soft. Bowel sounds are normal.   Musculoskeletal: Normal range of motion.   Neurological: Alert and oriented to person, place, and time.   Psychiatric: Normal mood and affect. Behavior is normal.   Skin: Skin is pale, warm and dry. RLE incisional wound.    Recent Labs   Lab 11/27/23  0504   *   K 4.1      CO2 25   BUN 30   CREATININE 1.0   CALCIUM 8.6*   MG 2.1       Recent Labs   Lab 11/27/23  0504   WBC 9.96   RBC 3.20*   HGB 9.3*   HCT 29.1*      MCV 91   MCH 29.1   MCHC 32.0         Assessment and Plan:    Closed displaced  "intertrochanteric fracture of right femur s/p IM nail on 11/19/2023   PT/OT consultations to eval and tx  WBAT  Multimodal pain management  Continue home Apixiban 5 mg po BID   Surgical bandages to remain in place to right leg and hip area until Orthopedic clinic follow-up.   Local wound care every 5 days to right hip skin tears and blistering near incisions    Essential Hypertension  Hypotensive in hospital post op and now in SNF  - Chronic, stable  - Losartan resumed in SNF per transfer orders  - 11/26: Decrease Losartan dose from 25 to 12.5 mg po daily: hold for SBP < 120  - Continue lasix and atenolol w hold parameters, see CHF and A fib  - Monitor BP  - Adjust therapy to BP goal < 150/90  - Remove and replace chucho hose daily.    Persistent atrial fibrillation  Chronic anticoagulation   - Continue eliquis 5 mg po bid  - Continue atenolol 25 mg daily: hold for HR < 60    Chronic diastolic heart failure with preserved ejection fraction   Stable. No SOB, LE or abdominal edema. Clinically dry.    - Monitor fluid balance with daily weight & strict I/O  - See "Hypertension"  - Home Lasix 40 mg daily and 20 mg nightly   - D/t hypotension: Lasix 20 mg TID: hold for SBP < 110 and notify provider  - Remove and replace chucho hose daily.      - CHF education to include diet, medication, and CHF flags for MD notification    Stage 3b chronic kidney disease   - Chronic, stable  - Baseline Creatinine 1.2   - Avoid any nephrotoxic agents including NSAIDs, aminoglycosides, IV contrast (unless absolutely necessary), gadolinium, fleets and other phosphorous-based laxatives. Caution with antibiotics.  - Renally adjust medications based on patient's creatinine clearance  - Avoid Hypotension  - Routinely monitor renal function with scheduled metabolic profile    Constipation  Acute, stable  - remains at risk for constipation  - Continue scheduled senokot and miralax  - Suppository daily PRN  - Adjust bowel regimen prn to avoid " diarrhea  - Encourage fluid intake  - Encourage safe physical activity as tolerated  - Monitor bowel movement regularity and consistency    Prediabetes   - Last A1c 6.3 Nov 2023  - Home med(s): none - new dx, begin with diet modification  - Diabetic Diet  - Monitor glucose with routine metabolic profile    Physical Debility  Weakness   -Patient with decreased bed mobility therefore patient is at high risk for developing wounds and deterioration of any current wounds.   - Continue with PT/OT for gait training and strengthening and restoration of ADL's   - Encourage mobility, OOB in chair, and early ambulation as appropriate  - Fall precautions   - Monitor for bowel and bladder dysfunction  - Monitor for and prevent skin breakdown and pressure ulcers  - Continue DVT prophylaxis with Sanjuanaqugladys Combs NP  Department of Hospital Medicine   [facility]- Skilled Nursing Facility     DOS: 11/27/2023       Patient note was created using MModal Dictation.  Any errors in syntax or even information may not have been identified and edited on initial review prior to signing this note.

## 2023-11-28 PROCEDURE — 97110 THERAPEUTIC EXERCISES: CPT | Mod: HCNC,CQ

## 2023-11-28 PROCEDURE — 25000003 PHARM REV CODE 250: Mod: HCNC | Performed by: HOSPITALIST

## 2023-11-28 PROCEDURE — 11000004 HC SNF PRIVATE: Mod: HCNC

## 2023-11-28 PROCEDURE — 97116 GAIT TRAINING THERAPY: CPT | Mod: HCNC,CQ

## 2023-11-28 PROCEDURE — 25000003 PHARM REV CODE 250: Mod: HCNC | Performed by: FAMILY MEDICINE

## 2023-11-28 PROCEDURE — 25000003 PHARM REV CODE 250: Mod: HCNC | Performed by: NURSE PRACTITIONER

## 2023-11-28 PROCEDURE — 97110 THERAPEUTIC EXERCISES: CPT | Mod: HCNC,CO

## 2023-11-28 PROCEDURE — 97535 SELF CARE MNGMENT TRAINING: CPT | Mod: HCNC,CO

## 2023-11-28 RX ORDER — METHOCARBAMOL 500 MG/1
500 TABLET, FILM COATED ORAL 4 TIMES DAILY
Status: DISCONTINUED | OUTPATIENT
Start: 2023-11-28 | End: 2023-12-13 | Stop reason: HOSPADM

## 2023-11-28 RX ORDER — LEVALBUTEROL INHALATION SOLUTION 0.63 MG/3ML
0.63 SOLUTION RESPIRATORY (INHALATION) EVERY 8 HOURS PRN
Status: DISCONTINUED | OUTPATIENT
Start: 2023-11-28 | End: 2023-12-13 | Stop reason: HOSPADM

## 2023-11-28 RX ADMIN — TRAMADOL HYDROCHLORIDE 50 MG: 50 TABLET, COATED ORAL at 09:11

## 2023-11-28 RX ADMIN — SENNOSIDES AND DOCUSATE SODIUM 1 TABLET: 8.6; 5 TABLET ORAL at 09:11

## 2023-11-28 RX ADMIN — METHOCARBAMOL 500 MG: 500 TABLET ORAL at 04:11

## 2023-11-28 RX ADMIN — APIXABAN 5 MG: 2.5 TABLET, FILM COATED ORAL at 09:11

## 2023-11-28 RX ADMIN — METHOCARBAMOL 500 MG: 500 TABLET ORAL at 01:11

## 2023-11-28 RX ADMIN — TRAMADOL HYDROCHLORIDE 50 MG: 50 TABLET, COATED ORAL at 03:11

## 2023-11-28 RX ADMIN — ACETAMINOPHEN 1000 MG: 325 TABLET ORAL at 05:11

## 2023-11-28 RX ADMIN — Medication 1 TABLET: at 04:11

## 2023-11-28 RX ADMIN — ATENOLOL 25 MG: 25 TABLET ORAL at 09:11

## 2023-11-28 RX ADMIN — Medication 1 TABLET: at 09:11

## 2023-11-28 RX ADMIN — METHOCARBAMOL 500 MG: 500 TABLET ORAL at 05:11

## 2023-11-28 RX ADMIN — METHOCARBAMOL 500 MG: 500 TABLET ORAL at 09:11

## 2023-11-28 RX ADMIN — ACETAMINOPHEN 1000 MG: 325 TABLET ORAL at 01:11

## 2023-11-28 RX ADMIN — LOSARTAN POTASSIUM 12.5 MG: 25 TABLET, FILM COATED ORAL at 09:11

## 2023-11-28 RX ADMIN — FUROSEMIDE 20 MG: 20 TABLET ORAL at 09:11

## 2023-11-28 NOTE — CONSULTS
[facility] - Skilled Nursing  Wound Care    Patient Name:  Ngoc Castellanos   MRN:  [id number]  Date: 11/28/2023  Diagnosis: Closed displaced intertrochanteric fracture of right femur with routine healing    History:     Past Medical History:   Diagnosis Date    *Atrial fibrillation     Atrial fibrillation     Breast cancer 02/2014    Right breast infiltrating ductal CA, ER/HI positive, Her2 equivocal    Closed displaced intertrochanteric fracture of right femur s/p IM nail on 11/19/2023 11/18/2023    H/O total mastectomy of right breast 03/17/2014    Hypertension     Persistent atrial fibrillation 09/25/2013    Prediabetes 11/19/2023    Primary hypertension 09/06/2012    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    left    Stage 3b chronic kidney disease     Valvular regurgitation     moderate MR       Social History     Socioeconomic History    Marital status:    Tobacco Use    Smoking status: Never    Smokeless tobacco: Never   Substance and Sexual Activity    Alcohol use: No    Drug use: No     Social Determinants of Health     Financial Resource Strain: Low Risk  (11/20/2023)    Overall Financial Resource Strain (CARDIA)     Difficulty of Paying Living Expenses: Not hard at all   Food Insecurity: No Food Insecurity (11/20/2023)    Hunger Vital Sign     Worried About Running Out of Food in the Last Year: Never true     Ran Out of Food in the Last Year: Never true   Transportation Needs: No Transportation Needs (11/20/2023)    PRAPARE - Transportation     Lack of Transportation (Medical): No     Lack of Transportation (Non-Medical): No   Physical Activity: Inactive (11/20/2023)    Exercise Vital Sign     Days of Exercise per Week: 0 days     Minutes of Exercise per Session: 0 min   Stress: No Stress Concern Present (11/20/2023)    [place] [employer] - Occupational Stress Questionnaire     Feeling of Stress : Only a little   Social Connections: Moderately Isolated (11/20/2023)    Social  "Connection and Isolation Panel [NHANES]     Frequency of Communication with Friends and Family: Three times a week     Frequency of Social Gatherings with Friends and Family: Once a week     Attends Pent[employer]ism Services: 1 to 4 times per year     Active Member of Clubs or Organizations: No     Attends Club or Organization Meetings: Never     Marital Status:    Housing Stability: Low Risk  (11/20/2023)    Housing Stability Vital Sign     Unable to Pay for Housing in the Last Year: No     Number of Places Lived in the Last Year: 1     Unstable Housing in the Last Year: No       Precautions:     Allergies as of 11/22/2023 - Reviewed 11/22/2023   Allergen Reaction Noted    Clindamycin  02/15/2023    Levofloxacin Other (See Comments) 06/14/2022    Lidocaine Other (See Comments) 06/14/2022    Adhesive tape-silicones Rash 10/30/2017       [facility] Assessment Details/Treatment   Patient seen for wound care consultation.   Reviewed chart for this encounter.   See Flow Sheet for findings.    Kofi: 14  Albumin: 3.6    Pt sitting up in bed, agreed to assessment. Removed bandages from lateral incision lines as they were not well adhered, surgical incisions are well approximated and have surgical glue in place.   Pt has ruptured blisters on right lateral knee, right upper thigh, and right posterior thigh/buttock. She states the blisters are from "the tape." Not specific to what tape or where this occurred.   All open wounds cleansed with Vashe, applied Hydrofera Blue and and foam border.     RECOMMENDATIONS:  Leave in place 1 week unless strike through drainage is noted.     Discussed POC with patient and primary nurse.   See EMR for orders & patient education.    Discussed nutrition and the role of protein in wound healing with the patient. Instructed patient to optimize protein for wound healing.    Nursing to continue care & monitoring.  Nursing to maintain pressure injury prevention interventions.    Orders placed.     WC " FU 1 week      11/28/23 0830   WOCN Assessment   WOCN Total Time (mins) 30   Visit Date 11/28/23   Visit Time 0830   Consult Type New   WOCN Speciality Wound   Intervention assessed;changed;applied;chart review;orders   Teaching on-going        Altered Skin Integrity 11/21/23 1256 Right lateral;upper Thigh Blister(s)   Date First Assessed/Time First Assessed: 11/21/23 1256   Altered Skin Integrity Present on Admission - Did Patient arrive to the hospital with altered skin?: suspected hospital acquired  Side: Right  Orientation: lateral;upper  Location: Thigh  Primar...   Wound Image    Dressing Appearance Moist drainage   Drainage Amount Small   Drainage Characteristics/Odor Yellow;Serous   Appearance Moist;Pink   Tissue loss description Partial thickness   Red (%), Wound Tissue Color 100 %   Periwound Area Intact;Dry   Wound Length (cm) 3 cm   Wound Width (cm) 4 cm   Wound Depth (cm) 0.1 cm   Wound Volume (cm^3) 1.2 cm^3   Wound Surface Area (cm^2) 12 cm^2   Care Cleansed with:;Antimicrobial agent   Dressing Applied;Methylene blue/gentian violet;Foam   Dressing Change Due 12/05/23        Altered Skin Integrity 11/28/23 0830 Right Buttocks Blister(s)   Date First Assessed/Time First Assessed: 11/28/23 0830   Altered Skin Integrity Present on Admission - Did Patient arrive to the hospital with altered skin?: yes  Side: Right  Location: Buttocks  Primary Wound Type: Blister(s)   Wound Image    Appearance Pink;Moist   Tissue loss description Partial thickness   Red (%), Wound Tissue Color 100 %   Periwound Area Intact;Dry   Wound Length (cm) 4.5 cm   Wound Width (cm) 4.5 cm   Wound Depth (cm) 0.1 cm   Wound Volume (cm^3) 2.025 cm^3   Wound Surface Area (cm^2) 20.25 cm^2   Care Cleansed with:;Antimicrobial agent   Dressing Applied;Methylene blue/gentian violet;Foam   Off Loading Other (see comments)  (waffle ordered)   Dressing Change Due 12/05/23        Altered Skin Integrity 11/28/23 0830 Right lateral Knee Blister(s)    Date First Assessed/Time First Assessed: 11/28/23 0830   Altered Skin Integrity Present on Admission - Did Patient arrive to the hospital with altered skin?: yes  Side: Right  Orientation: lateral  Location: Knee  Primary Wound Type: Blister(s)   Wound Image     Dressing Appearance Moist drainage   Drainage Amount Small   Drainage Characteristics/Odor Yellow;Serous;No odor   Appearance Pink;Moist   Tissue loss description Partial thickness   Red (%), Wound Tissue Color 100 %   Periwound Area Intact;Dry   Wound Edges Irregular   Wound Length (cm) 2.5 cm   Wound Width (cm) 4 cm   Wound Depth (cm) 0.1 cm   Wound Volume (cm^3) 1 cm^3   Wound Surface Area (cm^2) 10 cm^2   Dressing Change Due 12/05/23        Incision/Site 11/19/23 1158 Right Leg   Date First Assessed/Time First Assessed: 11/19/23 1158   Side: Right  Location: Leg   Wound Image

## 2023-11-28 NOTE — PT/OT/SLP PROGRESS
"Physical Therapy Treatment    Patient Name:  Ngoc Castellanos   MRN:  [id number]  Admit Date: 11/22/2023  Admitting Diagnosis: Closed displaced intertrochanteric fracture of right femur with routine healing  Recent Surgeries:     General Precautions: Standard, aspiration, fall, hearing impaired  Orthopedic Precautions: LLE weight bearing as tolerated  Braces: N/A    Recommendations:     Discharge Recommendations: Low Intensity Therapy  Level of Assistance Recommended at Discharge: 24 hours significant assistance  Discharge Equipment Recommendations: shower chair, walker, rolling, wheelchair  Barriers to discharge: Inaccessible home environment    Assessment:     Ngoc Castellanos is a 89 y.o. female admitted with a medical diagnosis of Closed displaced intertrochanteric fracture of right femur with routine healing . Patient showed less difficulty transferring from sit to stand today and showed improved ability to bear weight through the RLE and to advance the leg. Decreased R LE swelling also noted.      Performance deficits affecting function: weakness, impaired endurance, impaired self care skills, impaired functional mobility, gait instability, impaired balance, decreased coordination, decreased lower extremity function, pain, decreased ROM, edema, impaired cardiopulmonary response to activity, orthopedic precautions.    Rehab Potential is good    Activity Tolerance: Fair    Plan:     Patient to be seen 5 x/week to address the above listed problems via gait training, therapeutic activities, therapeutic exercises, neuromuscular re-education, wheelchair management/training    Plan of Care Expires: 12/24/23  Plan of Care Reviewed with: patient    Subjective     Patient states " I still have a little pain on my right leg, but it's not as bad".     Pain/Comfort:  Pain Rating 1: 3/10  Location - Side 1: Right  Location - Orientation 1: generalized  Location 1: leg  Pain Addressed 1: Pre-medicate for activity, Reposition, " Distraction  Pain Rating Post-Intervention 1: 3/10    Patient's cultural, spiritual, Synagogue conflicts given the current situation:  no    Objective:     Communicated with NSG prior to session.  Patient found up in chair with Other (comments) upon PT entry to room.     Therapeutic Activities and Exercises: Educated on step sequence, posture and proper RW management. L LE AROM and R LE AAROM: LAQ, HIP FLEX AND HEEL/TOE RAISES 2X20 REPS B LE.    Functional Mobility:  Transfers:     Sit to Stand:  moderate assistance with rolling walker  Gait: 12 ft x 3 trials with RW and mod to min assistance with R LE WBAT, cues for step sequence and wheelchair in tow by PT tech, with long rest breaks in sitting between trials.  Wheelchair Propulsion:  Pt propelled Standard wheelchair x 52 feet on Level tile with  Right upper extremity and Left upper extremity with Stand-by Assistance.     AM-PAC 6 CLICK MOBILITY  11    Patient left up in chair with call button in reach and Lunch available .    GOALS:   Multidisciplinary Problems       Physical Therapy Goals          Problem: Physical Therapy    Goal Priority Disciplines Outcome Goal Variances Interventions   Physical Therapy Goal     PT, PT/OT Ongoing, Progressing     Description: Goals to be met by: 23       Patient will increase functional independence with mobility by performin. Supine to sit with Contact Guard Assistance  2. Sit to supine with Contact Guard Assistance  3. Rolling to Left and Right with Standby Assistance  4. Sit to stand transfer with Contact Guard Assistance  5. Bed to chair transfer with Contact Guard Assistance using Rolling Walker/LRAD  6. Gait  x 50 feet with Contact Guard Assistance using Rolling Walker/LRAD  7. Wheelchair propulsion x 150 feet with Modified [place] using bilateral upper extremities                         Time Tracking:     PT Received On: 23  PT Start Time: 1057  PT Stop Time: 1151  PT Total Time (min): 54  min    Billable Minutes: Gait Training 27 and Therapeutic Exercise 27    Treatment Type: Treatment  PT/PTA: PTA     Number of PTA visits since last PT visit: 1 11/28/2023

## 2023-11-28 NOTE — PT/OT/SLP PROGRESS
Occupational Therapy   Treatment    Name: Ngoc Castellanos  MRN: [id number]  Admit Date: 11/22/2023  Admitting Diagnosis:  Closed displaced intertrochanteric fracture of right femur with routine healing    General Precautions: Standard, aspiration, fall, hearing impaired   Orthopedic Precautions: RLE weight bearing as tolerated   Braces: N/A    Recommendations:     Discharge Recommendations:  Low Intensity Therapy  Level of Assistance Recommended at Discharge: 24 hours physical assistance for all ADL's and home management tasks  Discharge Equipment Recommendations: hip kit, walker, rolling, shower chair  Barriers to discharge:  Inaccessible home environment    Assessment:     Ngoc Castellanos is a 89 y.o. female with a medical diagnosis of Closed displaced intertrochanteric fracture of right femur with routine healing.  She presents with  Performance deficits affecting function are weakness, impaired endurance, impaired self care skills, impaired functional mobility, gait instability, impaired balance, decreased lower extremity function, decreased safety awareness, pain, impaired coordination, impaired cardiopulmonary response to activity, orthopedic precautions.     Pt. Was cooperative and participated well with session on this day. Pt  continues to demonstrate levels of physical deficits with  functional indep with daily management activities tasks, selfcare skills with balance,  functional mobility, UB strength and endurance. Pt. Will continue to benefit from continued OT to progress towards goals      Rehab Potential is fair    Activity tolerance:  Fair    Plan:     Patient to be seen 5 x/week to address the above listed problems via self-care/home management, therapeutic activities, therapeutic exercises    Plan of Care Expires: 12/23/23  Plan of Care Reviewed with: patient    Subjective     Communicated with: nsg and Pt. prior to session. It will feel good to wash my face and brush my teeth    Pain/Comfort:        Patient's cultural, spiritual, Yarsani conflicts given the current situation:  no    Objective:     Patient found supine PCT present    upon OT entry to room.    Bed Mobility:    Patient completed Rolling/Turning to Left with  minimum assistance and with side rail  Patient completed Rolling/Turning to Right with minimum assistance and with side rail  Patient completed Scooting/Bridging with maximal assistance and with side rail  Patient completed Supine to Sit with moderate assistance     Functional Mobility/Transfers:  Patient completed Sit <> Stand Transfer with moderate assistance  with  rolling walker   Patient completed Bed <> Chair Transfer using Stand Pivot technique with moderate assistance with rolling walker      Activities of Daily Living:  Grooming: stand by assistance with grooming needs at sink level  Upper Body Dressing: minimum assistance to doff/delilah pull over shirt  Lower Body Dressing: maximal assistance to delilah pants seated EOB level and to mange over hips instance with RW for bal  Toileting: total assistance with katie colette and fresh diaper management with removal         AMPAC 6 Click ADL:      OT Exercises: UE Ergometer 10 min    Treatment & Education:  Pt. With therex performed to increase ROM, endurance selfcare task and fxl mobility for independence     Pt edu on role of OT, POC, safety when performing self care tasks , benefit of performing OOB activity, and safety when performing functional transfers and mobility management for preparation with goals to progress towards next level of care     Patient left up in chair with all lines intact and call button in reach    GOALS:   Multidisciplinary Problems       Occupational Therapy Goals          Problem: Occupational Therapy    Goal Priority Disciplines Outcome Interventions   Occupational Therapy Goal     OT, PT/OT Ongoing, Progressing    Description: Goals to be met by: 12/23/2023     Patient will increase functional independence with  ADLs by performing:    Feeding with Set-up Assistance.  UE Dressing with Set-up assistance  LE Dressing with Maximum Assistance /c AE/LRAD as needed  Personal Hygiene while seated at sink with Modified [place].  Oral Hygiene while seated at sink with Modified [place].  Toileting from bedside commode with Maximum Assistance for hygiene and clothing management.   Bathing from  sitting at sink/on commode with Minimal Assistance.  Rolling to Right with Moderate Assistance.   Rolling to Left with Contact Guard Assistance.   Toilet transfer to bedside commode with Maximum Assistance.  Tolerate standing functional tasks for ~2 minutes /c Mod A and LRAD as needed  Footwear /c Mod A and AE as needed  SC t/f /c Max A and LRAD as needed.                         Time Tracking:     OT Date of Treatment: 11/30/23  OT Start Time: 0850    OT Stop Time: 0930  OT Total Time (min): 40 min    Billable Minutes:Self Care/Home Management 30  Therapeutic Exercise 10    12/1/2023

## 2023-11-28 NOTE — PROGRESS NOTES
[facility]                                  Skilled Nursing Facility                   Progress Note     Admit Date: 11/22/2023  WES 12/13/2023  [id number]  Principal Problem:  Closed displaced intertrochanteric fracture of right femur with routine healing   HPI obtained from patient interview and chart review     Chief Complaint: Establish Care    HPI:   Mrs. Castellanos is an 89-year-old female with a PMHx of Afib on Eliquis, HFpEF on lasix, HTN, and CKD stage III who presents to SNF following hospitalization for right intertrochanteric femur fracture s/p cephalomedullary nail fixation on 11/19 with Dr. Perez.  Admission to SNF for secondary weakness and debility.    Patient originally presented to [facility] ED on 11/18 with complaint of fall and leg pain.  Per [facility] notes,  Patient reports that she regularly uses either cane or walker to ambulate. She reports that she is often unsteady feeling on her feet and when at her home sometimes uses objects nearby for balance instead of cane or walker. States she was ambulating at home when she had trip and fall without LOC or hitting her head. Immediate severe right hip pain and inability to ambulate. Crawled across floor and able to call for help. EMS arrived and noted shortening and rotation of right leg, placed in pelvic binder and gave pain meds and brought to ED.In the ED patient afebrile and hemodynamically stable saturating well on room air. 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 Displacement of the lesser trochanter fracture fragment medially. CXR with stable pleural effusion compared to prior study. Orthopedic surgery consulted and patient admitted to the care of medicine for further evaluation and management. Patient was seen and evaluated by Orthopedic surgery  who recommended operative repair of hip fracture. Patient was medically optimized prior to surgery and was taken to OR after optimization on 11/19/2023. Patient underwent right hip cephalomedullary nail fixation by Dr. Henry Perez. Post-op patient weight bear as tolerated to the right lower extremity as per Orthopedics recommendation. Patient restarted on her home Apixiban 5 mg po BID post-op for her known persistent atrial fibrillation so no other chemical DVT prophylaxis needed post-op. PT/OT consulted post-op. Patient noted to be hyperglycemic on admit with -210. HgA1C ordered and returned at 6.3% consistent with prediabetes which is a new diagnosis. With patient's advanced age recommendation would be conservative mangement with diet control management with diabetic diet. discussed with patient and she is in agreement with diabetic diet and no medications to treat her prediabetes. Patient having post-op hypotension. Losartan held and giving IVF's on 11/20. BP improved after a total of 2 liters of normal saline given on 11/20. Lactic acid returned back as normal at 1.9. Hgb with drop post-op to 8.5 on 11/21 but that is expected blood loss from hip fracture and surgery and would not account totally for reason for post-op hypotension. Home Losartan on hold for BP management post-op. Patient requiring skilled nursing facility placement on discharge and accepted to [facility] and discharged in good condition to [facility] on 11/22/2023. Patient having some edema to her right surgical leg on discharge and to resume her home Lasix on discharge to [facility] and should help. BP much improved and plan to resume home Losartan and Atenolol on discharge to [facility].  Patient discharged on diabetic diet to treat her prediabetes. Perineural catheter removed by Anesthesia prior to hospital discharge.     Today, patient states her pain is well controlled.  She is progressing with therapy.  Patient reports shortness  of breath that is at her baseline.  She states that her postoperative pain is not well controlled.  She endorses some intermittent abdominal discomfort particularly gas pains.  Bilateral lower extremity edema is present.    Patient will be treated at [facility] with PT and OT to improve functional status and ability to perform ADLs.     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    Past Surgical History: Patient has a past surgical history that includes Brain surgery (2002); Hysterectomy; Tonsillectomy; Hemorrhoid surgery; Appendectomy; Breast biopsy (2/2014); Breast surgery (03/17/14); Breast cyst excision (1960); Mastectomy (Right); and Intramedullary rodding of femur (Right, 11/19/2023).    Social History: Patient reports that she has never smoked. She has never used smokeless tobacco. She reports that she does not drink alcohol and does not use drugs.    Family History:  No significant family history to report    Allergies: Patient is allergic to clindamycin, levofloxacin, lidocaine, and adhesive tape-silicones.    ROS  Constitutional: Negative for fever.  +fatigue   Eyes: Negative for blurred vision, double vision   Respiratory: Negative for cough.  +chronic SOB, at baseline  Cardiovascular: Negative for chest pain, palpitations. + leg swelling.   Gastrointestinal: Negative for abdominal pain, constipation, diarrhea, nausea, vomiting.  +indigestion, flatulence  Genitourinary: Negative for dysuria, frequency   Musculoskeletal:  + generalized weakness.  +  "RLE pain  Skin: Negative for itching and rash.   Neurological: Negative for dizziness, headaches.   Psychiatric/Behavioral: Negative for depression. The patient is not nervous/anxious.      24 hour Vital Sign Range   Temp:  [97.7 °F (36.5 °C)-98.1 °F (36.7 °C)]   Pulse:  [68-77]   Resp:  [16-18]   BP: (108-133)/(53-60)   SpO2:  [96 %-99 %]     Current BMI: Body mass index is 29.52 kg/m².    PEx  Constitutional: Patient appears debilitated.  No distress noted  HENT:   Head: Normocephalic and atraumatic.   Eyes: Pupils are equal, round  Neck: Normal range of motion. Neck supple.   Cardiovascular: Normal rate, regular rhythm and normal heart sounds.    Pulmonary/Chest: Effort normal and breath sounds with bilateral crackles R>L  Abdominal: Soft. Bowel sounds are normal.   Musculoskeletal: Normal range of motion.   Neurological: Alert and oriented to person, place, and time.   Psychiatric: Normal mood and affect. Behavior is normal.   Skin: Skin is warm and dry.  Surgical dressing to RLE, only to be removed by Ortho.  2+ pitting edema to bilateral lower extremities R>L    No results for input(s): "GLUCOSE", "NA", "K", "CL", "CO2", "BUN", "CREATININE", "CALCIUM", "MG" in the last 24 hours.    No results for input(s): "WBC", "RBC", "HGB", "HCT", "PLT", "MCV", "MCH", "MCHC" in the last 24 hours.    No results for input(s): "POCTGLUCOSE" in the last 168 hours.     Assessment and Plan:    Closed displaced intertrochanteric fracture of right femur   s/p IM nail on 11/19/2023  - PT/OT, WBAT  - DVT PPX with apixaban 2.5 mg BID  - maintain surgical dressing until removed by Orthopedics  - ortho NALLELY to [facility]  - follow-up with orthopedics after discharge    Acute postoperative pain  - increase methocarbamol 500 mg QID, continue acetaminophen 1000 mg q.8 hours, tramadol 50 mg q.6 hours PRN     Postprocedural hypotension  - experienced while at [facility], IV fluids were given and antihypertensive medications were held " "and then resumed upon SNF admission  - continues to have labile blood pressures  - 11/28 discontinuing losartan,  continue atenolol 25 mg daily, Lasix 20 mg TID, losartan 12.5 mg daily     Acute blood loss as cause of postoperative anemia  - expected postoperatively  - continue monitor twice weekly CBC, transfuse for hemoglobin < 7    Hsx of Primary hypertension  - home regimen with losartan 25 mg daily, Lasix 60 mg daily splint in 2 doses, atenolol 25 mg daily  - developed postop hypotension  - currently holding losartan, Lasix split into TID dosing, continue atenolol with holding parameters     Persistent atrial fibrillation  Chronic anticoagulation   - Patient with Long standing persistent (>12 months) atrial fibrillation which is:controlled currently with home  Atenolol  and will continue in hospital. Patient is currently in atrial fibrillation.[id number] Score: 3. Anticoagulation indicated.   - Anticoagulation done with Apixiban 5 mg po BID at home and held for surgery but resumed post-op on 11/19.     Chronic heart failure with preserved ejection fraction  - Patient is identified as having Diastolic (HFpEF) heart failure that is Chronic. CHF is currently controlled.   -  Monitor strict Is&Os and daily weights.   - holding off on on fluid restriction of 1.5 L due to postop hypotension and concern for dehydration in inpatient setting  - continue Lasix dose which is altered to 20 mg TID from her home dose of 40 mg in the morning and 20 mg in the afternoon  - initiated PRN Xopenex breathing treatments for shortness of breath    Stage 3b chronic kidney disease  - had elevation in creatinine to 1.5 on 11/21, has now normalized  - currently stable, continue monitor twice weekly BMPs, avoid nephrotoxic agents, renally dose medications as appropriate     Prediabetes  - "Patient noted to be hyperglycemic on admit with -210. HgA1C ordered and returned at 6.3% consistent with prediabetes which is a new diagnosis. " "With patient's advanced age recommendation would be conservative mangement with diet control management with diabetic diet. Discussed with patient on 11/20 and she is in agreement with plan."  - diabetic diet, blood glucose with twice weekly BMPs    ACP (advance care planning)  - completed at [facility] on 11/19/2023, patient wishes to be DNR     Indigestion  - continue PRN Tums and Mylanta    Debility   - Continue with PT/OT for gait training and strengthening and restoration of ADL's   - Encourage mobility, OOB in chair, and early ambulation as appropriate  - Fall precautions   - Monitor for bowel and bladder dysfunction  - Monitor for and prevent skin breakdown and pressure ulcers  - Continue DVT prophylaxis with apixaban        Anticipate disposition:  Home with home health    IP [facility] RISK OF UNPLANNED READMISSION Model: HIGH MODERATE LOW    Follow-up needed during SNF stay-    Follow-up needed after discharge from SNF: PCP     Future Appointments   Date Time Provider Department Center   12/4/2023  1:00 PM Sheree Isbell NP [facility]TH[facility] [address] [place]   12/6/2023  7:00 [place] [facility] [place]   12/6/2023  8:15 AM LAB, [facility] [place]   12/14/2023 10:30 AM Jacobo Mcleod MD [facility]   1/4/2024 10:15 AM Henry Perez MD [facility] ORTHO [address] [place]   2/6/2024 10:45 AM Henry Perez MD [facility] ORTH[facility] [address] [place]         I certify that SNF services are required to be given on an inpatient basis because [facility] needs for skilled nursing care and/or skilled rehabilitation are required on a daily basis and such services can only practically be provided in a skilled nursing facility setting and are for an ongoing condition for which she received inpatient care in the hospital.     Total time of the visit 146 minutes    Description of non physical exam/non charting time: counseling patient on clinical conditions and therapies provided.  Extensive chart review " completed including all consultation notes.  All pertinent laboratory and radiographical images reviewed.        Pooja Littlejohn NP  Department of Hospital Medicine   [facility]     DOS: 11/28/2023       Patient note was created using MModal Dictation.  Any errors in syntax or even information may not have been identified and edited on initial review prior to signing this note.

## 2023-11-28 NOTE — PLAN OF CARE
Problem: Adult Inpatient Plan of Care  Goal: Plan of Care Review  Outcome: Ongoing, Progressing  Flowsheets (Taken 11/28/2023 [id number])  Plan of Care Reviewed With: patient     Problem: Impaired Wound Healing  Goal: Optimal Wound Healing  Outcome: Ongoing, Progressing     Problem: Fall Injury Risk  Goal: Absence of Fall and Fall-Related Injury  Outcome: Ongoing, Progressing     Problem: Skin Injury Risk Increased  Goal: Skin Health and Integrity  Outcome: Ongoing, Progressing

## 2023-11-28 NOTE — PROGRESS NOTES
Food & Nutrition  Education    Diet Education: diabetes, heart failure  Time Spent: 10 minutes  Learners:patient      Nutrition Education provided with handouts:   Heart failure low sodium diet, weight monitoring  Intro to diabetic diet ,Plate method     Comments:patient laying in bed, refusing supper tray because she is feeling poorly, gets these spells she relates where she is weak.     Will need follow up. Materials left in room. Patient very hard of hearing.

## 2023-11-29 PROCEDURE — 25000003 PHARM REV CODE 250: Mod: HCNC | Performed by: FAMILY MEDICINE

## 2023-11-29 PROCEDURE — 25000003 PHARM REV CODE 250: Mod: HCNC | Performed by: NURSE PRACTITIONER

## 2023-11-29 PROCEDURE — 11000004 HC SNF PRIVATE: Mod: HCNC

## 2023-11-29 PROCEDURE — 25000003 PHARM REV CODE 250: Mod: HCNC | Performed by: HOSPITALIST

## 2023-11-29 PROCEDURE — 92526 ORAL FUNCTION THERAPY: CPT | Mod: HCNC

## 2023-11-29 PROCEDURE — 97530 THERAPEUTIC ACTIVITIES: CPT | Mod: HCNC,CO

## 2023-11-29 RX ORDER — LEVALBUTEROL INHALATION SOLUTION 0.63 MG/3ML
0.63 SOLUTION RESPIRATORY (INHALATION) DAILY
Status: COMPLETED | OUTPATIENT
Start: 2023-11-30 | End: 2023-12-03

## 2023-11-29 RX ORDER — FAMOTIDINE 20 MG/1
20 TABLET, FILM COATED ORAL DAILY
Status: DISCONTINUED | OUTPATIENT
Start: 2023-11-29 | End: 2023-12-07

## 2023-11-29 RX ADMIN — APIXABAN 5 MG: 2.5 TABLET, FILM COATED ORAL at 08:11

## 2023-11-29 RX ADMIN — FUROSEMIDE 20 MG: 20 TABLET ORAL at 08:11

## 2023-11-29 RX ADMIN — FAMOTIDINE 20 MG: 20 TABLET ORAL at 11:11

## 2023-11-29 RX ADMIN — SENNOSIDES AND DOCUSATE SODIUM 1 TABLET: 8.6; 5 TABLET ORAL at 09:11

## 2023-11-29 RX ADMIN — FUROSEMIDE 20 MG: 20 TABLET ORAL at 02:11

## 2023-11-29 RX ADMIN — METHOCARBAMOL 500 MG: 500 TABLET ORAL at 09:11

## 2023-11-29 RX ADMIN — METHOCARBAMOL 500 MG: 500 TABLET ORAL at 02:11

## 2023-11-29 RX ADMIN — POLYETHYLENE GLYCOL 3350 17 G: 17 POWDER, FOR SOLUTION ORAL at 08:11

## 2023-11-29 RX ADMIN — METHOCARBAMOL 500 MG: 500 TABLET ORAL at 08:11

## 2023-11-29 RX ADMIN — APIXABAN 5 MG: 2.5 TABLET, FILM COATED ORAL at 09:11

## 2023-11-29 RX ADMIN — METHOCARBAMOL 500 MG: 500 TABLET ORAL at 04:11

## 2023-11-29 RX ADMIN — ACETAMINOPHEN 1000 MG: 325 TABLET ORAL at 02:11

## 2023-11-29 RX ADMIN — TRAMADOL HYDROCHLORIDE 50 MG: 50 TABLET, COATED ORAL at 08:11

## 2023-11-29 RX ADMIN — ACETAMINOPHEN 1000 MG: 325 TABLET ORAL at 05:11

## 2023-11-29 RX ADMIN — Medication 1 TABLET: at 04:11

## 2023-11-29 RX ADMIN — Medication 1 TABLET: at 08:11

## 2023-11-29 RX ADMIN — SENNOSIDES AND DOCUSATE SODIUM 1 TABLET: 8.6; 5 TABLET ORAL at 08:11

## 2023-11-29 RX ADMIN — ACETAMINOPHEN 1000 MG: 325 TABLET ORAL at 09:11

## 2023-11-29 NOTE — PLAN OF CARE
Problem: SLP  Goal: SLP Goal  Description: Speech Language Pathology Goals  Goals expected to be met by 12/4:  1. Pt will tolerate least restrictive diet without s/s of aspiration.          Outcome: Met  Pt is safe to remain on current diet. If pt continues to c/o epigastric discomfort, GI consultation recommended.  No further skilled SLP services warranted at this time.

## 2023-11-29 NOTE — PROGRESS NOTES
[facility]                                  Skilled Nursing Facility                   Progress Note     Admit Date: 11/22/2023  WES 12/13/2023  [id number]  Principal Problem:  Closed displaced intertrochanteric fracture of right femur with routine healing   HPI obtained from patient interview and chart review     Chief Complaint: Re-evaluation of medical treatment and therapy status:  Re-evaluation of pain medication and indigestion    HPI:   Mrs. Castellanos is an 89-year-old female with a PMHx of Afib on Eliquis, HFpEF on lasix, HTN, and CKD stage III who presents to SNF following hospitalization for right intertrochanteric femur fracture s/p cephalomedullary nail fixation on 11/19 with Dr. Perez.  Admission to SNF for secondary weakness and debility.    Interval history: .  24 hr vital sign ranges reviewed and listed below.  Is better controlled today with changes made yesterday.  Patient states that her indigestion is still problematic, will initiate Pepcid.  Patient noted to have weight gain from 85.5 kg on 11/26 287.1 kg on 11/29.  She currently has her home dose of Lasix ordered  into 3 doses due to her hypotension, blood pressure is low at this time so some doses are held due to holding parameter.  Patient states her shortness of breath is slightly worse than her baseline, she feels it is hindering her endurance with therapy, will trial Xopenex nebulizer treatment daily to see if this helps with therapy..Patient reports an adequate appetite.  Patient denies dysuria.  Patient reports having regular bowel movements.  Patient progessing with PT/OT- Gait: 12 ft x 3 trials with RW and mod to min assistance with R LE WBAT, cues for step sequence and wheelchair in tow by PT tech, with long rest breaks in sitting between trials.. Continuing to follow and treat all acute and chronic conditions.    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    Past Surgical History: Patient has a past surgical history that includes Brain surgery (2002); Hysterectomy; Tonsillectomy; Hemorrhoid surgery; Appendectomy; Breast biopsy (2/2014); Breast surgery (03/17/14); Breast cyst excision (1960); Mastectomy (Right); and Intramedullary rodding of femur (Right, 11/19/2023).    Social History: Patient reports that she has never smoked. She has never used smokeless tobacco. She reports that she does not drink alcohol and does not use drugs.    Family History:  No significant family history to report    Allergies: Patient is allergic to clindamycin, levofloxacin, lidocaine, and adhesive tape-silicones.    ROS  Constitutional: Negative for fever.  +fatigue   Eyes: Negative for blurred vision, double vision   Respiratory: Negative for cough.  +chronic SOB, at baseline  Cardiovascular: Negative for chest pain, palpitations. + leg swelling.   Gastrointestinal: Negative for abdominal pain, constipation, diarrhea, nausea, vomiting.  +indigestion, flatulence  Genitourinary: Negative for dysuria, frequency   Musculoskeletal:  + generalized weakness.  + RLE pain  Skin: Negative for itching and rash.   Neurological: Negative for dizziness, headaches.   Psychiatric/Behavioral: Negative for depression. The patient is not nervous/anxious.      24 hour Vital Sign Range   Temp:  [97.3 °F (36.3 °C)-97.7 °F (36.5 °C)]   Pulse:  [68-78]   Resp:  [16]   BP: (100-110)/(52-55)   SpO2:  [97 %-100 %]     Current BMI: Body mass index is  30.07 kg/m².    PEx  Constitutional: Patient appears debilitated.  No distress noted  HENT:   Head: Normocephalic and atraumatic.   Eyes: Pupils are equal, round  Neck: Normal range of motion. Neck supple.   Cardiovascular: Normal rate, regular rhythm and normal heart sounds.    Pulmonary/Chest: Effort normal and breath sounds with bilateral crackles R>L  Abdominal: Soft. Bowel sounds are normal.   Musculoskeletal: Normal range of motion.   Neurological: Alert and oriented to person, place, and time.   Psychiatric: Normal mood and affect. Behavior is normal.   Skin: Skin is warm and dry.  Surgical dressing to RLE, only to be removed by Ortho.  2+ pitting edema to bilateral lower extremities R>L     Altered Skin Integrity 11/21/23 1256 Right lateral;upper Thigh Blister(s)   Date First Assessed/Time First Assessed: 11/21/23 1256   Altered Skin Integrity Present on Admission - Did Patient arrive to the hospital with altered skin?: suspected hospital acquired  Side: Right  Orientation: lateral;upper  Location: Thigh  Primar...   Dressing Appearance Moist drainage   Drainage Amount Small   Drainage Characteristics/Odor Yellow;Serous   Appearance Moist;Pink   Tissue loss description Partial thickness   Red (%), Wound Tissue Color 100 %   Periwound Area Intact;Dry   Wound Length (cm) 3 cm   Wound Width (cm) 4 cm   Wound Depth (cm) 0.1 cm   Wound Volume (cm^3) 1.2 cm^3   Wound Surface Area (cm^2) 12 cm^2   Care Cleansed with:;Antimicrobial agent   Dressing Applied;Methylene blue/gentian violet;Foam   Dressing Change Due 12/05/23        Altered Skin Integrity 11/28/23 0830 Right Buttocks Blister(s)   Date First Assessed/Time First Assessed: 11/28/23 0830   Altered Skin Integrity Present on Admission - Did Patient arrive to the hospital with altered skin?: yes  Side: Right  Location: Buttocks  Primary Wound Type: Blister(s)   Appearance Pink;Moist   Tissue loss description Partial thickness   Red (%), Wound Tissue Color 100 %  "  Periwound Area Intact;Dry   Wound Length (cm) 4.5 cm   Wound Width (cm) 4.5 cm   Wound Depth (cm) 0.1 cm   Wound Volume (cm^3) 2.025 cm^3   Wound Surface Area (cm^2) 20.25 cm^2   Care Cleansed with:;Antimicrobial agent   Dressing Applied;Methylene blue/gentian violet;Foam   Off Loading Other (see comments)  (waffle ordered)   Dressing Change Due 12/05/23        Altered Skin Integrity 11/28/23 0830 Right lateral Knee Blister(s)   Date First Assessed/Time First Assessed: 11/28/23 0830   Altered Skin Integrity Present on Admission - Did Patient arrive to the hospital with altered skin?: yes  Side: Right  Orientation: lateral  Location: Knee  Primary Wound Type: Blister(s)   Dressing Appearance Moist drainage   Drainage Amount Small   Drainage Characteristics/Odor Yellow;Serous;No odor   Appearance Pink;Moist   Tissue loss description Partial thickness   Red (%), Wound Tissue Color 100 %   Periwound Area Intact;Dry   Wound Edges Irregular   Wound Length (cm) 2.5 cm   Wound Width (cm) 4 cm   Wound Depth (cm) 0.1 cm   Wound Volume (cm^3) 1 cm^3   Wound Surface Area (cm^2) 10 cm^2   Dressing Change Due 12/05/23       No results for input(s): "GLUCOSE", "NA", "K", "CL", "CO2", "BUN", "CREATININE", "CALCIUM", "MG" in the last 24 hours.    No results for input(s): "WBC", "RBC", "HGB", "HCT", "PLT", "MCV", "MCH", "MCHC" in the last 24 hours.    No results for input(s): "POCTGLUCOSE" in the last 168 hours.     Assessment and Plan:    Closed displaced intertrochanteric fracture of right femur   s/p IM nail on 11/19/2023  - PT/OT, WBAT  - DVT PPX with apixaban 2.5 mg BID  - maintain surgical dressing until removed by Orthopedics  - ortho NALLELY to see patient weekly at SNF  - follow-up with orthopedics after discharge    Acute postoperative pain  - improved, continue methocarbamol 500 mg QID, acetaminophen 1000 mg q.8 hours, tramadol 50 mg q.6 hours PRN     Postprocedural hypotension  - experienced while at [facility], IV fluids were " given and antihypertensive medications were held and then resumed upon SNF admission  - continues to have labile blood pressures  - persistenting, continue to hold losartan,  continue atenolol 25 mg daily, Lasix 20 mg TID, holding parameters  On all medications     Acute blood loss as cause of postoperative anemia  - expected postoperatively  - stable, continue monitor twice weekly CBC, transfuse for hemoglobin < 7    Hsx of Primary hypertension  - home regimen with losartan 25 mg daily, Lasix 60 mg daily splint in 2 doses, atenolol 25 mg daily  - developed postop hypotension  - currently holding losartan, Lasix split into TID dosing, continue atenolol with holding parameters     Persistent atrial fibrillation  Chronic anticoagulation   - Patient with Long standing persistent (>12 months) atrial fibrillation which is:controlled currently with home  Atenolol  and will continue in hospital. Patient is currently in atrial fibrillation.[id number] Score: 3. Anticoagulation indicated.   - Anticoagulation done with Apixiban 5 mg po BID at home and held for surgery but resumed post-op on 11/19.     Chronic heart failure with preserved ejection fraction  - Patient is identified as having Diastolic (HFpEF) heart failure that is Chronic. CHF is currently controlled.   -  Monitor strict Is&Os and daily weights.   - holding off on on fluid restriction of 1.5 L due to postop hypotension and concern for dehydration in inpatient setting  - unstable, continue Lasix dose which is altered to 20 mg TID from her home dose of 40 mg in the morning and 20 mg in the afternoon- unable to increase dosing at this time due to her hypotension  - initiated Xopenex nebulizer treatment daily x4 days, continue PRN Xopenex breathing treatments for shortness of breath    Stage 3b chronic kidney disease  - had elevation in creatinine to 1.5 on 11/21, has now normalized  - currently stable, continue monitor twice weekly BMPs, avoid nephrotoxic agents,  "renally dose medications as appropriate     Prediabetes  - "Patient noted to be hyperglycemic on admit with -210. HgA1C ordered and returned at 6.3% consistent with prediabetes which is a new diagnosis. With patient's advanced age recommendation would be conservative mangement with diet control management with diabetic diet. Discussed with patient on 11/20 and she is in agreement with plan."  - diabetic diet, blood glucose with twice weekly BMPs    ACP (advance care planning)  - completed at [facility] on 11/19/2023, patient wishes to be DNR     Indigestion  - initiated Pepcid 20 mg daily, continue PRN Tums and Mylanta    Debility   - Continue with PT/OT for gait training and strengthening and restoration of ADL's   - Encourage mobility, OOB in chair, and early ambulation as appropriate  - Fall precautions   - Monitor for bowel and bladder dysfunction  - Monitor for and prevent skin breakdown and pressure ulcers  - Continue DVT prophylaxis with apixaban        Anticipate disposition:  Home with home health.  Patient states she lives in independent senior living apartment.  She is looking into assisted living upon discharge.    IP [facility] RISK OF UNPLANNED READMISSION Model: MODERATE     Follow-up needed during SNF stay-ortho (12/4)    Appointment not to send patient to- lab 12/6    Follow-up needed after discharge from SNF: PCP, orthopedics (1/4)    Future Appointments   Date Time Provider Department Center   12/4/2023  1:00 PM Sheree Isbell NP [facility] ORTHO [address]   12/6/2023  7:00 AM SPE[facility]EN[facility]ON SPECLAB [place]   12/6/2023  8:15 AM LAB, [facility] LAB [place]   12/14/2023 10:30 AM Jacobo Mcleod MD [facility]   1/4/2024 10:15 AM Henry Perez MD [facility] ORTHO [address] [place]   2/6/2024 10:45 AM Henry Perez MD [facility] ORTHO [address] [place] NP  Department of Hospital Medicine   [facility]     DOS: 11/29/2023 "       Patient note was created using MModal Dictation.  Any errors in syntax or even information may not have been identified and edited on initial review prior to signing this note.

## 2023-11-29 NOTE — PT/OT/SLP PROGRESS
Occupational Therapy   Treatment    Name: Ngoc Castellanos  MRN: [id number]  Admit Date: 11/22/2023  Admitting Diagnosis:  Closed displaced intertrochanteric fracture of right femur with routine healing    General Precautions: Standard, aspiration, fall, hearing impaired   Orthopedic Precautions: RLE weight bearing as tolerated   Braces: N/A    Recommendations:     Discharge Recommendations:  Low Intensity Therapy  Level of Assistance Recommended at Discharge: 24 hours physical assistance for all ADL's and home management tasks  Discharge Equipment Recommendations: hip kit, walker, rolling, shower chair  Barriers to discharge:  Inaccessible home environment    Assessment:     Ngoc Castellanos is a 89 y.o. female with a medical diagnosis of Closed displaced intertrochanteric fracture of right femur with routine healing.  She presents with Performance deficits affecting function are weakness, impaired endurance, impaired self care skills, impaired functional mobility, gait instability, impaired balance, decreased lower extremity function, decreased safety awareness, pain, impaired coordination, impaired cardiopulmonary response to activity, orthopedic precautions.     Pt. Was cooperative and participated well with session on this day. Pt. Noted with mild breathing  excerebration with standing and having to catch her breat. Pt. With Pt. 02 taken and dropped to 74 % and recovered up to 99% and on next stand Pt. 02 drop to 73% pt. With rest break taken and while talking 02 stats in 82% CN notified Pt  continues to demonstrate levels of physical deficits with  functional indep with daily management activities tasks, selfcare skills with balance,  functional mobility, UB strength and endurance. Pt. Will continue to benefit from continued OT to progress towards goals      Rehab Potential is fair    Activity tolerance:  Fair    Plan:     Patient to be seen 5 x/week to address the above listed problems via self-care/home management,  therapeutic activities, therapeutic exercises    Plan of Care Expires: 12/23/23  Plan of Care Reviewed with: patient    Subjective     Communicated with: Nsg and Pt.  prior to session. I have a hard time catching my breath at times .    Pain/Comfort:       Patient's cultural, spiritual, Yazidi conflicts given the current situation:  no    Objective:     Patient found up in chair with  PCT  present   upon OT entry to room.      Functional Mobility/Transfers:  Patient completed Sit <> Stand Transfer with minimum assistance and moderate assistance  with  rolling walker       Activities of Daily Living:  Grooming: supervision seated at sink level  Upper Body Dressing: minimum assistance to delilah pull over shirt    AMPAC 6 Click ADL:      OT Exercises: UE Ergometer 10 min with breaks in between    Treatment & Education:  Pt. With standing act on this day with task. Pt. With CGA/SBA for balance aspects with task with  AD at raised counter Pt with visual perception task with discrimination of various shapes and sizes x 3:04 and 2:10 min/sec  with standing bal and min cues through out with weight shifting and use of BUE's incorporated and crossing mid line and facilitation with posture in prep for home management        Pt. With therex performed to increase ROM, endurance selfcare task and fxl mobility for independence     Pt edu on role of OT, POC, safety when performing self care tasks , benefit of performing OOB activity, and safety when performing functional transfers and mobility management for preparation with goals to progress towards next level of care     Patient left up in chair with all lines intact and call button in reach    GOALS:   Multidisciplinary Problems       Occupational Therapy Goals          Problem: Occupational Therapy    Goal Priority Disciplines Outcome Interventions   Occupational Therapy Goal     OT, PT/OT Ongoing, Progressing    Description: Goals to be met by: 12/23/2023     Patient will  increase functional independence with ADLs by performing:    Feeding with Set-up Assistance.  UE Dressing with Set-up assistance  LE Dressing with Maximum Assistance /c AE/LRAD as needed  Personal Hygiene while seated at sink with Modified [place].  Oral Hygiene while seated at sink with Modified [place].  Toileting from bedside commode with Maximum Assistance for hygiene and clothing management.   Bathing from  sitting at sink/on commode with Minimal Assistance.  Rolling to Right with Moderate Assistance.   Rolling to Left with Contact Guard Assistance.   Toilet transfer to bedside commode with Maximum Assistance.  Tolerate standing functional tasks for ~2 minutes /c Mod A and LRAD as needed  Footwear /c Mod A and AE as needed  SC t/f /c Max A and LRAD as needed.                         Time Tracking:     OT Date of Treatment: 11/30/23  OT Start Time: 0850    OT Stop Time: 0930  OT Total Time (min): 40 min    Billable Minutes:Therapeutic Activity 45    12/1/2023

## 2023-11-29 NOTE — PLAN OF CARE
Problem: Adult Inpatient Plan of Care  Goal: Plan of Care Review  11/28/2023 1811 by Noah Vazquez RN  Outcome: Ongoing, Progressing  Flowsheets (Taken 11/28/2023 1811)  Plan of Care Reviewed With: patient  11/28/2023 1753 by Noah Vazquez RN  Outcome: Ongoing, Progressing  Flowsheets (Taken 11/28/2023 1753)  Plan of Care Reviewed With: patient     Problem: Impaired Wound Healing  Goal: Optimal Wound Healing  11/28/2023 1811 by Noah Vazquez RN  Outcome: Ongoing, Progressing  11/28/2023 1753 by Noah Vazquez RN  Outcome: Ongoing, Progressing     Problem: Fall Injury Risk  Goal: Absence of Fall and Fall-Related Injury  11/28/2023 1811 by Noah Vazquez RN  Outcome: Ongoing, Progressing  11/28/2023 1753 by Noah Vazquez RN  Outcome: Ongoing, Progressing     Problem: Skin Injury Risk Increased  Goal: Skin Health and Integrity  11/28/2023 1811 by Noah Vazquez RN  Outcome: Ongoing, Progressing  11/28/2023 1753 by Noah Vazquez RN  Outcome: Ongoing, Progressing

## 2023-11-29 NOTE — PLAN OF CARE
Problem: Adult Inpatient Plan of Care  Goal: Plan of Care Review  Outcome: Ongoing, Progressing  Goal: Patient-Specific Goal (Individualized)  Outcome: Ongoing, Progressing  Goal: Absence of Hospital-Acquired Illness or Injury  Outcome: Ongoing, Progressing  Goal: Optimal Comfort and Wellbeing  Outcome: Ongoing, Progressing  Intervention: Provide Person-Centered Care  Flowsheets (Taken 11/28/2023 2129)  Trust Relationship/Rapport:   care explained   choices provided   questions encouraged   questions answered

## 2023-11-29 NOTE — PT/OT/SLP PROGRESS
"Speech Language Pathology  Treatment/Discharge    Ngoc Castellanos   MRN: [id number]   Admitting Diagnosis: Closed displaced intertrochanteric fracture of right femur with routine healing    Diet recommendations: Solid Diet Level: Regular Diet - IDDSI Level 7  Liquid Diet Level: Thin liquids - IDDSI Level 0 Pt should avoid foods deemed problematic by pt (I.e., fibrous fruits). 1 bite/sip at a time, Alternating bites/sips, Avoid talking while eating, HOB to 90 degrees, Monitor for s/s of aspiration, No straws, Remain upright 30 minutes post meal, Small bites/sips, and Strict aspiration precautions    SLP Treatment Date: 11/29/23  Speech Start Time: 0758     Speech Stop Time: 0808     Speech Total (min): 10 min       TREATMENT BILLABLE MINUTES:  Treatment Swallowing Dysfunction 10    Has the patient been evaluated by SLP for swallowing? : Yes  Keep patient NPO?: No   General Precautions: Standard, aspiration, fall, hearing impaired          Subjective:  "That's been happening for years." Pt reporting experiencing epigastric discomfort and gas for some time.          Objective:      Pt seen for follow up to monitor diet tolerance.  Pt already finished breakfast meal upon entry.  She was noted to not be positioned fully upright in bed, but somewhat low in bed and not bending at the waist.  Rehab tech assisted SLP with repositioning pt to fully upright position. Belching was noted upon repositioning.  Pt endorses epigastric discomfort and feeling like some foods were "stuck" in upper sternal region.  SLP emphasized importance of asking for assistance to position fully upright during PO intake to avoid such discomfort. SLP also explained that pt's complaints do appear to be esophageal in nature. Pt reports experiencing these symptoms "for years." SLP encourged pt to follow up with GI in the future if symptoms continue to be problematic and difficult to manage.  Pt was agreeable to accepting an additional bite of [place] Toast " x1 and milk via straw x 1. Oral and pharyngeal management was WFL. No overt s/s of aspiration observed. SLP reminded pt she may want to avoid straws when she can due to complaints of excess gas.  SLP recommends that pt remain on current diet, avoid foods that appear to contribute to epigastric discomfort or globus sensation, follow up with GI as needed, and follow  aspiration and reflux precautions to reduce risks of aspiration.  Pt demonstrated understanding of education provided, but will benefit from continued reinforcement.       Assessment:  Ngoc Castellanos is a 89 y.o. female with a medical diagnosis of Closed displaced intertrochanteric fracture of right femur with routine healing and presents with functional oral and pharyngeal swallowing abilities.  Possible esophageal dysphasia.  Pt appears safe to continue on current diet following above stated aspiration precautions. Consider GI consultation if symptoms of epigastric discomfort or globus sensation persist.       Discharge recommendations: Therapy Intensity Recommendations at Discharge:  (no further SLP needs)     Goals:   Multidisciplinary Problems       SLP Goals       Not on file              Multidisciplinary Problems (Resolved)          Problem: SLP    Goal Priority Disciplines Outcome   SLP Goal   (Resolved)     SLP Met   Description: Speech Language Pathology Goals  Goals expected to be met by 12/4:  1. Pt will tolerate least restrictive diet without s/s of aspiration.                                Plan:   SLP Follow-up?: No           11/29/2023

## 2023-11-30 LAB
ANION GAP SERPL CALC-SCNC: 9 MMOL/L (ref 8–16)
BASOPHILS # BLD AUTO: 0.04 K/UL (ref 0–0.2)
BASOPHILS NFR BLD: 0.4 % (ref 0–1.9)
BUN SERPL-MCNC: 25 MG/DL (ref 10–30)
CALCIUM SERPL-MCNC: 9 MG/DL (ref 8.7–10.5)
CHLORIDE SERPL-SCNC: 101 MMOL/L (ref 95–110)
CO2 SERPL-SCNC: 23 MMOL/L (ref 23–29)
CREAT SERPL-MCNC: 0.9 MG/DL (ref 0.5–1.4)
DIFFERENTIAL METHOD: ABNORMAL
EOSINOPHIL # BLD AUTO: 0.4 K/UL (ref 0–0.5)
EOSINOPHIL NFR BLD: 4 % (ref 0–8)
ERYTHROCYTE [DISTWIDTH] IN BLOOD BY AUTOMATED COUNT: 17.4 % (ref 11.5–14.5)
EST. GFR  (NO RACE VARIABLE): 57.8 ML/MIN/1.73 M^2
GLUCOSE SERPL-MCNC: 113 MG/DL (ref 70–110)
HCT VFR BLD AUTO: 29.8 % (ref 37–48.5)
HGB BLD-MCNC: 9.5 G/DL (ref 12–16)
IMM GRANULOCYTES # BLD AUTO: 0.09 K/UL (ref 0–0.04)
IMM GRANULOCYTES NFR BLD AUTO: 0.9 % (ref 0–0.5)
LYMPHOCYTES # BLD AUTO: 0.7 K/UL (ref 1–4.8)
LYMPHOCYTES NFR BLD: 6.3 % (ref 18–48)
MAGNESIUM SERPL-MCNC: 2 MG/DL (ref 1.6–2.6)
MCH RBC QN AUTO: 29.3 PG (ref 27–31)
MCHC RBC AUTO-ENTMCNC: 31.9 G/DL (ref 32–36)
MCV RBC AUTO: 92 FL (ref 82–98)
MONOCYTES # BLD AUTO: 0.7 K/UL (ref 0.3–1)
MONOCYTES NFR BLD: 6.8 % (ref 4–15)
NEUTROPHILS # BLD AUTO: 8.5 K/UL (ref 1.8–7.7)
NEUTROPHILS NFR BLD: 81.6 % (ref 38–73)
NRBC BLD-RTO: 0 /100 WBC
PHOSPHATE SERPL-MCNC: 3.1 MG/DL (ref 2.7–4.5)
PLATELET # BLD AUTO: 268 K/UL (ref 150–450)
PMV BLD AUTO: 9.3 FL (ref 9.2–12.9)
POTASSIUM SERPL-SCNC: 3.6 MMOL/L (ref 3.5–5.1)
RBC # BLD AUTO: 3.24 M/UL (ref 4–5.4)
SODIUM SERPL-SCNC: 133 MMOL/L (ref 136–145)
WBC # BLD AUTO: 10.34 K/UL (ref 3.9–12.7)

## 2023-11-30 PROCEDURE — 94640 AIRWAY INHALATION TREATMENT: CPT | Mod: HCNC

## 2023-11-30 PROCEDURE — 85025 COMPLETE CBC W/AUTO DIFF WBC: CPT | Mod: HCNC | Performed by: HOSPITALIST

## 2023-11-30 PROCEDURE — 25000003 PHARM REV CODE 250: Mod: HCNC | Performed by: NURSE PRACTITIONER

## 2023-11-30 PROCEDURE — 97535 SELF CARE MNGMENT TRAINING: CPT | Mod: HCNC,CO

## 2023-11-30 PROCEDURE — 97110 THERAPEUTIC EXERCISES: CPT | Mod: HCNC,CQ

## 2023-11-30 PROCEDURE — 84100 ASSAY OF PHOSPHORUS: CPT | Mod: HCNC | Performed by: HOSPITALIST

## 2023-11-30 PROCEDURE — 11000004 HC SNF PRIVATE: Mod: HCNC

## 2023-11-30 PROCEDURE — 80048 BASIC METABOLIC PNL TOTAL CA: CPT | Mod: HCNC | Performed by: HOSPITALIST

## 2023-11-30 PROCEDURE — 25000242 PHARM REV CODE 250 ALT 637 W/ HCPCS: Mod: HCNC | Performed by: NURSE PRACTITIONER

## 2023-11-30 PROCEDURE — 97116 GAIT TRAINING THERAPY: CPT | Mod: HCNC,CQ

## 2023-11-30 PROCEDURE — 25000003 PHARM REV CODE 250: Mod: HCNC | Performed by: FAMILY MEDICINE

## 2023-11-30 PROCEDURE — 36415 COLL VENOUS BLD VENIPUNCTURE: CPT | Mod: HCNC | Performed by: HOSPITALIST

## 2023-11-30 PROCEDURE — 83735 ASSAY OF MAGNESIUM: CPT | Mod: HCNC | Performed by: HOSPITALIST

## 2023-11-30 PROCEDURE — 94761 N-INVAS EAR/PLS OXIMETRY MLT: CPT | Mod: HCNC

## 2023-11-30 PROCEDURE — 25000003 PHARM REV CODE 250: Mod: HCNC | Performed by: HOSPITALIST

## 2023-11-30 RX ORDER — SIMETHICONE 80 MG
2 TABLET,CHEWABLE ORAL 3 TIMES DAILY PRN
Status: DISCONTINUED | OUTPATIENT
Start: 2023-11-30 | End: 2023-12-13 | Stop reason: HOSPADM

## 2023-11-30 RX ORDER — LEVALBUTEROL INHALATION SOLUTION 0.63 MG/3ML
SOLUTION RESPIRATORY (INHALATION)
Status: DISPENSED
Start: 2023-11-30 | End: 2023-11-30

## 2023-11-30 RX ADMIN — APIXABAN 5 MG: 2.5 TABLET, FILM COATED ORAL at 09:11

## 2023-11-30 RX ADMIN — APIXABAN 5 MG: 2.5 TABLET, FILM COATED ORAL at 08:11

## 2023-11-30 RX ADMIN — SIMETHICONE 160 MG: 80 TABLET, CHEWABLE ORAL at 01:11

## 2023-11-30 RX ADMIN — FUROSEMIDE 20 MG: 20 TABLET ORAL at 04:11

## 2023-11-30 RX ADMIN — Medication 1 TABLET: at 04:11

## 2023-11-30 RX ADMIN — Medication 1 TABLET: at 09:11

## 2023-11-30 RX ADMIN — SENNOSIDES AND DOCUSATE SODIUM 1 TABLET: 8.6; 5 TABLET ORAL at 08:11

## 2023-11-30 RX ADMIN — METHOCARBAMOL 500 MG: 500 TABLET ORAL at 09:11

## 2023-11-30 RX ADMIN — SIMETHICONE 160 MG: 80 TABLET, CHEWABLE ORAL at 08:11

## 2023-11-30 RX ADMIN — ACETAMINOPHEN 1000 MG: 325 TABLET ORAL at 01:11

## 2023-11-30 RX ADMIN — ACETAMINOPHEN 1000 MG: 325 TABLET ORAL at 09:11

## 2023-11-30 RX ADMIN — FUROSEMIDE 20 MG: 20 TABLET ORAL at 01:11

## 2023-11-30 RX ADMIN — FAMOTIDINE 20 MG: 20 TABLET ORAL at 09:11

## 2023-11-30 RX ADMIN — LEVALBUTEROL HYDROCHLORIDE 0.63 MG: 0.63 SOLUTION RESPIRATORY (INHALATION) at 07:11

## 2023-11-30 RX ADMIN — SENNOSIDES AND DOCUSATE SODIUM 1 TABLET: 8.6; 5 TABLET ORAL at 09:11

## 2023-11-30 RX ADMIN — ACETAMINOPHEN 1000 MG: 325 TABLET ORAL at 06:11

## 2023-11-30 RX ADMIN — FUROSEMIDE 20 MG: 20 TABLET ORAL at 10:11

## 2023-11-30 RX ADMIN — METHOCARBAMOL 500 MG: 500 TABLET ORAL at 04:11

## 2023-11-30 RX ADMIN — POLYETHYLENE GLYCOL 3350 17 G: 17 POWDER, FOR SOLUTION ORAL at 09:11

## 2023-11-30 RX ADMIN — METHOCARBAMOL 500 MG: 500 TABLET ORAL at 01:11

## 2023-11-30 NOTE — PT/OT/SLP PROGRESS
"Physical Therapy Treatment    Patient Name:  Ngoc Castellanos   MRN:  [id number]  Admit Date: 11/22/2023  Admitting Diagnosis: Closed displaced intertrochanteric fracture of right femur with routine healing  Recent Surgeries:     General Precautions: Standard, aspiration, fall, hearing impaired  Orthopedic Precautions: LLE weight bearing as tolerated  Braces: N/A    Recommendations:     Discharge Recommendations: Low Intensity Therapy  Level of Assistance Recommended at Discharge: 24 hours significant assistance  Discharge Equipment Recommendations: shower chair, walker, rolling, wheelchair  Barriers to discharge: Inaccessible home environment    Assessment:     Ngoc Castellaons is a 89 y.o. female admitted with a medical diagnosis of Closed displaced intertrochanteric fracture of right femur with routine healing . Patient continues to progress consistently, as noted by transfer ability, postural control and safety in standing.      Performance deficits affecting function: weakness, impaired endurance, impaired self care skills, impaired functional mobility, gait instability, impaired balance, decreased coordination, pain, decreased ROM, edema, impaired cardiopulmonary response to activity, orthopedic precautions.    Rehab Potential is good    Activity Tolerance: Good    Plan:     Patient to be seen 5 x/week to address the above listed problems via gait training, therapeutic activities, therapeutic exercises, neuromuscular re-education, wheelchair management/training    Plan of Care Expires: 12/24/23  Plan of Care Reviewed with: patient    Subjective     Patient states " The pain is not that bad today".     Pain/Comfort:  Pain Rating 1: 2/10  Location - Side 1: Right  Location - Orientation 1: generalized  Location 1: leg  Pain Addressed 1: Reposition, Distraction  Pain Rating Post-Intervention 1: 2/10    Patient's cultural, spiritual, Buddhism conflicts given the current situation:  no    Objective:     Communicated with " NSG prior to session.  Patient found up in chair with Other (comments) upon PT entry to room.     Therapeutic Activities and Exercises: R knee passive stretch x 3 minutes. R LE Quad sets and ap 2x20 reps, L LE LAQ, HIP FLEX AND AP 2X20 REPS.    Functional Mobility:  Transfers:     Sit to Stand:  minimum assistance with rolling walker  Gait: 17 ft x 2 trials with RW and min to mod assistance, with w/c in tow.  Wheelchair Propulsion:  Pt propelled Standard wheelchair x 90 feet on Level tile with  Right upper extremity and Left upper extremity with Supervision or Set-up Assistance.     AM-PAC 6 CLICK MOBILITY  13    Patient left up in chair with call button in reach.    GOALS:   Multidisciplinary Problems       Physical Therapy Goals          Problem: Physical Therapy    Goal Priority Disciplines Outcome Goal Variances Interventions   Physical Therapy Goal     PT, PT/OT Ongoing, Progressing     Description: Goals to be met by: 23       Patient will increase functional independence with mobility by performin. Supine to sit with Contact Guard Assistance  2. Sit to supine with Contact Guard Assistance  3. Rolling to Left and Right with Standby Assistance  4. Sit to stand transfer with Contact Guard Assistance  5. Bed to chair transfer with Contact Guard Assistance using Rolling Walker/LRAD  6. Gait  x 50 feet with Contact Guard Assistance using Rolling Walker/LRAD  7. Wheelchair propulsion x 150 feet with Modified [place] using bilateral upper extremities                         Time Tracking:     PT Received On: 23  PT Start Time: 1111  PT Stop Time: 1154  PT Total Time (min): 43 min    Billable Minutes: Gait Training 15 and Therapeutic Exercise 28    Treatment Type: Treatment  PT/PTA: PTA     Number of PTA visits since last PT visit: 2     2023

## 2023-11-30 NOTE — PLAN OF CARE
Problem: Adult Inpatient Plan of Care  Goal: Plan of Care Review  Outcome: Ongoing, Progressing  Goal: Patient-Specific Goal (Individualized)  Outcome: Ongoing, Progressing  Goal: Absence of Hospital-Acquired Illness or Injury  Outcome: Ongoing, Progressing  Intervention: Prevent Infection  Flowsheets (Taken 11/29/2023 2124)  Infection Prevention:   single patient room provided   hand hygiene promoted   rest/sleep promoted  Goal: Optimal Comfort and Wellbeing  Outcome: Ongoing, Progressing

## 2023-11-30 NOTE — PROGRESS NOTES
[facility]                                  Skilled Nursing Facility                   Progress Note     Admit Date: 11/22/2023  WES 12/13/2023  [id number]  Principal Problem:  Closed displaced intertrochanteric fracture of right femur with routine healing   HPI obtained from patient interview and chart review     Chief Complaint: Re-evaluation of medical treatment and therapy status: Lab review, re-evaluation of shortness of breath    HPI:   Mrs. Castellanos is an 89-year-old female with a PMHx of Afib on Eliquis, HFpEF on lasix, HTN, and CKD stage III who presents to SNF following hospitalization for right intertrochanteric femur fracture s/p cephalomedullary nail fixation on 11/19 with Dr. Perez.  Admission to SNF for secondary weakness and debility.    Interval history: All of today's labs reviewed and are listed below.  24 hr vital sign ranges reviewed and listed below.  Patient states her shortness of breath was improved with therapy after receiving her nebulizer treatment this morning.  Patient continues to have extensive lower extremity edema with weeping this is to be expected with her level of heart disease, postop status, age and minimal ambulation.  Patient's weight has increased 292 lb yesterday from admit weight of 178 lb on 11/22.  Patient reports an adequate appetite.  Patient denies dysuria.  Patient reports having regular bowel movements.  Patient progressing with PT/OT-Gait: 12 ft x 3 trials with RW and mod to min assistance with R LE WBAT, cues for step sequence and wheelchair in tow by PT tech, with long rest breaks in sitting between trials. . Continuing to follow and treat all acute and chronic conditions.  Patient seen and discussed with orthopedics today.  Patient tells me with each visit that she has made peace with God and is ready to pass on whenever he is ready to take her.  She describes had difficult life has  become being 89 and that her quality of life is not what she is accustomed to.  Patient does not appear depressed or sad by this realization.      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    Past Surgical History: Patient has a past surgical history that includes Brain surgery (2002); Hysterectomy; Tonsillectomy; Hemorrhoid surgery; Appendectomy; Breast biopsy (2/2014); Breast surgery (03/17/14); Breast cyst excision (1960); Mastectomy (Right); and Intramedullary rodding of femur (Right, 11/19/2023).    Social History: Patient reports that she has never smoked. She has never used smokeless tobacco. She reports that she does not drink alcohol and does not use drugs.    Family History:  No significant family history to report    Allergies: Patient is allergic to clindamycin, levofloxacin, lidocaine, and adhesive tape-silicones.    ROS  Constitutional: Negative for fever.  +fatigue   Eyes: Negative for blurred vision, double vision   Respiratory: Negative for cough.  +chronic SOB, at baseline  Cardiovascular: Negative for chest pain, palpitations. + leg swelling.   Gastrointestinal: Negative for abdominal pain, constipation, diarrhea, nausea, vomiting.  +indigestion, flatulence  Genitourinary: Negative for dysuria, frequency   Musculoskeletal:  + generalized weakness.  + RLE pain  Skin: Negative for itching and rash.   Neurological: Negative for dizziness, headaches.   Psychiatric/Behavioral: Negative for depression. The patient is not nervous/anxious.      24  hour Vital Sign Range   Temp:  [97.6 °F (36.4 °C)]   Pulse:  [70-84]   Resp:  [17-18]   BP: (105-132)/(54-60)   SpO2:  [97 %-100 %]     Current BMI: Body mass index is 30.07 kg/m².    PEx  Constitutional: Patient appears debilitated.  No distress noted  HENT:   Head: Normocephalic and atraumatic.   Eyes: Pupils are equal, round  Neck: Normal range of motion. Neck supple.   Cardiovascular: Normal rate, regular rhythm and normal heart sounds.    Pulmonary/Chest: Effort normal and breath sounds with bilateral crackles R>L  Abdominal: Soft. Bowel sounds are normal.   Musculoskeletal: Normal range of motion.   Neurological: Alert and oriented to person, place, and time.   Psychiatric: Normal mood and affect. Behavior is normal.   Skin: Skin is warm and dry.  Surgical dressing to RLE, only to be removed by Ortho.  2+ pitting edema to bilateral lower extremities R>L     Altered Skin Integrity 11/21/23 1256 Right lateral;upper Thigh Blister(s)   Date First Assessed/Time First Assessed: 11/21/23 1256   Altered Skin Integrity Present on Admission - Did Patient arrive to the hospital with altered skin?: suspected hospital acquired  Side: Right  Orientation: lateral;upper  Location: Thigh  Primar...   Dressing Appearance Moist drainage   Drainage Amount Small   Drainage Characteristics/Odor Yellow;Serous   Appearance Moist;Pink   Tissue loss description Partial thickness   Red (%), Wound Tissue Color 100 %   Periwound Area Intact;Dry   Wound Length (cm) 3 cm   Wound Width (cm) 4 cm   Wound Depth (cm) 0.1 cm   Wound Volume (cm^3) 1.2 cm^3   Wound Surface Area (cm^2) 12 cm^2   Care Cleansed with:;Antimicrobial agent   Dressing Applied;Methylene blue/gentian violet;Foam   Dressing Change Due 12/05/23        Altered Skin Integrity 11/28/23 0830 Right Buttocks Blister(s)   Date First Assessed/Time First Assessed: 11/28/23 0830   Altered Skin Integrity Present on Admission - Did Patient arrive to the hospital with altered skin?: yes   "Side: Right  Location: Buttocks  Primary Wound Type: Blister(s)   Appearance Pink;Moist   Tissue loss description Partial thickness   Red (%), Wound Tissue Color 100 %   Periwound Area Intact;Dry   Wound Length (cm) 4.5 cm   Wound Width (cm) 4.5 cm   Wound Depth (cm) 0.1 cm   Wound Volume (cm^3) 2.025 cm^3   Wound Surface Area (cm^2) 20.25 cm^2   Care Cleansed with:;Antimicrobial agent   Dressing Applied;Methylene blue/gentian violet;Foam   Off Loading Other (see comments)  (waffle ordered)   Dressing Change Due 12/05/23        Altered Skin Integrity 11/28/23 0830 Right lateral Knee Blister(s)   Date First Assessed/Time First Assessed: 11/28/23 0830   Altered Skin Integrity Present on Admission - Did Patient arrive to the hospital with altered skin?: yes  Side: Right  Orientation: lateral  Location: Knee  Primary Wound Type: Blister(s)   Dressing Appearance Moist drainage   Drainage Amount Small   Drainage Characteristics/Odor Yellow;Serous;No odor   Appearance Pink;Moist   Tissue loss description Partial thickness   Red (%), Wound Tissue Color 100 %   Periwound Area Intact;Dry   Wound Edges Irregular   Wound Length (cm) 2.5 cm   Wound Width (cm) 4 cm   Wound Depth (cm) 0.1 cm   Wound Volume (cm^3) 1 cm^3   Wound Surface Area (cm^2) 10 cm^2   Dressing Change Due 12/05/23       Recent Labs   Lab 11/30/23  0322   *   K 3.6      CO2 23   BUN 25   CREATININE 0.9   CALCIUM 9.0   MG 2.0       Recent Labs   Lab 11/30/23  0322   WBC 10.34   RBC 3.24*   HGB 9.5*   HCT 29.8*      MCV 92   MCH 29.3   MCHC 31.9*       No results for input(s): "POCTGLUCOSE" in the last 168 hours.     Assessment and Plan:    Closed displaced intertrochanteric fracture of right femur   s/p IM nail on 11/19/2023  - PT/OT, WBAT  - DVT PPX with apixaban 2.5 mg BID  - maintain surgical dressing until removed by Orthopedics  - ortho NALLELY to see patient weekly at [facility]  - follow-up with orthopedics after discharge    Acute postoperative " pain  - improved, continue methocarbamol 500 mg QID, acetaminophen 1000 mg q.8 hours, tramadol 50 mg q.6 hours PRN    Hyponatremia  - mild decreased to 133, related to fluid overload status     Postprocedural hypotension  - experienced while at [facility], IV fluids were given and antihypertensive medications were held and then resumed upon SNF admission  - continues to have labile blood pressures  - persistenting, discontinued atenolol today so that patient can receive her Lasix dosing.  continue to hold losartan     Acute on Chronic heart failure with preserved ejection fraction  - Patient is identified as having Diastolic (HFpEF) heart failure that is Chronic. CHF is currently controlled.   -  Monitor strict Is&Os and daily weights.   - holding off on on fluid restriction of 1.5 L due to postop hypotension and concern for dehydration in inpatient setting  - home regimen with Lasix 40 mg in the AM,  - Xopenex nebulizer treatment daily x4 days, continue PRN Xopenex breathing treatments for shortness of breath  -  unstable, significant weight gain since admission, discontinued atenolol and lowered blood pressure holding threshold to SBP of 100 to hold Lasix so that she can receive more doses     Persistent atrial fibrillation  Chronic anticoagulation   - Patient with Long standing persistent (>12 months) atrial fibrillation which is:controlled currently with home  Atenolol  and will continue in hospital. Patient is currently in atrial fibrillation.[id number] Score: 3. Anticoagulation indicated.   - hold atenolol.  Anticoagulation done with Apixiban 5 mg po BID at home and held for surgery but resumed post-op on 11/19.    Acute blood loss as cause of postoperative anemia  - expected postoperatively  - stable, continue monitor twice weekly CBC, transfuse for hemoglobin < 7    Hsx of Primary hypertension  - home regimen with losartan 25 mg daily, Lasix 60 mg daily splint in 2 doses, atenolol 25 mg daily  - developed postop  "hypotension  - currently holding losartan, Lasix split into TID dosing, continue atenolol with holding parameters    Stage 3b chronic kidney disease  - had elevation in creatinine to 1.5 on 11/21, has now normalized  - currently stable, continue monitor twice weekly BMPs, avoid nephrotoxic agents, renally dose medications as appropriate     Prediabetes  - "Patient noted to be hyperglycemic on admit with -210. HgA1C ordered and returned at 6.3% consistent with prediabetes which is a new diagnosis. With patient's advanced age recommendation would be conservative mangement with diet control management with diabetic diet. Discussed with patient on 11/20 and she is in agreement with plan."  - diabetic diet, blood glucose with twice weekly BMPs    ACP (advance care planning)  - completed at [facility] on 11/19/2023, patient wishes to be DNR     Indigestion  - initiated Pepcid 20 mg daily, continue PRN Tums and Mylanta    Debility   - Continue with PT/OT for gait training and strengthening and restoration of ADL's   - Encourage mobility, OOB in chair, and early ambulation as appropriate  - Fall precautions   - Monitor for bowel and bladder dysfunction  - Monitor for and prevent skin breakdown and pressure ulcers  - Continue DVT prophylaxis with apixaban        Anticipate disposition:  Eval nursing home placement likely with hospice services.  Case management made aware on 11/30    IP [facility] RISK OF UNPLANNED READMISSION Model: MODERATE     Follow-up needed during SNF stay-ortho (12/4)    Appointment not to send patient to- lab 12/6    Follow-up needed after discharge from SNF: PCP, orthopedics (1/4)    Future Appointments   Date Time Provider Department Center   12/6/2023  7:00 AM SHON LEDESMA [facility] [place]   12/6/2023  8:15 AM LAB, SENAIT KENH LAB [place]   12/14/2023 10:30 AM Jacobo Mcleod MD [facility]   1/4/2024 10:15 AM Henry Perez MD [facility] ORTHO Toni wilfrid Ort   2/6/2024 10:45 " Henry Adam MD [facility] ORTHO Toni Minaya     Advance Care Planning   This was a voluntary visit in the option to decline counseling was given  Length of Conversation:  18 minutes  Topics Discussed: Disease process of acute on chronic heart failure, recent fracture, advanced debility  Overview of Materials/Resources given(if applicable):  Discussed if patient does not progress with therapy, display worsening symptoms of heart failure that hospice services would be an option for her.  Patient like to this option as she has made peace with her life and is ready to pass on, she states that her quality of life is very poor at this time.  she knows that her heart is very weak.  Outcome of Discussion:  Patient confirms her DNR status.  She is interested in looking into nursing home placement and would likely like to have hospice services at the facility  Individuals present:  Myself and patient  Decision Maker: Ngoc Castellanos, patient      Pooja Littlejohn NP  Department of Hospital Medicine   [facility]     DOS: 11/30/2023       Patient note was created using MModal Dictation.  Any errors in syntax or even information may not have been identified and edited on initial review prior to signing this note.

## 2023-11-30 NOTE — PLAN OF CARE
Problem: Adult Inpatient Plan of Care  Goal: Plan of Care Review  Outcome: Ongoing, Progressing  Goal: Patient-Specific Goal (Individualized)  Outcome: Ongoing, Progressing  Goal: Absence of Hospital-Acquired Illness or Injury  Outcome: Ongoing, Progressing  Goal: Optimal Comfort and Wellbeing  Outcome: Ongoing, Progressing  Goal: Readiness for Transition of Care  Outcome: Ongoing, Progressing     Problem: Impaired Wound Healing  Goal: Optimal Wound Healing  Outcome: Ongoing, Progressing     Problem: Fall Injury Risk  Goal: Absence of Fall and Fall-Related Injury  Outcome: Ongoing, Progressing     Problem: Fall Injury Risk  Goal: Absence of Fall and Fall-Related Injury  Outcome: Ongoing, Progressing     Problem: Skin Injury Risk Increased  Goal: Skin Health and Integrity  Outcome: Ongoing, Progressing   Pt admitted to Skilled Nursing for Displaced intertrochanteric fractu*. Patient is AA0X4.  Receiving daily PT/OT for strengthening, balance, gait training and ambulation. Pt currently requiring  2 person assistance,for transfers and ambulation. Patient also requiring 2 person assistance with dressing and set up for grooming and eating. Daily skilled nursing interventions include pain management, medication management, surgical wound management and ADL assistance. Patient reports pain 0/10 this shift. Dressing change completed to right hip wound site x 2. Ace wrap applied to bilateral lower legs, Pt is on a diabetic diet, tolerating well. Care plan reviewed and updated. No complaints at this time, will continue to update care and monitor.

## 2023-11-30 NOTE — PT/OT/SLP PROGRESS
Occupational Therapy   Treatment    Name: Ngoc Castellanos  MRN: [id number]  Admit Date: 11/22/2023  Admitting Diagnosis:  Closed displaced intertrochanteric fracture of right femur with routine healing    General Precautions: Standard, aspiration, fall, hearing impaired   Orthopedic Precautions: RLE weight bearing as tolerated   Braces: N/A    Recommendations:     Discharge Recommendations:  Low Intensity Therapy  Level of Assistance Recommended at Discharge: 24 hours physical assistance for all ADL's and home management tasks  Discharge Equipment Recommendations: hip kit, walker, rolling, shower chair  Barriers to discharge:  Inaccessible home environment    Assessment:     Ngoc Castellanos is a 89 y.o. female with a medical diagnosis of Closed displaced intertrochanteric fracture of right femur with routine healing.  She presents with  Performance deficits affecting function are weakness, impaired endurance, impaired self care skills, impaired functional mobility, gait instability, impaired balance, decreased lower extremity function, decreased safety awareness, pain, impaired coordination, impaired cardiopulmonary response to activity, orthopedic precautions.     Rehab Potential is fair    Activity tolerance:  Fair    Plan:     Patient to be seen 5 x/week to address the above listed problems via self-care/home management, therapeutic activities, therapeutic exercises    Plan of Care Expires: 12/23/23  Plan of Care Reviewed with: patient    Subjective     Communicated with: Nsg and Pt  prior to session.  I am so ready to get up    Pain/Comfort:       Patient's cultural, spiritual, Yazidism conflicts given the current situation:  no    Objective:     Patient found supine with   upon OT entry to room.    Bed Mobility:    Patient completed Rolling/Turning to Left with  minimum assistance and with side rail  Patient completed Rolling/Turning to Right with minimum assistance and with side rail  Patient completed  Scooting/Bridging with minimum assistance and with side rail  Patient completed Supine to Sit with moderate assistance     Functional Mobility/Transfers:  Patient completed Sit <> Stand Transfer with minimum assistance and moderate assistance  with  rolling walker   Patient completed Bed <> Chair Transfer using Stand Pivot technique with minimum assistance and moderate assistance with rolling walker    Activities of Daily Living:  Grooming with supervision while seated at sink level  Bathing with moderate assistance from bed level  Upper Body Dressing with minimum assistance to doff/delilah pull over shirt  Lower Body Dressing with maximal assistance performed seated  to delilah pants, manage up hips with steadying (A) with RW, and doff/delilah  B socks  Toileting with maximal assistance from bed level with fresh diaper management       [facility] 6 Click ADL:        Treatment & Education:  Pt edu on role of OT, POC, safety when performing self care tasks , benefit of performing OOB activity, and safety when performing functional transfers and mobility management for preparation with goals to progress towards next level of care     Patient left up in chair with all lines intact and call button in reach    GOALS:   Multidisciplinary Problems       Occupational Therapy Goals          Problem: Occupational Therapy    Goal Priority Disciplines Outcome Interventions   Occupational Therapy Goal     OT, PT/OT Ongoing, Progressing    Description: Goals to be met by: 12/23/2023     Patient will increase functional independence with ADLs by performing:    Feeding with Set-up Assistance.  UE Dressing with Set-up assistance  LE Dressing with Maximum Assistance /c AE/LRAD as needed  Personal Hygiene while seated at sink with Modified [place].  Oral Hygiene while seated at sink with Modified [place].  Toileting from bedside commode with Maximum Assistance for hygiene and clothing management.   Bathing from  sitting at sink/on commode  with Minimal Assistance.  Rolling to Right with Moderate Assistance.   Rolling to Left with Contact Guard Assistance.   Toilet transfer to bedside commode with Maximum Assistance.  Tolerate standing functional tasks for ~2 minutes /c Mod A and LRAD as needed  Footwear /c Mod A and AE as needed  SC t/f /c Max A and LRAD as needed.                         Time Tracking:     OT Date of Treatment: 11/30/23  OT Start Time: 0850    OT Stop Time: 0930  OT Total Time (min): 40 min    Billable Minutes:Self Care/Home Management 40    12/1/2023

## 2023-11-30 NOTE — CARE UPDATE
Left a message for Jeremy Castellanos to call SS to discuss plans for long term care placement and see if he will assist patient in this process.

## 2023-11-30 NOTE — PROGRESS NOTES
Ms. Castellanos was seen at [facility] today for a post-operative visit after undergoing   Cephalomedullary nail fixation of right intertrochanteric femur fractur   by Dr. Perez on 11/19/2023.      Interval History:  She reports that she is doing well.  Pain is controlled.  She is taking pain medication.  Tylenol, tramadol, robaxin   She denies fever, chills, and sweats since the time of the surgery.  She is participating in her therapy sessions.     Physical exam:    Incision is clean, dry and intact.  2 blisters lateral thigh.  She has tactile stimulation to their lower leg, she denies calf pain, there is no leg edema and their pedal pulse is palpable x 2.     RADS: none done today    Assessment:  Post-op visit (2 weeks)    Plan:  Current care, treatment plan, precautions, activity level/ modifications, limitations, rehabilitation exercises and proposed future treatment were discussed with the patient. We discussed the need to monitor for changes in symptoms and condition and report them to the physician.  Discussed importance of compliance with all appointments and follow up examinations.     WOUND CARE ORDERS  Do not remove surgical dressing for 2 weeks post-op. This will be done only by MD at initial post-op visit. If dressing is completely saturated, Call number below.      Do not get dressings wet. Do not shower.      If dressing continues to be saturated or there are signs of infection, please call [facility] [phone] for further instructions and to make appt to be seen.         PHYSICAL THERAPY:    - Continue therapy as ordered.        - weight bearing as tolerated         - range of motion as tolerated.      PAIN MEDICATION:               - Pain medication: refill was not needed,               - Pain medication refill policy provided to patient for review, yes      DVT PROPHYLAXIS:               - apixaban      FOLLOW UP:   - Patient will continue to be seen on [facility] weekly until their discharge then he is to return  to clinic for follow up.  - Future Appointments:   Future Appointments   Date Time Provider Department Center   12/4/2023  1:00 PM Sheree Isbell NP [facility] ORTHO [address] [place]   12/6/2023  7:00 AM [facility] [place]   12/6/2023  8:15 AM LAB, [facility] LAB [place]   12/14/2023 10:30 AM Jacobo Mcleod MD [facility]   1/4/2024 10:15 AM Henry Perez MD [facility]THO [address] [place]   2/6/2024 10:45 AM Henry Perez MD [facility] [place]           If there are any questions prior to scheduled follow up, the patient was instructed to contact the office

## 2023-12-01 PROCEDURE — 94640 AIRWAY INHALATION TREATMENT: CPT | Mod: HCNC

## 2023-12-01 PROCEDURE — 25000003 PHARM REV CODE 250: Mod: HCNC | Performed by: FAMILY MEDICINE

## 2023-12-01 PROCEDURE — 94761 N-INVAS EAR/PLS OXIMETRY MLT: CPT | Mod: HCNC

## 2023-12-01 PROCEDURE — 97530 THERAPEUTIC ACTIVITIES: CPT | Mod: HCNC,CQ

## 2023-12-01 PROCEDURE — 97110 THERAPEUTIC EXERCISES: CPT | Mod: HCNC,CQ

## 2023-12-01 PROCEDURE — 25000003 PHARM REV CODE 250: Mod: HCNC | Performed by: NURSE PRACTITIONER

## 2023-12-01 PROCEDURE — 97116 GAIT TRAINING THERAPY: CPT | Mod: HCNC,CQ

## 2023-12-01 PROCEDURE — 25000242 PHARM REV CODE 250 ALT 637 W/ HCPCS: Mod: HCNC | Performed by: NURSE PRACTITIONER

## 2023-12-01 PROCEDURE — 11000004 HC SNF PRIVATE: Mod: HCNC

## 2023-12-01 PROCEDURE — 25000003 PHARM REV CODE 250: Mod: HCNC | Performed by: HOSPITALIST

## 2023-12-01 RX ADMIN — TRAMADOL HYDROCHLORIDE 50 MG: 50 TABLET, COATED ORAL at 04:12

## 2023-12-01 RX ADMIN — METHOCARBAMOL 500 MG: 500 TABLET ORAL at 04:12

## 2023-12-01 RX ADMIN — APIXABAN 5 MG: 2.5 TABLET, FILM COATED ORAL at 09:12

## 2023-12-01 RX ADMIN — SENNOSIDES AND DOCUSATE SODIUM 1 TABLET: 8.6; 5 TABLET ORAL at 09:12

## 2023-12-01 RX ADMIN — ACETAMINOPHEN 1000 MG: 325 TABLET ORAL at 09:12

## 2023-12-01 RX ADMIN — FUROSEMIDE 20 MG: 20 TABLET ORAL at 09:12

## 2023-12-01 RX ADMIN — FAMOTIDINE 20 MG: 20 TABLET ORAL at 09:12

## 2023-12-01 RX ADMIN — FUROSEMIDE 20 MG: 20 TABLET ORAL at 04:12

## 2023-12-01 RX ADMIN — ACETAMINOPHEN 1000 MG: 325 TABLET ORAL at 02:12

## 2023-12-01 RX ADMIN — SIMETHICONE 160 MG: 80 TABLET, CHEWABLE ORAL at 09:12

## 2023-12-01 RX ADMIN — ACETAMINOPHEN 1000 MG: 325 TABLET ORAL at 06:12

## 2023-12-01 RX ADMIN — TRAMADOL HYDROCHLORIDE 50 MG: 50 TABLET, COATED ORAL at 09:12

## 2023-12-01 RX ADMIN — METHOCARBAMOL 500 MG: 500 TABLET ORAL at 01:12

## 2023-12-01 RX ADMIN — METHOCARBAMOL 500 MG: 500 TABLET ORAL at 09:12

## 2023-12-01 RX ADMIN — Medication 1 TABLET: at 09:12

## 2023-12-01 RX ADMIN — FUROSEMIDE 20 MG: 20 TABLET ORAL at 01:12

## 2023-12-01 RX ADMIN — POLYETHYLENE GLYCOL 3350 17 G: 17 POWDER, FOR SOLUTION ORAL at 09:12

## 2023-12-01 RX ADMIN — Medication 1 TABLET: at 04:12

## 2023-12-01 RX ADMIN — LEVALBUTEROL HYDROCHLORIDE 0.63 MG: 0.63 SOLUTION RESPIRATORY (INHALATION) at 07:12

## 2023-12-01 NOTE — NURSING
POC reviewed with pt, pt verbalized understanding. Safety maintained throughout shift, bed locked and in lowest position, call light in reach, Side rails up X 2. Non skid sock on when OOB. Pt remained free of fall/trauma. Pt self reports of pain treated with PO pain medication as ordered by MD. VS stable.  no reports of nausea and vomiting.   Incision to LLE LOUIE dry and intact. Foam to LLE replaced x2 throughout shift due to saturation.   Ace wraps to BLE removed and replaced.  No signs of distress, rise and fall of chest noted, respirations  even and unlabored   Plan of care on going. No future concerns as of present.

## 2023-12-01 NOTE — CARE UPDATE
"Update:  Spoke to Jeremy, he is checking out [facility] today. Explained that she is going to need more care than AL LOC due to pt is 2 person assist on some tasks at this point: there is a chance she can improve on some tasks, and some assisted living facilities will provide care to the LTC level, however, nursing home LOC is what is being recommended.  Jeremy is willing to help his mother with her financials, however, there is a limit to what he will do. He stated "partial help."  SS will follow up with pt and family choices as facilities are approaches and responses are given.            Discussed with patient about LTC placement. Pt stated family (son) were looking into patient care homes as an option. Pt has an appointment with a representative today to discuss her options and see if the care levels that they offer are suitable for her.  Pt is thinking she is going to need nursing home placement; the areas she would like to pursue are [place] and [place] since family would be close by.  SS provided her with contact information, so any facilities she chooses can contact SS and the process can be started. Pt gave permission to start seeking LTC facilities in these areas.  When asked who would help her with financial information, she stated her grandaughter.  SS has not spoken to any of her family members as of now.  Contacted called Jeremy and Toi as of now, left messages for them both.  "

## 2023-12-01 NOTE — PROGRESS NOTES
[facility]                                  Skilled Nursing Facility                   Progress Note     Admit Date: 11/22/2023  WES 12/13/2023  [id number]  Principal Problem:  Closed displaced intertrochanteric fracture of right femur with routine healing   HPI obtained from patient interview and chart review     Chief Complaint: Re-evaluation of medical treatment and therapy status: re-evaluation of shortness of breath    HPI:   Mrs. Castellanos is an 89-year-old female with a PMHx of Afib on Eliquis, HFpEF on lasix, HTN, and CKD stage III who presents to SNF following hospitalization for right intertrochanteric femur fracture s/p cephalomedullary nail fixation on 11/19 with Dr. Perez.  Admission to SNF for secondary weakness and debility.    Interval history:   24 hr vital sign ranges reviewed and listed below.  BP with some improvement off atenolol, yesterday patient noted to have a BP of 122/58, this morning was 110/57.  Continues to report shortness of breath.  Able to take more Lasix doses in the last 24 hours.  Needs updated weight, last is from 11/29, will message nursing.  Patient reports an adequate appetite.  Patient denies dysuria.  Patient reports having regular bowel movements.  Patient progressing with PT/OT- Gait: 17 ft x 2 trials with RW and min to mod assistance, with w/c in tow. Continuing to follow and treat all acute and chronic conditions. CM working with patient on evaluation for nursing home placement.  Son at bedside provided care update.    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    Past Surgical History: Patient has a past surgical history that includes Brain surgery (2002); Hysterectomy; Tonsillectomy; Hemorrhoid surgery; Appendectomy; Breast biopsy (2/2014); Breast surgery (03/17/14); Breast cyst excision (1960); Mastectomy (Right); and Intramedullary rodding of femur (Right, 11/19/2023).    Social History: Patient reports that she has never smoked. She has never used smokeless tobacco. She reports that she does not drink alcohol and does not use drugs.    Family History:  No significant family history to report    Allergies: Patient is allergic to clindamycin, levofloxacin, lidocaine, and adhesive tape-silicones.    ROS  Constitutional: Negative for fever.  +fatigue   Eyes: Negative for blurred vision, double vision   Respiratory: Negative for cough.  +chronic SOB, at baseline  Cardiovascular: Negative for chest pain, palpitations. + leg swelling.   Gastrointestinal: Negative for abdominal pain, constipation, diarrhea, nausea, vomiting.  +indigestion, flatulence  Genitourinary: Negative for dysuria, frequency   Musculoskeletal:  + generalized weakness.  + RLE pain  Skin: Negative for itching and rash.   Neurological: Negative for dizziness, headaches.   Psychiatric/Behavioral: Negative for depression. The patient is not nervous/anxious.      24 hour Vital Sign Range   Temp:  [98 °F (36.7 °C)-98.1 °F (36.7 °C)]   Pulse:  [79-80]   Resp:  [16-18]   BP: (107-122)/(51-58)   SpO2:  [97 %-98 %]     Current BMI: Body mass index is 30.07 kg/m².    PEx  Constitutional: Patient appears debilitated.  No distress noted  HENT:   Head: Normocephalic and atraumatic.   Eyes: Pupils are equal, round  Neck: Normal range of motion. Neck supple.   Cardiovascular: Normal rate, regular rhythm and normal heart sounds.    Pulmonary/Chest: Effort normal and breath sounds with  bilateral crackles R>L  Abdominal: Soft. Bowel sounds are normal.   Musculoskeletal: Normal range of motion.   Neurological: Alert and oriented to person, place, and time.   Psychiatric: Normal mood and affect. Behavior is normal.   Skin: Skin is warm and dry.  Surgical dressing to RLE, only to be removed by Ortho.  2+ pitting edema to bilateral lower extremities R>L     Altered Skin Integrity 11/21/23 1256 Right lateral;upper Thigh Blister(s)   Date First Assessed/Time First Assessed: 11/21/23 1256   Altered Skin Integrity Present on Admission - Did Patient arrive to the hospital with altered skin?: suspected hospital acquired  Side: Right  Orientation: lateral;upper  Location: Thigh  Primar...   Dressing Appearance Moist drainage   Drainage Amount Small   Drainage Characteristics/Odor Yellow;Serous   Appearance Moist;Pink   Tissue loss description Partial thickness   Red (%), Wound Tissue Color 100 %   Periwound Area Intact;Dry   Wound Length (cm) 3 cm   Wound Width (cm) 4 cm   Wound Depth (cm) 0.1 cm   Wound Volume (cm^3) 1.2 cm^3   Wound Surface Area (cm^2) 12 cm^2   Care Cleansed with:;Antimicrobial agent   Dressing Applied;Methylene blue/gentian violet;Foam   Dressing Change Due 12/05/23        Altered Skin Integrity 11/28/23 0830 Right Buttocks Blister(s)   Date First Assessed/Time First Assessed: 11/28/23 0830   Altered Skin Integrity Present on Admission - Did Patient arrive to the hospital with altered skin?: yes  Side: Right  Location: Buttocks  Primary Wound Type: Blister(s)   Appearance Pink;Moist   Tissue loss description Partial thickness   Red (%), Wound Tissue Color 100 %   Periwound Area Intact;Dry   Wound Length (cm) 4.5 cm   Wound Width (cm) 4.5 cm   Wound Depth (cm) 0.1 cm   Wound Volume (cm^3) 2.025 cm^3   Wound Surface Area (cm^2) 20.25 cm^2   Care Cleansed with:;Antimicrobial agent   Dressing Applied;Methylene blue/gentian violet;Foam   Off Loading Other (see comments)  (waffle ordered)  "  Dressing Change Due 12/05/23        Altered Skin Integrity 11/28/23 0830 Right lateral Knee Blister(s)   Date First Assessed/Time First Assessed: 11/28/23 0830   Altered Skin Integrity Present on Admission - Did Patient arrive to the hospital with altered skin?: yes  Side: Right  Orientation: lateral  Location: Knee  Primary Wound Type: Blister(s)   Dressing Appearance Moist drainage   Drainage Amount Small   Drainage Characteristics/Odor Yellow;Serous;No odor   Appearance Pink;Moist   Tissue loss description Partial thickness   Red (%), Wound Tissue Color 100 %   Periwound Area Intact;Dry   Wound Edges Irregular   Wound Length (cm) 2.5 cm   Wound Width (cm) 4 cm   Wound Depth (cm) 0.1 cm   Wound Volume (cm^3) 1 cm^3   Wound Surface Area (cm^2) 10 cm^2   Dressing Change Due 12/05/23       No results for input(s): "GLUCOSE", "NA", "K", "CL", "CO2", "BUN", "CREATININE", "CALCIUM", "MG" in the last 24 hours.      No results for input(s): "WBC", "RBC", "HGB", "HCT", "PLT", "MCV", "MCH", "MCHC" in the last 24 hours.      No results for input(s): "POCTGLUCOSE" in the last 168 hours.     Assessment and Plan:    Closed displaced intertrochanteric fracture of right femur   s/p IM nail on 11/19/2023  - PT/OT, WBAT  - DVT PPX with apixaban 2.5 mg BID  - maintain surgical dressing until removed by Orthopedics  - ortho NALLELY to see patient weekly at SNF  - follow-up with orthopedics after discharge    Acute postoperative pain  - improved, continue methocarbamol 500 mg QID, acetaminophen 1000 mg q.8 hours, tramadol 50 mg q.6 hours PRN    Hyponatremia  - mild decreased to 133, related to fluid overload status     Postprocedural hypotension  - [place] [facility] fluids were given and antihypertensive medications were held and then resumed upon SNF admission  - continues to have labile blood pressures  - persistenting, discontinued atenolol today so that patient can receive her Lasix dosing.  continue to hold losartan   " "  Acute on Chronic heart failure with preserved ejection fraction  - Patient is identified as having Diastolic (HFpEF) heart failure that is Chronic. CHF is currently controlled.   -  Monitor strict Is&Os and daily weights.   - holding off on on fluid restriction of 1.5 L due to postop hypotension and concern for dehydration in inpatient setting  - home regimen with Lasix 40 mg in the AM,  - Xopenex nebulizer treatment daily x4 days, continue PRN Xopenex breathing treatments for shortness of breath  -  unstable, significant weight gain since admission, continue to hold atenolol and lowered blood pressure holding threshold to SBP of 100 to hold Lasix so that she can receive more doses.  Need updated weight     Persistent atrial fibrillation  Chronic anticoagulation   - Patient with Long standing persistent (>12 months) atrial fibrillation which is:controlled currently with home  Atenolol  and will continue in hospital. Patient is currently in atrial fibrillation.[id number] Score: 3. Anticoagulation indicated.   - hold atenolol.  Anticoagulation done with Apixiban 5 mg po BID at home and held for surgery but resumed post-op on 11/19.    Acute blood loss as cause of postoperative anemia  - expected postoperatively  - stable, continue monitor twice weekly CBC, transfuse for hemoglobin < 7    Hsx of Primary hypertension  - home regimen with losartan 25 mg daily, Lasix 60 mg daily splint in 2 doses, atenolol 25 mg daily  - developed postop hypotension  - currently holding losartan, Lasix split into TID dosing, continue atenolol with holding parameters    Stage 3b chronic kidney disease  - had elevation in creatinine to 1.5 on 11/21, has now normalized  - currently stable, continue monitor twice weekly BMPs, avoid nephrotoxic agents, renally dose medications as appropriate     Prediabetes  - "Patient noted to be hyperglycemic on admit with -210. HgA1C ordered and returned at 6.3% consistent with prediabetes which is " "a new diagnosis. With patient's advanced age recommendation would be conservative mangement with diet control management with diabetic diet. Discussed with patient on 11/20 and she is in agreement with plan."  - diabetic diet, blood glucose with twice weekly BMPs    ACP (advance care planning)  - [place] [facility] [place], patient wishes to be DNR     Indigestion  - improved, continue Pepcid 20 mg daily, continue PRN Tums and Mylanta    ACP  - on 11/30, patient provided the option to pursue hospice services upon discharge if she would wish to do so    Debility   - Continue with PT/OT for gait training and strengthening and restoration of ADL's   - Encourage mobility, OOB in chair, and early ambulation as appropriate  - Fall precautions   - Monitor for bowel and bladder dysfunction  - Monitor for and prevent skin breakdown and pressure ulcers  - Continue DVT prophylaxis with apixaban        Anticipate disposition:  Eval nursing home placement likely with hospice services.  Case management made aware on 11/30    IP [facility] RISK OF UNPLANNED READMISSION Model: MODERATE     Follow-up needed during SNF stay-ortho (12/4)    Appointment not to send patient to- lab 12/6    Follow-up needed after discharge from SNF: PCP, orthopedics (1/4)    Future Appointments   Date Time Provider Department Center   12/6/2023  7:00 AM SPE[facility] SPECLAB [place]   12/6/2023  8:15 AM LAB, [facility] LAB [place]   12/14/2023 10:30 AM Jacobo Mcleod MD [facility]   1/4/2024 10:15 AM Henry Perez MD [facility] [address]THO [address] [place]   2/6/2024 10:45 AM Henry Perez MD [facility] [address]THO [address] [place]       I spent 47 minutes reviewing patient records, examining, and counseling the patient/ patient's family with greater than 50% of the time spent in direct patient care and coordination.    Pooja Littlejohn NP  Department of Hospital Medicine   [facility]     DOS: 12/1/2023 "       Patient note was created using MModal Dictation.  Any errors in syntax or even information may not have been identified and edited on initial review prior to signing this note.

## 2023-12-01 NOTE — PT/OT/SLP PROGRESS
Physical Therapy Treatment    Patient Name:  Ngoc Castellanos   MRN:  [id number]  Admit Date: 11/22/2023  Admitting Diagnosis: Closed displaced intertrochanteric fracture of right femur with routine healing  Recent Surgeries: INSERTION, INTRAMEDULLARY RAMSES, FEMUR, RIGHT, HANA TABLE, MIRIAN, C- ARM DOOR SIDE (Right)      General Precautions: Standard, fall, aspiration, hearing impaired  Orthopedic Precautions: RLE weight bearing as tolerated  Braces: N/A    Recommendations:     Discharge Recommendations: Low Intensity Therapy  Level of Assistance Recommended at Discharge: 24 hours significant assistance  Discharge Equipment Recommendations: shower chair, walker, rolling, wheelchair  Barriers to discharge: Inaccessible home environment    Assessment:     Ngoc Castellanos is a 89 y.o. female admitted with a medical diagnosis of Closed displaced intertrochanteric fracture of right femur with routine healing . Pt tolerated well, pt needing Min A for RLE when Supine to sit in bed. Pt amb x 3 trials CGA. Pt propelling UBE x 10 min. Pt pleasant, progressing and would continue to benefit from skilled PT services to improve overall functional mobility, strength and endurance.      Performance deficits affecting function: weakness, impaired endurance, impaired self care skills, impaired functional mobility, gait instability, impaired balance, decreased coordination, pain, decreased ROM, edema, impaired cardiopulmonary response to activity, orthopedic precautions.    Rehab Potential is good    Activity Tolerance: Good    Plan:     Patient to be seen 5 x/week to address the above listed problems via gait training, therapeutic activities, therapeutic exercises, neuromuscular re-education, wheelchair management/training    Plan of Care Expires: 12/24/23  Plan of Care Reviewed with: patient    Subjective     Pt agreeable for PT.     Pain/Comfort:  Pain Rating 1: 2/10  Location - Side 1: Right  Location - Orientation 1:  generalized  Location 1: leg  Pain Addressed 1: Pre-medicate for activity, Reposition, Distraction, Cessation of Activity  Pain Rating Post-Intervention 1: 2/10    Patient's cultural, spiritual, Pentec[employer]alism conflicts given the current situation:  no    Objective:       Patient found HOB elevated with  (no lines) upon PT entry to room.     Therapeutic Activities and Exercises: UBE X 10 min For BUE strengthening, endurance and ROM    Patient educated on role of therapy, goals of session, and benefits of out of bed mobility.   Instructed on use of call button and importance of calling nursing staff for assistance with mobility   Questions/concerns addressed within PTA scope of practice  Pt verbalized understanding    Functional Mobility:  Bed Mobility:     Scooting: minimum assistance toward EOB  Supine to Sit: stand by assistance, vc for sequencing, Min A for RLE management  Transfers:     Sit to Stand:  contact guard assistance and minimum assistance with rolling walker. Vc for sequencing for efficiency, posterior lean noted  Bed to Chair: contact guard assistance with  rolling walker  using  Stand Pivot  Gait: pt amb 21 ft + 22 ft + 11 ft CGA with RW, dec step length, dec catherine, dec WB through RLE. Seated rest break in between trials. Pt with reports of SOB, O2 monitored and O2 WFL    AM-PAC 6 CLICK MOBILITY  13    Patient left up in chair with  PT tech .    GOALS:   Multidisciplinary Problems       Physical Therapy Goals          Problem: Physical Therapy    Goal Priority Disciplines Outcome Goal Variances Interventions   Physical Therapy Goal     PT, PT/OT Ongoing, Progressing     Description: Goals to be met by: 23       Patient will increase functional independence with mobility by performin. Supine to sit with Contact Guard Assistance  2. Sit to supine with Contact Guard Assistance  3. Rolling to Left and Right with Standby Assistance  4. Sit to stand transfer with Contact Guard Assistance  5. Bed  to chair transfer with Contact Guard Assistance using Rolling Walker/LRAD  6. Gait  x 50 feet with Contact Guard Assistance using Rolling Walker/LRAD  7. Wheelchair propulsion x 150 feet with Modified [place] using bilateral upper extremities                         Time Tracking:     PT Received On: 12/01/23  PT Start Time: 1417  PT Stop Time: 1456  PT Total Time (min): 39 min    Billable Minutes: Gait Training 15, Therapeutic Activity 13, and Therapeutic Exercise 11    Treatment Type: Treatment  PT/PTA: PTA     Number of PTA visits since last PT visit: 3     12/01/2023

## 2023-12-01 NOTE — TREATMENT PLAN
"Rehab Services' DME recommendations    Ngoc Castellanos  MRN: [id number]    [x] Walker Adult (5'1"-6"6")      Wheels Yes    a RW or walker will significantly improve the ability to participate in MRADLs and will be used in the home on a regular basis       [x] Wheelchair  Number of hours up in a wheelchair per day 8       Style Light weight        Justification for light weight w/c: patient cannot propel in a standard wheelchair, patient can and does self-propel in a lightweight wheelchair, patient has impaired ability to participate in MRADLs, mobility limitations cannot be sifficiently resolved with a cane/walker, the home provides adequate access between rooms for a wheelchair, a wheelchair will significantly improve the ability to participate in MRADLs and will be used in the home on a regular basis, the patient is willing to use a wheelchair in the home, and the patient has a caregiver who is available, willing, and able to provide assistance with the wheelchair    Seat Width 20    Seat Depth 20    Back Height Standard    Leg Support Standard, Elevated leg rest, and Swing Away    Arm Height Full and Swing Away    Lap Belt Buckle    Accessories Anti-tippers and Safety belt    Cushion Basic    Justification for Cushion skin integrity      [x] Shower Chair With back        [x] Hip Kit Standard/Short      [x] Home health PT, OT, and Aide      Orestes Ernst, PTA 12/1/2023      "

## 2023-12-01 NOTE — CARE UPDATE
Advanced Discharge Planning    Spoke to Toi who is Grand Dtr of resident, she is coming to see her grandmother this weekend. Explained that we are seeking NH placement for her grandma, pt indicated that Toi would be helping her on her financials for placement, contact called to be sure that family is on the same page. SS indicated to grand dtr that she also spoke to son, Jeremy, who is researching places for her as well in [place].  Pt prefers [place] so she can be close to Toi.    Fax sent to [facility]- received by both, already evaluating resident for placement- to follow up as needed

## 2023-12-01 NOTE — PLAN OF CARE
Problem: Adult Inpatient Plan of Care  Goal: Plan of Care Review  Outcome: Ongoing, Progressing  Goal: Patient-Specific Goal (Individualized)  Outcome: Ongoing, Progressing  Goal: Absence of Hospital-Acquired Illness or Injury  Outcome: Ongoing, Progressing  Goal: Optimal Comfort and Wellbeing  Outcome: Ongoing, Progressing  Goal: Readiness for Transition of Care  Outcome: Ongoing, Progressing     Problem: Impaired Wound Healing  Goal: Optimal Wound Healing  Outcome: Ongoing, Progressing     Problem: Fall Injury Risk  Goal: Absence of Fall and Fall-Related Injury  Outcome: Ongoing, Progressing  Intervention: Identify and Manage Contributors  Flowsheets (Taken 12/1/2023 0313)  Self-Care Promotion:   BADL personal objects within reach   BADL personal routines maintained  Medication Review/Management:   medications reviewed   high-risk medications identified  Intervention: Promote Injury-Free Environment  Flowsheets (Taken 12/1/2023 0313)  Safety Promotion/Fall Prevention:   assistive device/personal item within reach   instructed to call staff for mobility   side rails raised x 2   medications reviewed   lighting adjusted   Fall Risk signage in place     Problem: Skin Injury Risk Increased  Goal: Skin Health and Integrity  Outcome: Ongoing, Progressing  Intervention: Optimize Skin Protection  Flowsheets (Taken 12/1/2023 0313)  Pressure Reduction Techniques:   frequent weight shift encouraged   weight shift assistance provided  Pressure Reduction Devices: pressure-redistributing mattress utilized  Skin Protection:   adhesive use limited   protective footwear used  Head of Bed (HOB) Positioning: HOB at 30-45 degrees  Intervention: Promote and Optimize Oral Intake  Flowsheets (Taken 12/1/2023 0313)  Oral Nutrition Promotion: rest periods promoted

## 2023-12-02 PROCEDURE — 25000003 PHARM REV CODE 250: Mod: HCNC | Performed by: NURSE PRACTITIONER

## 2023-12-02 PROCEDURE — 25000003 PHARM REV CODE 250: Mod: HCNC | Performed by: HOSPITALIST

## 2023-12-02 PROCEDURE — 97110 THERAPEUTIC EXERCISES: CPT | Mod: HCNC,CO

## 2023-12-02 PROCEDURE — 97110 THERAPEUTIC EXERCISES: CPT | Mod: HCNC,CQ

## 2023-12-02 PROCEDURE — 94761 N-INVAS EAR/PLS OXIMETRY MLT: CPT | Mod: HCNC

## 2023-12-02 PROCEDURE — 97530 THERAPEUTIC ACTIVITIES: CPT | Mod: HCNC,CO

## 2023-12-02 PROCEDURE — 25000242 PHARM REV CODE 250 ALT 637 W/ HCPCS: Mod: HCNC | Performed by: NURSE PRACTITIONER

## 2023-12-02 PROCEDURE — 97116 GAIT TRAINING THERAPY: CPT | Mod: HCNC,CQ

## 2023-12-02 PROCEDURE — 94640 AIRWAY INHALATION TREATMENT: CPT | Mod: HCNC

## 2023-12-02 PROCEDURE — 11000004 HC SNF PRIVATE: Mod: HCNC

## 2023-12-02 RX ADMIN — ACETAMINOPHEN 1000 MG: 325 TABLET ORAL at 02:12

## 2023-12-02 RX ADMIN — Medication 1 TABLET: at 05:12

## 2023-12-02 RX ADMIN — SIMETHICONE 160 MG: 80 TABLET, CHEWABLE ORAL at 09:12

## 2023-12-02 RX ADMIN — METHOCARBAMOL 500 MG: 500 TABLET ORAL at 10:12

## 2023-12-02 RX ADMIN — FUROSEMIDE 20 MG: 20 TABLET ORAL at 02:12

## 2023-12-02 RX ADMIN — METHOCARBAMOL 500 MG: 500 TABLET ORAL at 05:12

## 2023-12-02 RX ADMIN — TRAMADOL HYDROCHLORIDE 50 MG: 50 TABLET, COATED ORAL at 10:12

## 2023-12-02 RX ADMIN — LEVALBUTEROL HYDROCHLORIDE 0.63 MG: 0.63 SOLUTION RESPIRATORY (INHALATION) at 07:12

## 2023-12-02 RX ADMIN — Medication 1 TABLET: at 10:12

## 2023-12-02 RX ADMIN — ACETAMINOPHEN 1000 MG: 325 TABLET ORAL at 05:12

## 2023-12-02 RX ADMIN — SENNOSIDES AND DOCUSATE SODIUM 1 TABLET: 8.6; 5 TABLET ORAL at 09:12

## 2023-12-02 RX ADMIN — FAMOTIDINE 20 MG: 20 TABLET ORAL at 10:12

## 2023-12-02 RX ADMIN — ACETAMINOPHEN 1000 MG: 325 TABLET ORAL at 09:12

## 2023-12-02 RX ADMIN — METHOCARBAMOL 500 MG: 500 TABLET ORAL at 02:12

## 2023-12-02 RX ADMIN — FUROSEMIDE 20 MG: 20 TABLET ORAL at 05:12

## 2023-12-02 RX ADMIN — APIXABAN 5 MG: 2.5 TABLET, FILM COATED ORAL at 10:12

## 2023-12-02 RX ADMIN — FUROSEMIDE 20 MG: 20 TABLET ORAL at 10:12

## 2023-12-02 RX ADMIN — METHOCARBAMOL 500 MG: 500 TABLET ORAL at 09:12

## 2023-12-02 RX ADMIN — APIXABAN 5 MG: 2.5 TABLET, FILM COATED ORAL at 09:12

## 2023-12-02 NOTE — NURSING
POC reviewed with pt, pt verbalized understanding. Safety maintained throughout shift, bed locked and in lowest position, call light in reach, Side rails up X 2. Non skid sock on when OOB. Pt remained free of fall/trauma. Pt self reports of pain treated with PO pain medication as ordered by MD. VS stable.  no reports of nausea and vomiting.   Incision to LLE LOUIE dry and intact. Foam to LLE replaced x1 throughout shift due to saturation.   Ace wraps to BLE removed and replaced.  No signs of distress, rise and fall of chest noted, respirations  even and unlabored   Plan of care on going. No future concerns as of present.

## 2023-12-02 NOTE — PT/OT/SLP PROGRESS
Occupational Therapy   Treatment    Name: Ngoc Castellanos  MRN: [id number]  Admit Date: 11/22/2023  Admitting Diagnosis:  Closed displaced intertrochanteric fracture of right femur with routine healing    General Precautions: Standard, aspiration, fall, hearing impaired   Orthopedic Precautions: RLE weight bearing as tolerated   Braces: N/A    Recommendations:     Discharge Recommendations:  Low Intensity Therapy  Level of Assistance Recommended at Discharge: 24 hours physical assistance for all ADL's and home management tasks  Discharge Equipment Recommendations: hip kit, walker, rolling, shower chair  Barriers to discharge:  Inaccessible home environment    Assessment:     Ngoc Castellanos is a 89 y.o. female with a medical diagnosis of Closed displaced intertrochanteric fracture of right femur with routine healing.  She presents with limitations in performance of self-care, functional mobility, and ADLs. Performance deficits affecting function are weakness, impaired endurance, impaired self care skills, impaired functional mobility, gait instability, impaired balance, decreased lower extremity function, decreased safety awareness, pain, impaired coordination, impaired cardiopulmonary response to activity, orthopedic precautions. Pt tolerated Tx without incident and is making progress but continues to require assist to perform self care tasks, functional mobility and functional transfers. Pt would continue to benefit from OT intervention to further functional (I)ce and safety.     Rehab Potential is fair    Activity tolerance:  Fair    Plan:     Patient to be seen 5 x/week to address the above listed problems via self-care/home management, therapeutic activities, therapeutic exercises    Plan of Care Expires: 12/23/23  Plan of Care Reviewed with: patient    Subjective     Communicated with: Nursing prior to session.     Pain/Comfort:  Pain Rating 1: 0/10  Pain Rating Post-Intervention 1: 0/10    Patient's cultural,  spiritual, Worship conflicts given the current situation:  no    Objective:     Patient found up in chair with  (no lines) upon OT entry to room.    Bed Mobility:    Pt seated in chair at onset of treatment session.     Functional Mobility/Transfers:  Patient completed Sit <> Stand Transfer with moderate assistance  with  rolling walker     AMPAC 6 Click ADL: 13    OT Exercises: Pt performed UBE exercise for 8 minutes with Min resistance.  UE exercises performed to increase functional endurance and strength in order increase independence when performing self care tasks, functional ambulation, W/C propulsion, and functional standing activities .    Treatment & Education:  Pt participated in standing activity with Mary to steady and RW. Pt at raised counter to perform fine motor and visual scanning activity with focus on standing tolerance, functional reaching, dynamic standing bal, crossing midline, and to promote independence with homemaking and self care tasks. Pt tolerated standing for 7 min and 34 sec     Pt educated on:  - role of OT  - level of assistance  - energy conservation and task modification to maximized independence with ADL's and mobility   -  safety while performing functional transfers and self care tasks  - progress towards OT goals    Patient left up in chair with call button in reach    GOALS:   Multidisciplinary Problems       Occupational Therapy Goals          Problem: Occupational Therapy    Goal Priority Disciplines Outcome Interventions   Occupational Therapy Goal     OT, PT/OT Ongoing, Progressing    Description: Goals to be met by: 12/23/2023     Patient will increase functional independence with ADLs by performing:    Feeding with Set-up Assistance.  UE Dressing with Set-up assistance  LE Dressing with Maximum Assistance /c AE/LRAD as needed  Personal Hygiene while seated at sink with Modified [place].  Oral Hygiene while seated at sink with Modified [place].  Toileting from  bedside commode with Maximum Assistance for hygiene and clothing management.   Bathing from  sitting at sink/on commode with Minimal Assistance.  Rolling to Right with Moderate Assistance.   Rolling to Left with Contact Guard Assistance.   Toilet transfer to bedside commode with Maximum Assistance.  Tolerate standing functional tasks for ~2 minutes /c Mod A and LRAD as needed - Met  Footwear /c Mod A and AE as needed  SC t/f /c Max A and LRAD as needed.                         Time Tracking:     OT Date of Treatment: 12/02/23  OT Start Time: 1030    OT Stop Time: 1109  OT Total Time (min): 39 min    Billable Minutes:Therapeutic Activity 27  Therapeutic Exercise 12    12/2/2023  A client care conference was performed between the [facility] and WATSON, prior to treatment by WATSON, to discuss the patient's status, treatment plan and established goals.

## 2023-12-02 NOTE — PLAN OF CARE
Problem: Occupational Therapy  Goal: Occupational Therapy Goal  Description: Goals to be met by: 12/23/2023     Patient will increase functional independence with ADLs by performing:    Feeding with Set-up Assistance.  UE Dressing with Set-up assistance  LE Dressing with Maximum Assistance /c AE/LRAD as needed  Personal Hygiene while seated at sink with Modified [place].  Oral Hygiene while seated at sink with Modified [place].  Toileting from bedside commode with Maximum Assistance for hygiene and clothing management.   Bathing from  sitting at sink/on commode with Minimal Assistance.  Rolling to Right with Moderate Assistance.   Rolling to Left with Contact Guard Assistance.   Toilet transfer to bedside commode with Maximum Assistance.  Tolerate standing functional tasks for ~2 minutes /c Mod A and LRAD as needed - Met  Footwear /c Mod A and AE as needed  SC t/f /c Max A and LRAD as needed.           12/2/2023 1150 by Naya Lubin COTA/L  Outcome: Ongoing, Progressing

## 2023-12-02 NOTE — PT/OT/SLP PROGRESS
"Physical Therapy Treatment    Patient Name:  Ngoc Castellanos   MRN:  [id number]  Admit Date: 11/22/2023  Admitting Diagnosis: Closed displaced intertrochanteric fracture of right femur with routine healing  Recent Surgeries:     General Precautions: Standard, fall, aspiration, hearing impaired  Orthopedic Precautions: RLE weight bearing as tolerated  Braces: N/A    Recommendations:     Discharge Recommendations: Low Intensity Therapy  Level of Assistance Recommended at Discharge: 24 hours significant assistance  Discharge Equipment Recommendations: shower chair, walker, rolling, wheelchair  Barriers to discharge: Inaccessible home environment    Assessment:     Ngoc Castellanos is a 89 y.o. female admitted with a medical diagnosis of Closed displaced intertrochanteric fracture of right femur with routine healing . Pt tolerated well, pt is very pleasant, demo good effort, however does fatigue easily and some SOB reported, 02 sat 97-98% nsg notified, pt would continue to benefit from skilled PT services to improve overall functional mobility, strength and endurance.  .      Performance deficits affecting function: weakness, impaired endurance, impaired self care skills, impaired functional mobility, gait instability, impaired balance, decreased coordination, pain, decreased ROM, edema, impaired cardiopulmonary response to activity, orthopedic precautions.    Rehab Potential is good    Activity Tolerance: Fair    Plan:     Patient to be seen 5 x/week to address the above listed problems via gait training, therapeutic activities, therapeutic exercises, neuromuscular re-education, wheelchair management/training    Plan of Care Expires: 12/24/23  Plan of Care Reviewed with: patient    Subjective       Fair" pt agreeable to therapy.     Pain/Comfort:  Pain Rating 1: 0/10  Pain Rating Post-Intervention 1: 0/10    Patient's cultural, spiritual, Protestant conflicts given the current situation:  no    Objective:     Communicated " "with nsg during session. Repts 02 sat/HR/SOB with/without activity  Patient found with  (in WC) upon PT entry to room.     Therapeutic Activities and Exercises: 2x10 reps AP,GS,LAQ,hip flex A/AA RLE within limited tolerable ROM no reports of pain    Functional Mobility: 02 sats at rest 97-98% HR 85-92bpm,  after therex and gait 02 sats 97% -105 bpm nsg/present/ notified/ giving meds    Transfers:     Sit to Stand:  minimum assistance with rolling walker and vcs for tech  Gait: amb with RW min/CGA ~ 24 ft with 2 standing rest break declined further distance/trials 2* to fatigue "think that's enough"    AM-PAC 6 CLICK MOBILITY  14    Patient left up in chair with  nsg brought back to room .    GOALS:   Multidisciplinary Problems       Physical Therapy Goals          Problem: Physical Therapy    Goal Priority Disciplines Outcome Goal Variances Interventions   Physical Therapy Goal     PT, PT/OT Ongoing, Progressing     Description: Goals to be met by: 23       Patient will increase functional independence with mobility by performin. Supine to sit with Contact Guard Assistance  2. Sit to supine with Contact Guard Assistance  3. Rolling to Left and Right with Standby Assistance  4. Sit to stand transfer with Contact Guard Assistance  5. Bed to chair transfer with Contact Guard Assistance using Rolling Walker/LRAD  6. Gait  x 50 feet with Contact Guard Assistance using Rolling Walker/LRAD  7. Wheelchair propulsion x 150 feet with Modified [place] using bilateral upper extremities                         Time Tracking:     PT Received On: 23  PT Start Time: 09  PT Stop Time: 1010  PT Total Time (min): 29 min    Billable Minutes: Gait Training 10 and Therapeutic Exercise 19    Treatment Type: Treatment  PT/PTA: PTA     Number of PTA visits since last PT visit: 4     2023  "

## 2023-12-02 NOTE — PLAN OF CARE
Problem: Adult Inpatient Plan of Care  Goal: Plan of Care Review  Outcome: Ongoing, Progressing  Goal: Patient-Specific Goal (Individualized)  Outcome: Ongoing, Progressing  Goal: Absence of Hospital-Acquired Illness or Injury  Outcome: Ongoing, Progressing  Goal: Optimal Comfort and Wellbeing  Outcome: Ongoing, Progressing  Goal: Readiness for Transition of Care  Outcome: Ongoing, Progressing     Problem: Impaired Wound Healing  Goal: Optimal Wound Healing  Outcome: Ongoing, Progressing  Intervention: Promote Wound Healing  Flowsheets (Taken 12/2/2023 6391)  Oral Nutrition Promotion: rest periods promoted  Sleep/Rest Enhancement:   awakenings minimized   regular sleep/rest pattern promoted   room darkened  Activity Management: Rolling - L1  Pain Management Interventions:   care clustered   medication offered     Problem: Fall Injury Risk  Goal: Absence of Fall and Fall-Related Injury  Outcome: Ongoing, Progressing     Problem: Skin Injury Risk Increased  Goal: Skin Health and Integrity  Outcome: Ongoing, Progressing

## 2023-12-03 PROCEDURE — 25000003 PHARM REV CODE 250: Mod: HCNC | Performed by: NURSE PRACTITIONER

## 2023-12-03 PROCEDURE — 94640 AIRWAY INHALATION TREATMENT: CPT | Mod: HCNC

## 2023-12-03 PROCEDURE — 25000242 PHARM REV CODE 250 ALT 637 W/ HCPCS: Mod: HCNC | Performed by: NURSE PRACTITIONER

## 2023-12-03 PROCEDURE — 11000004 HC SNF PRIVATE: Mod: HCNC

## 2023-12-03 PROCEDURE — 25000003 PHARM REV CODE 250: Mod: HCNC | Performed by: HOSPITALIST

## 2023-12-03 PROCEDURE — 94761 N-INVAS EAR/PLS OXIMETRY MLT: CPT | Mod: HCNC

## 2023-12-03 PROCEDURE — 25000003 PHARM REV CODE 250: Mod: HCNC | Performed by: FAMILY MEDICINE

## 2023-12-03 RX ADMIN — SENNOSIDES AND DOCUSATE SODIUM 1 TABLET: 8.6; 5 TABLET ORAL at 09:12

## 2023-12-03 RX ADMIN — SIMETHICONE 160 MG: 80 TABLET, CHEWABLE ORAL at 09:12

## 2023-12-03 RX ADMIN — FUROSEMIDE 20 MG: 20 TABLET ORAL at 05:12

## 2023-12-03 RX ADMIN — LEVALBUTEROL HYDROCHLORIDE 0.63 MG: 0.63 SOLUTION RESPIRATORY (INHALATION) at 08:12

## 2023-12-03 RX ADMIN — POLYETHYLENE GLYCOL 3350 17 G: 17 POWDER, FOR SOLUTION ORAL at 09:12

## 2023-12-03 RX ADMIN — ACETAMINOPHEN 1000 MG: 325 TABLET ORAL at 10:12

## 2023-12-03 RX ADMIN — FUROSEMIDE 20 MG: 20 TABLET ORAL at 01:12

## 2023-12-03 RX ADMIN — Medication 1 TABLET: at 09:12

## 2023-12-03 RX ADMIN — APIXABAN 5 MG: 2.5 TABLET, FILM COATED ORAL at 09:12

## 2023-12-03 RX ADMIN — Medication 1 TABLET: at 05:12

## 2023-12-03 RX ADMIN — ACETAMINOPHEN 1000 MG: 325 TABLET ORAL at 06:12

## 2023-12-03 RX ADMIN — METHOCARBAMOL 500 MG: 500 TABLET ORAL at 09:12

## 2023-12-03 RX ADMIN — METHOCARBAMOL 500 MG: 500 TABLET ORAL at 01:12

## 2023-12-03 RX ADMIN — FUROSEMIDE 20 MG: 20 TABLET ORAL at 09:12

## 2023-12-03 RX ADMIN — ACETAMINOPHEN 1000 MG: 325 TABLET ORAL at 02:12

## 2023-12-03 RX ADMIN — METHOCARBAMOL 500 MG: 500 TABLET ORAL at 05:12

## 2023-12-03 RX ADMIN — FAMOTIDINE 20 MG: 20 TABLET ORAL at 09:12

## 2023-12-03 NOTE — PLAN OF CARE
Problem: Adult Inpatient Plan of Care  Goal: Plan of Care Review  12/3/2023 0337 by Vicky Miller LPN  Outcome: Ongoing, Progressing  12/3/2023 0328 by Vicky Miller LPN  Outcome: Ongoing, Progressing  Goal: Patient-Specific Goal (Individualized)  12/3/2023 0337 by Vicky Miller LPN  Outcome: Ongoing, Progressing  12/3/2023 0328 by Vicky Miller LPN  Outcome: Ongoing, Progressing  Goal: Absence of Hospital-Acquired Illness or Injury  12/3/2023 0337 by Vicky Miller LPN  Outcome: Ongoing, Progressing  12/3/2023 0328 by Vicky Miller LPN  Outcome: Ongoing, Progressing  Goal: Optimal Comfort and Wellbeing  12/3/2023 0337 by Vicky Miller LPN  Outcome: Ongoing, Progressing  12/3/2023 0328 by Vicky Miller LPN  Outcome: Ongoing, Progressing  Intervention: Provide Person-Centered Care  12/3/2023 0337 by Vicky Miller LPN  Flowsheets (Taken 12/2/2023 0337)  Trust Relationship/Rapport: questions encouraged  12/3/2023 0328 by Vicky Miller LPN  Flowsheets (Taken 12/2/2023 2110)  Trust Relationship/Rapport:   questions encouraged   reassurance provided   questions answered   care explained

## 2023-12-03 NOTE — PLAN OF CARE
Problem: Adult Inpatient Plan of Care  Goal: Plan of Care Review  Outcome: Ongoing, Progressing  Goal: Patient-Specific Goal (Individualized)  Outcome: Ongoing, Progressing  Goal: Absence of Hospital-Acquired Illness or Injury  Outcome: Ongoing, Progressing  Goal: Optimal Comfort and Wellbeing  Outcome: Ongoing, Progressing  Intervention: Provide Person-Centered Care  Flowsheets (Taken 12/2/2023 2110)  Trust Relationship/Rapport:   questions encouraged   reassurance provided   questions answered   care explained

## 2023-12-04 LAB
ANION GAP SERPL CALC-SCNC: 10 MMOL/L (ref 8–16)
BASOPHILS # BLD AUTO: 0.03 K/UL (ref 0–0.2)
BASOPHILS NFR BLD: 0.4 % (ref 0–1.9)
BUN SERPL-MCNC: 24 MG/DL (ref 10–30)
CALCIUM SERPL-MCNC: 9.2 MG/DL (ref 8.7–10.5)
CHLORIDE SERPL-SCNC: 103 MMOL/L (ref 95–110)
CO2 SERPL-SCNC: 23 MMOL/L (ref 23–29)
CREAT SERPL-MCNC: 0.9 MG/DL (ref 0.5–1.4)
DIFFERENTIAL METHOD: ABNORMAL
EOSINOPHIL # BLD AUTO: 0.3 K/UL (ref 0–0.5)
EOSINOPHIL NFR BLD: 4.2 % (ref 0–8)
ERYTHROCYTE [DISTWIDTH] IN BLOOD BY AUTOMATED COUNT: 17.9 % (ref 11.5–14.5)
EST. GFR  (NO RACE VARIABLE): 57.8 ML/MIN/1.73 M^2
GLUCOSE SERPL-MCNC: 118 MG/DL (ref 70–110)
HCT VFR BLD AUTO: 29.7 % (ref 37–48.5)
HGB BLD-MCNC: 9.2 G/DL (ref 12–16)
IMM GRANULOCYTES # BLD AUTO: 0.04 K/UL (ref 0–0.04)
IMM GRANULOCYTES NFR BLD AUTO: 0.6 % (ref 0–0.5)
LYMPHOCYTES # BLD AUTO: 0.5 K/UL (ref 1–4.8)
LYMPHOCYTES NFR BLD: 7.6 % (ref 18–48)
MAGNESIUM SERPL-MCNC: 1.9 MG/DL (ref 1.6–2.6)
MCH RBC QN AUTO: 28.5 PG (ref 27–31)
MCHC RBC AUTO-ENTMCNC: 31 G/DL (ref 32–36)
MCV RBC AUTO: 92 FL (ref 82–98)
MONOCYTES # BLD AUTO: 0.6 K/UL (ref 0.3–1)
MONOCYTES NFR BLD: 8.5 % (ref 4–15)
NEUTROPHILS # BLD AUTO: 5.6 K/UL (ref 1.8–7.7)
NEUTROPHILS NFR BLD: 78.7 % (ref 38–73)
NRBC BLD-RTO: 0 /100 WBC
PHOSPHATE SERPL-MCNC: 3.5 MG/DL (ref 2.7–4.5)
PLATELET # BLD AUTO: 226 K/UL (ref 150–450)
PMV BLD AUTO: 9.5 FL (ref 9.2–12.9)
POCT GLUCOSE: 149 MG/DL (ref 70–110)
POTASSIUM SERPL-SCNC: 3.6 MMOL/L (ref 3.5–5.1)
RBC # BLD AUTO: 3.23 M/UL (ref 4–5.4)
SODIUM SERPL-SCNC: 136 MMOL/L (ref 136–145)
WBC # BLD AUTO: 7.06 K/UL (ref 3.9–12.7)

## 2023-12-04 PROCEDURE — 25000003 PHARM REV CODE 250: Mod: HCNC | Performed by: NURSE PRACTITIONER

## 2023-12-04 PROCEDURE — 63600175 PHARM REV CODE 636 W HCPCS: Mod: HCNC | Performed by: NURSE PRACTITIONER

## 2023-12-04 PROCEDURE — 84100 ASSAY OF PHOSPHORUS: CPT | Mod: HCNC | Performed by: HOSPITALIST

## 2023-12-04 PROCEDURE — 97530 THERAPEUTIC ACTIVITIES: CPT | Mod: HCNC

## 2023-12-04 PROCEDURE — 25000003 PHARM REV CODE 250: Mod: HCNC | Performed by: FAMILY MEDICINE

## 2023-12-04 PROCEDURE — 25000003 PHARM REV CODE 250: Mod: HCNC | Performed by: HOSPITALIST

## 2023-12-04 PROCEDURE — 83735 ASSAY OF MAGNESIUM: CPT | Mod: HCNC | Performed by: HOSPITALIST

## 2023-12-04 PROCEDURE — 97110 THERAPEUTIC EXERCISES: CPT | Mod: HCNC

## 2023-12-04 PROCEDURE — 80048 BASIC METABOLIC PNL TOTAL CA: CPT | Mod: HCNC | Performed by: HOSPITALIST

## 2023-12-04 PROCEDURE — 36415 COLL VENOUS BLD VENIPUNCTURE: CPT | Mod: HCNC | Performed by: HOSPITALIST

## 2023-12-04 PROCEDURE — 97116 GAIT TRAINING THERAPY: CPT | Mod: HCNC

## 2023-12-04 PROCEDURE — 85025 COMPLETE CBC W/AUTO DIFF WBC: CPT | Mod: HCNC | Performed by: HOSPITALIST

## 2023-12-04 PROCEDURE — 11000004 HC SNF PRIVATE: Mod: HCNC

## 2023-12-04 RX ORDER — FUROSEMIDE 10 MG/ML
20 INJECTION INTRAMUSCULAR; INTRAVENOUS 2 TIMES DAILY
Status: DISCONTINUED | OUTPATIENT
Start: 2023-12-04 | End: 2023-12-06

## 2023-12-04 RX ORDER — POTASSIUM CHLORIDE 20 MEQ/1
40 TABLET, EXTENDED RELEASE ORAL ONCE
Status: COMPLETED | OUTPATIENT
Start: 2023-12-04 | End: 2023-12-04

## 2023-12-04 RX ADMIN — FUROSEMIDE 20 MG: 20 TABLET ORAL at 01:12

## 2023-12-04 RX ADMIN — ACETAMINOPHEN 1000 MG: 325 TABLET ORAL at 06:12

## 2023-12-04 RX ADMIN — POLYETHYLENE GLYCOL 3350 17 G: 17 POWDER, FOR SOLUTION ORAL at 08:12

## 2023-12-04 RX ADMIN — METHOCARBAMOL 500 MG: 500 TABLET ORAL at 09:12

## 2023-12-04 RX ADMIN — TRAMADOL HYDROCHLORIDE 50 MG: 50 TABLET, COATED ORAL at 08:12

## 2023-12-04 RX ADMIN — ACETAMINOPHEN 1000 MG: 325 TABLET ORAL at 10:12

## 2023-12-04 RX ADMIN — Medication 1 TABLET: at 05:12

## 2023-12-04 RX ADMIN — Medication 1 TABLET: at 08:12

## 2023-12-04 RX ADMIN — FAMOTIDINE 20 MG: 20 TABLET ORAL at 08:12

## 2023-12-04 RX ADMIN — POTASSIUM CHLORIDE 40 MEQ: 1500 TABLET, EXTENDED RELEASE ORAL at 04:12

## 2023-12-04 RX ADMIN — METHOCARBAMOL 500 MG: 500 TABLET ORAL at 08:12

## 2023-12-04 RX ADMIN — METHOCARBAMOL 500 MG: 500 TABLET ORAL at 01:12

## 2023-12-04 RX ADMIN — APIXABAN 5 MG: 2.5 TABLET, FILM COATED ORAL at 09:12

## 2023-12-04 RX ADMIN — FUROSEMIDE 20 MG: 20 TABLET ORAL at 08:12

## 2023-12-04 RX ADMIN — SENNOSIDES AND DOCUSATE SODIUM 1 TABLET: 8.6; 5 TABLET ORAL at 09:12

## 2023-12-04 RX ADMIN — METHOCARBAMOL 500 MG: 500 TABLET ORAL at 04:12

## 2023-12-04 RX ADMIN — APIXABAN 5 MG: 2.5 TABLET, FILM COATED ORAL at 08:12

## 2023-12-04 RX ADMIN — ACETAMINOPHEN 1000 MG: 325 TABLET ORAL at 01:12

## 2023-12-04 RX ADMIN — SENNOSIDES AND DOCUSATE SODIUM 1 TABLET: 8.6; 5 TABLET ORAL at 08:12

## 2023-12-04 RX ADMIN — FUROSEMIDE 20 MG: 10 INJECTION, SOLUTION INTRAMUSCULAR; INTRAVENOUS at 08:12

## 2023-12-04 NOTE — PT/OT/SLP PROGRESS
Occupational Therapy   Treatment    Name: Ngoc Castellanos  MRN: [id number]  Admit Date: 11/22/2023  Admitting Diagnosis:  Closed displaced intertrochanteric fracture of right femur with routine healing    General Precautions: Standard, fall, aspiration, hearing impaired   Orthopedic Precautions: RLE weight bearing as tolerated   Braces: N/A    Recommendations:     Discharge Recommendations:  Low Intensity Therapy  Level of Assistance Recommended at Discharge: 24 hours physical assistance for all ADL's and home management tasks  Discharge Equipment Recommendations: hip kit, walker, rolling, shower chair  Barriers to discharge:  Inaccessible home environment    Assessment:     Ngoc Castellanos is a 89 y.o. female with a medical diagnosis of Closed displaced intertrochanteric fracture of right femur with routine healing. Pt tolerated session well and without incident and shows excellent motivation and potential to improve, but the pt continues to require assistance to perform self-care tasks and mobility. Pt strengths include Functional cognition, Following multi-step tasks, and Attention to task and Motivation and Willingness to participate. Pt improved UE strength. However, pt would continue to benefit from cont'd OT services in the SNF setting to improve safety and independence /c functional tasks and ADLs upon discharge.  Performance deficits affecting function are weakness, impaired endurance, impaired self care skills, impaired functional mobility, gait instability, impaired balance, decreased lower extremity function, decreased safety awareness, pain, impaired coordination, impaired cardiopulmonary response to activity, orthopedic precautions.     Rehab Potential is good    Activity tolerance:  Good    Plan:     Patient to be seen 5 x/week to address the above listed problems via self-care/home management, therapeutic activities, therapeutic exercises    Plan of Care Expires: 12/23/23  Plan of Care Reviewed with:  "patient    Subjective     Communicated with: NSG prior to session.     "I think I would be perfect if I could hear!" .    Pain/Comfort:  Pain Rating 1: 2/10  Location - Side 1: Bilateral  Location - Orientation 1: lower  Location 1: leg  Pain Addressed 1: Cessation of Activity  Pain Rating Post-Intervention 1: 2/10    Patient's cultural, spiritual, Anabaptism conflicts given the current situation:  no    Objective:     Patient found up in chair with Other (comments) (no lines) upon OT entry to room. Pt alert and agreeable to therapy.       Functional Mobility/Transfers:  Patient completed Sit <> Stand Transfer with minimum assistance  with  rolling walker       [facility] 6 Click ADL: 13    OT Exercises:     ~Pt participated in dynamic standing balance activity to improve trunk mobility and cross body, forward, and posterior reach /c LUE and RUE. Pt asked to reach and retrieve objects forward, cross-body, and in posterior and place on rungs. Pt then asked to take objects off of rungs with opposite hands and place in original position. Pt able to complete using RW for support /c uninvolved UE and CGA. Pt /c LOB at end of activity req Max A to return to WC.     ~Pt performed 2x10 reps of the following exercises with 2 lb dumbbells while seated using BUE.     Chest presses, Shoulder presses, Biceps curls, Shoulder flexion, and Shoulder abduction    ~Pt participated in 12 minute UBE activity seated in WC at moderate resistance to address endurance and strength of UE to improve performance of ADLs and other functional tasks.             Treatment & Education:  Pt educated on POC, role of OT, and safety. Pt performed tasks to improve safety and independence in functional tasks and ADLs as mentioned above.       Patient left up in chair with call button in reach and all needs met    GOALS:   Multidisciplinary Problems       Occupational Therapy Goals          Problem: Occupational Therapy    Goal Priority Disciplines Outcome " Interventions   Occupational Therapy Goal     OT, PT/OT Ongoing, Progressing    Description: Goals to be met by: 12/23/2023     Patient will increase functional independence with ADLs by performing:    Feeding with Set-up Assistance.  UE Dressing with Set-up assistance  LE Dressing with Maximum Assistance /c AE/LRAD as needed  Personal Hygiene while seated at sink with Modified [place].  Oral Hygiene while seated at sink with Modified [place].  Toileting from bedside commode with Maximum Assistance for hygiene and clothing management.   Bathing from  sitting at sink/on commode with Minimal Assistance.  Rolling to Right with Moderate Assistance.   Rolling to Left with Contact Guard Assistance.   Toilet transfer to bedside commode with Maximum Assistance.  Tolerate standing functional tasks for ~2 minutes /c Mod A and LRAD as needed - Met  Footwear /c Mod A and AE as needed  SC t/f /c Max A and LRAD as needed.                         Time Tracking:     OT Date of Treatment: 12/04/23  OT Start Time: 1135    OT Stop Time: 1216  OT Total Time (min): 41 min    Billable Minutes:Therapeutic Activity 13  Therapeutic Exercise 28    12/4/2023

## 2023-12-04 NOTE — PLAN OF CARE
Patient with progression toward mobility goals as per POC. Goals remain appropriate. Continue with POC.     Problem: Physical Therapy  Goal: Physical Therapy Goal  Description: Goals to be met by: 23       Patient will increase functional independence with mobility by performin. Supine to sit with Contact Guard Assistance - In progress  2. Sit to supine with Contact Guard Assistance - In progress  3. Rolling to Left and Right with Standby Assistance - In progress  4. Sit to stand transfer with Contact Guard Assistance - In progress  5. Bed to chair transfer with Contact Guard Assistance using Rolling Walker/LRAD - MET  Updated Goal 23: Bed to chair transfer with SBA using RW    6. Gait  x 50 feet with Contact Guard Assistance using Rolling Walker/LRAD - In progress  7. Wheelchair propulsion x 150 feet with Modified [place] using bilateral upper extremities - In progress    Outcome: Ongoing, Progressing   2023

## 2023-12-04 NOTE — PROGRESS NOTES
"                                                        [facility]                                  Skilled Nursing Facility                   Progress Note     Admit Date: 11/22/2023  WES 12/13/2023  [id number]  Principal Problem:  Closed displaced intertrochanteric fracture of right femur with routine healing   HPI obtained from patient interview and chart review     Chief Complaint:Re-evaluation of medical treatment and therapy status: Lab review    HPI:   Mrs. Castellanos is an 89-year-old female with a PMHx of Afib on Eliquis, HFpEF on lasix, HTN, and CKD stage III who presents to SNF following hospitalization for right intertrochanteric femur fracture s/p cephalomedullary nail fixation on 11/19 with Dr. Perez.  Admission to SNF for secondary weakness and debility.    Interval history:  All of today's labs reviewed and are listed below.  24 hr vital sign ranges reviewed and listed below.  BP's have improved compared to last week.  Patient's SpO2 is 97% on room air today.  Patient did have a mild episode of hypoxia yesterday with SpO2 of 92%.  Patient continues to have weight gain, I ask staff to reweigh patient is yesterday's weight was 82.2 kg and today is 85.7 kg, will transition to IV Lasix at this time.  Patient's of shortness of breath continues to be at her baseline.  Patient denies abdominal discomfort, nausea, or vomiting.  Patient reports an adequate appetite.  Patient denies dysuria.  Patient reports having regular bowel movements.  Patient progressing with PT/OT-Gait: amb with RW min/CGA ~ 24 ft with 2 standing rest break declined further distance/trials 2* to fatigue "think that's enough". Continuing to follow and treat all acute and chronic conditions.    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    Past Surgical History: Patient has a past surgical history that includes Brain surgery (2002); Hysterectomy; Tonsillectomy; Hemorrhoid surgery; Appendectomy; Breast biopsy (2/2014); Breast surgery (03/17/14); Breast cyst excision (1960); Mastectomy (Right); and Intramedullary rodding of femur (Right, 11/19/2023).    Social History: Patient reports that she has never smoked. She has never used smokeless tobacco. She reports that she does not drink alcohol and does not use drugs.    Family History:  No significant family history to report    Allergies: Patient is allergic to clindamycin, levofloxacin, lidocaine, and adhesive tape-silicones.    ROS  Constitutional: Negative for fever.  +fatigue   Eyes: Negative for blurred vision, double vision   Respiratory: Negative for cough.  +chronic SOB, at baseline  Cardiovascular: Negative for chest pain, palpitations. + leg swelling.   Gastrointestinal: Negative for abdominal pain, constipation, diarrhea, nausea, vomiting.  +indigestion, flatulence  Genitourinary: Negative for dysuria, frequency   Musculoskeletal:  + generalized weakness.  + RLE pain  Skin: Negative for itching and rash.   Neurological: Negative for dizziness, headaches.   Psychiatric/Behavioral: Negative for depression. The patient is not nervous/anxious.      24 hour Vital Sign Range   Temp:  [98.1 °F (36.7 °C)-98.3 °F (36.8 °C)]   Pulse:  [79-92]   Resp:  [16-18]   BP: (116-122)/(55-70)   SpO2:  [97 %]     Current BMI: Body mass index is 29.59 kg/m².    PEx  Constitutional: Patient appears debilitated.  No distress noted  HENT:   Head: Normocephalic and atraumatic.   Eyes: Pupils are equal, round  Neck: Normal range of motion.  Neck supple.   Cardiovascular: Normal rate, regular rhythm and normal heart sounds.    Pulmonary/Chest: Effort normal and breath sounds with bilateral crackles R>L  Abdominal: Soft. Bowel sounds are normal.   Musculoskeletal: Normal range of motion.   Neurological: Alert and oriented to person, place, and time.   Psychiatric: Normal mood and affect. Behavior is normal.   Skin: Skin is warm and dry.  Surgical dressing to RLE, only to be removed by Ortho.  2+ pitting edema to bilateral lower extremities R>L     Altered Skin Integrity 11/21/23 1256 Right lateral;upper Thigh Blister(s)   Date First Assessed/Time First Assessed: 11/21/23 1256   Altered Skin Integrity Present on Admission - Did Patient arrive to the hospital with altered skin?: suspected hospital acquired  Side: Right  Orientation: lateral;upper  Location: Thigh  Primar...   Dressing Appearance Moist drainage   Drainage Amount Small   Drainage Characteristics/Odor Yellow;Serous   Appearance Moist;Pink   Tissue loss description Partial thickness   Red (%), Wound Tissue Color 100 %   Periwound Area Intact;Dry   Wound Length (cm) 3 cm   Wound Width (cm) 4 cm   Wound Depth (cm) 0.1 cm   Wound Volume (cm^3) 1.2 cm^3   Wound Surface Area (cm^2) 12 cm^2   Care Cleansed with:;Antimicrobial agent   Dressing Applied;Methylene blue/gentian violet;Foam   Dressing Change Due 12/05/23        Altered Skin Integrity 11/28/23 0830 Right Buttocks Blister(s)   Date First Assessed/Time First Assessed: 11/28/23 0830   Altered Skin Integrity Present on Admission - Did Patient arrive to the hospital with altered skin?: yes  Side: Right  Location: Buttocks  Primary Wound Type: Blister(s)   Appearance Pink;Moist   Tissue loss description Partial thickness   Red (%), Wound Tissue Color 100 %   Periwound Area Intact;Dry   Wound Length (cm) 4.5 cm   Wound Width (cm) 4.5 cm   Wound Depth (cm) 0.1 cm   Wound Volume (cm^3) 2.025 cm^3   Wound Surface Area (cm^2) 20.25 cm^2   Care  "Cleansed with:;Antimicrobial agent   Dressing Applied;Methylene blue/gentian violet;Foam   Off Loading Other (see comments)  (waffle ordered)   Dressing Change Due 12/05/23        Altered Skin Integrity 11/28/23 0830 Right lateral Knee Blister(s)   Date First Assessed/Time First Assessed: 11/28/23 0830   Altered Skin Integrity Present on Admission - Did Patient arrive to the hospital with altered skin?: yes  Side: Right  Orientation: lateral  Location: Knee  Primary Wound Type: Blister(s)   Dressing Appearance Moist drainage   Drainage Amount Small   Drainage Characteristics/Odor Yellow;Serous;No odor   Appearance Pink;Moist   Tissue loss description Partial thickness   Red (%), Wound Tissue Color 100 %   Periwound Area Intact;Dry   Wound Edges Irregular   Wound Length (cm) 2.5 cm   Wound Width (cm) 4 cm   Wound Depth (cm) 0.1 cm   Wound Volume (cm^3) 1 cm^3   Wound Surface Area (cm^2) 10 cm^2   Dressing Change Due 12/05/23       Recent Labs   Lab 12/04/23  0327      K 3.6      CO2 23   BUN 24   CREATININE 0.9   CALCIUM 9.2   MG 1.9         Recent Labs   Lab 12/04/23  0327   WBC 7.06   RBC 3.23*   HGB 9.2*   HCT 29.7*      MCV 92   MCH 28.5   MCHC 31.0*         No results for input(s): "POCTGLUCOSE" in the last 168 hours.     Assessment and Plan:    Closed displaced intertrochanteric fracture of right femur   s/p IM nail on 11/19/2023  - PT/OT, WBAT  - DVT PPX with apixaban 2.5 mg BID  - maintain surgical dressing until removed by Orthopedics  - ortho NALLELY to see patient weekly at SNF  - follow-up with orthopedics after discharge    Acute postoperative pain  - stable, continue methocarbamol 500 mg QID, acetaminophen 1000 mg q.8 hours, tramadol 50 mg q.6 hours PRN    Postprocedural hypotension  - experienced while at [facility], IV fluids were given and antihypertensive medications were held and then resumed upon SNF admission  - continues to have labile blood pressures  - persistenting, continue to hold " "atenolol and losartan so that patient can receive her Lasix dosing.      Acute on Chronic heart failure with preserved ejection fraction  - Patient is identified as having Diastolic (HFpEF) heart failure that is Chronic. CHF is currently controlled.   -  Monitor strict Is&Os and daily weights.   - holding off on on fluid restriction of 1.5 L due to postop hypotension and concern for dehydration in inpatient setting  - home regimen with Lasix 40 mg in the AM,  - Xopenex nebulizer treatment daily x4 days, continue PRN Xopenex breathing treatments for shortness of breath  -  unstable, significant weight gain since admission, continue to hold atenolol, initiated Lasix IV 20 mg  BID    Persistent atrial fibrillation  Chronic anticoagulation   - Patient with Long standing persistent (>12 months) atrial fibrillation which is:controlled currently with home  Atenolol  and will continue in hospital. Patient is currently in atrial fibrillation.[id number] Score: 3. Anticoagulation indicated.   - hold atenolol.  Anticoagulation done with Apixiban 5 mg po BID at home and held for surgery but resumed post-op on 11/19.    Acute blood loss as cause of postoperative anemia  - expected postoperatively  - stable, continue monitor twice weekly CBC, transfuse for hemoglobin < 7    Hsx of Primary hypertension  - home regimen with losartan 25 mg daily, Lasix 60 mg daily splint in 2 doses, atenolol 25 mg daily  - developed postop hypotension  - currently holding losartan, Lasix split into TID dosing, continue atenolol with holding parameters    Stage 3b chronic kidney disease  - had elevation in creatinine to 1.5 on 11/21, has now normalized  - currently stable, continue monitor twice weekly BMPs, avoid nephrotoxic agents, renally dose medications as appropriate     Prediabetes  - "Patient noted to be hyperglycemic on admit with -210. HgA1C ordered and returned at 6.3% consistent with prediabetes which is a new diagnosis. With " "patient's advanced age recommendation would be conservative mangement with diet control management with diabetic diet. Discussed with patient on 11/20 and she is in agreement with plan."  - diabetic diet, blood glucose with twice weekly BMPs    ACP (advance care planning)  - completed at [facility] [place], patient wishes to be DNR     Indigestion  - improved, continue Pepcid 20 mg daily, continue PRN Tums and Mylanta    ACP  - on 11/30, patient provided the option to pursue hospice services upon discharge if she would wish to do so    Debility   - Continue with PT/OT for gait training and strengthening and restoration of ADL's   - Encourage mobility, OOB in chair, and early ambulation as appropriate  - Fall precautions   - Monitor for bowel and bladder dysfunction  - Monitor for and prevent skin breakdown and pressure ulcers  - Continue DVT prophylaxis with apixaban        Anticipate disposition:  Eval nursing home placement likely with hospice services.  Case management made aware on 11/30    IP [facility] RISK OF UNPLANNED READMISSION Model: MODERATE     Follow-up needed during SNF stay-ortho (12/4)    Appointment not to send patient to- lab 12/6    Follow-up needed after discharge from SNF: PCP, orthopedics (1/4)    Future Appointments   Date Time Provider Department Center   12/6/2023  7:00 AM SPE[facility] SPE[facility] [place]   12/6/2023  8:15 AM LAB, SENA[facility] K[facility] LAB [place]   12/14/2023 10:30 AM Jacobo Mcleod MD [facility]   1/4/2024 10:15 AM Henry Perez MD [facility] [address]   2/6/2024 10:45 AM Henry Perez MD [facility] [address] [place] NP  Department of Hospital Medicine   [facility]- Skilled Nursing Facility     DOS: 12/4/2023       Patient note was created using MModal Dictation.  Any errors in syntax or even information may not have been identified and edited on initial review prior to signing this note.   "

## 2023-12-04 NOTE — PLAN OF CARE
Problem: Adult Inpatient Plan of Care  Goal: Plan of Care Review  Outcome: Ongoing, Progressing  Goal: Patient-Specific Goal (Individualized)  Outcome: Ongoing, Progressing  Goal: Absence of Hospital-Acquired Illness or Injury  Outcome: Ongoing, Progressing  Intervention: Identify and Manage Fall Risk  Flowsheets (Taken 12/3/2023 2105)  Safety Promotion/Fall Prevention:   assistive device/personal item within reach   Fall Risk reviewed with patient/family   lighting adjusted   side rails raised x 2   instructed to call staff for mobility   medications reviewed   pulse ox  Intervention: Prevent Skin Injury  Flowsheets (Taken 12/3/2023 2105)  Body Position: turned  Skin Protection: incontinence pads utilized  Goal: Optimal Comfort and Wellbeing  Outcome: Ongoing, Progressing

## 2023-12-04 NOTE — PT/OT/SLP PROGRESS
Physical Therapy Treatment    Patient Name:  Ngoc Castellanos   MRN:  [id number]  Admit Date: 11/22/2023  Admitting Diagnosis: Closed displaced intertrochanteric fracture of right femur with routine healing  Recent Surgeries:  INSERTION, INTRAMEDULLARY RAMSES, FEMUR, RIGHT, HANA TABLE, MIRIAN, C- ARM DOOR SIDE (Right)        General Precautions: Standard, fall, aspiration, hearing impaired  Orthopedic Precautions: RLE weight bearing as tolerated  Braces: N/A    Recommendations:     Discharge Recommendations: Low Intensity Therapy  Level of Assistance Recommended at Discharge: 24 hours significant assistance  Discharge Equipment Recommendations: wheelchair, walker, rolling, shower chair, hip kit, bedside commode  Barriers to discharge: Decreased caregiver support, Other (Comment) (Increased assistance for mobility)    Assessment:     Ngoc Castellanos is a 89 y.o. female admitted with a medical diagnosis of Closed displaced intertrochanteric fracture of right femur with routine healing. Patient agreeable to PT treatment this AM. Patient able to perform bed mobility and functional transfers with one person assistance. Patient continues to require close W/C follow from rehab tech for safety during ambulation. Patient pleasant and motivated to engage in therapy. Patient will benefit from continued SNF rehabilitation services to address deficits as well as progress mobility towards maximal functional potential for improved quality of life and decreased caregiver burden.        Performance deficits affecting function: weakness, impaired endurance, impaired self care skills, impaired functional mobility, gait instability, impaired balance, decreased lower extremity function, decreased safety awareness, decreased ROM, edema, impaired cardiopulmonary response to activity, orthopedic precautions.    Rehab Potential is good    Activity Tolerance: Good    Plan:     Patient to be seen 5 x/week to address the above listed problems via gait  "training, therapeutic activities, therapeutic exercises, neuromuscular re-education, wheelchair management/training    Plan of Care Expires: 12/24/23  Plan of Care Reviewed with: patient    Subjective     "Oh, that's no problem. I can try."     Pain/Comfort:  Pain Rating 1: 0/10  Pain Rating Post-Intervention 1: 0/10    Patient's cultural, spiritual, Protestant conflicts given the current situation:  no    Objective:     Communicated with nursing staff prior to session.  Patient found supine with PureWick upon PT entry to room.     Therapeutic Activities and Exercises:   PT assisted patient with hygiene needs and changing of brief in supine at start of session. Repeated rolling performed to complete task. PT assisted with donning of pants while patient seated EOB. PT pulled pants up in standing while patient using RW for UE support.     Seated BLE exercises x 20 reps including ankle DF/PF, LAQs, hip flexion (assist with RLE), hip abduction/adduction and glute sets    Functional Mobility:  Bed Mobility:     Rolling Left:  minimum assistance and HOB flat and use of bed rails  Rolling Right: stand by assistance and HOB flat and use of bed rails  Supine to Sit: moderate assistance, maximal assistance, and HOB flat with use of bed rails  Transfers:     Sit to Stand:  minimum assistance with rolling walker; one episode of posterior LOB back into wheelchair prior to achieving complete standing posture  Bed to Chair: contact guard assistance with  rolling walker  using  Step Transfer  Gait: Patient ambulated 30 feet and 35 feet using RW with Katelynn/CGA. Brief standing rest breaks during each gait trial. Seated rest break in W/C between trials. No LOB. Rehab tech with W/C in tow for safety.    Wheelchair Propulsion:  Pt propelled Light weight wheelchair x 100 feet on Level tile with  Bilateral upper extremity with Stand-by Assistance.     AM-PAC 6 CLICK MOBILITY  14    Patient left up in chair with call button in reach and " nursing staff notified.    GOALS:   Multidisciplinary Problems       Physical Therapy Goals          Problem: Physical Therapy    Goal Priority Disciplines Outcome Goal Variances Interventions   Physical Therapy Goal     PT, PT/OT Ongoing, Progressing     Description: Goals to be met by: 23       Patient will increase functional independence with mobility by performin. Supine to sit with Contact Guard Assistance - In progress  2. Sit to supine with Contact Guard Assistance - In progress  3. Rolling to Left and Right with Standby Assistance - In progress  4. Sit to stand transfer with Contact Guard Assistance - In progress  5. Bed to chair transfer with Contact Guard Assistance using Rolling Walker/LRAD - MET  Updated Goal 23: Bed to chair transfer with SBA using RW    6. Gait  x 50 feet with Contact Guard Assistance using Rolling Walker/LRAD - In progress  7. Wheelchair propulsion x 150 feet with Modified Mize using bilateral upper extremities - In progress                         Time Tracking:     PT Received On: 23  PT Start Time: 1000  PT Stop Time: 1040  PT Total Time (min): 40 min    Billable Minutes: Gait Training 12, Therapeutic Activity 16, and Therapeutic Exercise 12    Treatment Type: Treatment  PT/PTA: PT     Number of PTA visits since last PT visit: 0     2023

## 2023-12-04 NOTE — PROGRESS NOTES
[facility] - Skilled Nursing  Adult Nutrition  Progress Note    SUMMARY   Recommendations  Recommend 1500 calorie diet, boost glucose BID, diabetic education , HF education were completed in oral and written form.  RD following  Goals: PO to meet 85% of EEN/EPN by next RD follow up  Nutrition Goal Status: progressing towards goal  Communication of RD Recs: other (comment) (POC)    Assessment and Plan   Knowledge deficit regarding diabetic diet as evidenced by new diagnosis 11/2023.     Increased nutrient needs related to wound healing as evidenced by femur Fx repair.       Plan  Carbohydrate modified diet  Collaboration with other providers  Mineral supplement therapy- Calcium   Nutrition education- diabetic diet, HF, protein needs/sources  11/27  Vitamin supplement therapy- Vit D    Malnutrition Assessment   11/27     Skin (Micronutrient): dry, bruised  Eyes (Micronutrient): conjunctiva dull  Teeth (Micronutrient): broken dentition  Neck/Chest (Micronutrient): muscle wasting  Musculoskeletal/Lower Extremities: muscle wasting           Orbital Region (Subcutaneous Fat Loss): mild depletion  Upper Arm Region (Subcutaneous Fat Loss): moderate depletion  Thoracic and Lumbar Region: well nourished   Yarsanism Region (Muscle Loss): mild depletion  Clavicle Bone Region (Muscle Loss): mild depletion  Clavicle and Acromion Bone Region (Muscle Loss): moderate depletion  Dorsal Hand (Muscle Loss): moderate depletion  Patellar Region (Muscle Loss): well nourished  Anterior Thigh Region (Muscle Loss): mild depletion  Posterior Calf Region (Muscle Loss): well nourished                 Reason for Assessment    Reason For Assessment: RD follow-up  Diagnosis:  (R femur Fx s/p IM Nail)  Relevant Medical History: Pre-diabetes, HTN,HF, CKD3, CANCER, CVA, AFIB  Interdisciplinary Rounds: attended  General Information Comments: to dc to group home [place] weight up seven pounds, lasix added.  Nutrition Discharge Planning: DC heart healthy  "diet    Nutrition/Diet History    Patient Reported Diet/Restrictions/Preferences: general  Spiritual, Cultural Beliefs, Jehovah's witness Practices, Values that Affect Care: no  Food Allergies: NKFA  Factors Affecting Nutritional Intake: None identified at this time    Anthropometrics    Temp: 98.1 °F (36.7 °C)  Height Method: Stated  Height: 5' 7" (170.2 cm)  Height (inches): 67 in  Weight Method: Bed Scale  Weight: 85.7 kg (188 lb 15 oz)  Weight (lb): 188.94 lb  Ideal Body Weight (IBW), Female: 135 lb  % Ideal Body Weight, Female (lb): 132.11 %  BMI (Calculated): 29.6  BMI Grade: 25 - 29.9 - overweight  Usual Body Weight (UBW), k.8 kg  % Usual Body Weight: 100.33  % Weight Change From Usual Weight: 0.12 %       Lab/Procedures/Meds    Pertinent Labs Reviewed: reviewed  Pertinent Labs Comments: Hg 9.2, Hct 29.7, glucose 118, GFR 57.8,  Pertinent Medications Reviewed: reviewed  Pertinent Medications Comments: lasix, famotidine, polyethylene glycol    Estimated/Assessed Needs    Weight Used For Calorie Calculations: 80.9 kg (178 lb 5.6 oz)  Energy Calorie Requirements (kcal): 1465  Energy Need Method: Spencer-St Jeor (x 1.2(PAL) 2/2 obesity)  Protein Requirements: 65g  Weight Used For Protein Calculations: 80.9 kg (178 lb 5.6 oz) (x .8g 2/2 CKD)  Fluid Requirements (mL): 1465 or per MD     RDA Method (mL): 1465  CHO Requirement: 183      Nutrition Prescription Ordered    Current Diet Order: diabetic  Nutrition Order Comments: PO %  Oral Nutrition Supplement: boost glucose BID    Evaluation of Received Nutrient/Fluid Intake    I/O: +  Energy Calories Required: meeting needs  Protein Required: meeting needs  Fluid Required: meeting needs  Comments: LBM 12/3  Tolerance: tolerating  % Intake of Estimated Energy Needs: 75 - 100 %  % Meal Intake: 75 - 100 %    Nutrition Risk    Level of Risk/Frequency of Follow-up: low (one time per week)     Monitor and Evaluation    Food and Nutrient Adminstration: diet " order  Knowledge/Beliefs/Attitudes: food and nutrition knowledge/skill  Physical Activity and Function: nutrition-related ADLs and IADLs  Anthropometric Measurements: weight change  Biochemical Data, Medical Tests and Procedures: glucose/endocrine profile, gastrointestinal profile, electrolyte and renal panel  Nutrition-Focused Physical Findings: overall appearance, skin     Nutrition Follow-Up    RD Follow-up?: Yes

## 2023-12-05 LAB
ANION GAP SERPL CALC-SCNC: 9 MMOL/L (ref 8–16)
BUN SERPL-MCNC: 22 MG/DL (ref 10–30)
CALCIUM SERPL-MCNC: 9.3 MG/DL (ref 8.7–10.5)
CHLORIDE SERPL-SCNC: 102 MMOL/L (ref 95–110)
CO2 SERPL-SCNC: 25 MMOL/L (ref 23–29)
CREAT SERPL-MCNC: 0.9 MG/DL (ref 0.5–1.4)
EST. GFR  (NO RACE VARIABLE): 57.8 ML/MIN/1.73 M^2
GLUCOSE SERPL-MCNC: 125 MG/DL (ref 70–110)
POTASSIUM SERPL-SCNC: 4 MMOL/L (ref 3.5–5.1)
SODIUM SERPL-SCNC: 136 MMOL/L (ref 136–145)

## 2023-12-05 PROCEDURE — 97530 THERAPEUTIC ACTIVITIES: CPT | Mod: HCNC

## 2023-12-05 PROCEDURE — 25000003 PHARM REV CODE 250: Mod: HCNC | Performed by: NURSE PRACTITIONER

## 2023-12-05 PROCEDURE — 97116 GAIT TRAINING THERAPY: CPT | Mod: HCNC

## 2023-12-05 PROCEDURE — 80048 BASIC METABOLIC PNL TOTAL CA: CPT | Mod: HCNC | Performed by: NURSE PRACTITIONER

## 2023-12-05 PROCEDURE — 36415 COLL VENOUS BLD VENIPUNCTURE: CPT | Mod: HCNC | Performed by: NURSE PRACTITIONER

## 2023-12-05 PROCEDURE — 97110 THERAPEUTIC EXERCISES: CPT | Mod: HCNC

## 2023-12-05 PROCEDURE — 63600175 PHARM REV CODE 636 W HCPCS: Mod: HCNC | Performed by: NURSE PRACTITIONER

## 2023-12-05 PROCEDURE — 25000003 PHARM REV CODE 250: Mod: HCNC | Performed by: HOSPITALIST

## 2023-12-05 PROCEDURE — 97535 SELF CARE MNGMENT TRAINING: CPT | Mod: HCNC

## 2023-12-05 PROCEDURE — 11000004 HC SNF PRIVATE: Mod: HCNC

## 2023-12-05 PROCEDURE — 94761 N-INVAS EAR/PLS OXIMETRY MLT: CPT | Mod: HCNC

## 2023-12-05 PROCEDURE — 25000003 PHARM REV CODE 250: Mod: HCNC | Performed by: FAMILY MEDICINE

## 2023-12-05 RX ADMIN — POLYETHYLENE GLYCOL 3350 17 G: 17 POWDER, FOR SOLUTION ORAL at 10:12

## 2023-12-05 RX ADMIN — ACETAMINOPHEN 1000 MG: 325 TABLET ORAL at 01:12

## 2023-12-05 RX ADMIN — ACETAMINOPHEN 1000 MG: 325 TABLET ORAL at 10:12

## 2023-12-05 RX ADMIN — Medication 1 TABLET: at 05:12

## 2023-12-05 RX ADMIN — METHOCARBAMOL 500 MG: 500 TABLET ORAL at 10:12

## 2023-12-05 RX ADMIN — METHOCARBAMOL 500 MG: 500 TABLET ORAL at 05:12

## 2023-12-05 RX ADMIN — METHOCARBAMOL 500 MG: 500 TABLET ORAL at 08:12

## 2023-12-05 RX ADMIN — SENNOSIDES AND DOCUSATE SODIUM 1 TABLET: 8.6; 5 TABLET ORAL at 08:12

## 2023-12-05 RX ADMIN — FUROSEMIDE 20 MG: 10 INJECTION, SOLUTION INTRAMUSCULAR; INTRAVENOUS at 05:12

## 2023-12-05 RX ADMIN — SENNOSIDES AND DOCUSATE SODIUM 1 TABLET: 8.6; 5 TABLET ORAL at 10:12

## 2023-12-05 RX ADMIN — FUROSEMIDE 20 MG: 10 INJECTION, SOLUTION INTRAMUSCULAR; INTRAVENOUS at 10:12

## 2023-12-05 RX ADMIN — Medication 1 TABLET: at 10:12

## 2023-12-05 RX ADMIN — APIXABAN 5 MG: 2.5 TABLET, FILM COATED ORAL at 10:12

## 2023-12-05 RX ADMIN — METHOCARBAMOL 500 MG: 500 TABLET ORAL at 01:12

## 2023-12-05 RX ADMIN — Medication 1 DOSE: at 08:12

## 2023-12-05 RX ADMIN — FAMOTIDINE 20 MG: 20 TABLET ORAL at 10:12

## 2023-12-05 RX ADMIN — ACETAMINOPHEN 1000 MG: 325 TABLET ORAL at 06:12

## 2023-12-05 RX ADMIN — APIXABAN 5 MG: 2.5 TABLET, FILM COATED ORAL at 08:12

## 2023-12-05 NOTE — PT/OT/SLP PROGRESS
Physical Therapy Treatment    Patient Name:  Ngoc Castellanos   MRN:  [id number]  Admit Date: 11/22/2023  Admitting Diagnosis: Closed displaced intertrochanteric fracture of right femur with routine healing  Recent Surgeries:   INSERTION, INTRAMEDULLARY RAMSES, FEMUR, RIGHT, HANA TABLE, MIRIAN, C- ARM DOOR SIDE (Right)       General Precautions: Standard, fall, aspiration, hearing impaired  Orthopedic Precautions: RLE weight bearing as tolerated  Braces: N/A    Recommendations:     Discharge Recommendations: Low Intensity Therapy  Level of Assistance Recommended at Discharge: 24 hours significant assistance  Discharge Equipment Recommendations: wheelchair, walker, rolling, shower chair, hip kit, bedside commode  Barriers to discharge: Decreased caregiver support, Other (Comment) (Increased assistance for mobility)    Assessment:     Ngoc Castellanos is a 89 y.o. female admitted with a medical diagnosis of Closed displaced intertrochanteric fracture of right femur with routine healing . Dot required assistance donning pants prior to physical therapy needing increased assisted to perform rolling to the left compared to right with utilization of bed rail. Endorsed having more swelling in the left ankle and foot after a dressing was removed and was nervous concerning the quality of her ability to ambulate.   Tolerated ambulating once due to foot and performed education concerning safety with transfers and doable exercises for patient to complete to assist in the reduction of her experienced swelling.       Performance deficits affecting function: weakness, impaired endurance, impaired self care skills, impaired functional mobility, gait instability, impaired balance, decreased lower extremity function, decreased safety awareness, decreased ROM, edema, impaired cardiopulmonary response to activity, orthopedic precautions.    Rehab Potential is good    Activity Tolerance: Fair    Plan:     Patient to be seen 5 x/week to address  the above listed problems via gait training, therapeutic activities, therapeutic exercises, neuromuscular re-education, wheelchair management/training    Plan of Care Expires: 12/24/23  Plan of Care Reviewed with: patient    Subjective     The wound care nurse took a dressing off of my left foot, that it was too tight that has resulted in my foot gaining additional swelling and wanted to keep a closer eye on this observed swelling. I am willing to work with therapy, just not sure what I will be able to do.      Pain/Comfort:  Pain Rating 1: 3/10  Location - Side 1: Right  Location 1: leg  Pain Addressed 1: Reposition, Pre-medicate for activity    Patient's cultural, spiritual, Catholic conflicts given the current situation:  no    Objective:       Patient found HOB elevated with PICC line upon PT entry to room.     Therapeutic Activities and Exercises:     There-act: Required max A to don pants in bed needing physical assistance to thread both legs through the appropriate leg holes, attempted bridging to pull up pants, however only lifting 10% of total height and was unable to lift high enough to clear.   Mod A rolling left and SBA rolling right using bed rails to pull up pants all the way.   Supine to sit and scooting towards EOB SBA with additional time.   Verbal cues needed for sit to stand concerning appropriate location to place her hands.   Verbal cues provided throughout stand pivot. Demonstration and blocked practice to ensure patient maintained a majority of her weight bearing in the center of the walker, as she initially was performing retro walking with the walker she was unable to keep her bodyweight in the center, however with blocked practice this improved to teach back understanding.     Amb: 44' w/o w/c follow CGA/ min A with a progressive frequency of bilateral knee buckling.   After a longer rest break attempted again, however patient was only able to take a couple steps.    There-ex: AAROM hip  flexion and LAQ with RLE 2 x 10, vc's to encourage initially move with the RLE followed by using her hands to make up the difference.   Demonstrated teach back understanding with this.   LLE LAQ with ankle pumps and hip abduction x 20 (until patient fatigued) Patient verbalized a significant reduction of the tightness of her foot and calf with this.     Functional Mobility:  Bed Mobility:     Rolling Left:  moderate assistance  Rolling Right: stand by assistance  Bridging: maximal assistance  Supine to Sit: minimum assistance  Transfers:     Sit to Stand:  minimum assistance with rolling walker  Gait: 44' min A for bilateral knee buckling, no wheelchair follow, encouraged patient to ambulate close to the therapy mat.     AM-PAC 6 CLICK MOBILITY  16    Patient left up in chair with call button in reach and OT  notified.    GOALS:   Multidisciplinary Problems       Physical Therapy Goals          Problem: Physical Therapy    Goal Priority Disciplines Outcome Goal Variances Interventions   Physical Therapy Goal     PT, PT/OT Ongoing, Progressing     Description: Goals to be met by: 23       Patient will increase functional independence with mobility by performin. Supine to sit with Contact Guard Assistance - In progress  2. Sit to supine with Contact Guard Assistance - In progress  3. Rolling to Left and Right with Standby Assistance - In progress  4. Sit to stand transfer with Contact Guard Assistance - In progress  5. Bed to chair transfer with Contact Guard Assistance using Rolling Walker/LRAD - MET  Updated Goal 23: Bed to chair transfer with SBA using RW    6. Gait  x 50 feet with Contact Guard Assistance using Rolling Walker/LRAD - In progress  7. Wheelchair propulsion x 150 feet with Modified [place] using bilateral upper extremities - In progress                         Time Tracking:     PT Received On: 23  PT Start Time: 0850  PT Stop Time: 930  PT Total Time (min): 40  min    Billable Minutes: Gait Training 10 min, Therapeutic Activity 20 min, and Therapeutic Exercise 10 min    Treatment Type: Treatment  PT/PTA: PT     Number of PTA visits since last PT visit: 0     12/05/2023

## 2023-12-05 NOTE — PLAN OF CARE
Problem: Adult Inpatient Plan of Care  Goal: Plan of Care Review  Outcome: Ongoing, Progressing  Goal: Patient-Specific Goal (Individualized)  Outcome: Ongoing, Progressing     Problem: Impaired Wound Healing  Goal: Optimal Wound Healing  Outcome: Ongoing, Progressing     Problem: Fall Injury Risk  Goal: Absence of Fall and Fall-Related Injury  Outcome: Ongoing, Progressing     Problem: Skin Injury Risk Increased  Goal: Skin Health and Integrity  Outcome: Ongoing, Progressing

## 2023-12-05 NOTE — PROGRESS NOTES
[facility]                                  Skilled Nursing Facility                   Progress Note     Admit Date: 11/22/2023  WES 12/13/2023  [id number] A  Principal Problem:  Closed displaced intertrochanteric fracture of right femur with routine healing   HPI obtained from patient interview and chart review     Chief Complaint:Re-evaluation of medical treatment and therapy status    HPI:   Mrs. Castellanos is an 89-year-old female with a PMHx of Afib on Eliquis, HFpEF on lasix, HTN, and CKD stage III who presents to SNF following hospitalization for right intertrochanteric femur fracture s/p cephalomedullary nail fixation on 11/19 with Dr. Perez.  Admission to SNF for secondary weakness and debility.    Interval history:   24 hr vital sign ranges reviewed and listed below.  On 12/04 patient was placed on IV Lasix for continued weight gain, yesterday's weight was 188 lb, today's 175 lb, patient's legs do feel less edematous.  Patient states her breathing is a bit easier as well.  Patient denies  abdominal discomfort, nausea, or vomiting.  Patient reports an adequate appetite.  Patient denies dysuria.  Patient reports having regular bowel movements.  Patient progressing with PT/OT- Gait: Patient ambulated 30 feet and 35 feet using RW with Katelynn/CGA. Brief standing rest breaks during each gait trial. Seated rest break in W/C between trials. No LOB. Rehab tech with W/C in tow for safety. Continuing to follow and treat all acute and chronic conditions.    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    Past Surgical History: Patient has a past surgical history that includes Brain surgery (2002); Hysterectomy; Tonsillectomy; Hemorrhoid surgery; Appendectomy; Breast biopsy (2/2014); Breast surgery (03/17/14); Breast cyst excision (1960); Mastectomy (Right); and Intramedullary rodding of femur (Right, 11/19/2023).    Social History: Patient reports that she has never smoked. She has never used smokeless tobacco. She reports that she does not drink alcohol and does not use drugs.    Family History:  No significant family history to report    Allergies: Patient is allergic to clindamycin, levofloxacin, lidocaine, and adhesive tape-silicones.    ROS  Constitutional: Negative for fever.  +fatigue   Eyes: Negative for blurred vision, double vision   Respiratory: Negative for cough.  +chronic SOB, at baseline  Cardiovascular: Negative for chest pain, palpitations. + leg swelling.   Gastrointestinal: Negative for abdominal pain, constipation, diarrhea, nausea, vomiting.  +indigestion, flatulence  Genitourinary: Negative for dysuria, frequency   Musculoskeletal:  + generalized weakness.  + RLE pain  Skin: Negative for itching and rash.   Neurological: Negative for dizziness, headaches.   Psychiatric/Behavioral: Negative for depression. The patient is not nervous/anxious.      24 hour Vital Sign Range   Temp:  [97.6 °F (36.4 °C)-98.6 °F (37 °C)]   Pulse:  []   Resp:  [18-20]   BP: (113-118)/(53-57)   SpO2:  [97 %-100 %]     Current BMI: Body mass index is 27.45 kg/m².    PEx  Constitutional: Patient appears debilitated.  No distress noted  HENT:   Head: Normocephalic and atraumatic.   Eyes: Pupils are equal, round  Neck: Normal range of motion. Neck supple.   Cardiovascular: Normal rate, regular rhythm and normal heart sounds.    Pulmonary/Chest: Effort normal and breath sounds with bilateral  crackles R>L  Abdominal: Soft. Bowel sounds are normal.   Musculoskeletal: Normal range of motion.   Neurological: Alert and oriented to person, place, and time.   Psychiatric: Normal mood and affect. Behavior is normal.   Skin: Skin is warm and dry.  Surgical dressing to RLE, only to be removed by Ortho.  2+ pitting edema to bilateral lower extremities R>L     Altered Skin Integrity 11/21/23 1256 Right lateral;upper Thigh Blister(s)   Date First Assessed/Time First Assessed: 11/21/23 1256   Altered Skin Integrity Present on Admission - Did Patient arrive to the hospital with altered skin?: suspected hospital acquired  Side: Right  Orientation: lateral;upper  Location: Thigh  Primar...   Dressing Appearance Moist drainage   Drainage Amount Small   Drainage Characteristics/Odor Yellow;Serous   Appearance Moist;Pink   Tissue loss description Partial thickness   Red (%), Wound Tissue Color 100 %   Periwound Area Intact;Dry   Wound Length (cm) 3 cm   Wound Width (cm) 4 cm   Wound Depth (cm) 0.1 cm   Wound Volume (cm^3) 1.2 cm^3   Wound Surface Area (cm^2) 12 cm^2   Care Cleansed with:;Antimicrobial agent   Dressing Applied;Methylene blue/gentian violet;Foam   Dressing Change Due 12/05/23        Altered Skin Integrity 11/28/23 0830 Right Buttocks Blister(s)   Date First Assessed/Time First Assessed: 11/28/23 0830   Altered Skin Integrity Present on Admission - Did Patient arrive to the hospital with altered skin?: yes  Side: Right  Location: Buttocks  Primary Wound Type: Blister(s)   Appearance Pink;Moist   Tissue loss description Partial thickness   Red (%), Wound Tissue Color 100 %   Periwound Area Intact;Dry   Wound Length (cm) 4.5 cm   Wound Width (cm) 4.5 cm   Wound Depth (cm) 0.1 cm   Wound Volume (cm^3) 2.025 cm^3   Wound Surface Area (cm^2) 20.25 cm^2   Care Cleansed with:;Antimicrobial agent   Dressing Applied;Methylene blue/gentian violet;Foam   Off Loading Other (see comments)  (waffle ordered)   Dressing  "Change Due 12/05/23        Altered Skin Integrity 11/28/23 0830 Right lateral Knee Blister(s)   Date First Assessed/Time First Assessed: 11/28/23 0830   Altered Skin Integrity Present on Admission - Did Patient arrive to the hospital with altered skin?: yes  Side: Right  Orientation: lateral  Location: Knee  Primary Wound Type: Blister(s)   Dressing Appearance Moist drainage   Drainage Amount Small   Drainage Characteristics/Odor Yellow;Serous;No odor   Appearance Pink;Moist   Tissue loss description Partial thickness   Red (%), Wound Tissue Color 100 %   Periwound Area Intact;Dry   Wound Edges Irregular   Wound Length (cm) 2.5 cm   Wound Width (cm) 4 cm   Wound Depth (cm) 0.1 cm   Wound Volume (cm^3) 1 cm^3   Wound Surface Area (cm^2) 10 cm^2   Dressing Change Due 12/05/23       Recent Labs   Lab 12/05/23  0354      K 4.0      CO2 25   BUN 22   CREATININE 0.9   CALCIUM 9.3         No results for input(s): "WBC", "RBC", "HGB", "HCT", "PLT", "MCV", "MCH", "MCHC" in the last 24 hours.        Recent Labs   Lab 12/04/23  2018   POCTGLUCOSE 149*        Assessment and Plan:    Closed displaced intertrochanteric fracture of right femur   s/p IM nail on 11/19/2023  - PT/OT, WBAT  - DVT PPX with apixaban 2.5 mg BID  - maintain surgical dressing until removed by Orthopedics  - ortho NALLELY to see patient weekly at SNF  - follow-up with orthopedics after discharge    Acute postoperative pain  - stable, continue methocarbamol 500 mg QID, acetaminophen 1000 mg q.8 hours, tramadol 50 mg q.6 hours PRN    Postprocedural hypotension  - experienced while at [facility], IV fluids were given and antihypertensive medications were held and then resumed upon SNF admission  - continues to have labile blood pressures  - persistenting, continue to hold atenolol and losartan so that patient can receive her Lasix dosing.      Acute on Chronic heart failure with preserved ejection fraction  - Patient is identified as having Diastolic (HFpEF) " "heart failure that is Chronic. CHF is currently controlled.   -  Monitor strict Is&Os and daily weights.   - holding off on on fluid restriction of 1.5 L due to postop hypotension and concern for dehydration in inpatient setting  - home regimen with Lasix 40 mg in the AM,  - Xopenex nebulizer treatment daily x4 days, continue PRN Xopenex breathing treatments for shortness of breath  -  IV Lasix 20 mg BID, will obtain BMP tomorrow, continue to hold atenolol    Persistent atrial fibrillation  Chronic anticoagulation   - Patient with Long standing persistent (>12 months) atrial fibrillation which is:controlled currently with home  Atenolol  and will continue in hospital. Patient is currently in atrial fibrillation.[id number] Score: 3. Anticoagulation indicated.   - hold atenolol.  Anticoagulation done with Apixiban 5 mg po BID at home and held for surgery but resumed post-op on 11/19.    Acute blood loss as cause of postoperative anemia  - expected postoperatively  - stable, continue monitor twice weekly CBC, transfuse for hemoglobin < 7    Hsx of Primary hypertension  - home regimen with losartan 25 mg daily, Lasix 60 mg daily splint in 2 doses, atenolol 25 mg daily  - developed postop hypotension  - currently holding losartan, Lasix split into TID dosing, continue atenolol with holding parameters    Stage 3b chronic kidney disease  - had elevation in creatinine to 1.5 on 11/21, has now normalized  - currently stable, continue monitor twice weekly BMPs, avoid nephrotoxic agents, renally dose medications as appropriate     Prediabetes  - "Patient noted to be hyperglycemic on admit with -210. HgA1C ordered and returned at 6.3% consistent with prediabetes which is a new diagnosis. With patient's advanced age recommendation would be conservative mangement with diet control management with diabetic diet. Discussed with patient on 11/20 and she is in agreement with plan."  - diabetic diet, blood glucose with twice " weekly BMPs    ACP (advance care planning)  - completed at [facility] [place] on [place]/19/2023, patient wishes to be DNR     Indigestion  - improved, continue Pepcid 20 mg daily, continue PRN Tums and Mylanta    ACP  - on 11/30, patient provided the option to pursue hospice services upon discharge if she would wish to do so    Debility   - Continue with PT/OT for gait training and strengthening and restoration of ADL's   - Encourage mobility, OOB in chair, and early ambulation as appropriate  - Fall precautions   - Monitor for bowel and bladder dysfunction  - Monitor for and prevent skin breakdown and pressure ulcers  - Continue DVT prophylaxis with apixaban        Anticipate disposition:  Eval nursing home placement likely with hospice services.  Case management made aware on 11/30    IP [facility] RISK OF UNPLANNED READMISSION Model: MODERATE     Follow-up needed during SNF stay-ortho (12/4)    Appointment not to send patient to- lab 12/6    Follow-up needed after discharge from SNF: PCP, orthopedics (1/4)    Future Appointments   Date Time Provider Department Center   12/6/2023  7:00 [place] [facility] [place]   12/6/2023  8:15 AM LAB, [facility] LAB [place]   12/14/2023 10:30 AM Jacobo Mcleod MD [facility]   1/4/2024 10:15 AM Henry Perez MD [facility]THO [address]   2/6/2024 10:45 AM Henry Perez MD [facility]TH[facility] [address] [place]         I spent 47 minutes reviewing patient records, examining, and counseling the patient/ patient's family with greater than 50% of the time spent in direct patient care and coordination.      Pooja Littlejohn NP  Department of Hospital Medicine   [facility]     DOS: 12/5/2023       Patient note was created using MModal Dictation.  Any errors in syntax or even information may not have been identified and edited on initial review prior to signing this note.

## 2023-12-05 NOTE — PT/OT/SLP PROGRESS
Occupational Therapy   Treatment    Name: Ngoc Castellanos  MRN: [id number]  Admit Date: 11/22/2023  Admitting Diagnosis:  Closed displaced intertrochanteric fracture of right femur with routine healing    General Precautions: Standard, fall, aspiration, hearing impaired   Orthopedic Precautions: RLE weight bearing as tolerated   Braces: N/A    Recommendations:     Discharge Recommendations:  Low Intensity Therapy  Level of Assistance Recommended at Discharge: 24 hours physical assistance for all ADL's and home management tasks  Discharge Equipment Recommendations: hip kit, walker, rolling, shower chair  Barriers to discharge:  Inaccessible home environment    Assessment:     Ngoc Castellanos is a 89 y.o. female with a medical diagnosis of Closed displaced intertrochanteric fracture of right femur with routine healing. Pt tolerated session well and without incident and shows excellent motivation and potential to improve, but the pt continues to require assistance to perform self-care tasks and mobility. Pt strengths include Functional cognition, Following multi-step tasks, and Attention to task and PLOF, Motivation, and Willingness to participate. Pt improved UBD, LBD, Grooming/hygiene, Toileting, and Toilet transfers. However, pt would continue to benefit from cont'd OT services in the SNF setting to improve safety and independence /c functional tasks and ADLs upon discharge.  Performance deficits affecting function are weakness, impaired endurance, impaired self care skills, impaired functional mobility, gait instability, impaired balance, decreased lower extremity function, decreased safety awareness, pain, impaired coordination, impaired cardiopulmonary response to activity, orthopedic precautions.     Rehab Potential is good    Activity tolerance:  Good    Plan:     Patient to be seen 5 x/week to address the above listed problems via self-care/home management, therapeutic activities, therapeutic exercises    Plan of  "Care Expires: 12/23/23  Plan of Care Reviewed with: patient    Subjective     Communicated with: BRANDON prior to session.     "Well that felt wonderful!" Re: ADLs .    Pain/Comfort:  Pain Rating 1: 0/10  Pain Rating Post-Intervention 1: 0/10    Patient's cultural, spiritual, Tenriism conflicts given the current situation:  no    Objective:     Patient found up in chair with PICC line upon OT entry to room. Pt alert and agreeable to therapy.           Functional Mobility/Transfers:    Patient completed Toilet <> WC Transfer Stand Pivot technique with moderate assistance with  rolling walker, raised toilet seat, and grab bars  Pt preformed sit<>stand t/f /c Min A and RW      Activities of Daily Living:  Grooming: modified independence seated at sink in WC  Upper Body Dressing: set-up assistance to don/doff shirt seated on toilet    Lower Body Dressing: moderate assistance to don/doff pants, threading BLE seated on toilet and managing over hips standing /c RW using AE and socks off when threading over BLE seated to avoid traction /c pants. (A) given for adjustments to pants over R foot, pulling pants over knees while standing, and assistance with maintaining standing when donning over hips   Toileting: minimum assistance seated on raised toilet seat. (A) given for maintaining standing balance when preforming standing perirectal care and clothing mgmt /c RW  Grooming: modified independence seated at sink in WC to wash face/hands and comb hair    AMPAC 6 Click ADL: 16        Treatment & Education:  Pt educated on POC, role of OT, and safety. Pt performed tasks to improve safety and independence in functional tasks and ADLs as mentioned above.       Patient left up in chair with call button in reach, RN Elías present, and all needs met    GOALS:   Multidisciplinary Problems       Occupational Therapy Goals          Problem: Occupational Therapy    Goal Priority Disciplines Outcome Interventions   Occupational Therapy Goal    "  OT, PT/OT Ongoing, Progressing    Description: Goals to be met by: 12/23/2023     Patient will increase functional independence with ADLs by performing:    Feeding with Set-up Assistance.  UE Dressing with Set-up assistance- Met  LE Dressing with Maximum Assistance /c AE/LRAD as needed- Met  ~LE Dressing /c Mod A and AE/LRAD as needed- Partially met, preforming inconsistently  Personal Hygiene while seated at sink with Modified [place]- Met  Oral Hygiene while seated at sink with Modified [place].- Met  Toileting from bedside commode with Maximum Assistance for hygiene and clothing management- Met  ~Toileting from toilet /c Min A for clothing mgmt and hygiene   Bathing from  sitting at sink/on commode with Minimal Assistance.  Rolling to Right with Moderate Assistance.   Rolling to Left with Contact Guard Assistance.   Toilet transfer to bedside commode with Maximum Assistance- Met  ~Toilet transfer to toilet /c Mod A- Partially met, preforming inconsistently  Tolerate standing functional tasks for ~2 minutes /c Mod A and LRAD as needed - Met  Footwear /c Mod A and AE as needed  SC t/f /c Max A and LRAD as needed.                         Time Tracking:     OT Date of Treatment: 12/05/23  OT Start Time: 1000    OT Stop Time: 1032  OT Total Time (min): 32 min    Billable Minutes:Self Care/Home Management 32    12/5/2023

## 2023-12-05 NOTE — PLAN OF CARE
Problem: Adult Inpatient Plan of Care  Goal: Plan of Care Review  Outcome: Ongoing, Progressing  Goal: Patient-Specific Goal (Individualized)  Outcome: Ongoing, Progressing  Goal: Absence of Hospital-Acquired Illness or Injury  Outcome: Ongoing, Progressing  Intervention: Prevent Infection  Flowsheets (Taken 12/4/2023 2103)  Infection Prevention:   single patient room provided   rest/sleep promoted   hand hygiene promoted  Goal: Optimal Comfort and Wellbeing  Outcome: Ongoing, Progressing

## 2023-12-05 NOTE — CARE UPDATE
Discharge Update Information    Spoke to Naya at [facility], Count includes the [facility] individual home placement facility; Naya is the owner, [phone]; she will be coming to see resident to discuss Hospice care with her soon, received information sent on fax and will continue to work with sonJeremy on her admission.

## 2023-12-05 NOTE — PROGRESS NOTES
[facility] - Skilled Nursing  Wound Care    Patient Name:  Ngoc Castellanos   MRN:  [id number]  Date: 12/5/2023  Diagnosis: Closed displaced intertrochanteric fracture of right femur with routine healing    History:     Past Medical History:   Diagnosis Date    *Atrial fibrillation     Atrial fibrillation     Breast cancer 02/2014    Right breast infiltrating ductal CA, ER/ID positive, Her2 equivocal    Closed displaced intertrochanteric fracture of right femur s/p IM nail on 11/19/2023 11/18/2023    H/O total mastectomy of right breast 03/17/2014    Hypertension     Persistent atrial fibrillation 09/25/2013    Prediabetes 11/19/2023    Primary hypertension 09/06/2012    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    left    Stage 3b chronic kidney disease     Valvular regurgitation     moderate MR     Social History     Socioeconomic History    Marital status:    Tobacco Use    Smoking status: Never    Smokeless tobacco: Never   Substance and Sexual Activity    Alcohol use: No    Drug use: No     Social Determinants of Health     Financial Resource Strain: Low Risk  (11/20/2023)    Overall Financial Resource Strain (CARDIA)     Difficulty of Paying Living Expenses: Not hard at all   Food Insecurity: No Food Insecurity (11/20/2023)    Hunger Vital Sign     Worried About Running Out of Food in the Last Year: Never true     Ran Out of Food in the Last Year: Never true   Transportation Needs: No Transportation Needs (11/20/2023)    PRAPARE - Transportation     Lack of Transportation (Medical): No     Lack of Transportation (Non-Medical): No   Physical Activity: Inactive (11/20/2023)    Exercise Vital Sign     Days of Exercise per Week: 0 days     Minutes of Exercise per Session: 0 min   Stress: No Stress Concern Present (11/20/2023)    [employer] - Occupational Stress Questionnaire     Feeling of Stress : Only a little   Social Connections: Moderately Isolated (11/20/2023)    Social  Connection and Isolation Panel [NHANES]     Frequency of Communication with Friends and Family: Three times a week     Frequency of Social Gatherings with Friends and Family: Once a week     Attends [employer] Services: 1 to 4 times per year     Active Member of Clubs or Organizations: No     Attends Club or Organization Meetings: Never     Marital Status:    Housing Stability: Low Risk  (11/20/2023)    Housing Stability Vital Sign     Unable to Pay for Housing in the Last Year: No     Number of Places Lived in the Last Year: 1     Unstable Housing in the Last Year: No     Precautions:     Allergies as of 11/22/2023 - Reviewed 11/22/2023   Allergen Reaction Noted    Clindamycin  02/15/2023    Levofloxacin Other (See Comments) 06/14/2022    Lidocaine Other (See Comments) 06/14/2022    Adhesive tape-silicones Rash 10/30/2017     [facility] Assessment Details/Treatment   Patient seen for wound care follow-up.  Reviewed chart for this encounter.   See Flow Sheet for findings.    Kofi: 14  Albumin: 3.6  Nutrition score: 3-> adequate  POCT: 149 (12/4 @ 2018)    Pt sitting up in bed, agreed to assessment. Waffle in place, properly inflated. Pt wearing diaper and PureWick in place. Foam border dressings removed from right lateral leg blisters, wounds cleansed with Vashe, applied small piece of hydrofera ready (words facing out not toward the wound bed), cover with foam border dressing.   Pt has kerlix and ACE wrap to RLE.   LLE HAS ACE wrap, the wrap was bunched up at pt ankle.  BLE wraps removed, message sent to NP asking to d/c ACE wraps.     RECOMMENDATIONS:  SHIRA/TBIs to determine what level / if any compression can benefit the patient.     Discussed POC with patient and primary nurse.   See EMR for orders & patient education.    Discussed nutrition and the role of protein in wound healing with the patient. Instructed patient to optimize protein for wound healing.    Nursing to continue care & monitoring.  Nursing to  maintain pressure injury prevention interventions.    Orders clarified for dressing change.      12/05/23 0830   WOCN Assessment   WOCN Total Time (mins) 30   Visit Date 12/05/23   Visit Time 0830   Consult Type Follow Up   WOCN Speciality Wound   Wound other  (ruptured blisters)   Intervention assessed;changed;applied;chart review;orders   Teaching on-going        Altered Skin Integrity 11/21/23 1256 Right lateral;upper Thigh Blister(s)   Date First Assessed/Time First Assessed: 11/21/23 1256   Altered Skin Integrity Present on Admission - Did Patient arrive to the hospital with altered skin?: suspected hospital acquired  Side: Right  Orientation: lateral;upper  Location: Thigh  Primar...   Wound Image    Dressing Appearance Clean;Dry;Intact   Drainage Amount Scant   Drainage Characteristics/Odor Serous;Yellow;No odor   Appearance Pink;Moist   Tissue loss description Partial thickness   Red (%), Wound Tissue Color 100 %   Periwound Area Dry;Intact   Wound Length (cm) 3 cm   Wound Width (cm) 4 cm   Wound Depth (cm) 0.1 cm   Wound Volume (cm^3) 1.2 cm^3   Wound Surface Area (cm^2) 12 cm^2   Care Cleansed with:;Antimicrobial agent   Dressing Removed;Applied;Methylene blue/gentian violet;Foam   Dressing Change Due 12/07/23        Altered Skin Integrity 11/28/23 0830 Right lateral Knee Blister(s)   Date First Assessed/Time First Assessed: 11/28/23 0830   Altered Skin Integrity Present on Admission - Did Patient arrive to the hospital with altered skin?: yes  Side: Right  Orientation: lateral  Location: Knee  Primary Wound Type: Blister(s)   Wound Image    Dressing Appearance Clean;Dry;Intact;Moist drainage   Drainage Amount Scant   Drainage Characteristics/Odor Serous;Yellow;No odor   Appearance Pink;Moist   Tissue loss description Partial thickness   Red (%), Wound Tissue Color 100 %   Periwound Area Dry;Intact   Wound Edges Defined   Wound Length (cm) 2.4 cm   Wound Width (cm) 4 cm   Wound Depth (cm) 0.1 cm   Wound  Volume (cm^3) 0.96 cm^3   Wound Surface Area (cm^2) 9.6 cm^2   Care Cleansed with:;Antimicrobial agent   Dressing Removed;Applied;Methylene blue/gentian violet;Foam   Dressing Change Due 12/07/23       LLE anterior when ACE removed    LL medial     LL lateral

## 2023-12-05 NOTE — PLAN OF CARE
Problem: Occupational Therapy  Goal: Occupational Therapy Goal  Description: Goals to be met by: 12/23/2023     Patient will increase functional independence with ADLs by performing:    Feeding with Set-up Assistance.    UE Dressing with Set-up assistance- Met    LE Dressing with Maximum Assistance /c AE/LRAD as needed- Met  ~LE Dressing /c Mod A and AE/LRAD as needed- Partially met, preforming inconsistently    Personal Hygiene while seated at sink with Modified [place]- Met    Oral Hygiene while seated at sink with Modified [place].- Met    Toileting from bedside commode with Maximum Assistance for hygiene and clothing management- Met  ~Toileting from toilet /c Min A for clothing mgmt and hygiene     Bathing from  sitting at sink/on commode with Minimal Assistance.    Rolling to Right with Moderate Assistance.   Rolling to Left with Contact Guard Assistance.     Toilet transfer to bedside commode with Maximum Assistance- Met  ~Toilet transfer to toilet /c Mod A- Partially met, preforming inconsistently    Tolerate standing functional tasks for ~2 minutes /c Mod A and LRAD as needed - Met    Footwear /c Mod A and AE as needed    SC t/f /c Max A and LRAD as needed.    Outcome: Ongoing, Progressing    Goals reassessed and edited to reflect current level of function.

## 2023-12-06 LAB
ANION GAP SERPL CALC-SCNC: 10 MMOL/L (ref 8–16)
BUN SERPL-MCNC: 20 MG/DL (ref 10–30)
CALCIUM SERPL-MCNC: 9.3 MG/DL (ref 8.7–10.5)
CHLORIDE SERPL-SCNC: 102 MMOL/L (ref 95–110)
CO2 SERPL-SCNC: 26 MMOL/L (ref 23–29)
CREAT SERPL-MCNC: 0.8 MG/DL (ref 0.5–1.4)
EST. GFR  (NO RACE VARIABLE): >60 ML/MIN/1.73 M^2
GLUCOSE SERPL-MCNC: 121 MG/DL (ref 70–110)
POTASSIUM SERPL-SCNC: 3.6 MMOL/L (ref 3.5–5.1)
SODIUM SERPL-SCNC: 138 MMOL/L (ref 136–145)

## 2023-12-06 PROCEDURE — 97110 THERAPEUTIC EXERCISES: CPT | Mod: HCNC,CQ

## 2023-12-06 PROCEDURE — 97116 GAIT TRAINING THERAPY: CPT | Mod: HCNC,CQ

## 2023-12-06 PROCEDURE — 25000003 PHARM REV CODE 250: Mod: HCNC | Performed by: FAMILY MEDICINE

## 2023-12-06 PROCEDURE — 63600175 PHARM REV CODE 636 W HCPCS: Mod: HCNC | Performed by: NURSE PRACTITIONER

## 2023-12-06 PROCEDURE — 97530 THERAPEUTIC ACTIVITIES: CPT | Mod: HCNC,CQ

## 2023-12-06 PROCEDURE — 25000003 PHARM REV CODE 250: Mod: HCNC | Performed by: HOSPITALIST

## 2023-12-06 PROCEDURE — 97535 SELF CARE MNGMENT TRAINING: CPT | Mod: HCNC,CO

## 2023-12-06 PROCEDURE — 25000003 PHARM REV CODE 250: Mod: HCNC | Performed by: NURSE PRACTITIONER

## 2023-12-06 PROCEDURE — 80048 BASIC METABOLIC PNL TOTAL CA: CPT | Mod: HCNC | Performed by: NURSE PRACTITIONER

## 2023-12-06 PROCEDURE — 36415 COLL VENOUS BLD VENIPUNCTURE: CPT | Mod: HCNC | Performed by: NURSE PRACTITIONER

## 2023-12-06 PROCEDURE — 11000004 HC SNF PRIVATE: Mod: HCNC

## 2023-12-06 RX ORDER — POTASSIUM CHLORIDE 750 MG/1
30 CAPSULE, EXTENDED RELEASE ORAL ONCE
Status: COMPLETED | OUTPATIENT
Start: 2023-12-06 | End: 2023-12-06

## 2023-12-06 RX ORDER — POTASSIUM CHLORIDE 750 MG/1
10 CAPSULE, EXTENDED RELEASE ORAL DAILY
Status: DISCONTINUED | OUTPATIENT
Start: 2023-12-07 | End: 2023-12-13 | Stop reason: HOSPADM

## 2023-12-06 RX ORDER — FUROSEMIDE 40 MG/1
40 TABLET ORAL DAILY
Status: DISCONTINUED | OUTPATIENT
Start: 2023-12-07 | End: 2023-12-13 | Stop reason: HOSPADM

## 2023-12-06 RX ORDER — BISACODYL 10 MG
10 SUPPOSITORY, RECTAL RECTAL ONCE
Status: COMPLETED | OUTPATIENT
Start: 2023-12-06 | End: 2023-12-06

## 2023-12-06 RX ORDER — FUROSEMIDE 20 MG/1
20 TABLET ORAL
Status: DISCONTINUED | OUTPATIENT
Start: 2023-12-06 | End: 2023-12-13 | Stop reason: HOSPADM

## 2023-12-06 RX ORDER — FUROSEMIDE 40 MG/1
40 TABLET ORAL ONCE
Status: DISCONTINUED | OUTPATIENT
Start: 2023-12-06 | End: 2023-12-06

## 2023-12-06 RX ADMIN — FAMOTIDINE 20 MG: 20 TABLET ORAL at 09:12

## 2023-12-06 RX ADMIN — POLYETHYLENE GLYCOL 3350 17 G: 17 POWDER, FOR SOLUTION ORAL at 09:12

## 2023-12-06 RX ADMIN — POTASSIUM CHLORIDE 30 MEQ: 10 CAPSULE, COATED, EXTENDED RELEASE ORAL at 12:12

## 2023-12-06 RX ADMIN — FUROSEMIDE 20 MG: 10 INJECTION, SOLUTION INTRAMUSCULAR; INTRAVENOUS at 10:12

## 2023-12-06 RX ADMIN — Medication 1 DOSE: at 01:12

## 2023-12-06 RX ADMIN — SENNOSIDES AND DOCUSATE SODIUM 1 TABLET: 8.6; 5 TABLET ORAL at 09:12

## 2023-12-06 RX ADMIN — Medication 1 TABLET: at 09:12

## 2023-12-06 RX ADMIN — APIXABAN 5 MG: 2.5 TABLET, FILM COATED ORAL at 09:12

## 2023-12-06 RX ADMIN — ACETAMINOPHEN 1000 MG: 325 TABLET ORAL at 06:12

## 2023-12-06 RX ADMIN — ACETAMINOPHEN 1000 MG: 325 TABLET ORAL at 02:12

## 2023-12-06 RX ADMIN — Medication 1 TABLET: at 05:12

## 2023-12-06 RX ADMIN — Medication 1 DOSE: at 09:12

## 2023-12-06 RX ADMIN — METHOCARBAMOL 500 MG: 500 TABLET ORAL at 09:12

## 2023-12-06 RX ADMIN — FUROSEMIDE 20 MG: 20 TABLET ORAL at 05:12

## 2023-12-06 RX ADMIN — Medication 1 DOSE: at 05:12

## 2023-12-06 RX ADMIN — BISACODYL 10 MG: 10 SUPPOSITORY RECTAL at 11:12

## 2023-12-06 RX ADMIN — METHOCARBAMOL 500 MG: 500 TABLET ORAL at 05:12

## 2023-12-06 RX ADMIN — METHOCARBAMOL 500 MG: 500 TABLET ORAL at 12:12

## 2023-12-06 RX ADMIN — ACETAMINOPHEN 1000 MG: 325 TABLET ORAL at 10:12

## 2023-12-06 NOTE — PT/OT/SLP PROGRESS
Occupational Therapy   Treatment    Name: Ngoc Castellanos  MRN: [id number]  Admit Date: 11/22/2023  Admitting Diagnosis:  Closed displaced intertrochanteric fracture of right femur with routine healing    General Precautions: Standard, fall, aspiration, hearing impaired   Orthopedic Precautions: RLE weight bearing as tolerated   Braces: N/A    Recommendations:     Discharge Recommendations:  Low Intensity Therapy  Level of Assistance Recommended at Discharge: 24 hours supervision for safety in performing ADL's and home management tasks  Discharge Equipment Recommendations: hip kit, walker, rolling, shower chair  Barriers to discharge:  Inaccessible home environment    Assessment:     Ngoc Castellanos is a 89 y.o. female with a medical diagnosis of Closed displaced intertrochanteric fracture of right femur with routine healing  She presents with Performance deficits affecting function are weakness, impaired endurance, impaired self care skills, impaired functional mobility, gait instability, impaired balance, decreased lower extremity function, decreased safety awareness, pain, impaired coordination, impaired cardiopulmonary response to activity, orthopedic precautions.     Pt. Was cooperative and participated well with session on this day. Pt  continues to demonstrate levels of physical deficits with  functional indep with daily management activities tasks, selfcare skills with balance,  functional mobility, UB strength and endurance. Pt. Will continue to benefit from continued OT to progress towards goals      Rehab Potential is fair    Activity tolerance:  Fair    Plan:     Patient to be seen 5 x/week to address the above listed problems via self-care/home management, therapeutic activities, therapeutic exercises    Plan of Care Expires: 12/23/23  Plan of Care Reviewed with: patient    Subjective     Communicated with: NSg and Pt. prior to session. I am doing well today    Pain/Comfort:  Pain Rating 1: 0/10  Pain Rating  Post-Intervention 1: 0/10    Patient's cultural, spiritual, Mandaeism conflicts given the current situation:  no    Objective:     Patient found up in chair    upon OT entry to room.    Functional Mobility/Transfers:  Patient completed Sit <> Stand Transfer with contact guard assistance and minimum assistance  with  rolling walker   Patient completed Toilet Transfer Stand Pivot technique with contact guard assistance with  grab bars    Activities of Daily Living:  Grooming: supervision at sink level  Bathing: minimum assistance for post katie area  Upper Body Dressing: stand by assistance to delilah pullover shirt  Lower Body Dressing: moderate assistance and maximal assistance to delilah pants seated  Toileting: minimum assistance to manage post katie area and apply diaper management     [facility] 6 Click ADL: 16      Treatment & Education:  .Pt edu on role of OT, POC, safety when performing self care tasks , benefit of performing OOB activity, and safety when performing functional transfers and mobility management for preparation with goals to progress towards next level of care     Patient left up in chair with all lines intact and call button in reach    GOALS:   Multidisciplinary Problems       Occupational Therapy Goals          Problem: Occupational Therapy    Goal Priority Disciplines Outcome Interventions   Occupational Therapy Goal     OT, PT/OT Ongoing, Progressing    Description: Goals to be met by: 12/23/2023     Patient will increase functional independence with ADLs by performing:    Feeding with Set-up Assistance.  UE Dressing with Set-up assistance- Met  LE Dressing with Maximum Assistance /c AE/LRAD as needed- Met  ~LE Dressing /c Mod A and AE/LRAD as needed- Partially met, preforming inconsistently  Personal Hygiene while seated at sink with Modified [place]- Met  Oral Hygiene while seated at sink with Modified [place].- Met  Toileting from bedside commode with Maximum Assistance for hygiene and  clothing management- Met  ~Toileting from toilet /c Min A for clothing mgmt and hygiene   Bathing from  sitting at sink/on commode with Minimal Assistance.  Rolling to Right with Moderate Assistance.   Rolling to Left with Contact Guard Assistance.   Toilet transfer to bedside commode with Maximum Assistance- Met  ~Toilet transfer to toilet /c Mod A- Partially met, preforming inconsistently  Tolerate standing functional tasks for ~2 minutes /c Mod A and LRAD as needed - Met  Footwear /c Mod A and AE as needed  SC t/f /c Max A and LRAD as needed.                         Time Tracking:     OT Date of Treatment: 12/06/23  OT Start Time: 0842    OT Stop Time: 0915  OT Total Time (min): 33 min    Billable Minutes:Self Care/Home Management 33    12/6/2023

## 2023-12-06 NOTE — PLAN OF CARE
Problem: Adult Inpatient Plan of Care  Goal: Plan of Care Review  Outcome: Ongoing, Progressing  Goal: Patient-Specific Goal (Individualized)  Outcome: Ongoing, Progressing  Goal: Absence of Hospital-Acquired Illness or Injury  Outcome: Ongoing, Progressing  Goal: Optimal Comfort and Wellbeing  Outcome: Ongoing, Progressing  Intervention: Monitor Pain and Promote Comfort  Flowsheets (Taken 12/5/2023 2054)  Pain Management Interventions:   care clustered   medication offered   position adjusted   quiet environment facilitated  Goal: Readiness for Transition of Care  Outcome: Ongoing, Progressing

## 2023-12-06 NOTE — PLAN OF CARE
Interdisciplinary team, Saumya Urbina, RN Charge Nurse, Sunita Ornelas, , Jennifer Lopez, PT, Rehab, Sunita Arthur, Activities, and Jody Zambrano, Dietician, spoke to patient for care plan conference, weekly status update, and therapy progress update. Tentative discharge date set for 12/13/23.

## 2023-12-06 NOTE — CARE UPDATE
Spoke to Jeremy, son of pt, with an update on [facility] placement of his mom.  Informed him that Naya, owner, was coming to unit with hospice to see pt and sign her up prior to admission.  Asked that Jeremy follow up with Naya and provided number for him to call her.

## 2023-12-06 NOTE — PT/OT/SLP PROGRESS
Physical Therapy Treatment    Patient Name:  Ngoc Castellanos   MRN:  [id number]  Admit Date: 11/22/2023  Admitting Diagnosis: Closed displaced intertrochanteric fracture of right femur with routine healing  Recent Surgeries: INSERTION, INTRAMEDULLARY RAMSES, FEMUR, RIGHT, HANA TABLE, MIRIAN, C- ARM DOOR SIDE (Right)      General Precautions: Standard, fall, aspiration, hearing impaired  Orthopedic Precautions: RLE weight bearing as tolerated  Braces: N/A    Recommendations:     Discharge Recommendations: Low Intensity Therapy  Level of Assistance Recommended at Discharge: 24 hours significant assistance  Discharge Equipment Recommendations: wheelchair, walker, rolling, shower chair, hip kit, bedside commode  Barriers to discharge: Decreased caregiver support, Other (Comment) (Increased assistance for mobility)    Assessment:     Ngoc Castellanos is a 89 y.o. female admitted with a medical diagnosis of Closed displaced intertrochanteric fracture of right femur with routine healing.  Pt was agreeable and tolerated session fairly well. Pt is most limited by SOB, but pt easily recovers with seated rest breaks. Pt tolerated 2 gait trials with Mary and RW, mild buckling noted. Pt is progressing well towards goals and continues to demonstrate the need for low intensity therapy on a scheduled basis exhibited by decreased independence with functional mobility    Performance deficits affecting function: weakness, impaired endurance, impaired self care skills, impaired functional mobility, gait instability, impaired balance, decreased lower extremity function, decreased safety awareness, decreased ROM, edema, impaired cardiopulmonary response to activity, orthopedic precautions.    Rehab Potential is good    Activity Tolerance: Fair    Plan:     Patient to be seen 5 x/week to address the above listed problems via gait training, therapeutic activities, therapeutic exercises, neuromuscular re-education, wheelchair  management/training    Plan of Care Expires: 12/24/23  Plan of Care Reviewed with: patient    Subjective     Pt reports she usually sit up in wheelchair with RLE not elevated.    Pain/Comfort:  Pain Rating 1: 0/10  Location - Side 1: Right  Location - Orientation 1: lower  Location 1: leg  Pain Addressed 1: Reposition  Pain Rating Post-Intervention 1: 3/10    Patient's cultural, spiritual, Episcopalian conflicts given the current situation:  no    Objective:     Patient found  up in wheelchair  with  (no active lines) upon PT entry to room.     Therapeutic Activities and Exercises:   Seated BLE therex x15 reps: ankle pumps, long arc quads, marches, and heel slide  2x BLE hamstring stretch   Mini elliptical x5 mins (light resistance)  To improve BLE endurance, strength, and ROM  Stopped early d/t increase RLE discomfort/pain  Patient educated on role of therapy, goals of session, and benefits of out of bed mobility.   Educated on increasing time LE is elevated during the day as pt has notable swelling in LE  Instructed on use of call button and importance of calling nursing staff for assistance with mobility   Questions/concerns addressed within PTA scope of practice  Pt verbalized understanding.    Functional Mobility:  Transfers:   Sit <> Stand Transfer: minimum assistance with rolling walker   Cues to scoot forward and hand placement for safety   Wheelchair> Chair Transfer: minimum assistance with rolling walker using Step Transfer technique   Gait:  Pt ambulated ~44+38 ft with minimum assistance and rolling walker. Wheelchair following   1 seated rest breaks & occasional standing rest break d/t feeling SOB  SpO2 ranged from 93%-95% following gait trial, but increase above 97% after 1 min of seated rest  Gait Deviation(s): antalgic, step-to gait with slight flexed posture, downward gaze, decrease catherine, slight LE buckling, and decrease step length  Verbal/tactile cues for looking up more and pacing as  needed  Wheelchair Propulsion:    Pt propelled lightweight wheelchair 2x ~100 feet on Level tile with  Bilateral upper extremity with Supervision or Set-up Assistance.     AM-PAC 6 CLICK MOBILITY  16    Patient left up in chair with call button in reach and nurse present.    GOALS:   Multidisciplinary Problems       Physical Therapy Goals          Problem: Physical Therapy    Goal Priority Disciplines Outcome Goal Variances Interventions   Physical Therapy Goal     PT, PT/OT Ongoing, Progressing     Description: Goals to be met by: 23       Patient will increase functional independence with mobility by performin. Supine to sit with Contact Guard Assistance - In progress  2. Sit to supine with Contact Guard Assistance - In progress  3. Rolling to Left and Right with Standby Assistance - In progress  4. Sit to stand transfer with Contact Guard Assistance - In progress  5. Bed to chair transfer with Contact Guard Assistance using Rolling Walker/LRAD - MET  Updated Goal 23: Bed to chair transfer with SBA using RW    6. Gait  x 50 feet with Contact Guard Assistance using Rolling Walker/LRAD - In progress  7. Wheelchair propulsion x 150 feet with Modified [place] using bilateral upper extremities - In progress                         Time Tracking:     PT Received On: 23  PT Start Time: 925  PT Stop Time: 100  PT Total Time (min): 39 min    Billable Minutes: Gait Training 12, Therapeutic Activity 15, and Therapeutic Exercise 12    Treatment Type: Treatment  PT/PTA: PTA     Number of PTA visits since last PT visit: 2023

## 2023-12-06 NOTE — PROGRESS NOTES
[facility]                                  Skilled Nursing Facility                   Progress Note     Admit Date: 11/22/2023  WES 12/13/2023  [id number] A  Principal Problem:  Closed displaced intertrochanteric fracture of right femur with routine healing   HPI obtained from patient interview and chart review     Chief Complaint:Re-evaluation of medical treatment and therapy status    HPI:   Mrs. Castellanos is an 89-year-old female with a PMHx of Afib on Eliquis, HFpEF on lasix, HTN, and CKD stage III who presents to SNF following hospitalization for right intertrochanteric femur fracture s/p cephalomedullary nail fixation on 11/19 with Dr. Perez.  Admission to SNF for secondary weakness and debility.    Interval history:   All of today's labs reviewed and are listed below.  24 hr vital sign ranges reviewed and listed below.  BP with improvement, this morning noted to be 135/61.  Did have mildly elevated heart rate yesterday at 102 that has resolved today, this is likely from atenolol being held.  Yesterday's patient's weight was 79.5 kg, today is 79.4 kg.  Patient denies, abdominal discomfort, nausea, or vomiting.  Patient reports an adequate appetite.  Patient denies dysuria.  Last bowel movement 12/3.  Patient progressing with PT/OT-Gait: 44' min A for bilateral knee buckling, no wheelchair follow, encouraged patient to ambulate close to the therapy mat. Continuing to follow and treat all acute and chronic conditions.    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    Past Surgical History: Patient has a past surgical history that includes Brain surgery (2002); Hysterectomy; Tonsillectomy; Hemorrhoid surgery; Appendectomy; Breast biopsy (2/2014); Breast surgery (03/17/14); Breast cyst excision (1960); Mastectomy (Right); and Intramedullary rodding of femur (Right, 11/19/2023).    Social History: Patient reports that she has never smoked. She has never used smokeless tobacco. She reports that she does not drink alcohol and does not use drugs.    Family History:  No significant family history to report    Allergies: Patient is allergic to clindamycin, levofloxacin, lidocaine, and adhesive tape-silicones.    ROS  Constitutional: Negative for fever.  +fatigue   Eyes: Negative for blurred vision, double vision   Respiratory: Negative for cough.  +chronic SOB, at baseline  Cardiovascular: Negative for chest pain, palpitations. + leg swelling.   Gastrointestinal: Negative for abdominal pain, diarrhea, nausea, vomiting.  +indigestion, flatulence, constipation  Genitourinary: Negative for dysuria, frequency   Musculoskeletal:  + generalized weakness.  + RLE pain  Skin: Negative for itching and rash.   Neurological: Negative for dizziness, headaches.   Psychiatric/Behavioral: Negative for depression. The patient is not nervous/anxious.      24 hour Vital Sign Range   Temp:  [98.2 °F (36.8 °C)-98.6 °F (37 °C)]   Pulse:  []   Resp:  [18-20]   BP: (113-135)/(53-61)   SpO2:  [97 %-100 %]     Current BMI: Body mass index is 27.42 kg/m².    PEx  Constitutional: Patient appears debilitated.  No distress noted  HENT:   Head: Normocephalic and atraumatic.   Eyes: Pupils are equal, round  Neck: Normal range of motion. Neck supple.   Cardiovascular: Normal rate, regular rhythm and normal heart sounds.    Pulmonary/Chest: Effort normal and breath sounds with bilateral crackles R>L  Abdominal: Soft. Bowel  sounds are normal.   Musculoskeletal: Normal range of motion.   Neurological: Alert and oriented to person, place, and time.   Psychiatric: Normal mood and affect. Behavior is normal.   Skin: Skin is warm and dry.  Surgical dressing to RLE, only to be removed by Ortho.  2+ pitting edema to bilateral lower extremities R>L     Altered Skin Integrity 11/21/23 1256 Right lateral;upper Thigh Blister(s)   Date First Assessed/Time First Assessed: 11/21/23 1256   Altered Skin Integrity Present on Admission - Did Patient arrive to the hospital with altered skin?: suspected hospital acquired  Side: Right  Orientation: lateral;upper  Location: Thigh  Primar...   Dressing Appearance Moist drainage   Drainage Amount Small   Drainage Characteristics/Odor Yellow;Serous   Appearance Moist;Pink   Tissue loss description Partial thickness   Red (%), Wound Tissue Color 100 %   Periwound Area Intact;Dry   Wound Length (cm) 3 cm   Wound Width (cm) 4 cm   Wound Depth (cm) 0.1 cm   Wound Volume (cm^3) 1.2 cm^3   Wound Surface Area (cm^2) 12 cm^2   Care Cleansed with:;Antimicrobial agent   Dressing Applied;Methylene blue/gentian violet;Foam   Dressing Change Due 12/05/23        Altered Skin Integrity 11/28/23 0830 Right Buttocks Blister(s)   Date First Assessed/Time First Assessed: 11/28/23 0830   Altered Skin Integrity Present on Admission - Did Patient arrive to the hospital with altered skin?: yes  Side: Right  Location: Buttocks  Primary Wound Type: Blister(s)   Appearance Pink;Moist   Tissue loss description Partial thickness   Red (%), Wound Tissue Color 100 %   Periwound Area Intact;Dry   Wound Length (cm) 4.5 cm   Wound Width (cm) 4.5 cm   Wound Depth (cm) 0.1 cm   Wound Volume (cm^3) 2.025 cm^3   Wound Surface Area (cm^2) 20.25 cm^2   Care Cleansed with:;Antimicrobial agent   Dressing Applied;Methylene blue/gentian violet;Foam   Off Loading Other (see comments)  (waffle ordered)   Dressing Change Due 12/05/23        Altered Skin  "Integrity 11/28/23 0830 Right lateral Knee Blister(s)   Date First Assessed/Time First Assessed: 11/28/23 0830   Altered Skin Integrity Present on Admission - Did Patient arrive to the hospital with altered skin?: yes  Side: Right  Orientation: lateral  Location: Knee  Primary Wound Type: Blister(s)   Dressing Appearance Moist drainage   Drainage Amount Small   Drainage Characteristics/Odor Yellow;Serous;No odor   Appearance Pink;Moist   Tissue loss description Partial thickness   Red (%), Wound Tissue Color 100 %   Periwound Area Intact;Dry   Wound Edges Irregular   Wound Length (cm) 2.5 cm   Wound Width (cm) 4 cm   Wound Depth (cm) 0.1 cm   Wound Volume (cm^3) 1 cm^3   Wound Surface Area (cm^2) 10 cm^2   Dressing Change Due 12/05/23       Recent Labs   Lab 12/06/23  0415      K 3.6      CO2 26   BUN 20   CREATININE 0.8   CALCIUM 9.3         No results for input(s): "WBC", "RBC", "HGB", "HCT", "PLT", "MCV", "MCH", "MCHC" in the last 24 hours.        Recent Labs   Lab 12/04/23  2018   POCTGLUCOSE 149*        Assessment and Plan:    Closed displaced intertrochanteric fracture of right femur   s/p IM nail on 11/19/2023  - PT/OT, WBAT  - DVT PPX with apixaban 2.5 mg BID  - maintain surgical dressing until removed by Orthopedics  - ortho NALLELY to see patient weekly at SNF  - follow-up with orthopedics after discharge    Acute postoperative pain  - stable, continue methocarbamol 500 mg QID, acetaminophen 1000 mg q.8 hours, tramadol 50 mg q.6 hours PRN    Constipation  - initiated suppository x1 dose, continue daily MiraLax, senna docusate 1 tab BID    Postprocedural hypotension  - experienced while at [facility], IV fluids were given and antihypertensive medications were held and then resumed upon SNF admission  - continues to have labile blood pressures  - persistenting, continue to hold atenolol and losartan so that patient can receive her Lasix dosing.      Acute on Chronic heart failure with preserved ejection " "fraction  - Patient is identified as having Diastolic (HFpEF) heart failure that is Chronic. CHF is currently controlled.   -  Monitor strict Is&Os and daily weights.   - holding off on on fluid restriction of 1.5 L due to postop hypotension and concern for dehydration in inpatient setting  - home regimen with Lasix 40 mg in the AM, 20 mg PM  - Xopenex nebulizer treatment daily x4 days, continue PRN Xopenex breathing treatments for shortness of breath  -  improving, transitioning patient back to PO Lasix with her home dose of 40 mg in the morning and 20 mg in the afternoon, also adding KCl 10 mEq daily continue to hold atenolol    Hypokalemia  - initiated KCL 30 mEq x1 dose    Persistent atrial fibrillation  Chronic anticoagulation   - Patient with Long standing persistent (>12 months) atrial fibrillation which is:controlled currently with home  Atenolol  and will continue in hospital. Patient is currently in atrial fibrillation.[id number] Score: 3. Anticoagulation indicated.   - hold atenolol.  Anticoagulation done with Apixiban 5 mg po BID at home and held for surgery but resumed post-op on 11/19.    Acute blood loss as cause of postoperative anemia  - expected postoperatively  - stable, continue monitor twice weekly CBC, transfuse for hemoglobin < 7    Hsx of Primary hypertension  - home regimen with losartan 25 mg daily, Lasix 60 mg daily splint in 2 doses, atenolol 25 mg daily  - developed postop hypotension  - currently holding losartan, Lasix split into TID dosing, continue atenolol with holding parameters    Stage 3b chronic kidney disease  - had elevation in creatinine to 1.5 on 11/21, has now normalized  - currently stable, continue monitor twice weekly BMPs, avoid nephrotoxic agents, renally dose medications as appropriate     Prediabetes  - "Patient noted to be hyperglycemic on admit with -210. HgA1C ordered and returned at 6.3% consistent with prediabetes which is a new diagnosis. With patient's " "advanced age recommendation would be conservative mangement with diet control management with diabetic diet. Discussed with patient on 11/20 and she is in agreement with plan."  - diabetic diet, blood glucose with twice weekly BMPs    ACP (advance care planning)  - completed at [facility] on 11/19/2023, patient wishes to be DNR     Indigestion  - improved, continue Pepcid 20 mg daily, continue PRN Tums and Mylanta    ACP  - on 11/30, patient provided the option to pursue hospice services upon discharge if she would wish to do so    Debility   - Continue with PT/OT for gait training and strengthening and restoration of ADL's   - Encourage mobility, OOB in chair, and early ambulation as appropriate  - Fall precautions   - Monitor for bowel and bladder dysfunction  - Monitor for and prevent skin breakdown and pressure ulcers  - Continue DVT prophylaxis with apixaban        Anticipate disposition:  Eval nursing home placement likely with hospice services.  Case management made aware on 11/30    IP [facility] RISK OF UNPLANNED READMISSION Model: MODERATE     Follow-up needed during SNF stay-ortho (12/4)    Appointment not to send patient to- lab 12/6    Follow-up needed after discharge from SNF: PCP, orthopedics (1/4)    Future Appointments   Date Time Provider Department Center   12/14/2023 10:30 AM Jacobo Mcleod MD [facility]   1/4/2024 10:15 AM Henry Perez MD [facility] [address]   2/6/2024 10:45 AM Henry Perez MD [facility] ORTHO [address] NP  Department of Hospital Medicine   [facility]     DOS: 12/6/2023       Patient note was created using MModal Dictation.  Any errors in syntax or even information may not have been identified and edited on initial review prior to signing this note.   "

## 2023-12-07 LAB
ANION GAP SERPL CALC-SCNC: 13 MMOL/L (ref 8–16)
BASOPHILS # BLD AUTO: 0.03 K/UL (ref 0–0.2)
BASOPHILS NFR BLD: 0.6 % (ref 0–1.9)
BUN SERPL-MCNC: 24 MG/DL (ref 10–30)
CALCIUM SERPL-MCNC: 9.6 MG/DL (ref 8.7–10.5)
CHLORIDE SERPL-SCNC: 101 MMOL/L (ref 95–110)
CO2 SERPL-SCNC: 24 MMOL/L (ref 23–29)
CREAT SERPL-MCNC: 0.9 MG/DL (ref 0.5–1.4)
DIFFERENTIAL METHOD: ABNORMAL
EOSINOPHIL # BLD AUTO: 0.4 K/UL (ref 0–0.5)
EOSINOPHIL NFR BLD: 6.9 % (ref 0–8)
ERYTHROCYTE [DISTWIDTH] IN BLOOD BY AUTOMATED COUNT: 18.1 % (ref 11.5–14.5)
EST. GFR  (NO RACE VARIABLE): 57.8 ML/MIN/1.73 M^2
GLUCOSE SERPL-MCNC: 112 MG/DL (ref 70–110)
HCT VFR BLD AUTO: 31.5 % (ref 37–48.5)
HGB BLD-MCNC: 9.9 G/DL (ref 12–16)
IMM GRANULOCYTES # BLD AUTO: 0.02 K/UL (ref 0–0.04)
IMM GRANULOCYTES NFR BLD AUTO: 0.4 % (ref 0–0.5)
LYMPHOCYTES # BLD AUTO: 0.5 K/UL (ref 1–4.8)
LYMPHOCYTES NFR BLD: 8.6 % (ref 18–48)
MAGNESIUM SERPL-MCNC: 1.8 MG/DL (ref 1.6–2.6)
MCH RBC QN AUTO: 29.2 PG (ref 27–31)
MCHC RBC AUTO-ENTMCNC: 31.4 G/DL (ref 32–36)
MCV RBC AUTO: 93 FL (ref 82–98)
MONOCYTES # BLD AUTO: 0.5 K/UL (ref 0.3–1)
MONOCYTES NFR BLD: 9.5 % (ref 4–15)
NEUTROPHILS # BLD AUTO: 3.9 K/UL (ref 1.8–7.7)
NEUTROPHILS NFR BLD: 74 % (ref 38–73)
NRBC BLD-RTO: 0 /100 WBC
PHOSPHATE SERPL-MCNC: 3.5 MG/DL (ref 2.7–4.5)
PLATELET # BLD AUTO: 236 K/UL (ref 150–450)
PMV BLD AUTO: 9.3 FL (ref 9.2–12.9)
POTASSIUM SERPL-SCNC: 4 MMOL/L (ref 3.5–5.1)
RBC # BLD AUTO: 3.39 M/UL (ref 4–5.4)
SODIUM SERPL-SCNC: 138 MMOL/L (ref 136–145)
WBC # BLD AUTO: 5.25 K/UL (ref 3.9–12.7)

## 2023-12-07 PROCEDURE — 83735 ASSAY OF MAGNESIUM: CPT | Mod: HCNC | Performed by: HOSPITALIST

## 2023-12-07 PROCEDURE — 85025 COMPLETE CBC W/AUTO DIFF WBC: CPT | Mod: HCNC | Performed by: HOSPITALIST

## 2023-12-07 PROCEDURE — 25000003 PHARM REV CODE 250: Mod: HCNC | Performed by: HOSPITALIST

## 2023-12-07 PROCEDURE — 97116 GAIT TRAINING THERAPY: CPT | Mod: HCNC

## 2023-12-07 PROCEDURE — 25000003 PHARM REV CODE 250: Mod: HCNC | Performed by: FAMILY MEDICINE

## 2023-12-07 PROCEDURE — 25000003 PHARM REV CODE 250: Mod: HCNC | Performed by: NURSE PRACTITIONER

## 2023-12-07 PROCEDURE — 97530 THERAPEUTIC ACTIVITIES: CPT | Mod: HCNC,CO

## 2023-12-07 PROCEDURE — 11000004 HC SNF PRIVATE: Mod: HCNC

## 2023-12-07 PROCEDURE — 36415 COLL VENOUS BLD VENIPUNCTURE: CPT | Mod: HCNC | Performed by: HOSPITALIST

## 2023-12-07 PROCEDURE — 97110 THERAPEUTIC EXERCISES: CPT | Mod: HCNC

## 2023-12-07 PROCEDURE — 84100 ASSAY OF PHOSPHORUS: CPT | Mod: HCNC | Performed by: HOSPITALIST

## 2023-12-07 PROCEDURE — 80048 BASIC METABOLIC PNL TOTAL CA: CPT | Mod: HCNC | Performed by: HOSPITALIST

## 2023-12-07 RX ORDER — PANTOPRAZOLE SODIUM 40 MG/1
40 TABLET, DELAYED RELEASE ORAL DAILY
Status: DISCONTINUED | OUTPATIENT
Start: 2023-12-07 | End: 2023-12-13 | Stop reason: HOSPADM

## 2023-12-07 RX ORDER — DOCUSATE SODIUM 283 MG/5ML
1 LIQUID RECTAL ONCE
Status: COMPLETED | OUTPATIENT
Start: 2023-12-07 | End: 2023-12-07

## 2023-12-07 RX ADMIN — ACETAMINOPHEN 1000 MG: 325 TABLET ORAL at 10:12

## 2023-12-07 RX ADMIN — METHOCARBAMOL 500 MG: 500 TABLET ORAL at 04:12

## 2023-12-07 RX ADMIN — Medication 1 DOSE: at 08:12

## 2023-12-07 RX ADMIN — ACETAMINOPHEN 1000 MG: 325 TABLET ORAL at 01:12

## 2023-12-07 RX ADMIN — POTASSIUM CHLORIDE 10 MEQ: 10 CAPSULE, COATED, EXTENDED RELEASE ORAL at 08:12

## 2023-12-07 RX ADMIN — APIXABAN 5 MG: 2.5 TABLET, FILM COATED ORAL at 08:12

## 2023-12-07 RX ADMIN — TRAMADOL HYDROCHLORIDE 50 MG: 50 TABLET, COATED ORAL at 03:12

## 2023-12-07 RX ADMIN — SENNOSIDES AND DOCUSATE SODIUM 1 TABLET: 8.6; 5 TABLET ORAL at 08:12

## 2023-12-07 RX ADMIN — METHOCARBAMOL 500 MG: 500 TABLET ORAL at 08:12

## 2023-12-07 RX ADMIN — POLYETHYLENE GLYCOL 3350 17 G: 17 POWDER, FOR SOLUTION ORAL at 08:12

## 2023-12-07 RX ADMIN — Medication 1 DOSE: at 12:12

## 2023-12-07 RX ADMIN — DOCUSATE SODIUM 1 ENEMA: 283 LIQUID RECTAL at 12:12

## 2023-12-07 RX ADMIN — Medication 1 DOSE: at 04:12

## 2023-12-07 RX ADMIN — Medication 1 TABLET: at 08:12

## 2023-12-07 RX ADMIN — PANTOPRAZOLE SODIUM 40 MG: 40 TABLET, DELAYED RELEASE ORAL at 02:12

## 2023-12-07 RX ADMIN — FAMOTIDINE 20 MG: 20 TABLET ORAL at 08:12

## 2023-12-07 RX ADMIN — ACETAMINOPHEN 1000 MG: 325 TABLET ORAL at 06:12

## 2023-12-07 RX ADMIN — METHOCARBAMOL 500 MG: 500 TABLET ORAL at 01:12

## 2023-12-07 RX ADMIN — FUROSEMIDE 20 MG: 20 TABLET ORAL at 04:12

## 2023-12-07 RX ADMIN — FUROSEMIDE 40 MG: 40 TABLET ORAL at 08:12

## 2023-12-07 RX ADMIN — Medication 1 TABLET: at 04:12

## 2023-12-07 NOTE — PROGRESS NOTES
[facility]                                  Skilled Nursing Facility                   Progress Note     Admit Date: 11/22/2023  WES 12/13/2023  [id number] A  Principal Problem:  Closed displaced intertrochanteric fracture of right femur with routine healing   HPI obtained from patient interview and chart review     Chief Complaint:Re-evaluation of medical treatment and therapy status: Lab review    HPI:   Mrs. Castellanos is an 89-year-old female with a PMHx of Afib on Eliquis, HFpEF on lasix, HTN, and CKD stage III who presents to SNF following hospitalization for right intertrochanteric femur fracture s/p cephalomedullary nail fixation on 11/19 with Dr. Perez.  Admission to SNF for secondary weakness and debility.    Interval history:  All of today's labs reviewed and are listed below.  H&H improved, renal function stable.  24 hr vital sign ranges reviewed and listed below, no acute abnormalities noted.  BP with overall improvement.  Patient tolerating home dosing of Lasix.  I do not feel that weights are accurate at this time, will base LASIK needs on patient's presentation and symptoms.  Patient's bilateral lower extremity edema does not appear increased from yesterday, shortness of breath is currently at her new baseline.  Patient denies abdominal discomfort, nausea, or vomiting.  Indigestion still not well controlled.  Patient reports an adequate appetite.  Patient denies dysuria.  Suppository was unsuccessful yesterday patient's last bowel movement remains 12/3, patient is passing flatus and has active bowel sounds.  Will give enema today.  Patient progressing with PT/OT- Gait: Pt ambulated ~44+38 ft with minimum assistance and rolling walker. Wheelchair following. Continuing to follow and treat all acute and chronic conditions.    Past Medical History: Patient has a past medical history of *Atrial fibrillation, Atrial fibrillation,  Breast cancer (02/2014), Closed displaced intertrochanteric fracture of right femur s/p IM nail on 11/19/2023 (11/18/2023), H/O total mastectomy of right breast (03/17/2014), Hypertension, Persistent atrial fibrillation (09/25/2013), Prediabetes (11/19/2023), Primary hypertension (09/06/2012), Squamous cell cancer of skin of jawline (2014), Stage 3b chronic kidney disease, and Valvular regurgitation.    Past Surgical History: Patient has a past surgical history that includes Brain surgery (2002); Hysterectomy; Tonsillectomy; Hemorrhoid surgery; Appendectomy; Breast biopsy (2/2014); Breast surgery (03/17/14); Breast cyst excision (1960); Mastectomy (Right); and Intramedullary rodding of femur (Right, 11/19/2023).    Social History: Patient reports that she has never smoked. She has never used smokeless tobacco. She reports that she does not drink alcohol and does not use drugs.    Family History:  No significant family history to report    Allergies: Patient is allergic to clindamycin, levofloxacin, lidocaine, and adhesive tape-silicones.    ROS  Constitutional: Negative for fever.  +fatigue   Eyes: Negative for blurred vision, double vision   Respiratory: Negative for cough.  +chronic SOB, at baseline  Cardiovascular: Negative for chest pain, palpitations. + leg swelling.   Gastrointestinal: Negative for abdominal pain, diarrhea, nausea, vomiting.  +indigestion, flatulence, constipation  Genitourinary: Negative for dysuria, frequency   Musculoskeletal:  + generalized weakness.  + RLE pain  Skin: Negative for itching and rash.   Neurological: Negative for dizziness, headaches.   Psychiatric/Behavioral: Negative for depression. The patient is not nervous/anxious.      24 hour Vital Sign Range   Temp:  [97.7 °F (36.5 °C)-98.4 °F (36.9 °C)]   Pulse:  [83-88]   Resp:  [16-18]   BP: (111-130)/(53-60)   SpO2:  [97 %]     Current BMI: Body mass index is 29.21 kg/m².    PEx  Constitutional: Patient appears debilitated.  No  distress noted  HENT:   Head: Normocephalic and atraumatic.   Eyes: Pupils are equal, round  Neck: Normal range of motion. Neck supple.   Cardiovascular: Normal rate, regular rhythm and normal heart sounds.    Pulmonary/Chest: Effort normal and breath sounds with bilateral crackles R>L  Abdominal: Soft. Bowel sounds are normal.   Musculoskeletal: Normal range of motion.   Neurological: Alert and oriented to person, place, and time.   Psychiatric: Normal mood and affect. Behavior is normal.   Skin: Skin is warm and dry.  Surgical dressing to RLE, only to be removed by Ortho.  2+ pitting edema to bilateral lower extremities R>L     Altered Skin Integrity 11/21/23 1256 Right lateral;upper Thigh Blister(s)   Date First Assessed/Time First Assessed: 11/21/23 1256   Altered Skin Integrity Present on Admission - Did Patient arrive to the hospital with altered skin?: suspected hospital acquired  Side: Right  Orientation: lateral;upper  Location: Thigh  Primar...   Dressing Appearance Moist drainage   Drainage Amount Small   Drainage Characteristics/Odor Yellow;Serous   Appearance Moist;Pink   Tissue loss description Partial thickness   Red (%), Wound Tissue Color 100 %   Periwound Area Intact;Dry   Wound Length (cm) 3 cm   Wound Width (cm) 4 cm   Wound Depth (cm) 0.1 cm   Wound Volume (cm^3) 1.2 cm^3   Wound Surface Area (cm^2) 12 cm^2   Care Cleansed with:;Antimicrobial agent   Dressing Applied;Methylene blue/gentian violet;Foam   Dressing Change Due 12/05/23        Altered Skin Integrity 11/28/23 0830 Right Buttocks Blister(s)   Date First Assessed/Time First Assessed: 11/28/23 0830   Altered Skin Integrity Present on Admission - Did Patient arrive to the hospital with altered skin?: yes  Side: Right  Location: Buttocks  Primary Wound Type: Blister(s)   Appearance Pink;Moist   Tissue loss description Partial thickness   Red (%), Wound Tissue Color 100 %   Periwound Area Intact;Dry   Wound Length (cm) 4.5 cm   Wound Width  (cm) 4.5 cm   Wound Depth (cm) 0.1 cm   Wound Volume (cm^3) 2.025 cm^3   Wound Surface Area (cm^2) 20.25 cm^2   Care Cleansed with:;Antimicrobial agent   Dressing Applied;Methylene blue/gentian violet;Foam   Off Loading Other (see comments)  (waffle ordered)   Dressing Change Due 12/05/23        Altered Skin Integrity 11/28/23 0830 Right lateral Knee Blister(s)   Date First Assessed/Time First Assessed: 11/28/23 0830   Altered Skin Integrity Present on Admission - Did Patient arrive to the hospital with altered skin?: yes  Side: Right  Orientation: lateral  Location: Knee  Primary Wound Type: Blister(s)   Dressing Appearance Moist drainage   Drainage Amount Small   Drainage Characteristics/Odor Yellow;Serous;No odor   Appearance Pink;Moist   Tissue loss description Partial thickness   Red (%), Wound Tissue Color 100 %   Periwound Area Intact;Dry   Wound Edges Irregular   Wound Length (cm) 2.5 cm   Wound Width (cm) 4 cm   Wound Depth (cm) 0.1 cm   Wound Volume (cm^3) 1 cm^3   Wound Surface Area (cm^2) 10 cm^2   Dressing Change Due 12/05/23       Recent Labs   Lab 12/07/23  0449      K 4.0      CO2 24   BUN 24   CREATININE 0.9   CALCIUM 9.6   MG 1.8         Recent Labs   Lab 12/07/23  0449   WBC 5.25   RBC 3.39*   HGB 9.9*   HCT 31.5*      MCV 93   MCH 29.2   MCHC 31.4*           Recent Labs   Lab 12/04/23  2018   POCTGLUCOSE 149*        Assessment and Plan:    Closed displaced intertrochanteric fracture of right femur   s/p IM nail on 11/19/2023  - PT/OT, WBAT  - DVT PPX with apixaban 2.5 mg BID  - maintain surgical dressing until removed by Orthopedics  - ortho NALLELY to see patient weekly at SNF  - follow-up with orthopedics after discharge    Acute postoperative pain  - stable, continue methocarbamol 500 mg QID, acetaminophen 1000 mg q.8 hours, tramadol 50 mg q.6 hours PRN    Constipation  - initiated docusate enema, suppository yesterday was unsuccessful,, continue daily MiraLax, senna docusate 1  tab BID    Postprocedural hypotension  - experienced while at [facility], IV fluids were given and antihypertensive medications were held and then resumed upon SNF admission  - continues to have labile blood pressures  - persistenting, continue to hold atenolol and losartan so that patient can receive her Lasix dosing.      Acute on Chronic heart failure with preserved ejection fraction  - Patient is identified as having Diastolic (HFpEF) heart failure that is Chronic. CHF is currently controlled.   -  Monitor strict Is&Os and daily weights.   - holding off on on fluid restriction of 1.5 L due to postop hypotension and concern for dehydration in inpatient setting  - home regimen with Lasix 40 mg in the AM, 20 mg PM  - Xopenex nebulizer treatment daily x4 days, continue PRN Xopenex breathing treatments for shortness of breath  -  continue home dose PO Lasix of 40 mg in the morning and 20 mg in the afternoon, also adding KCl 10 mEq daily continue to hold atenolol.  Weights do not seem accurate at this time, will base need for increase Lasix dosing based on patient's clinical presentation and symptoms.    Hypokalemia  - initiated KCL 30 mEq x1 dose    Persistent atrial fibrillation  Chronic anticoagulation   - Patient with Long standing persistent (>12 months) atrial fibrillation which is:controlled currently with home  Atenolol  and will continue in hospital. Patient is currently in atrial fibrillation.[id number] Score: 3. Anticoagulation indicated.   - hold atenolol.  Anticoagulation done with Apixiban 5 mg po BID at home and held for surgery but resumed post-op on 11/19.    Acute blood loss as cause of postoperative anemia  - expected postoperatively  - stable, continue monitor twice weekly CBC, transfuse for hemoglobin < 7    Hsx of Primary hypertension  - home regimen with losartan 25 mg daily, Lasix 60 mg daily splint in 2 doses, atenolol 25 mg daily  - developed postop hypotension  - currently holding losartan, Lasix  "split into TID dosing, continue atenolol with holding parameters    Stage 3b chronic kidney disease  - had elevation in creatinine to 1.5 on 11/21, has now normalized  - currently stable, continue monitor twice weekly BMPs, avoid nephrotoxic agents, renally dose medications as appropriate     Prediabetes  - "Patient noted to be hyperglycemic on admit with -210. HgA1C ordered and returned at 6.3% consistent with prediabetes which is a new diagnosis. With patient's advanced age recommendation would be conservative mangement with diet control management with diabetic diet. Discussed with patient on 11/20 and she is in agreement with plan."  - diabetic diet, blood glucose with twice weekly BMPs    ACP (advance care planning)  - [place] [facility] [place], patient wishes to be DNR     Indigestion  - unstable, switching Pepcid to Protonix 40 mg daily, continue PRN Tums and Mylanta    ACP  - on 11/30, patient provided the option to pursue hospice services upon discharge if she would wish to do so    Debility   - Continue with PT/OT for gait training and strengthening and restoration of ADL's   - Encourage mobility, OOB in chair, and early ambulation as appropriate  - Fall precautions   - Monitor for bowel and bladder dysfunction  - Monitor for and prevent skin breakdown and pressure ulcers  - Continue DVT prophylaxis with apixaban        Anticipate disposition:  Eval nursing home placement likely with hospice services.  Case management made aware on 11/30    IP [facility] RISK OF UNPLANNED READMISSION Model: MODERATE     Follow-up needed during SNF stay-ortho (12/4)    Appointment not to send patient to- lab 12/6    Follow-up needed after discharge from SNF: PCP, orthopedics (1/4)    Future Appointments   Date Time Provider Department Center   12/14/2023 10:30 AM Jacobo Mcleod MD [facility]   1/4/2024 10:15 AM Henry Perez MD [facility] [address] [address]   2/6/2024 10:45 AM Henry Perez MD " [facility] [address] NP  Department of Hospital Medicine   [facility]     DOS: 12/7/2023       Patient note was created using MModal Dictation.  Any errors in syntax or even information may not have been identified and edited on initial review prior to signing this note.

## 2023-12-07 NOTE — PT/OT/SLP PROGRESS
"Physical Therapy Treatment    Patient Name:  Ngoc Castellanos   MRN:  [id number]  Admit Date: 11/22/2023  Admitting Diagnosis: Closed displaced intertrochanteric fracture of right femur with routine healing  Recent Surgeries: Cephalomedullary nail fixation of right intertrochanteric femur fracture     General Precautions: Standard, fall, aspiration, hearing impaired  Orthopedic Precautions: RLE weight bearing as tolerated  Braces: N/A    Recommendations:     Discharge Recommendations: Low Intensity Therapy  Level of Assistance Recommended at Discharge: Intermittent assistance   Discharge Equipment Recommendations: walker, rolling, wheelchair, shower chair, hip kit, bedside commode  Barriers to discharge: Decreased caregiver support    Assessment:     Ngoc Castellanos is a 89 y.o. female admitted with a medical diagnosis of Closed displaced intertrochanteric fracture of right femur with routine healing .     Pt taken from OT in gym, very pleasant and agreeable to therapy. Pt able to match previous ambulation distance and adds curb step today with minimal assistance, more independent with sit to stands and ambulation compared to previous treatments. Continue to progress independence as able.      Performance deficits affecting function: weakness, gait instability, impaired endurance, impaired balance, impaired self care skills, impaired functional mobility, pain, decreased safety awareness, decreased lower extremity function, orthopedic precautions.    Rehab Potential is good    Activity Tolerance: Good    Plan:     Patient to be seen 5 x/week to address the above listed problems via gait training, therapeutic activities, therapeutic exercises, neuromuscular re-education, wheelchair management/training    Plan of Care Expires: 12/24/23  Plan of Care Reviewed with: patient    Subjective     "I think once you get this old they should just put you down."     Pain/Comfort:  Pain Rating 1:  (no pain at rest)  Location - Side 1: " "Right  Location - Orientation 1: upper  Location 1: leg  Pain Addressed 1: Reposition, Distraction  Pain Rating Post-Intervention 1: 3/10 (during LE ergometer)    Patient's cultural, spiritual, Oriental orthodox conflicts given the current situation:  no    Objective:     Communicated with DEVIN Francois prior to session.  Patient found up in chair with  (no active line) upon PT entry to room.     Therapeutic Activities and Exercises:   10 min on LE ergometer    Functional Mobility:  Transfers:     Sit to Stand:  contact guard assistance with rolling walker  X 3 trials from wheelchair  Gait: 40ft x 2 w/ RW and CGA, narrow JORDYN, slow catherine, decreased step length  Balance: CGA throughout  Stairs:  Pt ascended/descended 4" curb step with Rolling Walker with no handrails with Minimal Assistance.   Verbal cueing for technique    AM-PAC 6 CLICK MOBILITY  17    Patient left up in chair in activity room    GOALS:   Multidisciplinary Problems       Physical Therapy Goals          Problem: Physical Therapy    Goal Priority Disciplines Outcome Goal Variances Interventions   Physical Therapy Goal     PT, PT/OT Ongoing, Progressing     Description: Goals to be met by: 23       Patient will increase functional independence with mobility by performin. Supine to sit with Contact Guard Assistance - In progress  2. Sit to supine with Contact Guard Assistance - In progress  3. Rolling to Left and Right with Standby Assistance - In progress  4. Sit to stand transfer with Contact Guard Assistance - In progress  5. Bed to chair transfer with Contact Guard Assistance using Rolling Walker/LRAD - MET  Updated Goal 23: Bed to chair transfer with SBA using RW    6. Gait  x 50 feet with Contact Guard Assistance using Rolling Walker/LRAD - In progress  7. Wheelchair propulsion x 150 feet with Modified [place] using bilateral upper extremities - In progress                         Time Tracking:     PT Received On: 23  PT Start " Time: 1023  PT Stop Time: 1053  PT Total Time (min): 30 min    Billable Minutes: Gait Training 12 min and Therapeutic Exercise 12 min    Treatment Type: Treatment  PT/PTA: PT     Number of PTA visits since last PT visit: 0     12/07/2023

## 2023-12-07 NOTE — PT/OT/SLP PROGRESS
Occupational Therapy   Treatment    Name: Ngoc Castellanos  MRN: [id number]  Admit Date: 11/22/2023  Admitting Diagnosis:  Closed displaced intertrochanteric fracture of right femur with routine healing    General Precautions: Standard, fall, aspiration, hearing impaired   Orthopedic Precautions: RLE weight bearing as tolerated   Braces: N/A    Recommendations:     Discharge Recommendations:  Low Intensity Therapy  Level of Assistance Recommended at Discharge: 24 hours supervision for safety in performing ADL's and home management tasks  Discharge Equipment Recommendations: hip kit, walker, rolling, shower chair  Barriers to discharge:  Inaccessible home environment    Assessment:     Ngoc Castellanos is a 89 y.o. female with a medical diagnosis of Closed displaced intertrochanteric fracture of right femur with routine healing.  She presents with. Performance deficits affecting function are weakness, impaired endurance, impaired self care skills, impaired functional mobility, gait instability, impaired balance, decreased lower extremity function, decreased safety awareness, pain, impaired coordination, impaired cardiopulmonary response to activity, orthopedic precautions.     Pt. Was cooperative and participated well with session on this day. Pt  continues to demonstrate levels of physical deficits with  functional indep with daily management activities tasks, selfcare skills with balance,  functional mobility, UB strength and endurance. Pt. Will continue to benefit from continued OT to progress towards goals      Rehab Potential is fair    Activity tolerance:  Fair    Plan:     Patient to be seen 5 x/week to address the above listed problems via self-care/home management, therapeutic activities, therapeutic exercises    Plan of Care Expires: 12/23/23  Plan of Care Reviewed with: patient    Subjective     Communicated with: Nsg and Pt. prior to session.  I am doing well today    Pain/Comfort:  Pain Rating 1: 0/10  Pain  Rating Post-Intervention 1: 0/10    Patient's cultural, spiritual, [employer]aritan conflicts given the current situation:  no    Objective:     Patient found up in chair with   upon OT entry to room.    Functional Mobility/Transfers:  Patient completed Sit <> Stand Transfer with minimum assistance  with  rolling walker Pt. With cues for safety and hand placement    Activities of Daily Living:  Not tested Pt. Already dressed    [facility] 6 Click ADL: 16    OT Exercises: UE Ergometer 10 min    Treatment & Education:  Pt. With standing act on this day with task. Pt. With CGA/SBA for balance aspects with task with  AD at raised counter Pt with visual perception task with discrimination of various shapes and sizes x 8:24 min/sec with standing bal and min cues through out with weight shifting and use of BUE's incorporated and crossing mid line and facilitation with posture in prep for home management .     Pt. With 2# dowel activity with 2x15 reps with  bicep curls  and forward flex motion to increase BUE ROM and strength.    Pt. With therex performed to increase ROM, endurance selfcare task and fxl mobility for independence     Pt edu on role of OT, POC, safety when performing self care tasks , benefit of performing OOB activity, and safety when performing functional transfers and mobility management for preparation with goals to progress towards next level of care     Patient left up in chair with  PT in gym  present    GOALS:   Multidisciplinary Problems       Occupational Therapy Goals          Problem: Occupational Therapy    Goal Priority Disciplines Outcome Interventions   Occupational Therapy Goal     OT, PT/OT Ongoing, Progressing    Description: Goals to be met by: 12/23/2023     Patient will increase functional independence with ADLs by performing:    Feeding with Set-up Assistance.  UE Dressing with Set-up assistance- Met  LE Dressing with Maximum Assistance /c AE/LRAD as needed- Met  ~LE Dressing /c Mod A and AE/LRAD  as needed- Partially met, preforming inconsistently  Personal Hygiene while seated at sink with Modified [place]- Met  Oral Hygiene while seated at sink with Modified [place].- Met  Toileting from bedside commode with Maximum Assistance for hygiene and clothing management- Met  ~Toileting from toilet /c Min A for clothing mgmt and hygiene   Bathing from  sitting at sink/on commode with Minimal Assistance.  Rolling to Right with Moderate Assistance.   Rolling to Left with Contact Guard Assistance.   Toilet transfer to bedside commode with Maximum Assistance- Met  ~Toilet transfer to toilet /c Mod A- Partially met, preforming inconsistently  Tolerate standing functional tasks for ~2 minutes /c Mod A and LRAD as needed - Met  Footwear /c Mod A and AE as needed  SC t/f /c Max A and LRAD as needed.                         Time Tracking:     OT Date of Treatment: 12/07/23  OT Start Time: 0952    OT Stop Time: 1025  OT Total Time (min): 33 min    Billable Minutes:Therapeutic Activity 33    12/7/2023

## 2023-12-07 NOTE — PLAN OF CARE
Problem: Adult Inpatient Plan of Care  Goal: Plan of Care Review  Outcome: Ongoing, Progressing  Flowsheets (Taken 12/6/2023 2128)  Plan of Care Reviewed With: patient  Goal: Patient-Specific Goal (Individualized)  Outcome: Ongoing, Progressing  Goal: Absence of Hospital-Acquired Illness or Injury  Outcome: Ongoing, Progressing  Goal: Optimal Comfort and Wellbeing  Outcome: Ongoing, Progressing  Goal: Readiness for Transition of Care  Outcome: Ongoing, Progressing     Problem: Impaired Wound Healing  Goal: Optimal Wound Healing  Outcome: Ongoing, Progressing  Intervention: Promote Wound Healing  Flowsheets (Taken 12/6/2023 2128)  Sleep/Rest Enhancement:   regular sleep/rest pattern promoted   relaxation techniques promoted   room darkened  Activity Management: Rolling - L1  Pain Management Interventions:   pain management plan reviewed with patient/caregiver   pillow support provided   relaxation techniques promoted   quiet environment facilitated   care clustered   medication offered     Problem: Fall Injury Risk  Goal: Absence of Fall and Fall-Related Injury  Outcome: Ongoing, Progressing     Problem: Skin Injury Risk Increased  Goal: Skin Health and Integrity  Outcome: Ongoing, Progressing  Intervention: Optimize Skin Protection  Flowsheets (Taken 12/6/2023 2128)  Pressure Reduction Techniques:   frequent weight shift encouraged   weight shift assistance provided  Pressure Reduction Devices:   pressure-redistributing mattress utilized   positioning supports utilized  Skin Protection:   incontinence pads utilized   protective footwear used  Head of Bed (HOB) Positioning: HOB at 30-45 degrees

## 2023-12-07 NOTE — PROGRESS NOTES
Patient seen for wound care follow-up.  Reviewed chart for this encounter.   See Flow Sheet for findings.    Pt sitting in wheelchair, PCT at chairside, pt used walker to stand for assessment, removed foam border and hydrofera ready, revealing discolored blanchable erythema, no open wound noted.     RECOMMENDATIONS:  Foam border to protect     Discussed POC with patient and primary nurse.   See EMR for orders & patient education.    Discussed nutrition and the role of protein in wound healing with the patient. Instructed patient to optimize protein for wound healing.    Bedside nursing to continue care & monitoring.  Bedside nursing to maintain pressure injury prevention interventions.     12/07/23 0930   WOCN Assessment   WOCN Total Time (mins) 15   Visit Date 12/07/23   Visit Time 0930   Consult Type Follow Up   WOCN Speciality Wound   Intervention assessed;changed;applied;chart review;orders   Teaching on-going        Altered Skin Integrity 11/28/23 0830 Right Buttocks Blister(s)   Date First Assessed/Time First Assessed: 11/28/23 0830   Altered Skin Integrity Present on Admission - Did Patient arrive to the hospital with altered skin?: yes  Side: Right  Location: Buttocks  Primary Wound Type: Blister(s)   Wound Image    Periwound Area Dry;Intact   Wound Length (cm) 0 cm   Wound Width (cm) 0 cm   Wound Depth (cm) 0 cm   Wound Volume (cm^3) 0 cm^3   Wound Surface Area (cm^2) 0 cm^2

## 2023-12-07 NOTE — PLAN OF CARE
Problem: Adult Inpatient Plan of Care  Goal: Plan of Care Review  Outcome: Ongoing, Progressing  Flowsheets (Taken 12/7/2023 5554)  Plan of Care Reviewed With: patient     Problem: Impaired Wound Healing  Goal: Optimal Wound Healing  Outcome: Ongoing, Progressing     Problem: Fall Injury Risk  Goal: Absence of Fall and Fall-Related Injury  Outcome: Ongoing, Progressing     Problem: Skin Injury Risk Increased  Goal: Skin Health and Integrity  Outcome: Ongoing, Progressing

## 2023-12-08 PROCEDURE — 25000003 PHARM REV CODE 250: Mod: HCNC | Performed by: NURSE PRACTITIONER

## 2023-12-08 PROCEDURE — 86580 TB INTRADERMAL TEST: CPT | Mod: HCNC | Performed by: NURSE PRACTITIONER

## 2023-12-08 PROCEDURE — 97110 THERAPEUTIC EXERCISES: CPT | Mod: HCNC,CQ

## 2023-12-08 PROCEDURE — 97530 THERAPEUTIC ACTIVITIES: CPT | Mod: HCNC,CO

## 2023-12-08 PROCEDURE — 11000004 HC SNF PRIVATE: Mod: HCNC

## 2023-12-08 PROCEDURE — 25000003 PHARM REV CODE 250: Mod: HCNC | Performed by: HOSPITALIST

## 2023-12-08 PROCEDURE — 30200315 PPD INTRADERMAL TEST REV CODE 302: Mod: HCNC | Performed by: NURSE PRACTITIONER

## 2023-12-08 PROCEDURE — 97116 GAIT TRAINING THERAPY: CPT | Mod: HCNC,CQ

## 2023-12-08 PROCEDURE — 25000003 PHARM REV CODE 250: Mod: HCNC | Performed by: FAMILY MEDICINE

## 2023-12-08 RX ADMIN — ACETAMINOPHEN 1000 MG: 325 TABLET ORAL at 09:12

## 2023-12-08 RX ADMIN — METHOCARBAMOL 500 MG: 500 TABLET ORAL at 01:12

## 2023-12-08 RX ADMIN — Medication 1 DOSE: at 05:12

## 2023-12-08 RX ADMIN — POLYETHYLENE GLYCOL 3350 17 G: 17 POWDER, FOR SOLUTION ORAL at 09:12

## 2023-12-08 RX ADMIN — Medication 1 DOSE: at 09:12

## 2023-12-08 RX ADMIN — TUBERCULIN PURIFIED PROTEIN DERIVATIVE 5 UNITS: 5 INJECTION, SOLUTION INTRADERMAL at 09:12

## 2023-12-08 RX ADMIN — METHOCARBAMOL 500 MG: 500 TABLET ORAL at 09:12

## 2023-12-08 RX ADMIN — Medication 1 TABLET: at 09:12

## 2023-12-08 RX ADMIN — ACETAMINOPHEN 1000 MG: 325 TABLET ORAL at 02:12

## 2023-12-08 RX ADMIN — Medication 1 TABLET: at 05:12

## 2023-12-08 RX ADMIN — APIXABAN 5 MG: 2.5 TABLET, FILM COATED ORAL at 09:12

## 2023-12-08 RX ADMIN — ACETAMINOPHEN 1000 MG: 325 TABLET ORAL at 06:12

## 2023-12-08 RX ADMIN — FUROSEMIDE 40 MG: 40 TABLET ORAL at 09:12

## 2023-12-08 RX ADMIN — Medication 1 DOSE: at 01:12

## 2023-12-08 RX ADMIN — FUROSEMIDE 20 MG: 20 TABLET ORAL at 05:12

## 2023-12-08 RX ADMIN — PANTOPRAZOLE SODIUM 40 MG: 40 TABLET, DELAYED RELEASE ORAL at 09:12

## 2023-12-08 RX ADMIN — SENNOSIDES AND DOCUSATE SODIUM 1 TABLET: 8.6; 5 TABLET ORAL at 09:12

## 2023-12-08 RX ADMIN — METHOCARBAMOL 500 MG: 500 TABLET ORAL at 04:12

## 2023-12-08 RX ADMIN — TRAMADOL HYDROCHLORIDE 50 MG: 50 TABLET, COATED ORAL at 09:12

## 2023-12-08 RX ADMIN — POTASSIUM CHLORIDE 10 MEQ: 10 CAPSULE, COATED, EXTENDED RELEASE ORAL at 09:12

## 2023-12-08 NOTE — PT/OT/SLP PROGRESS
Occupational Therapy   Treatment    Name: Ngoc Castellanos  MRN: [id number]  Admit Date: 11/22/2023  Admitting Diagnosis:  Closed displaced intertrochanteric fracture of right femur with routine healing    General Precautions: Standard, fall, aspiration, hearing impaired   Orthopedic Precautions: RLE weight bearing as tolerated   Braces: N/A    Recommendations:     Discharge Recommendations:  Low Intensity Therapy  Level of Assistance Recommended at Discharge: 24 hours physical assistance for all ADL's and home management tasks  Discharge Equipment Recommendations: hip kit, walker, rolling, shower chair  Barriers to discharge:  Inaccessible home environment    Assessment:     Ngoc Castellanos is a 89 y.o. female with a medical diagnosis of Closed displaced intertrochanteric fracture of right femur with routine healing.  She presents with limitations in performance of self-care, functional mobility, and ADLs. Performance deficits affecting function are weakness, impaired endurance, impaired self care skills, impaired functional mobility, gait instability, impaired balance, decreased lower extremity function, decreased safety awareness, pain, impaired coordination, impaired cardiopulmonary response to activity, orthopedic precautions. Pt tolerated Tx without incident and is making progress but continues to require assist to perform self care tasks, functional mobility and functional transfers. Pt would continue to benefit from OT intervention to further functional (I)ce and safety.     Rehab Potential is good    Activity tolerance:  Good    Plan:     Patient to be seen 5 x/week to address the above listed problems via self-care/home management, therapeutic activities, therapeutic exercises    Plan of Care Expires: 12/23/23  Plan of Care Reviewed with: patient    Subjective     Communicated with: Nursing prior to session.     Pain/Comfort:  Pain Rating 1: 2/10  Location - Side 1: Right  Location - Orientation 1:  lower  Location 1: leg (From knee down)  Pain Addressed 1: Pre-medicate for activity  Pain Rating Post-Intervention 1: 1/10    Patient's cultural, spiritual, Worship conflicts given the current situation:  no    Objective:     Patient found up in chair with  (no active lines) upon OT entry to room.    Bed Mobility:    Pt seated in chair at onset of treatment session.     Functional Mobility/Transfers:  Patient completed Sit <> Stand Transfer with minimum assistance  with  rolling walker     AMPAC 6 Click ADL: 16    Treatment & Education:  Pt participated in standing activity with SBA/CGA and RW. Pt at raised counter to perform fine motor and reaching activity with focus on standing tolerance, functional reaching, dynamic standing bal, crossing midline, and to promote independence with homemaking and self care tasks. Pt tolerated standing for 4 min and 48 sec     Pt seated at table to perform fine motor and pinch/ strengthening activity with soft theraputty. Pt educated on exercises to perform in room with theraputty.     Pt educated on:  - role of OT  - level of assistance  - energy conservation and task modification to maximized independence with ADL's and mobility   -  safety while performing functional transfers and self care tasks  - progress towards OT goals      Patient left up in chair with call button in reach    GOALS:   Multidisciplinary Problems       Occupational Therapy Goals          Problem: Occupational Therapy    Goal Priority Disciplines Outcome Interventions   Occupational Therapy Goal     OT, PT/OT Ongoing, Progressing    Description: Goals to be met by: 12/23/2023     Patient will increase functional independence with ADLs by performing:    Feeding with Set-up Assistance.  UE Dressing with Set-up assistance- Met  LE Dressing with Maximum Assistance /c AE/LRAD as needed- Met  ~LE Dressing /c Mod A and AE/LRAD as needed- Partially met, preforming inconsistently  Personal Hygiene while seated  at sink with Modified [place]- Met  Oral Hygiene while seated at sink with Modified [place].- Met  Toileting from bedside commode with Maximum Assistance for hygiene and clothing management- Met  ~Toileting from toilet /c Min A for clothing mgmt and hygiene   Bathing from  sitting at sink/on commode with Minimal Assistance.  Rolling to Right with Moderate Assistance.   Rolling to Left with Contact Guard Assistance.   Toilet transfer to bedside commode with Maximum Assistance- Met  ~Toilet transfer to toilet /c Mod A- Partially met, preforming inconsistently  Tolerate standing functional tasks for ~2 minutes /c Mod A and LRAD as needed - Met  Footwear /c Mod A and AE as needed  SC t/f /c Max A and LRAD as needed.                         Time Tracking:     OT Date of Treatment: 12/08/23  OT Start Time: 0840    OT Stop Time: 0910  OT Total Time (min): 30 min    Billable Minutes:Therapeutic Activity 30    12/8/2023

## 2023-12-08 NOTE — PLAN OF CARE
Problem: Adult Inpatient Plan of Care  Goal: Plan of Care Review  Flowsheets (Taken 12/7/2023 2000)  Plan of Care Reviewed With: patient     Problem: Fall Injury Risk  Goal: Absence of Fall and Fall-Related Injury  Intervention: Identify and Manage Contributors  Flowsheets (Taken 12/7/2023 2000)  Self-Care Promotion:   BADL personal routines maintained   BADL personal objects within reach   safe use of adaptive equipment encouraged  Medication Review/Management: medications reviewed  Intervention: Promote Injury-Free Environment  Flowsheets (Taken 12/7/2023 2000)  Safety Promotion/Fall Prevention:   assistive device/personal item within reach   Fall Risk reviewed with patient/family   nonskid shoes/socks when out of bed   side rails raised x 2   instructed to call staff for mobility   medications reviewed

## 2023-12-08 NOTE — PT/OT/SLP PROGRESS
"Physical Therapy Treatment    Patient Name:  Ngoc Castellanos   MRN:  [id number]  Admit Date: 11/22/2023  Admitting Diagnosis: Closed displaced intertrochanteric fracture of right femur with routine healing  Recent Surgeries:     General Precautions: Standard, aspiration, fall, hearing impaired  Orthopedic Precautions: RLE weight bearing as tolerated  Braces: N/A    Recommendations:     Discharge Recommendations: Low Intensity Therapy  Level of Assistance Recommended at Discharge: Intermittent assistance   Discharge Equipment Recommendations: bedside commode, hip kit, shower chair, walker, rolling, wheelchair  Barriers to discharge: Decreased caregiver support    Assessment:     Ngoc Castellanos is a 89 y.o. female admitted with a medical diagnosis of Closed displaced intertrochanteric fracture of right femur with routine healing . Patient continues to progress well towards her goals, as evidence of transfer ability and walking range. R knee swelling has decreased and shortness of breath is not as pronounced as it used to be. O2 sat after each gait trial was 95% to 97%, but Heart rate increased from 86 to 124 bpm during gait training.      Performance deficits affecting function: weakness, impaired endurance, impaired self care skills, impaired functional mobility, gait instability, impaired balance, edema, pain, impaired cardiopulmonary response to activity, orthopedic precautions, decreased ROM, impaired skin.    Rehab Potential is good    Activity Tolerance: Good    Plan:     Patient to be seen 5 x/week to address the above listed problems via gait training, therapeutic activities, therapeutic exercises, neuromuscular re-education, wheelchair management/training    Plan of Care Expires: 12/24/23  Plan of Care Reviewed with: patient    Subjective     Patient states " I'm feeling better, thanks to you all".     Pain/Comfort:  Pain Rating 1: 3/10  Location - Side 1: Right  Location - Orientation 1: generalized  Location 1: " leg  Pain Rating Post-Intervention 1: 3/10    Patient's cultural, spiritual, [employer]ar[employer] conflicts given the current situation:  no    Objective:     Communicated with NSG prior to session.  Patient found up in chair with Other (comments) upon PT entry to room.     Therapeutic Activities and Exercises: LE Ergometer x 12 minutes with rest breaks. R knee passive stretch, with leg elevated on a stool x 5 minutes. R knne quad sets and saq x 10 reps each.    Functional Mobility:  Transfers:     Sit to Stand:  contact guard assistance with rolling walker  Gait: 45 ft x 2 trials with RW and CGA, with R LE WBAT and w/c in tow by PT tech.  Wheelchair Propulsion:  Pt propelled Standard wheelchair x 36 feet on Level tile with  Right upper extremity and Left upper extremity with Supervision or Set-up Assistance.     AM-PAC 6 CLICK MOBILITY  17    Patient left up in chair with call button in reach.    GOALS:   Multidisciplinary Problems       Physical Therapy Goals          Problem: Physical Therapy    Goal Priority Disciplines Outcome Goal Variances Interventions   Physical Therapy Goal     PT, PT/OT Ongoing, Progressing     Description: Goals to be met by: 23       Patient will increase functional independence with mobility by performin. Supine to sit with Contact Guard Assistance - In progress  2. Sit to supine with Contact Guard Assistance - In progress  3. Rolling to Left and Right with Standby Assistance - In progress  4. Sit to stand transfer with Contact Guard Assistance - In progress  5. Bed to chair transfer with Contact Guard Assistance using Rolling Walker/LRAD - MET  Updated Goal 23: Bed to chair transfer with SBA using RW    6. Gait  x 50 feet with Contact Guard Assistance using Rolling Walker/LRAD - In progress  7. Wheelchair propulsion x 150 feet with Modified [place] using bilateral upper extremities - In progress                         Time Tracking:     PT Received On: 23  PT Start  Time: 1115  PT Stop Time: 1155  PT Total Time (min): 40 min    Billable Minutes: Gait Training 16 and Therapeutic Exercise 24    Treatment Type: Treatment  PT/PTA: PTA     Number of PTA visits since last PT visit: 1     12/08/2023

## 2023-12-08 NOTE — PROGRESS NOTES
[facility]                                  Skilled Nursing Facility                   Progress Note     Admit Date: 11/22/2023  WES 12/13/2023  [id number] A  Principal Problem:  Closed displaced intertrochanteric fracture of right femur with routine healing   HPI obtained from patient interview and chart review     Chief Complaint:Re-evaluation of medical treatment and therapy status    HPI:   Mrs. Castellanos is an 89-year-old female with a PMHx of Afib on Eliquis, HFpEF on lasix, HTN, and CKD stage III who presents to SNF following hospitalization for right intertrochanteric femur fracture s/p cephalomedullary nail fixation on 11/19 with Dr. Perez.  Admission to SNF for secondary weakness and debility.    Interval history:   24 hr vital sign ranges reviewed and listed below, no acute abnormalities noted.  BP with overall improvement.  Patient tolerating home dosing of Lasix.  I do not feel that weights are accurate at this time, will base Lasix needs on patient's presentation and symptoms.  Patient's bilateral lower extremity edema does not appear increased from yesterday, shortness of breath is currently at her new baseline.  Patient denies abdominal discomfort, nausea, or vomiting.  Patient reports an adequate appetite.  Patient denies dysuria.  Patient had successful bowel movement yesterday with the aid of an enema.  Patient progressing with PT/OT- Gait: 40ft x 2 w/ RW and CGA, narrow JORDYN, slow catherine, decreased step length Continuing to follow and treat all acute and chronic conditions.    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    Past Surgical History: Patient has a past surgical history that includes Brain surgery (2002); Hysterectomy; Tonsillectomy; Hemorrhoid surgery; Appendectomy; Breast biopsy (2/2014); Breast surgery (03/17/14); Breast cyst excision (1960); Mastectomy (Right); and Intramedullary rodding of femur (Right, 11/19/2023).    Social History: Patient reports that she has never smoked. She has never used smokeless tobacco. She reports that she does not drink alcohol and does not use drugs.    Family History:  No significant family history to report    Allergies: Patient is allergic to clindamycin, levofloxacin, lidocaine, and adhesive tape-silicones.    ROS  Constitutional: Negative for fever.  +fatigue   Eyes: Negative for blurred vision, double vision   Respiratory: Negative for cough.  +chronic SOB, at baseline  Cardiovascular: Negative for chest pain, palpitations. + leg swelling.   Gastrointestinal: Negative for abdominal pain, diarrhea, nausea, vomiting.  +indigestion, flatulence, constipation  Genitourinary: Negative for dysuria, frequency   Musculoskeletal:  + generalized weakness.  + RLE pain  Skin: Negative for itching and rash.   Neurological: Negative for dizziness, headaches.   Psychiatric/Behavioral: Negative for depression. The patient is not nervous/anxious.      24 hour Vital Sign Range   Temp:  [97.5 °F (36.4 °C)]   Pulse:  [85-91]   Resp:  [16-18]   BP: (112-132)/(56-59)   SpO2:  [96 %]     Current BMI: Body mass index is 29.28 kg/m².    PEx  Constitutional: Patient appears debilitated.  No distress noted  HENT:   Head: Normocephalic and atraumatic.   Eyes: Pupils are equal, round  Neck: Normal range of motion. Neck supple.   Cardiovascular: Normal rate, regular rhythm and normal heart sounds.    Pulmonary/Chest: Effort normal and breath sounds with  bilateral crackles R>L  Abdominal: Soft. Bowel sounds are normal.   Musculoskeletal: Normal range of motion.   Neurological: Alert and oriented to person, place, and time.   Psychiatric: Normal mood and affect. Behavior is normal.   Skin: Skin is warm and dry.  Surgical dressing to RLE, only to be removed by Ortho.  2+ pitting edema to bilateral lower extremities R>L     Altered Skin Integrity 11/21/23 1256 Right lateral;upper Thigh Blister(s)   Date First Assessed/Time First Assessed: 11/21/23 1256   Altered Skin Integrity Present on Admission - Did Patient arrive to the hospital with altered skin?: suspected hospital acquired  Side: Right  Orientation: lateral;upper  Location: Thigh  Primar...   Dressing Appearance Moist drainage   Drainage Amount Small   Drainage Characteristics/Odor Yellow;Serous   Appearance Moist;Pink   Tissue loss description Partial thickness   Red (%), Wound Tissue Color 100 %   Periwound Area Intact;Dry   Wound Length (cm) 3 cm   Wound Width (cm) 4 cm   Wound Depth (cm) 0.1 cm   Wound Volume (cm^3) 1.2 cm^3   Wound Surface Area (cm^2) 12 cm^2   Care Cleansed with:;Antimicrobial agent   Dressing Applied;Methylene blue/gentian violet;Foam   Dressing Change Due 12/05/23        Altered Skin Integrity 11/28/23 0830 Right Buttocks Blister(s)   Date First Assessed/Time First Assessed: 11/28/23 0830   Altered Skin Integrity Present on Admission - Did Patient arrive to the hospital with altered skin?: yes  Side: Right  Location: Buttocks  Primary Wound Type: Blister(s)   Appearance Pink;Moist   Tissue loss description Partial thickness   Red (%), Wound Tissue Color 100 %   Periwound Area Intact;Dry   Wound Length (cm) 4.5 cm   Wound Width (cm) 4.5 cm   Wound Depth (cm) 0.1 cm   Wound Volume (cm^3) 2.025 cm^3   Wound Surface Area (cm^2) 20.25 cm^2   Care Cleansed with:;Antimicrobial agent   Dressing Applied;Methylene blue/gentian violet;Foam   Off Loading Other (see comments)  (waffle ordered)  "  Dressing Change Due 12/05/23        Altered Skin Integrity 11/28/23 0830 Right lateral Knee Blister(s)   Date First Assessed/Time First Assessed: 11/28/23 0830   Altered Skin Integrity Present on Admission - Did Patient arrive to the hospital with altered skin?: yes  Side: Right  Orientation: lateral  Location: Knee  Primary Wound Type: Blister(s)   Dressing Appearance Moist drainage   Drainage Amount Small   Drainage Characteristics/Odor Yellow;Serous;No odor   Appearance Pink;Moist   Tissue loss description Partial thickness   Red (%), Wound Tissue Color 100 %   Periwound Area Intact;Dry   Wound Edges Irregular   Wound Length (cm) 2.5 cm   Wound Width (cm) 4 cm   Wound Depth (cm) 0.1 cm   Wound Volume (cm^3) 1 cm^3   Wound Surface Area (cm^2) 10 cm^2   Dressing Change Due 12/05/23       No results for input(s): "GLUCOSE", "NA", "K", "CL", "CO2", "BUN", "CREATININE", "CALCIUM", "MG" in the last 24 hours.        No results for input(s): "WBC", "RBC", "HGB", "HCT", "PLT", "MCV", "MCH", "MCHC" in the last 24 hours.          Recent Labs   Lab 12/04/23  2018   POCTGLUCOSE 149*        Assessment and Plan:    Closed displaced intertrochanteric fracture of right femur   s/p IM nail on 11/19/2023  - PT/OT, WBAT  - DVT PPX with apixaban 2.5 mg BID  - maintain surgical dressing until removed by Orthopedics  - ortho NALLELY to see patient weekly at SNF  - follow-up with orthopedics after discharge    Acute postoperative pain  - stable, continue methocarbamol 500 mg QID, acetaminophen 1000 mg q.8 hours, tramadol 50 mg q.6 hours PRN    Constipation  - improved with docusate enema given on 12/07, suppository yesterday was unsuccessful,, continue daily MiraLax, senna docusate 1 tab BID    Postprocedural hypotension  - experienced while at [facility], IV fluids were given and antihypertensive medications were held and then resumed upon SNF admission  - continues to have labile blood pressures  - persistenting, continue to hold atenolol and " losartan so that patient can receive her Lasix dosing.      Acute on Chronic heart failure with preserved ejection fraction  - Patient is identified as having Diastolic (HFpEF) heart failure that is Chronic. CHF is currently controlled.   -  Monitor strict Is&Os and daily weights.   - holding off on on fluid restriction of 1.5 L due to postop hypotension and concern for dehydration in inpatient setting  - home regimen with Lasix 40 mg in the AM, 20 mg PM  - Xopenex nebulizer treatment daily x4 days, continue PRN Xopenex breathing treatments for shortness of breath  -  continue home dose PO Lasix of 40 mg in the morning and 20 mg in the afternoon, also adding KCl 10 mEq daily continue to hold atenolol.  Weights do not seem accurate at this time, will base need for increase Lasix dosing based on patient's clinical presentation and symptoms.    Persistent atrial fibrillation  Chronic anticoagulation   - Patient with Long standing persistent (>12 months) atrial fibrillation which is:controlled currently with home  Atenolol  and will continue in hospital. Patient is currently in atrial fibrillation.[id number] Score: 3. Anticoagulation indicated.   - hold atenolol.  Anticoagulation done with Apixiban 5 mg po BID at home and held for surgery but resumed post-op on 11/19.    Acute blood loss as cause of postoperative anemia  - expected postoperatively  - stable, continue monitor twice weekly CBC, transfuse for hemoglobin < 7    Hsx of Primary hypertension  - home regimen with losartan 25 mg daily, Lasix 60 mg daily splint in 2 doses, atenolol 25 mg daily  - developed postop hypotension  - currently holding losartan, Lasix split into TID dosing, continue atenolol with holding parameters    Stage 3b chronic kidney disease  - had elevation in creatinine to 1.5 on 11/21, has now normalized  - currently stable, continue monitor twice weekly BMPs, avoid nephrotoxic agents, renally dose medications as appropriate    "  Prediabetes  - "Patient noted to be hyperglycemic on admit with -210. HgA1C ordered and returned at 6.3% consistent with prediabetes which is a new diagnosis. With patient's advanced age recommendation would be conservative mangement with diet control management with diabetic diet. Discussed with patient on 11/20 and she is in agreement with plan."  - diabetic diet, blood glucose with twice weekly BMPs    ACP (advance care planning)  - [place] [facility] [place], patient wishes to be DNR     Indigestion  - improved, continue Protonix 40 mg daily, continue PRN Tums and Mylanta    ACP  - on 11/30, patient provided the option to pursue hospice services upon discharge if she would wish to do so    Debility   - Continue with PT/OT for gait training and strengthening and restoration of ADL's   - Encourage mobility, OOB in chair, and early ambulation as appropriate  - Fall precautions   - Monitor for bowel and bladder dysfunction  - Monitor for and prevent skin breakdown and pressure ulcers  - Continue DVT prophylaxis with apixaban        Anticipate disposition:  Eval nursing home placement likely with hospice services.  Case management made aware on 11/30    The mobility limitation cannot be sufficiently resolved by the use of a cane.   Patient's functional mobility deficit can be sufficiently resolved with the use of a (Rolling Walker or Walker).  Patient's mobility limitation significantly impairs their ability to participate in one of more activities of daily living.  The use of a (RW or Walker) will significantly improve the patient's ability to participate in MRADLS and the patient will use it on regular basis in the home.     Patient has a mobility limitation that significantly impairs their ability to participate in one or more mobility related activities of daily living (MRADL's) such as toileting, feeding, dressing, grooming and bathing in customary locations in the home.  The mobility limitation " cannot be sufficiently  resolved by the use of a cane or walker.   The use of a manual wheelchair will significantly improve the patient's ability to participate in MRADLS and the patient will use it on regular basis in the home.  Patient  has expressed their willingness to use a manual wheelchair in the home.   Patient  also has a caregiver who is available, willing and able to provide assistance with the wheelchair when needed.      IP [facility] RISK OF UNPLANNED READMISSION Model: MODERATE     Follow-up needed during SNF stay-ortho (12/4)    Appointment not to send patient to- lab 12/6    Follow-up needed after discharge from SNF: PCP, orthopedics (1/4)    Future Appointments   Date Time Provider Department Center   12/14/2023 10:30 AM Jacobo Mcleod MD [facility]   1/4/2024 10:15 AM Henry Perez MD [facility] ORTHO [address]   2/6/2024 10:45 AM Henry Perez MD [facility] CHRISTY Littlejohn NP  Department of [facility] Medicine   [facility] Sk[facility] Facility     DOS: 12/8/2023       Patient note was created using MModal Dictation.  Any errors in syntax or even information may not have been identified and edited on initial review prior to signing this note.

## 2023-12-08 NOTE — CARE UPDATE
1234 pmReceived call from son Shadi on asking for discharge in pm due to [place] into the [facility] on [address] [place] [address].  Contact called transfer facility to determine of all information has been sent or if they require anything further. [phone] Naya, spoke to Beth at the [facility]. She will have someone return my call today. Afterwards, will follow up son on his cell for a confirmed time. [phone].    240 p   No returned call from [facility], spoke to son. He is requesting a hospital bed, wc, walker and other equipment that is listed. Explained that a hospital bed would have to be justified and approved by the insurance company, insurance MAY cover a w/c or walker but NOT both and that the company would let him know the price for all others.  We will know more next week when insurance is contacted.  Son agreed to 200 pm for discharge time. Need to ask how does pt need to transport (w/c van or if her family can pick her up,) secure chat sent out.

## 2023-12-09 PROCEDURE — 11000004 HC SNF PRIVATE: Mod: HCNC

## 2023-12-09 PROCEDURE — 97530 THERAPEUTIC ACTIVITIES: CPT | Mod: HCNC

## 2023-12-09 PROCEDURE — 25000003 PHARM REV CODE 250: Mod: HCNC | Performed by: FAMILY MEDICINE

## 2023-12-09 PROCEDURE — 25000003 PHARM REV CODE 250: Mod: HCNC | Performed by: HOSPITALIST

## 2023-12-09 PROCEDURE — 97110 THERAPEUTIC EXERCISES: CPT | Mod: HCNC

## 2023-12-09 PROCEDURE — 25000003 PHARM REV CODE 250: Mod: HCNC | Performed by: NURSE PRACTITIONER

## 2023-12-09 PROCEDURE — 97535 SELF CARE MNGMENT TRAINING: CPT | Mod: HCNC

## 2023-12-09 RX ADMIN — APIXABAN 5 MG: 2.5 TABLET, FILM COATED ORAL at 09:12

## 2023-12-09 RX ADMIN — Medication 1 DOSE: at 05:12

## 2023-12-09 RX ADMIN — FUROSEMIDE 20 MG: 20 TABLET ORAL at 05:12

## 2023-12-09 RX ADMIN — METHOCARBAMOL 500 MG: 500 TABLET ORAL at 09:12

## 2023-12-09 RX ADMIN — Medication 1 TABLET: at 09:12

## 2023-12-09 RX ADMIN — Medication 1 DOSE: at 09:12

## 2023-12-09 RX ADMIN — ACETAMINOPHEN 1000 MG: 325 TABLET ORAL at 06:12

## 2023-12-09 RX ADMIN — Medication 1 TABLET: at 05:12

## 2023-12-09 RX ADMIN — PANTOPRAZOLE SODIUM 40 MG: 40 TABLET, DELAYED RELEASE ORAL at 09:12

## 2023-12-09 RX ADMIN — POLYETHYLENE GLYCOL 3350 17 G: 17 POWDER, FOR SOLUTION ORAL at 09:12

## 2023-12-09 RX ADMIN — METHOCARBAMOL 500 MG: 500 TABLET ORAL at 01:12

## 2023-12-09 RX ADMIN — ACETAMINOPHEN 1000 MG: 325 TABLET ORAL at 09:12

## 2023-12-09 RX ADMIN — Medication 1 DOSE: at 01:12

## 2023-12-09 RX ADMIN — FUROSEMIDE 40 MG: 40 TABLET ORAL at 09:12

## 2023-12-09 RX ADMIN — METHOCARBAMOL 500 MG: 500 TABLET ORAL at 05:12

## 2023-12-09 RX ADMIN — SENNOSIDES AND DOCUSATE SODIUM 1 TABLET: 8.6; 5 TABLET ORAL at 09:12

## 2023-12-09 RX ADMIN — POTASSIUM CHLORIDE 10 MEQ: 10 CAPSULE, COATED, EXTENDED RELEASE ORAL at 09:12

## 2023-12-09 RX ADMIN — TRAMADOL HYDROCHLORIDE 50 MG: 50 TABLET, COATED ORAL at 09:12

## 2023-12-09 NOTE — PLAN OF CARE
CHW served per facility NOMNC to patient. Patient signed NOMNC, a copy was given and faxed to [facility].

## 2023-12-09 NOTE — PT/OT/SLP PROGRESS
"Occupational Therapy   Treatment    Name: Ngoc Castellanos  MRN: [id number]  Admit Date: 11/22/2023  Admitting Diagnosis:  Closed displaced intertrochanteric fracture of right femur with routine healing    General Precautions: Standard, aspiration, fall   Orthopedic Precautions: RLE weight bearing as tolerated   Braces: N/A    Recommendations:     Discharge Recommendations:  Low Intensity Therapy  Level of Assistance Recommended at Discharge: 24 hours physical assistance for all ADL's and home management tasks  Discharge Equipment Recommendations: bedside commode, hip kit, shower chair  Barriers to discharge:  Decreased caregiver support    Assessment:     Ngoc Castellanos is a 89 y.o. female with a medical diagnosis of Closed displaced intertrochanteric fracture of right femur with routine healing .  She presents with good participation and motivation during treatment session, making progress towards self care and mobility goals. She continues to need significant physical assist. The following performance deficits affecting function are weakness, impaired endurance, impaired self care skills, impaired functional mobility, impaired balance, gait instability, impaired cognition, decreased coordination, decreased lower extremity function, impaired cardiopulmonary response to activity, orthopedic precautions.     Rehab Potential is fair    Activity tolerance:  Good    Plan:     Patient to be seen 5 x/week to address the above listed problems via self-care/home management, therapeutic activities, therapeutic exercises    Plan of Care Expires: 12/23/23  Plan of Care Reviewed with: patient    Subjective     Communicated with: Nurse prior to session. Pt stated, " I just started trying to wipe myself now ", following toileting.    Pain/Comfort:  Pain Rating 1: 0/10  Pain Rating Post-Intervention 1: 0/10    Patient's cultural, spiritual, Taoist conflicts given the current situation:  no    Objective:     Patient found up in " chair with  (no active line, UIC) upon OT entry to room.      Functional Mobility/Transfers:  Patient completed Toilet Transfer Step Transfer technique with moderate assistance with  rolling walker and grab bars  Functional Mobility: Pt stood at counter top for 5 min with RW and contact guard assist to improve dynamic balance for increased (I) with self care and functional mobility. She reached across midline for PEGs 5 x with the L UE and 15 x with the R UE. Pt reported she automatically uses L hand even though dominant right hand. Needs unilateral support for standing balance    Activities of Daily Living:  Grooming: set-up hand hygiene seated at sink  Toileting: moderate assistance for clothing management    [facility] 6 Click ADL: 15    OT Exercises:   UE Ergometer 10 min seated in w/c light / mod resistance to increase strength for functional mobility.   Pt completed B UE strengthening exercises seated in w/c using weight dowel aundrea with brief rest periods between sets  Using 3 Lb chest press, bicep curls, and overhead press 10 reps x 3 sets  Using 4 lb chest press, overhead press 10 reps x 3 sets    Treatment & Education:  -Education on energy conservation and task modification to maximize safety and (I) during ADLs and mobility  -Education on importance of OOB activity to improve overall activity tolerance and promote recovery  -Pt educated to call for assistance and to transfer with hospital staff only  -Provided education regarding role of OT, POC, & discharge recommendations with pt  verbalizing understanding.  Pt had no further questions & when asked whether there were any concerns pt reported none.    Patient left up in chair with call button in reach    GOALS:   Multidisciplinary Problems       Occupational Therapy Goals          Problem: Occupational Therapy    Goal Priority Disciplines Outcome Interventions   Occupational Therapy Goal     OT, PT/OT Ongoing, Progressing    Description: Goals to be met by:  12/23/2023     Patient will increase functional independence with ADLs by performing:    Feeding with Set-up Assistance.  UE Dressing with Set-up assistance- Met  LE Dressing with Maximum Assistance /c AE/LRAD as needed- Met  ~LE Dressing /c Mod A and AE/LRAD as needed- Partially met, preforming inconsistently  Personal Hygiene while seated at sink with Modified [place]- Met  Oral Hygiene while seated at sink with Modified [place].- Met  Toileting from bedside commode with Maximum Assistance for hygiene and clothing management- Met  ~Toileting from toilet /c Min A for clothing mgmt and hygiene - Ongoing  Bathing from  sitting at sink/on commode with Minimal Assistance.  Rolling to Right with Moderate Assistance.   Rolling to Left with Contact Guard Assistance.   Toilet transfer to bedside commode with Maximum Assistance- Met  ~Toilet transfer to toilet /c Mod A- Partially met, preforming inconsistently  Tolerate standing functional tasks for ~2 minutes /c Mod A and LRAD as needed - Met  Footwear /c Mod A and AE as needed  SC t/f /c Max A and LRAD as needed.                         Time Tracking:     OT Date of Treatment: 12/09/23  OT Start Time: 1350    OT Stop Time: 1500  OT Total Time (min): 70 min    Billable Minutes:Self Care/Home Management 15  Therapeutic Activity 25  Therapeutic Exercise 30    12/9/2023

## 2023-12-10 LAB — TB INDURATION 48 - 72 HR READ: 0 MM

## 2023-12-10 PROCEDURE — 94761 N-INVAS EAR/PLS OXIMETRY MLT: CPT | Mod: HCNC

## 2023-12-10 PROCEDURE — 25000003 PHARM REV CODE 250: Mod: HCNC | Performed by: HOSPITALIST

## 2023-12-10 PROCEDURE — 11000004 HC SNF PRIVATE: Mod: HCNC

## 2023-12-10 PROCEDURE — 25000003 PHARM REV CODE 250: Mod: HCNC | Performed by: NURSE PRACTITIONER

## 2023-12-10 RX ADMIN — Medication 1 DOSE: at 08:12

## 2023-12-10 RX ADMIN — METHOCARBAMOL 500 MG: 500 TABLET ORAL at 08:12

## 2023-12-10 RX ADMIN — Medication 1 TABLET: at 08:12

## 2023-12-10 RX ADMIN — FUROSEMIDE 40 MG: 40 TABLET ORAL at 08:12

## 2023-12-10 RX ADMIN — METHOCARBAMOL 500 MG: 500 TABLET ORAL at 06:12

## 2023-12-10 RX ADMIN — APIXABAN 5 MG: 2.5 TABLET, FILM COATED ORAL at 08:12

## 2023-12-10 RX ADMIN — ACETAMINOPHEN 1000 MG: 325 TABLET ORAL at 09:12

## 2023-12-10 RX ADMIN — FUROSEMIDE 20 MG: 20 TABLET ORAL at 06:12

## 2023-12-10 RX ADMIN — METHOCARBAMOL 500 MG: 500 TABLET ORAL at 01:12

## 2023-12-10 RX ADMIN — POTASSIUM CHLORIDE 10 MEQ: 10 CAPSULE, COATED, EXTENDED RELEASE ORAL at 08:12

## 2023-12-10 RX ADMIN — Medication 1 DOSE: at 01:12

## 2023-12-10 RX ADMIN — Medication 1 TABLET: at 06:12

## 2023-12-10 RX ADMIN — ACETAMINOPHEN 1000 MG: 325 TABLET ORAL at 02:12

## 2023-12-10 RX ADMIN — PANTOPRAZOLE SODIUM 40 MG: 40 TABLET, DELAYED RELEASE ORAL at 08:12

## 2023-12-10 RX ADMIN — Medication 1 DOSE: at 05:12

## 2023-12-10 RX ADMIN — SENNOSIDES AND DOCUSATE SODIUM 1 TABLET: 8.6; 5 TABLET ORAL at 08:12

## 2023-12-10 RX ADMIN — ACETAMINOPHEN 1000 MG: 325 TABLET ORAL at 05:12

## 2023-12-10 NOTE — PLAN OF CARE
Problem: Adult Inpatient Plan of Care  Goal: Plan of Care Review  Outcome: Ongoing, Progressing  Goal: Patient-Specific Goal (Individualized)  Outcome: Ongoing, Progressing  Goal: Absence of Hospital-Acquired Illness or Injury  Outcome: Ongoing, Progressing  Goal: Optimal Comfort and Wellbeing  Outcome: Ongoing, Progressing  Goal: Readiness for Transition of Care  Outcome: Ongoing, Progressing     Problem: Impaired Wound Healing  Goal: Optimal Wound Healing  Outcome: Ongoing, Progressing     Problem: Fall Injury Risk  Goal: Absence of Fall and Fall-Related Injury  Outcome: Ongoing, Progressing  Intervention: Identify and Manage Contributors  Flowsheets (Taken 12/10/2023 0313)  Self-Care Promotion: BADL personal routines maintained  Medication Review/Management: medications reviewed  Intervention: Promote Injury-Free Environment  Flowsheets (Taken 12/10/2023 0313)  Safety Promotion/Fall Prevention:   assistive device/personal item within reach   side rails raised x 2   instructed to call staff for mobility   Fall Risk reviewed with patient/family     Problem: Skin Injury Risk Increased  Goal: Skin Health and Integrity  Outcome: Ongoing, Progressing  Intervention: Optimize Skin Protection  Flowsheets (Taken 12/10/2023 0313)  Pressure Reduction Devices:   pressure-redistributing mattress utilized   positioning supports utilized  Skin Protection: adhesive use limited  Head of Bed (HOB) Positioning: HOB at 45 degrees  Intervention: Promote and Optimize Oral Intake  Flowsheets (Taken 12/10/2023 0313)  Oral Nutrition Promotion: rest periods promoted

## 2023-12-10 NOTE — NURSING
POC reviewed with pt, pt verbalized understanding. Safety maintained throughout shift, bed locked and in lowest position, call light in reach, Side rails up X 2. Non skid sock on when OOB. Pt remained free of fall/trauma. Pt self reports of pain treated with PO pain medication as ordered by MD. VS stable.  no reports of nausea and vomiting.   Foam to RLE clean,dry,and intact. . No signs of distress, rise and fall of chest noted, respirations  even and unlabored   Plan of care on going. No future concerns as of present.

## 2023-12-11 LAB
ANION GAP SERPL CALC-SCNC: 9 MMOL/L (ref 8–16)
BASOPHILS # BLD AUTO: 0.04 K/UL (ref 0–0.2)
BASOPHILS NFR BLD: 0.8 % (ref 0–1.9)
BUN SERPL-MCNC: 20 MG/DL (ref 10–30)
CALCIUM SERPL-MCNC: 9 MG/DL (ref 8.7–10.5)
CHLORIDE SERPL-SCNC: 100 MMOL/L (ref 95–110)
CO2 SERPL-SCNC: 24 MMOL/L (ref 23–29)
CREAT SERPL-MCNC: 0.9 MG/DL (ref 0.5–1.4)
DIFFERENTIAL METHOD: ABNORMAL
EOSINOPHIL # BLD AUTO: 0.4 K/UL (ref 0–0.5)
EOSINOPHIL NFR BLD: 7.9 % (ref 0–8)
ERYTHROCYTE [DISTWIDTH] IN BLOOD BY AUTOMATED COUNT: 17.5 % (ref 11.5–14.5)
EST. GFR  (NO RACE VARIABLE): 57.8 ML/MIN/1.73 M^2
GLUCOSE SERPL-MCNC: 101 MG/DL (ref 70–110)
HCT VFR BLD AUTO: 32.1 % (ref 37–48.5)
HGB BLD-MCNC: 10.3 G/DL (ref 12–16)
IMM GRANULOCYTES # BLD AUTO: 0.02 K/UL (ref 0–0.04)
IMM GRANULOCYTES NFR BLD AUTO: 0.4 % (ref 0–0.5)
LYMPHOCYTES # BLD AUTO: 0.5 K/UL (ref 1–4.8)
LYMPHOCYTES NFR BLD: 9.8 % (ref 18–48)
MAGNESIUM SERPL-MCNC: 2 MG/DL (ref 1.6–2.6)
MCH RBC QN AUTO: 29.9 PG (ref 27–31)
MCHC RBC AUTO-ENTMCNC: 32.1 G/DL (ref 32–36)
MCV RBC AUTO: 93 FL (ref 82–98)
MONOCYTES # BLD AUTO: 0.4 K/UL (ref 0.3–1)
MONOCYTES NFR BLD: 9 % (ref 4–15)
NEUTROPHILS # BLD AUTO: 3.5 K/UL (ref 1.8–7.7)
NEUTROPHILS NFR BLD: 72.1 % (ref 38–73)
NRBC BLD-RTO: 0 /100 WBC
PHOSPHATE SERPL-MCNC: 2.4 MG/DL (ref 2.7–4.5)
PLATELET # BLD AUTO: 211 K/UL (ref 150–450)
PMV BLD AUTO: 9.7 FL (ref 9.2–12.9)
POTASSIUM SERPL-SCNC: 3.8 MMOL/L (ref 3.5–5.1)
RBC # BLD AUTO: 3.45 M/UL (ref 4–5.4)
SODIUM SERPL-SCNC: 133 MMOL/L (ref 136–145)
WBC # BLD AUTO: 4.91 K/UL (ref 3.9–12.7)

## 2023-12-11 PROCEDURE — 97116 GAIT TRAINING THERAPY: CPT | Mod: HCNC,CQ

## 2023-12-11 PROCEDURE — 25000003 PHARM REV CODE 250: Mod: HCNC | Performed by: FAMILY MEDICINE

## 2023-12-11 PROCEDURE — 25000003 PHARM REV CODE 250: Mod: HCNC | Performed by: NURSE PRACTITIONER

## 2023-12-11 PROCEDURE — 11000004 HC SNF PRIVATE: Mod: HCNC

## 2023-12-11 PROCEDURE — 84100 ASSAY OF PHOSPHORUS: CPT | Mod: HCNC | Performed by: HOSPITALIST

## 2023-12-11 PROCEDURE — 97530 THERAPEUTIC ACTIVITIES: CPT | Mod: HCNC,CO

## 2023-12-11 PROCEDURE — 85025 COMPLETE CBC W/AUTO DIFF WBC: CPT | Mod: HCNC | Performed by: HOSPITALIST

## 2023-12-11 PROCEDURE — 80048 BASIC METABOLIC PNL TOTAL CA: CPT | Mod: HCNC | Performed by: HOSPITALIST

## 2023-12-11 PROCEDURE — 97110 THERAPEUTIC EXERCISES: CPT | Mod: HCNC,CQ

## 2023-12-11 PROCEDURE — 83735 ASSAY OF MAGNESIUM: CPT | Mod: HCNC | Performed by: HOSPITALIST

## 2023-12-11 PROCEDURE — 25000003 PHARM REV CODE 250: Mod: HCNC | Performed by: HOSPITALIST

## 2023-12-11 PROCEDURE — 97530 THERAPEUTIC ACTIVITIES: CPT | Mod: HCNC,CQ

## 2023-12-11 PROCEDURE — 36415 COLL VENOUS BLD VENIPUNCTURE: CPT | Mod: HCNC | Performed by: HOSPITALIST

## 2023-12-11 RX ORDER — ACETAMINOPHEN 325 MG/1
650 TABLET ORAL EVERY 6 HOURS PRN
Refills: 0 | COMMUNITY
Start: 2023-12-11

## 2023-12-11 RX ORDER — MECLIZINE HCL 12.5 MG 12.5 MG/1
12.5 TABLET ORAL 3 TIMES DAILY PRN
Qty: 90 TABLET | Refills: 3 | Status: SHIPPED | OUTPATIENT
Start: 2023-12-11

## 2023-12-11 RX ORDER — VITS A,C,E/LUTEIN/MINERALS 300MCG-200
1 TABLET ORAL DAILY
Qty: 30 TABLET | Refills: 1 | Status: SHIPPED | OUTPATIENT
Start: 2023-12-11

## 2023-12-11 RX ORDER — ATENOLOL 25 MG/1
25 TABLET ORAL DAILY
Qty: 90 TABLET | Refills: 3
Start: 2023-12-11

## 2023-12-11 RX ORDER — TRAMADOL HYDROCHLORIDE 50 MG/1
50 TABLET ORAL EVERY 6 HOURS PRN
Qty: 20 TABLET | Refills: 0 | Status: SHIPPED | OUTPATIENT
Start: 2023-12-11

## 2023-12-11 RX ORDER — FUROSEMIDE 20 MG/1
TABLET ORAL
Qty: 270 TABLET | Refills: 3 | Status: SHIPPED | OUTPATIENT
Start: 2023-12-11

## 2023-12-11 RX ORDER — METHOCARBAMOL 500 MG/1
500 TABLET, FILM COATED ORAL 4 TIMES DAILY
Qty: 40 TABLET | Refills: 0 | Status: SHIPPED | OUTPATIENT
Start: 2023-12-11 | End: 2023-12-21

## 2023-12-11 RX ORDER — PANTOPRAZOLE SODIUM 40 MG/1
40 TABLET, DELAYED RELEASE ORAL DAILY
Qty: 30 TABLET | Refills: 2 | Status: SHIPPED | OUTPATIENT
Start: 2023-12-12

## 2023-12-11 RX ORDER — POTASSIUM CHLORIDE 750 MG/1
10 CAPSULE, EXTENDED RELEASE ORAL DAILY
Qty: 30 CAPSULE | Refills: 3 | Status: SHIPPED | OUTPATIENT
Start: 2023-12-12

## 2023-12-11 RX ORDER — POLYETHYLENE GLYCOL 3350 17 G/17G
17 POWDER, FOR SOLUTION ORAL DAILY
Refills: 0 | COMMUNITY
Start: 2023-12-12

## 2023-12-11 RX ORDER — AMOXICILLIN 250 MG
1 CAPSULE ORAL 2 TIMES DAILY
Qty: 60 TABLET | Refills: 1 | Status: SHIPPED | OUTPATIENT
Start: 2023-12-11

## 2023-12-11 RX ORDER — LOSARTAN POTASSIUM 25 MG/1
25 TABLET ORAL DAILY
Qty: 90 TABLET | Refills: 3
Start: 2023-12-11

## 2023-12-11 RX ADMIN — ACETAMINOPHEN 1000 MG: 325 TABLET ORAL at 03:12

## 2023-12-11 RX ADMIN — ACETAMINOPHEN 1000 MG: 325 TABLET ORAL at 05:12

## 2023-12-11 RX ADMIN — FUROSEMIDE 20 MG: 20 TABLET ORAL at 06:12

## 2023-12-11 RX ADMIN — Medication 1 TABLET: at 07:12

## 2023-12-11 RX ADMIN — FUROSEMIDE 40 MG: 40 TABLET ORAL at 09:12

## 2023-12-11 RX ADMIN — METHOCARBAMOL 500 MG: 500 TABLET ORAL at 03:12

## 2023-12-11 RX ADMIN — POLYETHYLENE GLYCOL 3350 17 G: 17 POWDER, FOR SOLUTION ORAL at 09:12

## 2023-12-11 RX ADMIN — APIXABAN 5 MG: 2.5 TABLET, FILM COATED ORAL at 09:12

## 2023-12-11 RX ADMIN — SENNOSIDES AND DOCUSATE SODIUM 1 TABLET: 8.6; 5 TABLET ORAL at 09:12

## 2023-12-11 RX ADMIN — PANTOPRAZOLE SODIUM 40 MG: 40 TABLET, DELAYED RELEASE ORAL at 09:12

## 2023-12-11 RX ADMIN — METHOCARBAMOL 500 MG: 500 TABLET ORAL at 06:12

## 2023-12-11 RX ADMIN — POTASSIUM CHLORIDE 10 MEQ: 10 CAPSULE, COATED, EXTENDED RELEASE ORAL at 09:12

## 2023-12-11 RX ADMIN — ACETAMINOPHEN 1000 MG: 325 TABLET ORAL at 09:12

## 2023-12-11 RX ADMIN — Medication 1 TABLET: at 06:12

## 2023-12-11 RX ADMIN — Medication 1 DOSE: at 09:12

## 2023-12-11 RX ADMIN — METHOCARBAMOL 500 MG: 500 TABLET ORAL at 09:12

## 2023-12-11 NOTE — PROGRESS NOTES
[facility]                                  Skilled Nursing Facility                   Progress Note     Admit Date: 11/22/2023  WES 12/13/2023  [id number]  Principal Problem:  Closed displaced intertrochanteric fracture of right femur with routine healing   HPI obtained from patient interview and chart review     Chief Complaint:Re-evaluation of medical treatment and therapy status: Lab review    HPI:   Mrs. Castellanos is an 89-year-old female with a PMHx of Afib on Eliquis, HFpEF on lasix, HTN, and CKD stage III who presents to SNF following hospitalization for right intertrochanteric femur fracture s/p cephalomedullary nail fixation on 11/19 with Dr. Perez.  Admission to SNF for secondary weakness and debility.    Interval history:  All of today's labs reviewed and are listed below.  24 hr vital sign ranges reviewed and listed below, BP's remain low to normal.  Patient states her pain is well controlled.  Patient states that her indigestion symptoms have completely resolved with new medication regimen. Patient denies shortness of breath, abdominal discomfort, nausea, or vomiting.  Patient reports an adequate appetite.  Patient denies dysuria.  Patient reports having regular bowel movements.  Patient progressing with PT/OT- Gait: 45 ft x 2 trials with RW and CGA, with R LE WBAT and w/c in tow by PT tech. Continuing to follow and treat all acute and chronic conditions.    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    Past Surgical History: Patient has a past surgical history that includes Brain surgery (2002); Hysterectomy; Tonsillectomy; Hemorrhoid surgery; Appendectomy; Breast biopsy (2/2014); Breast surgery (03/17/14); Breast cyst excision (1960); Mastectomy (Right); and Intramedullary rodding of femur (Right, 11/19/2023).    Social History: Patient reports that she has never smoked. She has never used smokeless tobacco. She reports that she does not drink alcohol and does not use drugs.    Family History:  No significant family history to report    Allergies: Patient is allergic to clindamycin, levofloxacin, lidocaine, and adhesive tape-silicones.    ROS  Constitutional: Negative for fever.  +fatigue   Eyes: Negative for blurred vision, double vision   Respiratory: Negative for cough.  +chronic SOB, at baseline  Cardiovascular: Negative for chest pain, palpitations. + leg swelling.   Gastrointestinal: Negative for abdominal pain, diarrhea, nausea, vomiting.  +indigestion, flatulence, constipation  Genitourinary: Negative for dysuria, frequency   Musculoskeletal:  + generalized weakness.  + RLE pain  Skin: Negative for itching and rash.   Neurological: Negative for dizziness, headaches.   Psychiatric/Behavioral: Negative for depression. The patient is not nervous/anxious.      24 hour Vital Sign Range   Temp:  [97.8 °F (36.6 °C)]   Pulse:  [94]   Resp:  [18]   BP: (113-120)/(56-65)   SpO2:  [98 %]     Current BMI: Body mass index is 29.07 kg/m².    PEx  Constitutional: Patient appears debilitated.  No distress noted  HENT:   Head: Normocephalic and atraumatic.   Eyes: Pupils are equal, round  Neck: Normal range of motion. Neck supple.   Cardiovascular: Normal rate, regular rhythm and normal heart sounds.    Pulmonary/Chest: Effort normal and breath sounds with bilateral crackles R>L  Abdominal: Soft. Bowel sounds are normal.   Musculoskeletal: Normal range of motion.   Neurological:  Alert and oriented to person, place, and time.   Psychiatric: Normal mood and affect. Behavior is normal.   Skin: Skin is warm and dry.  Surgical dressing to RLE, only to be removed by Ortho.  2+ pitting edema to bilateral lower extremities R>L     Altered Skin Integrity 11/21/23 1256 Right lateral;upper Thigh Blister(s)   Date First Assessed/Time First Assessed: 11/21/23 1256   Altered Skin Integrity Present on Admission - Did Patient arrive to the hospital with altered skin?: suspected hospital acquired  Side: Right  Orientation: lateral;upper  Location: Thigh  Primar...   Dressing Appearance Moist drainage   Drainage Amount Small   Drainage Characteristics/Odor Yellow;Serous   Appearance Moist;Pink   Tissue loss description Partial thickness   Red (%), Wound Tissue Color 100 %   Periwound Area Intact;Dry   Wound Length (cm) 3 cm   Wound Width (cm) 4 cm   Wound Depth (cm) 0.1 cm   Wound Volume (cm^3) 1.2 cm^3   Wound Surface Area (cm^2) 12 cm^2   Care Cleansed with:;Antimicrobial agent   Dressing Applied;Methylene blue/gentian violet;Foam   Dressing Change Due 12/05/23        Altered Skin Integrity 11/28/23 0830 Right Buttocks Blister(s)   Date First Assessed/Time First Assessed: 11/28/23 0830   Altered Skin Integrity Present on Admission - Did Patient arrive to the hospital with altered skin?: yes  Side: Right  Location: Buttocks  Primary Wound Type: Blister(s)   Appearance Pink;Moist   Tissue loss description Partial thickness   Red (%), Wound Tissue Color 100 %   Periwound Area Intact;Dry   Wound Length (cm) 4.5 cm   Wound Width (cm) 4.5 cm   Wound Depth (cm) 0.1 cm   Wound Volume (cm^3) 2.025 cm^3   Wound Surface Area (cm^2) 20.25 cm^2   Care Cleansed with:;Antimicrobial agent   Dressing Applied;Methylene blue/gentian violet;Foam   Off Loading Other (see comments)  (waffle ordered)   Dressing Change Due 12/05/23        Altered Skin Integrity 11/28/23 0830 Right lateral Knee Blister(s)   Date First  "Assessed/Time First Assessed: 11/28/23 0830   Altered Skin Integrity Present on Admission - Did Patient arrive to the hospital with altered skin?: yes  Side: Right  Orientation: lateral  Location: Knee  Primary Wound Type: Blister(s)   Dressing Appearance Moist drainage   Drainage Amount Small   Drainage Characteristics/Odor Yellow;Serous;No odor   Appearance Pink;Moist   Tissue loss description Partial thickness   Red (%), Wound Tissue Color 100 %   Periwound Area Intact;Dry   Wound Edges Irregular   Wound Length (cm) 2.5 cm   Wound Width (cm) 4 cm   Wound Depth (cm) 0.1 cm   Wound Volume (cm^3) 1 cm^3   Wound Surface Area (cm^2) 10 cm^2   Dressing Change Due 12/05/23       No results for input(s): "GLUCOSE", "NA", "K", "CL", "CO2", "BUN", "CREATININE", "CALCIUM", "MG" in the last 24 hours.        Recent Labs   Lab 12/11/23  0417   WBC 4.91   RBC 3.45*   HGB 10.3*   HCT 32.1*      MCV 93   MCH 29.9   MCHC 32.1             Recent Labs   Lab 12/04/23  2018   POCTGLUCOSE 149*        Assessment and Plan:    Closed displaced intertrochanteric fracture of right femur   s/p IM nail on 11/19/2023  - PT/OT, WBAT  - DVT PPX with apixaban 2.5 mg BID  - maintain surgical dressing until removed by Orthopedics  - ortho NALLELY to see patient weekly at SNF  - follow-up with orthopedics after discharge    Acute postoperative pain  - stable, continue methocarbamol 500 mg QID, acetaminophen 1000 mg q.8 hours, tramadol 50 mg q.6 hours PRN    Constipation  - improved with docusate enema given on 12/07, suppository yesterday was unsuccessful,, continue daily MiraLax, senna docusate 1 tab BID    Postprocedural hypotension  - experienced while at [facility], IV fluids were given and antihypertensive medications were held and then resumed upon SNF admission  - continues to have labile blood pressures  - persistenting, continue to hold atenolol and losartan so that patient can receive her Lasix dosing.      Acute on Chronic heart failure with " "preserved ejection fraction  - Patient is identified as having Diastolic (HFpEF) heart failure that is Chronic. CHF is currently controlled.   -  Monitor strict Is&Os and daily weights.   - holding off on on fluid restriction of 1.5 L due to postop hypotension and concern for dehydration in inpatient setting  - home regimen with Lasix 40 mg in the AM, 20 mg PM  - Xopenex nebulizer treatment daily x4 days, continue PRN Xopenex breathing treatments for shortness of breath  -  continue home dose PO Lasix of 40 mg in the morning and 20 mg in the afternoon, also adding KCl 10 mEq daily continue to hold atenolol.  Weights do not seem accurate at this time, will base need for increase Lasix dosing based on patient's clinical presentation and symptoms.    Persistent atrial fibrillation  Chronic anticoagulation   - Patient with Long standing persistent (>12 months) atrial fibrillation which is:controlled currently with home  Atenolol  and will continue in hospital. Patient is currently in atrial fibrillation.[id number] Score: 3. Anticoagulation indicated.   - hold atenolol.  Anticoagulation done with Apixiban 5 mg po BID at home and held for surgery but resumed post-op on 11/19.    Acute blood loss as cause of postoperative anemia  - expected postoperatively  - stable, continue monitor twice weekly CBC, transfuse for hemoglobin < 7    Hsx of Primary hypertension  - home regimen with losartan 25 mg daily, Lasix 60 mg daily splint in 2 doses, atenolol 25 mg daily  - developed postop hypotension  - currently holding losartan, Lasix split into TID dosing, continue atenolol with holding parameters    Stage 3b chronic kidney disease  - had elevation in creatinine to 1.5 on 11/21, has now normalized  - currently stable, continue monitor twice weekly BMPs, avoid nephrotoxic agents, renally dose medications as appropriate     Prediabetes  - "Patient noted to be hyperglycemic on admit with -210. HgA1C ordered and returned at " "6.3% consistent with prediabetes which is a new diagnosis. With patient's advanced age recommendation would be conservative mangement with diet control management with diabetic diet. Discussed with patient on 11/20 and she is in agreement with plan."  - diabetic diet, blood glucose with twice weekly BMPs    ACP (advance care planning)  - completed at [facility] on 11/19/2023, patient wishes to be DNR     Indigestion  - improved, continue Protonix 40 mg daily, continue PRN Tums and Mylanta    ACP  - on 11/30, patient provided the option to pursue hospice services upon discharge if she would wish to do so    Debility   - Continue with PT/OT for gait training and strengthening and restoration of ADL's   - Encourage mobility, OOB in chair, and early ambulation as appropriate  - Fall precautions   - Monitor for bowel and bladder dysfunction  - Monitor for and prevent skin breakdown and pressure ulcers  - Continue DVT prophylaxis with apixaban        Anticipate disposition:  Nursing home    The mobility limitation cannot be sufficiently resolved by the use of a cane.   Patient's functional mobility deficit can be sufficiently resolved with the use of a (Rolling Walker or Walker).  Patient's mobility limitation significantly impairs their ability to participate in one of more activities of daily living.  The use of a (RW or Walker) will significantly improve the patient's ability to participate in MRADLS and the patient will use it on regular basis in the home.     Patient has a mobility limitation that significantly impairs their ability to participate in one or more mobility related activities of daily living (MRADL's) such as toileting, feeding, dressing, grooming and bathing in customary locations in the home.  The mobility limitation cannot be sufficiently  resolved by the use of a cane or walker.   The use of a manual wheelchair will significantly improve the patient's ability to participate in MRADLS and the patient will " use it on regular basis in the home.  Patient  has expressed their willingness to use a manual wheelchair in the home.   Patient  also has a caregiver who is available, willing and able to provide assistance with the wheelchair when needed.        IP [facility] RISK OF UNPLANNED READMISSION Model: MODERATE     Follow-up needed during SNF stay-ortho (12/4)    Appointment not to send patient to- lab 12/6    Follow-up needed after discharge from SNF: PCP, orthopedics (1/4)    Future Appointments   Date Time Provider Department Center   12/14/2023 10:30 AM Jacobo Mcleod MD [facility]   1/4/2024 10:15 AM Henry Perez MD [facility] [address]   2/6/2024 10:45 AM Henry Perez MD [facility] [address]       I spent 50 minutes reviewing patient records, examining, and counseling the patient/ patient's family with greater than 50% of the time spent in direct patient care and coordination.  Discharge coordination    Pooja Littlejohn NP  Department of Hospital Medicine   [facility]     DOS: 12/11/2023       Patient note was created using MModal Dictation.  Any errors in syntax or even information may not have been identified and edited on initial review prior to signing this note.

## 2023-12-11 NOTE — PT/OT/SLP PROGRESS
Occupational Therapy   Treatment    Name: Ngoc Castellanos  MRN: [id number]  Admit Date: 11/22/2023  Admitting Diagnosis:  Closed displaced intertrochanteric fracture of right femur with routine healing    General Precautions: Standard, aspiration, fall   Orthopedic Precautions: RLE weight bearing as tolerated   Braces: N/A    Recommendations:     Discharge Recommendations:  Low Intensity Therapy  Level of Assistance Recommended at Discharge: 24 hours physical assistance for all ADL's and home management tasks  Discharge Equipment Recommendations: bedside commode, hip kit, shower chair  Barriers to discharge:  Decreased caregiver support    Assessment:     Ngoc Castellanos is a 89 y.o. female with a medical diagnosis of Closed displaced intertrochanteric fracture of right femur with routine healing.  She presents with  Performance deficits affecting function are weakness, impaired endurance, impaired self care skills, impaired functional mobility, impaired balance, gait instability, impaired cognition, decreased coordination, decreased lower extremity function, impaired cardiopulmonary response to activity, orthopedic precautions.     Pt. Was cooperative and participated well with session on this day. Pt  continues to demonstrate levels of physical deficits with  functional indep with daily management activities tasks, selfcare skills with balance,  functional mobility, UB strength and endurance. Pt. Will continue to benefit from continued OT to progress towards goals      Rehab Potential is fair    Activity tolerance:  Fair    Plan:     Patient to be seen 5 x/week to address the above listed problems via self-care/home management, therapeutic activities, therapeutic exercises    Plan of Care Expires: 12/23/23  Plan of Care Reviewed with: patient    Subjective     Communicated with: PCT and Pt. prior to session That shower was wonderful.    Pain/Comfort:  Pain Rating 1: 0/10  Pain Rating Post-Intervention 1:  0/10    Patient's cultural, spiritual, Amish conflicts given the current situation:  no    Objective:     Patient found up in chair with Pct assisting with shower   upon OT entry to room.    Functional Mobility/Transfers:  Patient completed Sit <> Stand Transfer with minimum assistance  with  grab bars(s)   Patient completed  Shower Transfer Stand Pivot technique with minimum assistance with grab bars    Activities of Daily Living:  Grooming with supervision while seated at sink level  Bathing with  PCT assisted Pt. With shower    Upper Body Dressing with stand by assistance to doff/delilah pull over shirt  Lower Body Dressing with moderate assistance to thread BLE performed seated  in wheelchair and managing over  hips in stance (with G bars).and diaper management   Footwear with total assistance to don socks without adaptive equipment in wheelchair    [facility] 6 Click ADL: 15    OT Exercises: UE Ergometer 10 min    Treatment & Education:  Pt. With therex performed to increase ROM, endurance selfcare task and fxl mobility for independence     Pt edu on role of OT, POC, safety when performing self care tasks , benefit of performing OOB activity, and safety when performing functional transfers and mobility management for preparation with goals to progress towards next level of care     Patient left up in chair with all lines intact and call button in reach    GOALS:   Multidisciplinary Problems       Occupational Therapy Goals          Problem: Occupational Therapy    Goal Priority Disciplines Outcome Interventions   Occupational Therapy Goal     OT, PT/OT Ongoing, Progressing    Description: Goals to be met by: 12/23/2023     Patient will increase functional independence with ADLs by performing:    Feeding with Set-up Assistance.  UE Dressing with Set-up assistance- Met  LE Dressing with Maximum Assistance /c AE/LRAD as needed- Met  ~LE Dressing /c Mod A and AE/LRAD as needed- Partially met, preforming  inconsistently  Personal Hygiene while seated at sink with Modified Rich- Met  Oral Hygiene while seated at sink with Modified Rich.- Met  Toileting from bedside commode with Maximum Assistance for hygiene and clothing management- Met  ~Toileting from toilet /c Min A for clothing mgmt and hygiene - Ongoing  Bathing from  sitting at sink/on commode with Minimal Assistance.  Rolling to Right with Moderate Assistance.   Rolling to Left with Contact Guard Assistance.   Toilet transfer to bedside commode with Maximum Assistance- Met  ~Toilet transfer to toilet /c Mod A- Partially met, preforming inconsistently  Tolerate standing functional tasks for ~2 minutes /c Mod A and LRAD as needed - Met  Footwear /c Mod A and AE as needed  SC t/f /c Max A and LRAD as needed.                         Time Tracking:     OT Date of Treatment: 12/11/23  OT Start Time: 0854    OT Stop Time: 0934  OT Total Time (min): 40 min    Billable Minutes:Therapeutic Activity 40    12/11/2023

## 2023-12-11 NOTE — PLAN OF CARE
Problem: Adult Inpatient Plan of Care  Goal: Plan of Care Review  Outcome: Ongoing, Progressing  Goal: Patient-Specific Goal (Individualized)  Outcome: Ongoing, Progressing  Goal: Absence of Hospital-Acquired Illness or Injury  Outcome: Ongoing, Progressing  Goal: Optimal Comfort and Wellbeing  Outcome: Ongoing, Progressing  Goal: Readiness for Transition of Care  Outcome: Ongoing, Progressing     Problem: Impaired Wound Healing  Goal: Optimal Wound Healing  Outcome: Ongoing, Progressing     Problem: Fall Injury Risk  Goal: Absence of Fall and Fall-Related Injury  Outcome: Ongoing, Progressing     Problem: Skin Injury Risk Increased  Goal: Skin Health and Integrity  Outcome: Ongoing, Progressing  Intervention: Optimize Skin Protection  Flowsheets (Taken 12/11/2023 0879)  Pressure Reduction Techniques: frequent weight shift encouraged  Pressure Reduction Devices: foam padding utilized  Head of Bed (HOB) Positioning: HOB at 20-30 degrees

## 2023-12-11 NOTE — PLAN OF CARE
Continue 1500 calorie diet, boost glucose BID, diabetic education , HF education completed in oral and written form, RD following  Goals: PO to meet 85% of EEN/EPN by next RD follow up  Nutrition Goal Status: goal met  Communication of RD Recs: other (comment) (POC)    Assessment and Plan  Knowledge deficit regarding diabetic diet as evidenced by new diagnosis 11/2023.     Increased nutrient needs related to wound healing as evidenced by femur Fx repair.       Plan  Carbohydrate modified diet  Collaboration with other providers  Mineral supplement therapy- Calcium   Nutrition education- diabetic diet, HF, protein needs/sources  11/27  Vitamin supplement therapy- Vit D  Commercial beverage( protein) boost glucose BID

## 2023-12-11 NOTE — PROGRESS NOTES
[facility] - Skilled Nursing  Adult Nutrition  Progress Note    SUMMARY   Recommendation  Continue 1500 calorie diet, boost glucose BID, diabetic education , HF education completed in oral and written form, RD following  Goals: PO to meet 85% of EEN/EPN by next RD follow up  Nutrition Goal Status: goal met  Communication of RD Recs: other (comment) (POC)    Assessment and Plan  Knowledge deficit regarding diabetic diet as evidenced by new diagnosis 11/2023.     Increased nutrient needs related to wound healing as evidenced by femur Fx repair.       Plan  Carbohydrate modified diet  Collaboration with other providers  Mineral supplement therapy- Calcium   Nutrition education- diabetic diet, HF, protein needs/sources  11/27  Vitamin supplement therapy- Vit D  Commercial beverage( protein) boost glucose BID    Malnutrition Assessment 11/27     Skin (Micronutrient): dry, bruised  Eyes (Micronutrient): conjunctiva dull  Teeth (Micronutrient): broken dentition  Neck/Chest (Micronutrient): muscle wasting  Musculoskeletal/Lower Extremities: muscle wasting           Orbital Region (Subcutaneous Fat Loss): mild depletion  Upper Arm Region (Subcutaneous Fat Loss): moderate depletion  Thoracic and Lumbar Region: well nourished   [place] Region (Muscle Loss): mild depletion  Clavicle Bone Region (Muscle Loss): mild depletion  Clavicle and Acromion Bone Region (Muscle Loss): moderate depletion  Dorsal Hand (Muscle Loss): moderate depletion  Patellar Region (Muscle Loss): well nourished  Anterior Thigh Region (Muscle Loss): mild depletion  Posterior Calf Region (Muscle Loss): well nourished                 Reason for Assessment    Reason For Assessment: RD follow-up  Diagnosis:  (R femur Fx s/p IM Nail)  Relevant Medical History: Pre-diabetes, HTN,HF, CKD3, CANCER, CVA, AFIB  Interdisciplinary Rounds: attended  General Information Comments: will be on hospice at [facility], PO %  Nutrition Discharge Planning: DC heart  "healthy diet    Nutrition/Diet History    Patient Reported Diet/Restrictions/Preferences: general  Spiritual, Cultural Beliefs, Gnosticism Practices, Values that Affect Care: no  Food Allergies: NKFA  Factors Affecting Nutritional Intake: None identified at this time    Anthropometrics    Temp: 97.7 °F (36.5 °C)  Height Method: Stated  Height: 5' 7" (170.2 cm)  Height (inches): 67 in  Weight Method: Bed Scale  Weight: 84.2 kg (185 lb 10 oz)  Weight (lb): 185.63 lb  Ideal Body Weight (IBW), Female: 135 lb  % Ideal Body Weight, Female (lb): 132.11 %  BMI (Calculated): 29.1  BMI Grade: 25 - 29.9 - overweight  Usual Body Weight (UBW), k.8 kg  % Usual Body Weight: 100.33  % Weight Change From Usual Weight: 0.12 %       Lab/Procedures/Meds    Pertinent Labs Reviewed: reviewed  Pertinent Labs Comments: Hg 10.3, Hct 32.1, PO 2.4,  Pertinent Medications Reviewed: reviewed  Pertinent Medications Comments: KCl    Estimated/Assessed Needs    Weight Used For Calorie Calculations: 80.9 kg (178 lb 5.6 oz)  Energy Calorie Requirements (kcal): 1465  Energy Need Method: [place]-St Jeor (x 1.2(PAL) 2/2 obesity)  Protein Requirements: 65g  Weight Used For Protein Calculations: 80.9 kg (178 lb 5.6 oz) (x .8g 2/2 CKD)  Fluid Requirements (mL): 1465 or per MD     RDA Method (mL): 1465  CHO Requirement: 183      Nutrition Prescription Ordered    Current Diet Order: diabetic  Nutrition Order Comments: PO %  Oral Nutrition Supplement: boost glucose BID    Evaluation of Received Nutrient/Fluid Intake    I/O: no data  Energy Calories Required: meeting needs  Protein Required: meeting needs  Fluid Required: meeting needs  Comments: LBM 1210  Tolerance: tolerating  % Intake of Estimated Energy Needs: 75 - 100 %  % Meal Intake: 75 - 100 %    Nutrition Risk    Level of Risk/Frequency of Follow-up: low (one time per week)     Monitor and Evaluation    Food and Nutrient Adminstration: diet order  Knowledge/Beliefs/Attitudes: food and " nutrition knowledge/skill  Physical Activity and Function: nutrition-related ADLs and IADLs  Anthropometric Measurements: weight change  Biochemical Data, Medical Tests and Procedures: glucose/endocrine profile, gastrointestinal profile, electrolyte and renal panel  Nutrition-Focused Physical Findings: overall appearance, skin     Nutrition Follow-Up    RD Follow-up?: Yes

## 2023-12-11 NOTE — NURSING
PPD Reading Note  PPD read and results entered in [facility].  Result: 0 mm induration.

## 2023-12-11 NOTE — PLAN OF CARE
[facility]    FACILITY TRANSFER ORDERS      Patient Name: Ngoc Castellanos  YOB: 1925    PCP: Jacobo Mcleod MD   PCP Address: [address] [place] [address] / [place] [address]  PCP Phone Number: [phone]  PCP Fax: [phone]    Encounter Date: 12/11/2023    Admit to:  [facility], ([facility])     Vital Signs:  Routine    Diagnoses:   Active Hospital Problems    Diagnosis  POA    *Closed displaced intertrochanteric fracture of right femur s/p IM nail on 11/19/2023 [S72.141D]  Not Applicable    Hyponatremia [E87.1]  Yes     Not clinically significant       Acute blood loss as cause of postoperative anemia [D62]  Yes    Primary osteoarthritis of left knee [M17.12]  Yes    Chronic heart failure with preserved ejection fraction [I50.32]  Yes    Chronic anticoagulation [Z79.01]  Not Applicable    Stage 3b chronic kidney disease [N18.32]  Yes    Osteopenia [M85.80]  Yes    Persistent atrial fibrillation [I48.19]  Yes    Peripheral neuropathy [G62.9]  Yes    Primary hypertension [I10]  Yes      Resolved Hospital Problems   No resolved problems to display.       Allergies:  Review of patient's allergies indicates:   Allergen Reactions    Clindamycin     Levofloxacin Other (See Comments)    Lidocaine Other (See Comments)    Adhesive tape-silicones Rash       Diet:  Regular diet    Activities: Activity as tolerated  Orthopedic Precautions: RLE weight bearing as tolerated     Goals of Care Treatment Preferences:  Code Status: DNR    Living Will: Yes            Nursing:  Per facility protocol     CONSULTS:    Physical Therapy to evaluate and treat. , Occupational Therapy to evaluate and treat., and  to evaluate for community resources/long-range planning.      WOUND CARE ORDERS  Surgical incision-  Keep incision clean and dry.  Cleanse with soap and water daily, pat dry.  No lotions or ointments.  Do not submerge under water until cleared by Ortho.       Medications:  Review discharge medications with patient and family and provide education.      Current Discharge Medication List        START taking these medications    Details   pantoprazole (PROTONIX) 40 MG tablet Take 1 tablet (40 mg total) by mouth once daily.  Qty: 30 tablet, Refills: 2      polyethylene glycol (GLYCOLAX) 17 gram PwPk Take 17 g by mouth once daily.  Refills: 0      potassium chloride (MICRO-K) 10 MEQ CpSR Take 1 capsule (10 mEq total) by mouth once daily.  Qty: 30 capsule, Refills: 3      vit A,C & E-lutein-minerals 1,000-60-2 unit (OCUVITE WITH LUTEIN) Tab Take 1 tablet by mouth once daily.  Qty: 30 tablet, Refills: 1           CONTINUE these medications which have CHANGED    Details   acetaminophen (TYLENOL) 325 MG tablet Take 2 tablets (650 mg total) by mouth every 6 (six) hours as needed for Pain.  Refills: 0      apixaban (ELIQUIS) 5 mg Tab Take 1 tablet (5 mg total) by mouth 2 (two) times daily.  Qty: 180 tablet, Refills: 3      atenoloL (TENORMIN) 25 MG tablet Take 1 tablet (25 mg total) by mouth once daily. HOLD UNTIL FOLLOW-UP WITH PRIMARY CARE PROVIDER  Qty: 90 tablet, Refills: 3    Comments: .      furosemide (LASIX) 20 MG tablet Take 2 tablets in the morning and 1 tablet in the afternoon at 6pm  Qty: 270 tablet, Refills: 3      losartan (COZAAR) 25 MG tablet Take 1 tablet (25 mg total) by mouth once daily. HOLD UNTIL FOLLOW-UP WITH PRIMARY CARE PROVIDER  Qty: 90 tablet, Refills: 3    Comments: .  Associated Diagnoses: Essential hypertension; Persistent atrial fibrillation      meclizine (ANTIVERT) 12.5 mg tablet Take 1 tablet (12.5 mg total) by mouth 3 (three) times daily as needed for Dizziness.  Qty: 90 tablet, Refills: 03    Associated Diagnoses: Vertigo      methocarbamoL (ROBAXIN) 500 MG Tab Take 1 tablet (500 mg total) by mouth 4 (four) times daily. for 10 days  Qty: 40 tablet, Refills: 0      senna-docusate 8.6-50 mg (PERICOLACE) 8.6-50 mg per tablet Take 1 tablet by mouth 2 (two) times  daily.  Qty: 60 tablet, Refills: 1      sodium chloride (OCEAN) 0.65 % nasal spray 1 spray by Nasal route as needed for Congestion.  Qty: 50 mL, Refills: 12      traMADoL (ULTRAM) 50 mg tablet Take 1 tablet (50 mg total) by mouth every 6 (six) hours as needed for Pain.  Qty: 20 tablet, Refills: 0    Comments: N/A           CONTINUE these medications which have NOT CHANGED    Details   calcium carbonate (OS-CELIA) 600 mg calcium (1,500 mg) Tab Take 600 mg by mouth 2 (two) times daily with meals.             Immunizations Administered as of 12/11/2023       Name Date Dose VIS Date Route Exp Date    COVID-19, MRNA, LN-S, PF (Pfizer) (Purple Cap) 10/22/2021 0.3 mL 5/10/2021 Intramuscular --    Site: Left deltoid     : [employer]     Lot: [id number]     External: Auto Reconciled From Outside Source     Comment: Adminis     COVID-19, MRNA, LN-S, PF (Pfizer) (Purple Cap) 3/25/2021 -- -- -- --    : [employer]     Lot: [id number]     External: Auto Reconciled From Outside Source     Comment: Adminis     COVID-19, MRNA, LN-S, PF (Pfizer) (Purple Cap) 3/2/2021 -- -- -- --    : [employer]     Lot: [id number]     External: Auto Reconciled From Outside Source     Comment: Adminis             This patient has had both covid vaccinations    Some patients may experience side effects after vaccination.  These may include fever, headache, muscle or joint aches.  Most symptoms resolve with 24-48 hours and do not require urgent medical evaluation unless they persist for more than 72 hours or symptoms are concerning for an unrelated medical condition.          _________________________________  Pooja Littlejohn NP  12/11/2023

## 2023-12-11 NOTE — DISCHARGE INSTRUCTIONS
Discharge instructions    You have chosen hospice service, any equipment you need can be provided by that company, Orders have been sent to [facility] at your request.    [facility] will also be started, 12.14 is the date of service.

## 2023-12-11 NOTE — PT/OT/SLP PROGRESS
"Physical Therapy Treatment    Patient Name:  Ngoc Castellanos   MRN:  [id number]  Admit Date: 11/22/2023  Admitting Diagnosis: Closed displaced intertrochanteric fracture of right femur with routine healing  Recent Surgeries: Cephalomedullary nail fixation of right intertrochanteric femur fracture    General Precautions: Standard, aspiration, fall  Orthopedic Precautions: RLE weight bearing as tolerated  Braces: N/A    Recommendations:     Discharge Recommendations: Low Intensity Therapy  Level of Assistance Recommended at Discharge: Intermittent assistance   Discharge Equipment Recommendations: bedside commode, hip kit, shower chair, walker, rolling, wheelchair  Barriers to discharge: Decreased caregiver support    Assessment:     Ngoc Castellanos is a 89 y.o. female admitted with a medical diagnosis of Closed displaced intertrochanteric fracture of right femur with routine healing . Pt tolerated fairly well, pt amb x 3 trials CGA. Min/CGA for STS from w/c to RW. Pt completed TE to tolerance, pt would continue to benefit from skilled PT services to improve overall functional mobility, strength and endurance.      Performance deficits affecting function: weakness, impaired endurance, impaired self care skills, impaired functional mobility, gait instability, impaired balance, edema, pain, impaired cardiopulmonary response to activity, orthopedic precautions, decreased ROM, impaired skin.    Rehab Potential is good    Activity Tolerance: Good    Plan:     Patient to be seen 5 x/week to address the above listed problems via gait training, therapeutic activities, therapeutic exercises, neuromuscular re-education, wheelchair management/training    Plan of Care Expires: 12/24/23  Plan of Care Reviewed with: patient    Subjective     "Today isn't a good day".     Pain/Comfort:  Pain Rating 1: 0/10  Pain Rating Post-Intervention 1: 0/10    Patient's cultural, spiritual, Hindu conflicts given the current " situation:  no    Objective:     Patient found up in chair with  (no lines) upon PT entry to room.     Therapeutic Activities and Exercises: seated TE: GS, AP, LAQ (AAROM RLE), hip flex, (AAROM RLE) X 20 reps for BLE strengthening    Patient educated on role of therapy, goals of session, and benefits of out of bed mobility.   Instructed on use of call button and importance of calling nursing staff for assistance with mobility   Questions/concerns addressed within PTA scope of practice  Pt verbalized understanding     Functional Mobility:  Transfers:     Sit to Stand:  contact guard assistance and minimum assistance with rolling walker. Pt performed multiple STS from w/c to RW without rest due to pt needing increased verbal cuing for completion  Gait: pt amb 50 ft + 40 ft + 20 ft CGA with RW. Extended seated rest breaks due to pt reporting having a bad day.     AM-PAC 6 CLICK MOBILITY  17    Patient left up in chair with call button in reach.    GOALS:   Multidisciplinary Problems       Physical Therapy Goals          Problem: Physical Therapy    Goal Priority Disciplines Outcome Goal Variances Interventions   Physical Therapy Goal     PT, PT/OT Ongoing, Progressing     Description: Goals to be met by: 23       Patient will increase functional independence with mobility by performin. Supine to sit with Contact Guard Assistance - In progress  2. Sit to supine with Contact Guard Assistance - In progress  3. Rolling to Left and Right with Standby Assistance - In progress  4. Sit to stand transfer with Contact Guard Assistance - In progress  5. Bed to chair transfer with Contact Guard Assistance using Rolling Walker/LRAD - MET  Updated Goal 23: Bed to chair transfer with SBA using RW    6. Gait  x 50 feet with Contact Guard Assistance using Rolling Walker/LRAD - In progress  7. Wheelchair propulsion x 150 feet with Modified [place] using bilateral upper extremities - In progress                          Time Tracking:     PT Received On: 12/11/23  PT Start Time: 1010  PT Stop Time: 1050  PT Total Time (min): 40 min    Billable Minutes: Gait Training 20, Therapeutic Activity 10, and Therapeutic Exercise 10    Treatment Type: Treatment  PT/PTA: PTA     Number of PTA visits since last PT visit: 2     12/11/2023

## 2023-12-11 NOTE — CARE UPDATE
Contact called [facility] Manager, Kelly regarding pt's admission on Wed.  Contact called Naya @ [facility] regarding details of pt's transfer on 12.13, Wed.    Transportation: [facility] to set up transportation for 2:00 pm to [facility].    [place] to [place]: Facility transfer orders [facility] [phone].  [facility] (name unknown)    DME: Hospice will order all equipment needs for patient    Pt : informed her of the details on Wed  Family: Called isabel Hsu a message for him and contact info for questions.

## 2023-12-11 NOTE — PLAN OF CARE
[facility]  Department of Hospital Medicine  [address] [place] [address]  [phone] after hours  [phone] fax    HOSPICE  ORDERS    12/11/2023    Admit to Hospice:  Home Service   Diagnoses:   Active Hospital Problems    Diagnosis  POA    *Closed displaced intertrochanteric fracture of right femur s/p IM nail on 11/19/2023 [S72.141D]  Not Applicable    Hyponatremia [E87.1]  Yes     Not clinically significant       Acute blood loss as cause of postoperative anemia [D62]  Yes    Primary osteoarthritis of left knee [M17.12]  Yes    Chronic heart failure with preserved ejection fraction [I50.32]  Yes    Chronic anticoagulation [Z79.01]  Not Applicable    Stage 3b chronic kidney disease [N18.32]  Yes    Osteopenia [M85.80]  Yes    Persistent atrial fibrillation [I48.19]  Yes    Peripheral neuropathy [G62.9]  Yes    Primary hypertension [I10]  Yes      Resolved Hospital Problems   No resolved problems to display.       Hospice Qualifying Diagnoses:        Patient has a life expectancy < 6 months due to:  Primary Hospice Diagnosis:  CHF  Comorbid Conditions Contributing to Decline:  Advanced frailty, recent fracture with surgery, CKD    Vital Signs: Routine per Hospice Protocol.    Code Status: DNR    Allergies:   Review of patient's allergies indicates:   Allergen Reactions    Clindamycin     Levofloxacin Other (See Comments)    Lidocaine Other (See Comments)    Adhesive tape-silicones Rash       Diet:  Regular  Activities: As tolerated    Goals of Care Treatment Preferences:  Code Status: DNR    Living Will: Yes              Nursing: Per Hospice Routine.      Routine Skin for Bedridden Patients: Apply moisture barrier cream to all skin folds and   wet areas in perineal area daily and after baths and all bowel movements.    Oxygen: PRN    Medications:        Medication List        START taking these medications      OCUVITE WITH LUTEIN Tab  Generic drug: vit A,C &  E-lutein-minerals 1,000-60-2 unit  Take 1 tablet by mouth once daily.  Replaces: OCUVITE ORAL     pantoprazole 40 MG tablet  Commonly known as: PROTONIX  Take 1 tablet (40 mg total) by mouth once daily.  Start taking on: December 12, 2023     polyethylene glycol 17 gram Pwpk  Commonly known as: GLYCOLAX  Take 17 g by mouth once daily.  Start taking on: December 12, 2023     potassium chloride 10 MEQ Cpsr  Commonly known as: MICRO-K  Take 1 capsule (10 mEq total) by mouth once daily.  Start taking on: December 12, 2023            CHANGE how you take these medications      acetaminophen 325 MG tablet  Commonly known as: TYLENOL  Take 2 tablets (650 mg total) by mouth every 6 (six) hours as needed for Pain.  What changed:   medication strength  how much to take  when to take this  reasons to take this     apixaban 5 mg Tab  Commonly known as: ELIQUIS  Take 1 tablet (5 mg total) by mouth 2 (two) times daily.  What changed: how much to take     atenoloL 25 MG tablet  Commonly known as: TENORMIN  Take 1 tablet (25 mg total) by mouth once daily. HOLD UNTIL FOLLOW-UP WITH PRIMARY CARE PROVIDER  What changed:   when to take this  additional instructions     furosemide 20 MG tablet  Commonly known as: LASIX  Take 2 tablets in the morning and 1 tablet in the afternoon at 6pm  What changed: See the new instructions.     losartan 25 MG tablet  Commonly known as: COZAAR  Take 1 tablet (25 mg total) by mouth once daily. HOLD UNTIL FOLLOW-UP WITH PRIMARY CARE PROVIDER  What changed:   when to take this  additional instructions     methocarbamoL 500 MG Tab  Commonly known as: ROBAXIN  Take 1 tablet (500 mg total) by mouth 4 (four) times daily. for 10 days  What changed: when to take this            CONTINUE taking these medications      calcium carbonate 600 mg calcium (1,500 mg) Tab  Commonly known as: OS-CELIA  Take 600 mg by mouth 2 (two) times daily with meals.     meclizine 12.5 mg tablet  Commonly known as: ANTIVERT  Take 1  tablet (12.5 mg total) by mouth 3 (three) times daily as needed for Dizziness.     senna-docusate 8.6-50 mg 8.6-50 mg per tablet  Commonly known as: PERICOLACE  Take 1 tablet by mouth 2 (two) times daily.     sodium chloride 0.65 % nasal spray  Commonly known as: OCEAN  1 spray by Nasal route as needed for Congestion.     traMADoL 50 mg tablet  Commonly known as: ULTRAM  Take 1 tablet (50 mg total) by mouth every 6 (six) hours as needed for Pain.            Future Orders:  Hospice Medical Director may dictate new orders for comfortable care measures & sign death certificate.        _________________________________  Pooja Littlejohn NP  12/11/2023

## 2023-12-11 NOTE — CARE UPDATE
Discharge Details    Pt is scheduled for discharge on 12.13, Wednesday to [facility], ([facility]) in [place], via our wheelchair van service at 2:00 pm.    DME: Hospice will provide all equipment needs.     Recommendations and facility transfer orders are being sent.    HH: [facility] on 12.14 DOS

## 2023-12-12 PROCEDURE — 97116 GAIT TRAINING THERAPY: CPT | Mod: HCNC

## 2023-12-12 PROCEDURE — 25000003 PHARM REV CODE 250: Mod: HCNC | Performed by: HOSPITALIST

## 2023-12-12 PROCEDURE — 97530 THERAPEUTIC ACTIVITIES: CPT | Mod: HCNC

## 2023-12-12 PROCEDURE — 97535 SELF CARE MNGMENT TRAINING: CPT | Mod: HCNC,CO

## 2023-12-12 PROCEDURE — 25000003 PHARM REV CODE 250: Mod: HCNC | Performed by: FAMILY MEDICINE

## 2023-12-12 PROCEDURE — 25000003 PHARM REV CODE 250: Mod: HCNC | Performed by: NURSE PRACTITIONER

## 2023-12-12 PROCEDURE — 11000004 HC SNF PRIVATE: Mod: HCNC

## 2023-12-12 PROCEDURE — 97110 THERAPEUTIC EXERCISES: CPT | Mod: HCNC

## 2023-12-12 RX ORDER — HYDROCORTISONE 25 MG/G
CREAM TOPICAL 3 TIMES DAILY
Status: DISCONTINUED | OUTPATIENT
Start: 2023-12-12 | End: 2023-12-13 | Stop reason: HOSPADM

## 2023-12-12 RX ORDER — HYDROCORTISONE 25 MG/G
CREAM TOPICAL 3 TIMES DAILY PRN
Qty: 28 G | Refills: 1 | Status: SHIPPED | OUTPATIENT
Start: 2023-12-12

## 2023-12-12 RX ADMIN — APIXABAN 5 MG: 2.5 TABLET, FILM COATED ORAL at 08:12

## 2023-12-12 RX ADMIN — SENNOSIDES AND DOCUSATE SODIUM 1 TABLET: 8.6; 5 TABLET ORAL at 08:12

## 2023-12-12 RX ADMIN — Medication 1 DOSE: at 08:12

## 2023-12-12 RX ADMIN — Medication 1 TABLET: at 04:12

## 2023-12-12 RX ADMIN — Medication 1 DOSE: at 04:12

## 2023-12-12 RX ADMIN — POLYETHYLENE GLYCOL 3350 17 G: 17 POWDER, FOR SOLUTION ORAL at 08:12

## 2023-12-12 RX ADMIN — HYDROCORTISONE: 25 CREAM TOPICAL at 08:12

## 2023-12-12 RX ADMIN — Medication 1 TABLET: at 08:12

## 2023-12-12 RX ADMIN — Medication 1 DOSE: at 01:12

## 2023-12-12 RX ADMIN — METHOCARBAMOL 500 MG: 500 TABLET ORAL at 01:12

## 2023-12-12 RX ADMIN — PANTOPRAZOLE SODIUM 40 MG: 40 TABLET, DELAYED RELEASE ORAL at 08:12

## 2023-12-12 RX ADMIN — METHOCARBAMOL 500 MG: 500 TABLET ORAL at 08:12

## 2023-12-12 RX ADMIN — METHOCARBAMOL 500 MG: 500 TABLET ORAL at 04:12

## 2023-12-12 RX ADMIN — POTASSIUM CHLORIDE 10 MEQ: 10 CAPSULE, COATED, EXTENDED RELEASE ORAL at 08:12

## 2023-12-12 RX ADMIN — FUROSEMIDE 40 MG: 40 TABLET ORAL at 08:12

## 2023-12-12 RX ADMIN — FUROSEMIDE 20 MG: 20 TABLET ORAL at 04:12

## 2023-12-12 RX ADMIN — ACETAMINOPHEN 1000 MG: 325 TABLET ORAL at 09:12

## 2023-12-12 RX ADMIN — ACETAMINOPHEN 1000 MG: 325 TABLET ORAL at 06:12

## 2023-12-12 RX ADMIN — ACETAMINOPHEN 1000 MG: 325 TABLET ORAL at 01:12

## 2023-12-12 NOTE — PLAN OF CARE
Problem: Adult Inpatient Plan of Care  Goal: Plan of Care Review  Outcome: Ongoing, Progressing  Flowsheets (Taken 12/12/2023 0055)  Plan of Care Reviewed With: patient     Problem: Impaired Wound Healing  Goal: Optimal Wound Healing  Outcome: Ongoing, Progressing     Problem: Fall Injury Risk  Goal: Absence of Fall and Fall-Related Injury  Outcome: Ongoing, Progressing     Problem: Skin Injury Risk Increased  Goal: Skin Health and Integrity  Outcome: Ongoing, Progressing

## 2023-12-12 NOTE — PROGRESS NOTES
Patient seen for wound care follow-up.   Reviewed chart for this encounter.   See Flow Sheet for findings.    Pt sitting up in bed, agreed to assessment. Foam border removed using adhesive remover spray, no damage to skin, revealing partial thickness skin loss on both right lateral leg wounds. Cleansed with Vashe, patted dry, applied small piece of Hydrofera ready (wounds facing outward) to wound bed, covered with foam border.   Wounds appear to be improving.  Right buttock wound healed, can cover with foam border for protection/prevention of re-injury.   Right leg incision well approximated, surgical glue remains in place in multiple places.   Continue treatment as ordered.    RECOMMENDATIONS:  No change in orders.     Discussed POC with patient and primary nurse.   See EMR for orders & patient education.    Discussed nutrition and the role of protein in wound healing with the patient. Instructed patient to optimize protein for wound healing.    Bedside nursing to continue care & monitoring.  Bedside nursing to maintain pressure injury prevention interventions.    WC FU 12/19 12/12/23 0800   WOCN Assessment   WOCN Total Time (mins) 20   Visit Date 12/12/23   Visit Time 0800   Consult Type Follow Up   WOCN Speciality Wound   Wound other;At risk for pressure Injury  (ruptured blisters)   Intervention assessed;changed;applied;chart review   Teaching on-going        Altered Skin Integrity 11/21/23 1256 Right lateral;upper Thigh Blister(s)   Date First Assessed/Time First Assessed: 11/21/23 1256   Altered Skin Integrity Present on Admission - Did Patient arrive to the hospital with altered skin?: suspected hospital acquired  Side: Right  Orientation: lateral;upper  Location: Thigh  Primar...   Wound Image    Dressing Appearance Clean;Dry;Intact;Moist drainage   Drainage Amount Scant   Drainage Characteristics/Odor Serosanguineous;Yellow;No odor   Appearance Pink;Red;Moist   Tissue loss description Partial thickness    Red (%), Wound Tissue Color 100 %   Periwound Area Intact;Dry;Pink;Scar tissue   Wound Length (cm) 2 cm   Wound Width (cm) 1.5 cm   Wound Depth (cm) 0.1 cm   Wound Volume (cm^3) 0.3 cm^3   Wound Surface Area (cm^2) 3 cm^2   Care Cleansed with:;Antimicrobial agent   Dressing Applied;Methylene blue/gentian violet;Foam   Dressing Change Due 12/15/23        Altered Skin Integrity 11/28/23 0830 Right Buttocks Blister(s)   Date First Assessed/Time First Assessed: 11/28/23 0830   Altered Skin Integrity Present on Admission - Did Patient arrive to the hospital with altered skin?: yes  Side: Right  Location: Buttocks  Primary Wound Type: Blister(s)   Wound Image    Dressing Appearance Clean;Dry;Intact   Drainage Amount None   Drainage Characteristics/Odor No odor   Tissue loss description Not applicable   Periwound Area Intact   Wound Length (cm) 0 cm   Wound Width (cm) 0 cm   Wound Depth (cm) 0 cm   Wound Volume (cm^3) 0 cm^3   Wound Surface Area (cm^2) 0 cm^2   Dressing Removed;Applied;Foam        Altered Skin Integrity 11/28/23 0830 Right lateral Knee Blister(s)   Date First Assessed/Time First Assessed: 11/28/23 0830   Altered Skin Integrity Present on Admission - Did Patient arrive to the hospital with altered skin?: yes  Side: Right  Orientation: lateral  Location: Knee  Primary Wound Type: Blister(s)   Wound Image    Dressing Appearance Clean;Dry;Intact   Drainage Amount None   Drainage Characteristics/Odor No odor   Appearance Pink;Dry;Smooth   Tissue loss description Not applicable   Periwound Area Intact;Dry;Pink;Scar tissue   Wound Length (cm) 1 cm   Wound Width (cm) 1.4 cm   Wound Depth (cm) 0.1 cm   Wound Volume (cm^3) 0.14 cm^3   Wound Surface Area (cm^2) 1.4 cm^2   Dressing Removed;Applied;Foam        Incision/Site 11/19/23 1158 Right Leg   Date First Assessed/Time First Assessed: 11/19/23 1158   Side: Right  Location: Leg   Wound Image    Dressing Appearance Open to air   Drainage Amount None   Drainage  Characteristics/Odor No odor   Appearance Intact;Dry   Periwound Area Intact;Dry   Wound Edges Approximated

## 2023-12-12 NOTE — PT/OT/SLP PROGRESS
Occupational Therapy   Treatment/ Discharge Summary    Name: Nogc Castellanos  MRN: [id number]  Admit Date: 11/22/2023  Admitting Diagnosis:  Closed displaced intertrochanteric fracture of right femur with routine healing    General Precautions: Standard, aspiration, fall   Orthopedic Precautions: RLE weight bearing as tolerated   Braces: N/A    Recommendations:     Discharge Recommendations:  Low Intensity Therapy  Level of Assistance Recommended at Discharge: 24 hours physical assistance for all ADL's and home management tasks  Discharge Equipment Recommendations: bedside commode, hip kit, shower chair  Barriers to discharge:  Decreased caregiver support    Assessment:     Ngoc Castellanos is a 89 y.o. female with a medical diagnosis of Closed displaced intertrochanteric fracture of right femur with routine healing.  She presents with  Performance deficits affecting function are weakness, impaired endurance, impaired self care skills, impaired functional mobility, impaired balance, gait instability, impaired cognition, decreased coordination, decreased lower extremity function, impaired cardiopulmonary response to activity, orthopedic precautions.     Rehab Potential is fair    Activity tolerance:  Fair    Plan:     Patient to be seen 5 x/week to address the above listed problems via self-care/home management, therapeutic activities, therapeutic exercises    Plan of Care Expires: 12/23/23  Plan of Care Reviewed with: patient    Subjective     Communicated with: nsg and Pt. prior to session. I am doing well today    Pain/Comfort:  Pain Rating 1: 0/10  Pain Rating Post-Intervention 1: 0/10    Patient's cultural, spiritual, [employer] conflicts given the current situation:  no    Objective:     Patient found supine with purwick in place  upon OT entry to room.    Bed Mobility:    Patient completed Scooting/Bridging with stand by assistance  Patient completed Supine to Sit with stand by assistance and with side rail      Functional Mobility/Transfers:  Patient completed Sit <> Stand Transfer with contact guard assistance  with  rolling walker   Patient completed Bed <> Chair Transfer using Stand Pivot technique with contact guard assistance with rolling walker  Patient completed Toilet Transfer Stand Pivot technique with contact guard assistance with  rolling walker    Activities of Daily Living:  Grooming: supervision seated at sink level  Bathing: moderate assistance for BLE management and post katie area  Upper Body Dressing: supervision to doff/delilah pull over shirt  Lower Body Dressing: moderate assistance to delilah pants seated and to mange over hips instance with RW for balance and Total A for BLE socks  Toileting: minimum assistance and moderate assistance with cleaning and clothing management from elevated toilet seated and (A) to manage adult diaper    [facility] 6 Click ADL: 15    Treatment & Education:  Pt edu on role of OT, POC, safety when performing self care tasks , benefit of performing OOB activity, and safety when performing functional transfers and mobility management for preparation with goals to progress towards next level of care     Patient left up in chair with all lines intact and call button in reach    GOALS:   Multidisciplinary Problems       Occupational Therapy Goals          Problem: Occupational Therapy    Goal Priority Disciplines Outcome Interventions   Occupational Therapy Goal     OT, PT/OT Ongoing, Progressing    Description: Goals to be met by: 12/23/2023     Patient will increase functional independence with ADLs by performing:    Feeding with Set-up Assistance.-MET  UE Dressing with Set-up assistance- Met  LE Dressing with Maximum Assistance /c AE/LRAD as needed- Met  ~LE Dressing /c Mod A and AE/LRAD as needed- Partially met, preforming inconsistently  Personal Hygiene while seated at sink with Modified Pollock- Met  Oral Hygiene while seated at sink with Modified Pollock.-  Met  Toileting from bedside commode with Maximum Assistance for hygiene and clothing management- Met  ~Toileting from toilet /c Min A for clothing mgmt and hygiene - Ongoing-Not MET  Bathing from  sitting at sink/on commode with Minimal Assistance.-MET  Rolling to Right with Moderate Assistance. MET  Rolling to Left with Contact Guard Assistance.-MET   Toilet transfer to bedside commode with Maximum Assistance- Met  ~Toilet transfer to toilet /c Mod A- Partially met, preforming inconsistently  Tolerate standing functional tasks for ~2 minutes /c Mod A and LRAD as needed - Met  Footwear /c Mod A and AE as needed-Not MET due to swelling in BLE's  SC t/f /c Max A and LRAD as needed.-MET                         Time Tracking:     OT Date of Treatment: 12/12/23  OT Start Time: 0945    OT Stop Time: 1028  OT Total Time (min): 43 min    Billable Minutes:Self Care/Home Management 43    12/12/2023  A client care conference was performed between the KYLEIGHR and SHIRLEY, prior to treatment to discuss the patient's status, treatment plan and established goals.

## 2023-12-12 NOTE — NURSING
Pt AAOX4, rise and fall of chest noted. Visualized pt in no apparent distress,pt remains fall free throughout shift, call bell within reach, care will continue.

## 2023-12-12 NOTE — PROGRESS NOTES
[facility]                                  Skilled Nursing Facility                   Progress Note     Admit Date: 11/22/2023  WES 12/13/2023  [id number] A  Principal Problem:  Closed displaced intertrochanteric fracture of right femur with routine healing   HPI obtained from patient interview and chart review     Chief Complaint:Re-evaluation of medical treatment and therapy status, rash to back     HPI:   Mrs. Castellanos is an 89-year-old female with a PMHx of Afib on Eliquis, HFpEF on lasix, HTN, and CKD stage III who presents to SNF following hospitalization for right intertrochanteric femur fracture s/p cephalomedullary nail fixation on 11/19 with Dr. Perez.  Admission to SNF for secondary weakness and debility.    Interval history:   24 hr vital sign ranges reviewed and listed below, BP's remain low to normal.  Patient states her pain is well controlled.  Patient with rash to her back, appears like a heat rash.  Patient denies shortness of breath, abdominal discomfort, nausea, or vomiting.  Patient reports an adequate appetite.  Patient denies dysuria.  Patient reports having regular bowel movements.  Patient progressing with PT/OT- Gait: 45 ft x 2 trials with RW and CGA, with R LE WBAT and w/c in tow by PT tech. Continuing to follow and treat all acute and chronic conditions.    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    Past Surgical History: Patient has a past surgical  history that includes Brain surgery (2002); Hysterectomy; Tonsillectomy; Hemorrhoid surgery; Appendectomy; Breast biopsy (2/2014); Breast surgery (03/17/14); Breast cyst excision (1960); Mastectomy (Right); and Intramedullary rodding of femur (Right, 11/19/2023).    Social History: Patient reports that she has never smoked. She has never used smokeless tobacco. She reports that she does not drink alcohol and does not use drugs.    Family History:  No significant family history to report    Allergies: Patient is allergic to clindamycin, levofloxacin, lidocaine, and adhesive tape-silicones.    ROS  Constitutional: Negative for fever.  +fatigue   Eyes: Negative for blurred vision, double vision   Respiratory: Negative for cough.  +chronic SOB, at baseline  Cardiovascular: Negative for chest pain, palpitations. + leg swelling.   Gastrointestinal: Negative for abdominal pain, diarrhea, nausea, vomiting.  +indigestion, flatulence, constipation  Genitourinary: Negative for dysuria, frequency   Musculoskeletal:  + generalized weakness.  + RLE pain  Skin: +  for itching and rash to back   Neurological: Negative for dizziness, headaches.   Psychiatric/Behavioral: Negative for depression. The patient is not nervous/anxious.      24 hour Vital Sign Range   Temp:  [97.7 °F (36.5 °C)-98 °F (36.7 °C)]   Pulse:  [87]   Resp:  [18]   BP: (118-133)/(57-62)   SpO2:  [96 %-97 %]     Current BMI: Body mass index is 29.07 kg/m².    PEx  Constitutional: Patient appears debilitated.  No distress noted  HENT:   Head: Normocephalic and atraumatic.   Eyes: Pupils are equal, round  Neck: Normal range of motion. Neck supple.   Cardiovascular: Normal rate, regular rhythm and normal heart sounds.    Pulmonary/Chest: Effort normal and breath sounds with bilateral crackles R>L  Abdominal: Soft. Bowel sounds are normal.   Musculoskeletal: Normal range of motion.   Neurological: Alert and oriented to person, place, and time.   Psychiatric: Normal  mood and affect. Behavior is normal.   Skin: Skin is warm and dry.  Surgical dressing to RLE, only to be removed by Ortho.  2+ pitting edema to bilateral lower extremities R>L      Altered Skin Integrity 11/21/23 1256 Right lateral;upper Thigh Blister(s)   Date First Assessed/Time First Assessed: 11/21/23 1256   Altered Skin Integrity Present on Admission - Did Patient arrive to the hospital with altered skin?: suspected hospital acquired  Side: Right  Orientation: lateral;upper  Location: Thigh  Primar...   Dressing Appearance Clean;Dry;Intact;Moist drainage   Drainage Amount Scant   Drainage Characteristics/Odor Serosanguineous;Yellow;No odor   Appearance Pink;Red;Moist   Tissue loss description Partial thickness   Red (%), Wound Tissue Color 100 %   Periwound Area Intact;Dry;Pink;Scar tissue   Wound Length (cm) 2 cm   Wound Width (cm) 1.5 cm   Wound Depth (cm) 0.1 cm   Wound Volume (cm^3) 0.3 cm^3   Wound Surface Area (cm^2) 3 cm^2   Care Cleansed with:;Antimicrobial agent   Dressing Applied;Methylene blue/gentian violet;Foam   Dressing Change Due 12/15/23        Altered Skin Integrity 11/28/23 0830 Right Buttocks Blister(s)   Date First Assessed/Time First Assessed: 11/28/23 0830   Altered Skin Integrity Present on Admission - Did Patient arrive to the hospital with altered skin?: yes  Side: Right  Location: Buttocks  Primary Wound Type: Blister(s)   Dressing Appearance Clean;Dry;Intact   Drainage Amount None   Drainage Characteristics/Odor No odor   Tissue loss description Not applicable   Periwound Area Intact   Wound Length (cm) 0 cm   Wound Width (cm) 0 cm   Wound Depth (cm) 0 cm   Wound Volume (cm^3) 0 cm^3   Wound Surface Area (cm^2) 0 cm^2   Dressing Removed;Applied;Foam        Altered Skin Integrity 11/28/23 0830 Right lateral Knee Blister(s)   Date First Assessed/Time First Assessed: 11/28/23 0830   Altered Skin Integrity Present on Admission - Did Patient arrive to the hospital with altered skin?: yes   "Side: Right  Orientation: lateral  Location: Knee  Primary Wound Type: Blister(s)   Dressing Appearance Clean;Dry;Intact   Drainage Amount None   Drainage Characteristics/Odor No odor   Appearance Pink;Dry;Smooth   Tissue loss description Not applicable   Periwound Area Intact;Dry;Pink;Scar tissue   Wound Length (cm) 1 cm   Wound Width (cm) 1.4 cm   Wound Depth (cm) 0.1 cm   Wound Volume (cm^3) 0.14 cm^3   Wound Surface Area (cm^2) 1.4 cm^2       No results for input(s): "GLUCOSE", "NA", "K", "CL", "CO2", "BUN", "CREATININE", "CALCIUM", "MG" in the last 24 hours.        No results for input(s): "WBC", "RBC", "HGB", "HCT", "PLT", "MCV", "MCH", "MCHC" in the last 24 hours.            No results for input(s): "POCTGLUCOSE" in the last 168 hours.       Assessment and Plan:    Closed displaced intertrochanteric fracture of right femur   s/p IM nail on 11/19/2023  - PT/OT, WBAT  - DVT PPX with apixaban 2.5 mg BID  - maintain surgical dressing until removed by Orthopedics  - ortho NALLELY to see patient weekly at SNF  - follow-up with orthopedics after discharge    Rash  - to back, appears to be a heat rash  - initiated hydrocortisone ointment 2.5% TID to back    Acute postoperative pain  - stable, continue methocarbamol 500 mg QID, acetaminophen 1000 mg q.8 hours, tramadol 50 mg q.6 hours PRN    Constipation  - improved with docusate enema given on 12/07, suppository yesterday was unsuccessful,, continue daily MiraLax, senna docusate 1 tab BID    Postprocedural hypotension  - experienced while at [facility], IV fluids were given and antihypertensive medications were held and then resumed upon SNF admission  - continues to have labile blood pressures  - persistenting, continue to hold atenolol and losartan so that patient can receive her Lasix dosing.      Acute on Chronic heart failure with preserved ejection fraction  - Patient is identified as having Diastolic (HFpEF) heart failure that is Chronic. CHF is currently controlled.   - " " Monitor strict Is&Os and daily weights.   - holding off on on fluid restriction of 1.5 L due to postop hypotension and concern for dehydration in inpatient setting  - home regimen with Lasix 40 mg in the AM, 20 mg PM  - Xopenex nebulizer treatment daily x4 days, continue PRN Xopenex breathing treatments for shortness of breath  -  continue home dose PO Lasix of 40 mg in the morning and 20 mg in the afternoon, also adding KCl 10 mEq daily continue to hold atenolol.  Weights do not seem accurate at this time, will base need for increase Lasix dosing based on patient's clinical presentation and symptoms.    Persistent atrial fibrillation  Chronic anticoagulation   - Patient with Long standing persistent (>12 months) atrial fibrillation which is:controlled currently with home  Atenolol  and will continue in hospital. Patient is currently in atrial fibrillation.[id number] Score: 3. Anticoagulation indicated.   - hold atenolol.  Anticoagulation done with Apixiban 5 mg po BID at home and held for surgery but resumed post-op on 11/19.    Acute blood loss as cause of postoperative anemia  - expected postoperatively  - stable, continue monitor twice weekly CBC, transfuse for hemoglobin < 7    Hsx of Primary hypertension  - home regimen with losartan 25 mg daily, Lasix 60 mg daily splint in 2 doses, atenolol 25 mg daily  - developed postop hypotension  - currently holding losartan, Lasix split into TID dosing, continue atenolol with holding parameters    Stage 3b chronic kidney disease  - had elevation in creatinine to 1.5 on 11/21, has now normalized  - currently stable, continue monitor twice weekly BMPs, avoid nephrotoxic agents, renally dose medications as appropriate     Prediabetes  - "Patient noted to be hyperglycemic on admit with -210. HgA1C ordered and returned at 6.3% consistent with prediabetes which is a new diagnosis. With patient's advanced age recommendation would be conservative mangement with diet " "control management with diabetic diet. Discussed with patient on 11/20 and she is in agreement with plan."  - diabetic diet, blood glucose with twice weekly BMPs    ACP (advance care planning)  - [place] [facility] [place], patient wishes to be DNR     Indigestion  - improved, continue Protonix 40 mg daily, continue PRN Tums and Mylanta    ACP  - on 11/30, patient provided the option to pursue hospice services upon discharge if she would wish to do so    Debility   - Continue with PT/OT for gait training and strengthening and restoration of ADL's   - Encourage mobility, OOB in chair, and early ambulation as appropriate  - Fall precautions   - Monitor for bowel and bladder dysfunction  - Monitor for and prevent skin breakdown and pressure ulcers  - Continue DVT prophylaxis with apixaban        Anticipate disposition:  Nursing home    The mobility limitation cannot be sufficiently resolved by the use of a cane.   Patient's functional mobility deficit can be sufficiently resolved with the use of a (Rolling Walker or Walker).  Patient's mobility limitation significantly impairs their ability to participate in one of more activities of daily living.  The use of a (RW or Walker) will significantly improve the patient's ability to participate in MRADLS and the patient will use it on regular basis in the home.     Patient has a mobility limitation that significantly impairs their ability to participate in one or more mobility related activities of daily living (MRADL's) such as toileting, feeding, dressing, grooming and bathing in customary locations in the home.  The mobility limitation cannot be sufficiently  resolved by the use of a cane or walker.   The use of a manual wheelchair will significantly improve the patient's ability to participate in MRADLS and the patient will use it on regular basis in the home.  Patient  has expressed their willingness to use a manual wheelchair in the home.   Patient  also has a " caregiver who is available, willing and able to provide assistance with the wheelchair when needed.        IP [facility] RISK OF UNPLANNED READMISSION Model: MODERATE     Follow-up needed during SNF stay-ortho (12/4)    Appointment not to send patient to- lab 12/6    Follow-up needed after discharge from SNF: PCP, orthopedics (1/4)    Future Appointments   Date Time Provider Department Center   12/14/2023 10:30 AM Jacobo Mcleod MD [facility]   1/4/2024 10:15 AM Henry Perez MD [facility] [address]   2/6/2024 10:45 AM Henry Perez MD [facility] [address]       I spent 47 minutes reviewing patient records, examining, and counseling the patient/ patient's family with greater than 50% of the time spent in direct patient care and coordination.  Discharge coordination    Pooja Littlejohn NP  Department of Hospital Medicine   [facility]     DOS: 12/12/2023       Patient note was created using MModal Dictation.  Any errors in syntax or even information may not have been identified and edited on initial review prior to signing this note.

## 2023-12-12 NOTE — PLAN OF CARE
Patient with gradual progression of mobility over course of therapy. D/C scheduled on 23.    Problem: Physical Therapy  Goal: Physical Therapy Goal  Description: Goals to be met by: 23       Patient will increase functional independence with mobility by performin. Supine to sit with Contact Guard Assistance - not met, MaxA   2. Sit to supine with Contact Guard Assistance - not met, Katelynn  3. Rolling to Left and Right with Standby Assistance - not met, Katelynn to L/SBA to R  4. Sit to stand transfer with Contact Guard Assistance - MET 23  5. Bed to chair transfer with Contact Guard Assistance using Rolling Walker/LRAD - MET  Updated Goal 23: Bed to chair transfer with SBA using RW - not met, CGA for safety    6. Gait  x 50 feet with Contact Guard Assistance using Rolling Walker/LRAD - MET 23  7. Wheelchair propulsion x 150 feet with Modified [place] using bilateral upper extremities - not met, Supervision with limited distance traveled    Outcome: Adequate for Care Transition   2023

## 2023-12-12 NOTE — PT/OT/SLP PROGRESS
Physical Therapy Treatment/Discharge Note    Patient Name:  Ngoc Castellanos   MRN:  [id number]  Admit Date: 11/22/2023  Admitting Diagnosis: Closed displaced intertrochanteric fracture of right femur with routine healing  Recent Surgeries: Cephalomedullary nail fixation of right intertrochanteric femur fracture     General Precautions: Standard, fall, aspiration  Orthopedic Precautions: RLE weight bearing as tolerated  Braces: N/A    Recommendations:     Discharge Recommendations: Low Intensity Therapy  Level of Assistance Recommended at Discharge: Intermittent assistance   Discharge Equipment Recommendations: bedside commode, hip kit, shower chair, wheelchair, walker, rolling  Barriers to discharge: Decreased caregiver support    Assessment:     Ngoc Castellanos is a 89 y.o. female admitted with a medical diagnosis of Closed displaced intertrochanteric fracture of right femur with routine healing. Patient agreeable to PT treatment this PM. D/C GG scoring completed during session. Patient currently requiring MaxA for supine to sit, Katelynn for sit to supine and rolling and CGA for functional transfers and ambulation. RW used for BUE support and overall balance during mobility for prevention of falls. Patient scheduled for D/C on 12/13/23 with recommendation for Home Health PT/OT to continue with progression of mobility toward maximal functional potential for improved quality of life and decreased caregiver burden.       Performance deficits affecting function: weakness, impaired endurance, impaired self care skills, impaired functional mobility, gait instability, impaired balance, decreased lower extremity function, decreased safety awareness, decreased ROM, edema, impaired cardiopulmonary response to activity, orthopedic precautions.    Rehab Potential is good    Activity Tolerance: Good    Plan:     Patient to be seen 5 x/week to address the above listed problems via gait training, therapeutic activities, therapeutic  "exercises, neuromuscular re-education, wheelchair management/training    Plan of Care Expires: 12/24/23  Plan of Care Reviewed with: patient    Subjective     "I am leaving tomorrow. Thank you so much for everything."     Pain/Comfort:  Pain Rating 1: 0/10  Pain Rating Post-Intervention 1: 0/10    Patient's cultural, spiritual, Jainism conflicts given the current situation:  no    Objective:     Communicated with nursing staff prior to session.  Patient found up in chair with  (no active lines) upon PT entry to room.     Therapeutic Activities and Exercises:   Seated BLE exercises x 20 reps including ankle DF/PF, LAQs, hip flexion, hip abduction/adduction and glute sets; assist with RLE for LAQs and hip flexion. Rest breaks throughout as needed.     Functional Mobility:     12/12/23 1445   Functional Limitation in Range of Motion   Upper Extremity No impairment   Lower Extremity Impairment on one side   Mobility Devices Walker;Wheelchair (manual or electric)   Roll Left and Right   Did they attempt the activity? Yes   Was the activity done independently? No   Assistance Needed Physical assistance  (Katelynn to L/SBA to R with HOB flat and use of bed rails)   Physical Assistance Level Less than half   Was adaptive equipment used? Yes   CARE Score - Roll Left and Right 3   Sit to Lying   Did they attempt the activity? Yes   Was the activity done independently? No   Assistance Needed Physical assistance  (Katelynn to elevate RLE with HOB flat and use of bed rails)   Physical Assistance Level Less than half   Was adaptive equipment used? Yes   CARE Score - Sit to Lying 3   Lying to Sitting on Side of Bed   Did they attempt the activity? Yes   Was the activity done independently? No   Assistance Needed Physical assistance  (MaxA with HOB flat and use of bed rails)   Physical Assistance Level More than half   Was adaptive equipment used? Yes   CARE Score - Lying to Sitting on Side of Bed 2   Sit to Stand   Did they attempt the " activity? Yes   Was the activity done independently? No   Assistance Needed Touching assistance  (CGA using RW)   Was adaptive equipment used? Yes   CARE Score - Sit to Stand 4   Chair/Bed-to-Chair Transfer   Did they attempt the activity? Yes   Was the activity done independently? No   Assistance Needed Touching assistance  (CGA using RW)   Was adaptive equipment used? Yes   CARE Score - Chair/Bed-to-Chair Transfer 4   Car Transfer   Did they attempt the activity? No   Reason if not Attempted Environmental limitations   CARE Score - Car Transfer 10   Walk 150 Feet   Did they attempt the activity? No   Reason if not Attempted Safety concerns   CARE Score - Walk 150 Feet 88   Walking 10 Feet on Uneven Surfaces   Did they attempt the activity? No   Reason if not Attempted Safety concerns   CARE Score - Walking 10 Feet on Uneven Surfaces 88   OTHER   Uses a Wheelchair/Scooter? Yes   Wheel 50 Feet with Two Turns   Did they attempt the activity? Yes   Was the activity done independently? No   Assistance Needed Setup / clean-up  (Patient propelled W/C 100 feet using BUE with Supervision.)   Type of Wheelchair/Scooter Manual   CARE Score - Wheel 50 Feet with Two Turns 5   Wheel 150 Feet   Did they attempt the activity? No   Reason if not Attempted Safety concerns   CARE Score - Wheel 150 Feet 88       AM-PAC 6 CLICK MOBILITY  15    Patient left up in chair with call button in reach and nursing staff notified.    GOALS:   Multidisciplinary Problems       Physical Therapy Goals          Problem: Physical Therapy    Goal Priority Disciplines Outcome Goal Variances Interventions   Physical Therapy Goal     PT, PT/OT Adequate for Care Transition     Description: Goals to be met by: 23       Patient will increase functional independence with mobility by performin. Supine to sit with Contact Guard Assistance - not met, MaxA   2. Sit to supine with Contact Guard Assistance - not met, Katelynn  3. Rolling to Left and Right  with Standby Assistance - not met, Katelynn to L/SBA to R  4. Sit to stand transfer with Contact Guard Assistance - MET 12/12/23  5. Bed to chair transfer with Contact Guard Assistance using Rolling Walker/LRAD - MET  Updated Goal 12/4/23: Bed to chair transfer with SBA using RW - not met, CGA for safety    6. Gait  x 50 feet with Contact Guard Assistance using Rolling Walker/LRAD - MET 12/11/23  7. Wheelchair propulsion x 150 feet with Modified [place] using bilateral upper extremities - not met, Supervision with limited distance traveled                         Time Tracking:     PT Received On: 12/12/23  PT Start Time: 1319  PT Stop Time: 1357  PT Total Time (min): 38 min    Billable Minutes: Gait Training 12, Therapeutic Activity 16, and Therapeutic Exercise 10    Treatment Type: Treatment  PT/PTA: PT     Number of PTA visits since last PT visit: 0     12/12/2023

## 2023-12-12 NOTE — PLAN OF CARE
Interdisciplinary team, Saumya Urbina, RN Charge Nurse, Sunita Ornelas, , Sagrario Webb, OT, Rehab, Sunita Arthur, Activities, and Jody Zambrano, Dietician, spoke to patient for care plan conference, weekly status update, and therapy progress update. Tentative discharge date set for 12/13/23.

## 2023-12-13 VITALS
BODY MASS INDEX: 29.13 KG/M2 | OXYGEN SATURATION: 97 % | HEART RATE: 89 BPM | TEMPERATURE: 98 F | DIASTOLIC BLOOD PRESSURE: 58 MMHG | SYSTOLIC BLOOD PRESSURE: 124 MMHG | RESPIRATION RATE: 16 BRPM | WEIGHT: 185.63 LBS | HEIGHT: 67 IN

## 2023-12-13 PROCEDURE — 25000003 PHARM REV CODE 250: Mod: HCNC | Performed by: HOSPITALIST

## 2023-12-13 PROCEDURE — 25000003 PHARM REV CODE 250: Mod: HCNC | Performed by: NURSE PRACTITIONER

## 2023-12-13 RX ADMIN — Medication 1 TABLET: at 10:12

## 2023-12-13 RX ADMIN — HYDROCORTISONE: 25 CREAM TOPICAL at 10:12

## 2023-12-13 RX ADMIN — PANTOPRAZOLE SODIUM 40 MG: 40 TABLET, DELAYED RELEASE ORAL at 10:12

## 2023-12-13 RX ADMIN — FUROSEMIDE 40 MG: 40 TABLET ORAL at 10:12

## 2023-12-13 RX ADMIN — APIXABAN 5 MG: 2.5 TABLET, FILM COATED ORAL at 10:12

## 2023-12-13 RX ADMIN — METHOCARBAMOL 500 MG: 500 TABLET ORAL at 10:12

## 2023-12-13 RX ADMIN — SENNOSIDES AND DOCUSATE SODIUM 1 TABLET: 8.6; 5 TABLET ORAL at 10:12

## 2023-12-13 RX ADMIN — ACETAMINOPHEN 1000 MG: 325 TABLET ORAL at 05:12

## 2023-12-13 RX ADMIN — POTASSIUM CHLORIDE 10 MEQ: 10 CAPSULE, COATED, EXTENDED RELEASE ORAL at 10:12

## 2023-12-13 NOTE — HOSPITAL COURSE
Patient progressed well with PT and OT- last PT note states that patient ambulated***. Patient had no significant events during their stay at SNF. Home health was set up. DME was ordered if needed. Follow up appointment to be made by patient within one week. All prescriptions and discharge instructions were ordered to be given to the patient prior to discharge.     PEx  Constitutional: Patient appears debilitated.  No distress noted  HENT:   Head: Normocephalic and atraumatic.   Eyes: Pupils are equal, round  Neck: Normal range of motion. Neck supple.   Cardiovascular: Normal rate, regular rhythm and normal heart sounds.    Pulmonary/Chest: Effort normal and breath sounds with bilateral crackles R>L  Abdominal: Soft. Bowel sounds are normal.   Musculoskeletal: Normal range of motion.   Neurological: Alert and oriented to person, place, and time.   Psychiatric: Normal mood and affect. Behavior is normal.   Skin: Skin is warm and dry.  Surgical dressing to RLE, only to be removed by Ortho.  2+ pitting edema to bilateral lower extremities R>L           Altered Skin Integrity 11/21/23 1256 Right lateral;upper Thigh Blister(s)   Date First Assessed/Time First Assessed: 11/21/23 1256   Altered Skin Integrity Present on Admission - Did Patient arrive to the hospital with altered skin?: suspected hospital acquired  Side: Right  Orientation: lateral;upper  Location: Thigh  Primar...   Dressing Appearance Clean;Dry;Intact;Moist drainage   Drainage Amount Scant   Drainage Characteristics/Odor Serosanguineous;Yellow;No odor   Appearance Pink;Red;Moist   Tissue loss description Partial thickness   Red (%), Wound Tissue Color 100 %   Periwound Area Intact;Dry;Pink;Scar tissue   Wound Length (cm) 2 cm   Wound Width (cm) 1.5 cm   Wound Depth (cm) 0.1 cm   Wound Volume (cm^3) 0.3 cm^3   Wound Surface Area (cm^2) 3 cm^2   Care Cleansed with:;Antimicrobial agent   Dressing Applied;Methylene blue/gentian violet;Foam   Dressing Change  Due 12/15/23        Altered Skin Integrity 11/28/23 0830 Right Buttocks Blister(s)   Date First Assessed/Time First Assessed: 11/28/23 0830   Altered Skin Integrity Present on Admission - Did Patient arrive to the hospital with altered skin?: yes  Side: Right  Location: Buttocks  Primary Wound Type: Blister(s)   Dressing Appearance Clean;Dry;Intact   Drainage Amount None   Drainage Characteristics/Odor No odor   Tissue loss description Not applicable   Periwound Area Intact   Wound Length (cm) 0 cm   Wound Width (cm) 0 cm   Wound Depth (cm) 0 cm   Wound Volume (cm^3) 0 cm^3   Wound Surface Area (cm^2) 0 cm^2   Dressing Removed;Applied;Foam        Altered Skin Integrity 11/28/23 0830 Right lateral Knee Blister(s)   Date First Assessed/Time First Assessed: 11/28/23 0830   Altered Skin Integrity Present on Admission - Did Patient arrive to the hospital with altered skin?: yes  Side: Right  Orientation: lateral  Location: Knee  Primary Wound Type: Blister(s)   Dressing Appearance Clean;Dry;Intact   Drainage Amount None   Drainage Characteristics/Odor No odor   Appearance Pink;Dry;Smooth   Tissue loss description Not applicable   Periwound Area Intact;Dry;Pink;Scar tissue   Wound Length (cm) 1 cm   Wound Width (cm) 1.4 cm   Wound Depth (cm) 0.1 cm   Wound Volume (cm^3) 0.14 cm^3   Wound Surface Area (cm^2) 1.4 cm^2

## 2023-12-13 NOTE — NURSING
Admission Diagnosis: Displaced intertrochanteric fractu*     POC reviewed with pt, pt verbalized understanding. Safety maintained throughout shift, bed locked and in lowest position, call light in reach. Pt remained free of fall/trauma. VSS, afebrile this shift.    Pt pain  Last Documentation = Comfort/Acceptable Pain Level: 0 (12/13/23 1000)    Last Documentation = Pain Rating (0-10): Activity: 0 (12/13/23 1000)    Last Documentation = Pain Rating (0-10): Rest: 0 (12/13/23 1000)    Skin assessment completed prior to discharge   Location: right femur incision LOUIE, lateral upper thigh, lateral upper knee hydrofera/ foam   Description: incision and excoriation  Recommendation: Clean , dry, intact     AVS reviewed with patient prior to discharge. Discharge instruction, follow up appointments and medication instructions given to pt, pt verbalized understanding. Patient IV lines have been removed prior to discharge. Rx sent to patients pharmacy.       * No surgery found *      OUTCOME: Patient tolerated treatment/procedure well without complication and is now ready for discharge.    DISPOSITION: Long Term Care    FOLLOWUP: With primary care provider        Pt discharging to [facility], being transported by [facility] transportation via wheelchair. Spoke with Laura at [facility], stated no report needed.

## 2023-12-13 NOTE — DISCHARGE SUMMARY
[facility] Medicine  Discharge Summary      Patient Name: Ngoc Castellanos  MRN: [id number]  MELODY: [id number]  Patient Class: IP- SNF  Admission Date: 11/22/2023  Hospital Length of Stay: 21 days  Discharge Date and Time:  12/13/2023 1:28 PM  Attending Physician: Jose A Ragsdale MD   Discharging Provider: Pooja Littlejohn NP  Primary Care Provider: Jacobo Mcleod MD    Primary Care Team: [facility] 8 POOJA LITTLEJOHN     HPI:   Mrs. Castellanos is an 89-year-old female with a PMHx of Afib on Eliquis, HFpEF on lasix, HTN, and CKD stage III who presents to SNF following hospitalization for right intertrochanteric femur fracture s/p cephalomedullary nail fixation on 11/19 with Dr. Perez.  Admission to SNF for secondary weakness and debility.     Patient [place] [facility] [place]18 with complaint of fall and leg pain.  Per [facility] notes,  Patient reports that she regularly uses either cane or walker to ambulate. She reports that she is often unsteady feeling on her feet and when at her home sometimes uses objects nearby for balance instead of cane or walker. States she was ambulating at home when she had trip and fall without LOC or hitting her head. Immediate severe right hip pain and inability to ambulate. Crawled across floor and able to call for help. EMS arrived and noted shortening and rotation of right leg, placed in pelvic binder and gave pain meds and brought to ED.In the ED patient afebrile and hemodynamically stable saturating well on room air. 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 Displacement of the lesser trochanter fracture fragment medially. CXR with stable pleural effusion compared to prior study. Orthopedic surgery consulted and patient admitted to the care of medicine for further evaluation and management. Patient was seen and evaluated by  Orthopedic surgery who recommended operative repair of hip fracture. Patient was medically optimized prior to surgery and was taken to OR after optimization on 11/19/2023. Patient underwent right hip cephalomedullary nail fixation by Dr. Henry Perez. Post-op patient weight bear as tolerated to the right lower extremity as per Orthopedics recommendation. Patient restarted on her home Apixiban 5 mg po BID post-op for her known persistent atrial fibrillation so no other chemical DVT prophylaxis needed post-op. PT/OT consulted post-op. Patient noted to be hyperglycemic on admit with -210. HgA1C ordered and returned at 6.3% consistent with prediabetes which is a new diagnosis. With patient's advanced age recommendation would be conservative mangement with diet control management with diabetic diet. discussed with patient and she is in agreement with diabetic diet and no medications to treat her prediabetes. Patient having post-op hypotension. Losartan held and giving IVF's on 11/20. BP improved after a total of 2 liters of normal saline given on 11/20. Lactic acid returned back as normal at 1.9. Hgb with drop post-op to 8.5 on 11/21 but that is expected blood loss from hip fracture and surgery and would not account totally for reason for post-op hypotension. Home Losartan on hold for BP management post-op. Patient requiring skilled nursing facility placement on discharge and accepted to [facility] and discharged in good condition to [facility] on 11/22/2023. Patient having some edema to her right surgical leg on discharge and to resume her home Lasix on discharge to [facility] and should help. BP much improved and plan to resume home Losartan and Atenolol on discharge to [facility].  Patient discharged on diabetic diet to treat her prediabetes. Perineural catheter removed by Anesthesia prior to hospital discharge.      Today, patient states her pain is well controlled.  She is progressing with therapy.   Patient reports shortness of breath that is at her baseline.  She states that her postoperative pain is not well controlled.  She endorses some intermittent abdominal discomfort particularly gas pains.  Bilateral lower extremity edema is present.     Patient will be treated at [facility] with PT and OT to improve functional status and ability to perform ADLs.     Hospital Course:   Patient progressed well with PT and OT- last PT note states that patient ambulated 50 ft + 40 ft + 20 ft CGA with RW.  Patient was adequately diuresed throughout SNF stay.  Her blood pressure improved with holding her home antihypertensive medication regimen to allow for Lasix dosing.  Advance care planning discussions held with patient, patient elected to be DNR and to have hospice services upon discharge.  Patient will be discharging to a new group home rather than returning to her independent apartment.  Patient is excited about her new home.  Home health was set up. DME was ordered if needed. Follow up appointment to be made by patient within one week. All prescriptions and discharge instructions were ordered to be given to the patient prior to discharge.     PEx  Constitutional: Patient appears debilitated.  No distress noted  HENT:   Head: Normocephalic and atraumatic.   Eyes: Pupils are equal, round  Neck: Normal range of motion. Neck supple.   Cardiovascular: Normal rate, regular rhythm and normal heart sounds.    Pulmonary/Chest: Effort normal and breath sounds with bilateral crackles R>L  Abdominal: Soft. Bowel sounds are normal.   Musculoskeletal: Normal range of motion.   Neurological: Alert and oriented to person, place, and time.   Psychiatric: Normal mood and affect. Behavior is normal.   Skin: Skin is warm and dry.  Surgical dressing to RLE, only to be removed by Ortho.  2+ pitting edema to bilateral lower extremities R>L           Altered Skin Integrity 11/21/23 1256 Right lateral;upper Thigh Blister(s)   Date First  Assessed/Time First Assessed: 11/21/23 1256   Altered Skin Integrity Present on Admission - Did Patient arrive to the hospital with altered skin?: suspected hospital acquired  Side: Right  Orientation: lateral;upper  Location: Thigh  Primar...   Dressing Appearance Clean;Dry;Intact;Moist drainage   Drainage Amount Scant   Drainage Characteristics/Odor Serosanguineous;Yellow;No odor   Appearance Pink;Red;Moist   Tissue loss description Partial thickness   Red (%), Wound Tissue Color 100 %   Periwound Area Intact;Dry;Pink;Scar tissue   Wound Length (cm) 2 cm   Wound Width (cm) 1.5 cm   Wound Depth (cm) 0.1 cm   Wound Volume (cm^3) 0.3 cm^3   Wound Surface Area (cm^2) 3 cm^2   Care Cleansed with:;Antimicrobial agent   Dressing Applied;Methylene blue/gentian violet;Foam   Dressing Change Due 12/15/23        Altered Skin Integrity 11/28/23 0830 Right Buttocks Blister(s)   Date First Assessed/Time First Assessed: 11/28/23 0830   Altered Skin Integrity Present on Admission - Did Patient arrive to the hospital with altered skin?: yes  Side: Right  Location: Buttocks  Primary Wound Type: Blister(s)   Dressing Appearance Clean;Dry;Intact   Drainage Amount None   Drainage Characteristics/Odor No odor   Tissue loss description Not applicable   Periwound Area Intact   Wound Length (cm) 0 cm   Wound Width (cm) 0 cm   Wound Depth (cm) 0 cm   Wound Volume (cm^3) 0 cm^3   Wound Surface Area (cm^2) 0 cm^2   Dressing Removed;Applied;Foam        Altered Skin Integrity 11/28/23 0830 Right lateral Knee Blister(s)   Date First Assessed/Time First Assessed: 11/28/23 0830   Altered Skin Integrity Present on Admission - Did Patient arrive to the hospital with altered skin?: yes  Side: Right  Orientation: lateral  Location: Knee  Primary Wound Type: Blister(s)   Dressing Appearance Clean;Dry;Intact   Drainage Amount None   Drainage Characteristics/Odor No odor   Appearance Pink;Dry;Smooth   Tissue loss description Not applicable   Periwound  "Area Intact;Dry;Pink;Scar tissue   Wound Length (cm) 1 cm   Wound Width (cm) 1.4 cm   Wound Depth (cm) 0.1 cm   Wound Volume (cm^3) 0.14 cm^3   Wound Surface Area (cm^2) 1.4 cm^2            Goals of Care Treatment Preferences:  Code Status: DNR    Living Will: Yes              Consults:   Consults (From admission, onward)          Status Ordering Provider     Inpatient consult to Registered Dietitian/Nutritionist  Once        Provider:  (Not yet assigned)    HASEEB Wyman new Assessment & Plan notes have been filed under this hospital service since the last note was generated.  Service: Hospital Medicine    Final Active Diagnoses:    Diagnosis Date Noted POA    PRINCIPAL PROBLEM:  Closed displaced intertrochanteric fracture of right femur s/p IM nail on 11/19/2023 [S72.141D] 11/18/2023 Not Applicable    Hyponatremia [E87.1] 11/22/2023 Yes    Acute blood loss as cause of postoperative anemia [D62] 11/19/2023 Yes    Primary osteoarthritis of left knee [M17.12] 06/21/2022 Yes    Chronic heart failure with preserved ejection fraction [I50.32] 05/18/2021 Yes    Chronic anticoagulation [Z79.01] 10/30/2017 Not Applicable    Stage 3b chronic kidney disease [N18.32]  Yes    Osteopenia [M85.80] 05/06/2014 Yes    Persistent atrial fibrillation [I48.19] 09/25/2013 Yes    Peripheral neuropathy [G62.9] 07/29/2013 Yes    Primary hypertension [I10] 09/06/2012 Yes      Problems Resolved During this Admission:       Discharged Condition: stable    Disposition: Home-Health Care c    Follow Up:    Patient Instructions:      HOSPITAL BED FOR HOME USE     Order Specific Question Answer Comments   Type: Semi-electric    Length of need (1-99 months): 99    Does patient have medical equipment at home? grab bar    Does patient have medical equipment at home? cane, straight    Does patient have medical equipment at home? rollator    Height: 5' 7" (1.702 m)    Weight: 84.2 kg (185 lb 10 oz)    Please check all " "that apply: Patient requires a bed height different than a fixed height hospital bed to permit transfers to chair, wheelchair, or standing.      WALKER FOR HOME USE     Order Specific Question Answer Comments   Type of Walker: Adult (5'4"-6'6")    With wheels? No    Height: 5' 7" (1.702 m)    Weight: 84.2 kg (185 lb 10 oz)    Length of need (1-99 months): 99    Does patient have medical equipment at home? grab bar    Does patient have medical equipment at home? cane, straight    Does patient have medical equipment at home? rollator    Please check all that apply: Walker will be used for gait training.    Please check all that apply: Patient's condition impairs ambulation.    Please check all that apply: Patient is unable to safely ambulate without equipment.      WHEELCHAIR FOR HOME USE     Order Specific Question Answer Comments   Hours in W/C per day: 8    Type of Wheelchair: Lightweight    Patient unable to propel in Standard wheelchair? Yes    Size(Width): 20"    Leg Support: Elevating leg rests    Leg Support: Swing Away    Lap Belt: Buckle    Accessories: Anti-tippers    Accessories: Front brakes    Accessories: Brake extensions    Accessories: Safety belt    Accessories: Heel loops    Cushion: Basic    Justification for cushion: Prevent pressure ulcers    Reclining Back No    Height: 5' 7" (1.702 m)    Weight: 84.2 kg (185 lb 10 oz)    Does patient have medical equipment at home? grab bar    Does patient have medical equipment at home? cane, straight    Does patient have medical equipment at home? rollator    Length of need (1-99 months): 99    Please check all that apply: Caregiver is capable and willing to operate wheelchair safely.    Please check all that apply: The patient requires the use of a w/c for activities of daily living within the Home.    Please check all that apply: Patient mobility limitations cannot be sufficiently resolved by the use of other ambulatory therapies.      BATH/SHOWER CHAIR FOR " "HOME USE     Order Specific Question Answer Comments   Height: 5' 7" (1.702 m)    Weight: 84.2 kg (185 lb 10 oz)    Does patient have medical equipment at home? grab bar    Does patient have medical equipment at home? cane, straight    Does patient have medical equipment at home? rollator    Length of need (1-99 months): 99    Type: With back      HIP KIT FOR HOME USE     Order Specific Question Answer Comments   Height: 5' 7" (1.702 m)    Weight: 84.2 kg (185 lb 10 oz)    Does patient have medical equipment at home? grab bar    Does patient have medical equipment at home? cane, straight    Does patient have medical equipment at home? rollator    Length of need (1-99 months): 99    Type: [place] Hip Kit      No driving until:   Order Comments: Cleared by PCP     Notify your health care provider if you experience any of the following:  temperature >100.4     Notify your health care provider if you experience any of the following:  persistent nausea and vomiting or diarrhea     Notify your health care provider if you experience any of the following:  severe uncontrolled pain     Notify your health care provider if you experience any of the following:  redness, tenderness, or signs of infection (pain, swelling, redness, odor or green/yellow discharge around incision site)     Notify your health care provider if you experience any of the following:  difficulty breathing or increased cough     Notify your health care provider if you experience any of the following:  severe persistent headache     Notify your health care provider if you experience any of the following:  worsening rash     Notify your health care provider if you experience any of the following:  persistent dizziness, light-headedness, or visual disturbances     Notify your health care provider if you experience any of the following:  increased confusion or weakness     Activity as tolerated       Significant Diagnostic Studies: N/A    Pending Diagnostic " Studies:       Procedure Component Value Units Date/Time    Basic Metabolic Panel (BMP) [[id number]]     Order Status: Sent Lab Status: No result     Specimen: Blood     CBC auto differential [[id number]]     Order Status: Sent Lab Status: No result     Specimen: Blood     Magnesium [[id number]]     Order Status: Sent Lab Status: No result     Specimen: Blood     Phosphorus [[id number]]     Order Status: Sent Lab Status: No result     Specimen: Blood            Medications:  Reconciled Home Medications:      Medication List        START taking these medications      hydrocortisone 2.5 % cream  Apply topically 3 (three) times daily as needed (to back for itching).     OCUVITE WITH LUTEIN Tab  Generic drug: vit A,C & E-lutein-minerals 1,000-60-2 unit  Take 1 tablet by mouth once daily.  Replaces: OCUVITE ORAL     pantoprazole 40 MG tablet  Commonly known as: PROTONIX  Take 1 tablet (40 mg total) by mouth once daily.     polyethylene glycol 17 gram Pwpk  Commonly known as: GLYCOLAX  Take 17 g by mouth once daily.     potassium chloride 10 MEQ Cpsr  Commonly known as: MICRO-K  Take 1 capsule (10 mEq total) by mouth once daily.            CHANGE how you take these medications      acetaminophen 325 MG tablet  Commonly known as: TYLENOL  Take 2 tablets (650 mg total) by mouth every 6 (six) hours as needed for Pain.  What changed:   medication strength  how much to take  when to take this  reasons to take this     apixaban 5 mg Tab  Commonly known as: ELIQUIS  Take 1 tablet (5 mg total) by mouth 2 (two) times daily.  What changed: how much to take     atenoloL 25 MG tablet  Commonly known as: TENORMIN  Take 1 tablet (25 mg total) by mouth once daily. HOLD UNTIL FOLLOW-UP WITH PRIMARY CARE PROVIDER  What changed:   when to take this  additional instructions     furosemide 20 MG tablet  Commonly known as: LASIX  Take 2 tablets in the morning and 1 tablet in the afternoon at 6pm  What changed: See the new instructions.      losartan 25 MG tablet  Commonly known as: COZAAR  Take 1 tablet (25 mg total) by mouth once daily. HOLD UNTIL FOLLOW-UP WITH PRIMARY CARE PROVIDER  What changed:   when to take this  additional instructions     methocarbamoL 500 MG Tab  Commonly known as: ROBAXIN  Take 1 tablet (500 mg total) by mouth 4 (four) times daily. for 10 days  What changed: when to take this            CONTINUE taking these medications      calcium carbonate 600 mg calcium (1,500 mg) Tab  Commonly known as: OS-CELIA  Take 600 mg by mouth 2 (two) times daily with meals.     meclizine 12.5 mg tablet  Commonly known as: ANTIVERT  Take 1 tablet (12.5 mg total) by mouth 3 (three) times daily as needed for Dizziness.     senna-docusate 8.6-50 mg 8.6-50 mg per tablet  Commonly known as: PERICOLACE  Take 1 tablet by mouth 2 (two) times daily.     sodium chloride 0.65 % nasal spray  Commonly known as: OCEAN  1 spray by Nasal route as needed for Congestion.     traMADoL 50 mg tablet  Commonly known as: ULTRAM  Take 1 tablet (50 mg total) by mouth every 6 (six) hours as needed for Pain.            STOP taking these medications      fluticasone propionate 50 mcg/actuation nasal spray  Commonly known as: FLONASE     melatonin 3 mg tablet  Commonly known as: MELATIN     OCUVITE ORAL  Replaced by: OCUVITE WITH LUTEIN Tab              Indwelling Lines/Drains at time of discharge:   Lines/Drains/Airways       Drain  Duration             Female External Urinary Catheter 11/22/23 1900 20 days              Epidural Line  Duration                  Perineural Analgesia/Anesthesia Assessment (Motor Function-Bromage) 11/19/23 0915 24 days                    Time spent on the discharge of patient: 39 minutes         Pooja Littlejohn NP  Department of Hospital Medicine  [facility]

## 2025-05-23 ENCOUNTER — PATIENT OUTREACH (OUTPATIENT)
Dept: ADMINISTRATIVE | Facility: HOSPITAL | Age: OVER 89
End: 2025-05-23
Payer: MEDICARE

## 2025-05-23 NOTE — PROGRESS NOTES
Health Maintenance Topic(s) Outreach Outcomes & Actions Taken:    Primary Care Appt - Outreach Outcomes & Actions Taken  : pt passed away per Beth Humphries

## (undated) DEVICE — APPLICATOR CHLORAPREP ORN 26ML

## (undated) DEVICE — SUT VICRYL PLUS 3-0 SH 18IN

## (undated) DEVICE — KIT PT CARE HANA PROFX SSXT

## (undated) DEVICE — TAPE SURG DURAPORE 2 X10YD

## (undated) DEVICE — KWIRE RECON THREADED 3.2X400MM
Type: IMPLANTABLE DEVICE | Site: FEMUR | Status: NON-FUNCTIONAL
Removed: 2023-11-19

## (undated) DEVICE — GOWN AERO CHROME W/ TOWEL XL

## (undated) DEVICE — GUIDE WIRE 3.0X1000MM BALL TIP
Type: IMPLANTABLE DEVICE | Site: FEMUR | Status: NON-FUNCTIONAL
Removed: 2023-11-19

## (undated) DEVICE — SUT VICRYL PLUS 0 CT1 18IN

## (undated) DEVICE — DRAPE U SPLIT SHEET 54X76IN

## (undated) DEVICE — DRAPE C-ARM ELAS CLIP 42X120IN

## (undated) DEVICE — DRAPE STERI U-SHAPED 47X51IN

## (undated) DEVICE — DRAPE THREE-QTR REINF 53X77IN

## (undated) DEVICE — DRESSING AQUACEL RIBBON 2X45CM

## (undated) DEVICE — REAMER SHAFT MOD TRINKLE 8X510

## (undated) DEVICE — SPONGE COTTON TRAY 4X4IN

## (undated) DEVICE — DRAPE TOP 53X102IN

## (undated) DEVICE — DRAPE C-ARMOR EQUIPMENT COVER

## (undated) DEVICE — ADHESIVE DERMABOND ADVANCED

## (undated) DEVICE — DRAPE IOBAN 2 STERI

## (undated) DEVICE — TRAY MINOR ORTHO OMC

## (undated) DEVICE — SUT MONOCRYL 3-0 PS-2 UND

## (undated) DEVICE — DRILL T2 ALPH FREHND 4.2X185MM

## (undated) DEVICE — DRAPE INCISE IOBAN 2 23X17IN